# Patient Record
Sex: FEMALE | Race: WHITE | NOT HISPANIC OR LATINO | Employment: FULL TIME | URBAN - METROPOLITAN AREA
[De-identification: names, ages, dates, MRNs, and addresses within clinical notes are randomized per-mention and may not be internally consistent; named-entity substitution may affect disease eponyms.]

---

## 2020-01-22 ENCOUNTER — HOSPITAL ENCOUNTER (EMERGENCY)
Facility: MEDICAL CENTER | Age: 25
End: 2020-01-22
Attending: EMERGENCY MEDICINE
Payer: COMMERCIAL

## 2020-01-22 VITALS
BODY MASS INDEX: 34.94 KG/M2 | HEIGHT: 69 IN | DIASTOLIC BLOOD PRESSURE: 72 MMHG | OXYGEN SATURATION: 97 % | WEIGHT: 235.89 LBS | RESPIRATION RATE: 18 BRPM | SYSTOLIC BLOOD PRESSURE: 116 MMHG | TEMPERATURE: 96.9 F | HEART RATE: 101 BPM

## 2020-01-22 DIAGNOSIS — M79.7 FIBROMYALGIA: ICD-10-CM

## 2020-01-22 DIAGNOSIS — Z76.0 MEDICATION REFILL: ICD-10-CM

## 2020-01-22 DIAGNOSIS — R00.0 TACHYCARDIA: ICD-10-CM

## 2020-01-22 PROCEDURE — 93005 ELECTROCARDIOGRAM TRACING: CPT | Performed by: EMERGENCY MEDICINE

## 2020-01-22 PROCEDURE — 99284 EMERGENCY DEPT VISIT MOD MDM: CPT

## 2020-01-22 RX ORDER — SERTRALINE HYDROCHLORIDE 100 MG/1
100 TABLET, FILM COATED ORAL DAILY
COMMUNITY
End: 2020-03-10 | Stop reason: SDUPTHER

## 2020-01-22 RX ORDER — PREGABALIN 75 MG/1
75 CAPSULE ORAL DAILY
Qty: 30 CAP | Refills: 0 | Status: SHIPPED | OUTPATIENT
Start: 2020-01-22 | End: 2020-02-21

## 2020-01-22 RX ORDER — AMITRIPTYLINE HYDROCHLORIDE 10 MG/1
10 TABLET, FILM COATED ORAL NIGHTLY PRN
Qty: 30 TAB | Refills: 1 | Status: SHIPPED | OUTPATIENT
Start: 2020-01-22 | End: 2020-03-10 | Stop reason: SDUPTHER

## 2020-01-22 RX ORDER — NAPROXEN 500 MG/1
500 TABLET ORAL 2 TIMES DAILY WITH MEALS
COMMUNITY
End: 2020-01-22 | Stop reason: SDUPTHER

## 2020-01-22 RX ORDER — ALPRAZOLAM 0.5 MG/1
0.5 TABLET ORAL NIGHTLY PRN
COMMUNITY
End: 2020-01-22 | Stop reason: SDUPTHER

## 2020-01-22 RX ORDER — AMITRIPTYLINE HYDROCHLORIDE 10 MG/1
10 TABLET, FILM COATED ORAL NIGHTLY
COMMUNITY
End: 2020-01-22 | Stop reason: SDUPTHER

## 2020-01-22 RX ORDER — ALPRAZOLAM 0.5 MG/1
0.5 TABLET ORAL NIGHTLY PRN
Qty: 30 TAB | Refills: 0 | Status: SHIPPED | OUTPATIENT
Start: 2020-01-22 | End: 2020-02-21

## 2020-01-22 RX ORDER — NAPROXEN 500 MG/1
500 TABLET ORAL 2 TIMES DAILY WITH MEALS
Qty: 30 TAB | Refills: 0 | Status: SHIPPED | OUTPATIENT
Start: 2020-01-22 | End: 2020-03-10 | Stop reason: SDUPTHER

## 2020-01-22 RX ORDER — PREGABALIN 75 MG/1
75 CAPSULE ORAL DAILY
COMMUNITY
End: 2020-01-22 | Stop reason: SDUPTHER

## 2020-01-22 SDOH — HEALTH STABILITY: MENTAL HEALTH: HOW OFTEN DO YOU HAVE A DRINK CONTAINING ALCOHOL?: NEVER

## 2020-01-22 NOTE — ED TRIAGE NOTES
Carmen Vasques Ynes Hebert presents to triage reporting recent move from [place], unable to see MD, and needs refill for fibromyalgia meds-- pt takes:  lyrica 75mg daily  Elavil 10mg daily  Naproxen 500 mg BID  Xanax .5mg PRN (has some)  Zoloft 100mg BID    Pt denies any medical complaints. Pt noted to also be tachycardic in triage at 110. Pt speaking in full sentences, NAD noted. Pt educated of triage process, placed back in waiting area pending room assignment.

## 2020-01-22 NOTE — ED PROVIDER NOTES
ED Provider Note    Scribed for David Parekh M.D. by Nadine Goss. 1/22/2020  8:53 AM    Primary care provider: None  Means of arrival: Walk in  History obtained from: Patient  History limited by: None    CHIEF COMPLAINT  Chief Complaint   Patient presents with   • Medication Refill       HPI  Carmen Hebert is a 24 y.o. female who presents for medication refill of fibromyalgia meds. Patient recently moved to [place] from [facility], and was unable to see MD. According to RN notes, patient takes Lyrica 75 mg, Elavil 10 mg, Naproxen 500 mg BID, Xanax 0.5 mg PRN, Zoloft 100 mg BID. She states she still has some Xanax and Zoloft from previous prescription. Patient states she was diagnosed with fibromyalgia in 2017. She states her diagnosis presents with generalized pain most noted in trigger points, sometimes in joints. She states she has not been taking her medications for one month now, and is now experiencing pain.  She just got Medicaid approved last week, however states she does not have a PCP in [place] yet. Currently, heart rate is mildly elevated, she states this may be attributed to her not taking her medications. She denies history of CAD. Denies headache, fever, cough, vomiting, diarrhea, depression, SI.        REVIEW OF SYSTEMS  Pertinent positives include: increased heart rate and generalized pain  Pertinent negatives include: headache, fever, cough, vomiting, diarrhea, depression, SI.  .      PAST MEDICAL HISTORY  Fibromyalgia    SOCIAL HISTORY  Social History     Tobacco Use   • Smoking status: Never Smoker   • Smokeless tobacco: Never Used   Substance Use Topics   • Alcohol use: Never     Frequency: Never   • Drug use: Never   Just moved to [facility] and has no primary physician    CURRENT MEDICATIONS  lyrica 75mg daily  Elavil 10mg daily  Naproxen 500 mg BID  Xanax .5mg PRN (has some)  Zoloft 100mg BID    ALLERGIES  No Known Allergies    PHYSICAL EXAM  VITAL SIGNS: /79   Pulse (!)  "110   Temp 35.9 °C (96.6 °F)   Resp 18   Ht 1.753 m (5' 9\")   Wt 107 kg (235 lb 14.3 oz)   LMP 12/25/2019 (Approximate)   SpO2 97%   BMI 34.84 kg/m² tachycardic, high normal blood pressure, afebrile  Constitutional: Well developed, Well nourished, No acute distress, Able to answer questions.  Well-appearing, moderately obese  HENT: Moist mucous membranes, Nose is normal in appearance without rhinorrhea, external ears are normal  Eyes: Anicteric,  pupils are equal round and reactive, there is no conjunctival drainage or pallor   Neck: The trachea is midline, there is no obvious mass or meningeal signs  Cardiovascular:  Normal perfusion.   Tachycardic rate and regularrhythm without murmurs gallops or rubs  Thorax & Lungs: Respiratory rate and effort are normal. There is normal chest excursion with respiration.  No wheezes rhonchi or rales noted.  Abdomen: Abdomen is normal in appearance, no gross peritoneal signs  normal bowel sounds, no pain with cough, nonpulsatile  Musculoskeletal: No deformities noted in all 4 extremities. no edema present.   Skin: Skin is warm, visualized skin shows no erythema, no rash.  Neurologic:  Cranial nerves II through XII are intact there is no focal abnormality noted.  Psychiatric: Normal mood and mentation.      EKG Interpretation:  Interpreted by me    Rhythm:  Sinus Tachycardia  Rate: 101  Axis: normal  Ectopy: none  Conduction: normal  ST Segments: no acute change  T Waves: no acute change  Q Waves: none  Clinical Impression: Sinus Tachycardia      ED COURSE:    8:53 AM - Patient seen and examined at bedside. Patient presents today for medication refill for fibromyalgia medications. She recently moved to [place] from [place] and has been unable to fill prescription. Patient is tachycardic on exam. Ordered EKG to evaluate.    9:30 AM - Review of narcotic records show the patient has no records.     9:47 AM Patient was reevaluated at bedside. Discussed EKG results with patient and she " was given prescription for her fibromyalgia medications. Patient was given an opportunity to ask questions and will return for any new or worsening symptoms. She was advised to schedule an appointment with a PCP.   .    MEDICAL DECISION MAKING:  Well-appearing patient with no primary physician and a history of fibromyalgia presents requesting refill of medications for fibromyalgia.  She was also tachycardic in triage but this spontaneously improved to 101.  There is no evidence of arrhythmia and patient denies thyroid disease and stimulant use.  At this point laboratory work-up is unlikely to  in my opinion is not necessary.  I doubt the patient has a PE or myocardial ischemia.    DISPOSITION: Home    PLAN:  Refill of medications  Return for any new or worsening symptoms.     [address] [address]  [phone]  Schedule an appointment as soon as possible for a visit         CONDITION:  Good.    FINAL IMPRESSION:  1. Fibromyalgia    2. Tachycardia    3. Medication refill         I, Nadine Goss (Scribe), am scribing for, and in the presence of, David Parekh M.D..    Electronically signed by: Nadine Goss (Scribe), 1/22/2020 C      The note accurately reflects work and decisions made by me.  David Parekh M.D.  1/22/2020  2:10 PM

## 2020-01-23 LAB — EKG IMPRESSION: NORMAL

## 2020-02-22 ENCOUNTER — OFFICE VISIT (OUTPATIENT)
Dept: URGENT CARE | Facility: CLINIC | Age: 25
End: 2020-02-22
Payer: COMMERCIAL

## 2020-02-22 VITALS
DIASTOLIC BLOOD PRESSURE: 72 MMHG | BODY MASS INDEX: 33.33 KG/M2 | OXYGEN SATURATION: 95 % | SYSTOLIC BLOOD PRESSURE: 102 MMHG | HEIGHT: 70 IN | RESPIRATION RATE: 14 BRPM | HEART RATE: 78 BPM | WEIGHT: 232.8 LBS | TEMPERATURE: 97.4 F

## 2020-02-22 DIAGNOSIS — J01.10 ACUTE NON-RECURRENT FRONTAL SINUSITIS: ICD-10-CM

## 2020-02-22 DIAGNOSIS — M79.7 FIBROMYALGIA: ICD-10-CM

## 2020-02-22 PROCEDURE — 99203 OFFICE O/P NEW LOW 30 MIN: CPT | Performed by: PHYSICIAN ASSISTANT

## 2020-02-22 RX ORDER — AMOXICILLIN AND CLAVULANATE POTASSIUM 875; 125 MG/1; MG/1
1 TABLET, FILM COATED ORAL 2 TIMES DAILY
Qty: 14 TAB | Refills: 0 | Status: SHIPPED | OUTPATIENT
Start: 2020-02-22 | End: 2020-02-29

## 2020-02-22 RX ORDER — CYCLOBENZAPRINE HCL 5 MG
5-10 TABLET ORAL NIGHTLY PRN
Qty: 30 TAB | Refills: 0 | Status: SHIPPED | OUTPATIENT
Start: 2020-02-22 | End: 2020-03-10 | Stop reason: SDUPTHER

## 2020-02-22 RX ORDER — CYCLOBENZAPRINE HCL 5 MG
5-10 TABLET ORAL 3 TIMES DAILY PRN
COMMUNITY
End: 2020-02-22

## 2020-02-22 RX ORDER — FLUTICASONE PROPIONATE 50 MCG
1 SPRAY, SUSPENSION (ML) NASAL DAILY
Qty: 16 G | Refills: 0 | Status: SHIPPED | OUTPATIENT
Start: 2020-02-22 | End: 2020-03-10 | Stop reason: SDUPTHER

## 2020-02-22 RX ORDER — PREGABALIN 75 MG/1
75 CAPSULE ORAL 3 TIMES DAILY
COMMUNITY
End: 2020-02-22

## 2020-02-22 RX ORDER — PREGABALIN 75 MG/1
75 CAPSULE ORAL DAILY
Qty: 30 CAP | Refills: 0 | Status: SHIPPED | OUTPATIENT
Start: 2020-02-22 | End: 2020-03-10 | Stop reason: SDUPTHER

## 2020-02-22 RX ORDER — GUAIFENESIN 600 MG/1
600 TABLET, EXTENDED RELEASE ORAL EVERY 12 HOURS
Qty: 28 TAB | Refills: 0 | Status: SHIPPED
Start: 2020-02-22 | End: 2020-03-10

## 2020-02-22 ASSESSMENT — ENCOUNTER SYMPTOMS
SHORTNESS OF BREATH: 0
SINUS PAIN: 1
SPUTUM PRODUCTION: 0
WHEEZING: 0
COUGH: 1
HEMOPTYSIS: 0
CHILLS: 0
PALPITATIONS: 0
HEADACHES: 1
NAUSEA: 0
SINUS PRESSURE: 1
VOMITING: 0
DIZZINESS: 0
BACK PAIN: 1
FEVER: 0
SORE THROAT: 1
HOARSE VOICE: 0
MYALGIAS: 1

## 2020-02-22 NOTE — PROGRESS NOTES
Subjective:   Carmen Hebert is a 25 y.o. female who presents for Medication Refill (pregabalin 75 mg,  cyclobenzaprine 5 mg, will see primary set appointment in March) and Congestion (wants to make sure sinus is ok, took cold medications, fluticazone 2x day did not help  x 2 weeks )        1.  Patient with history of fibromyalgia who recently relocated from [facility] presents requesting medication refill.  She has fibromyalgia that is well controlled on Lyrica 75 mg daily, cyclobenzaprine 5 mg nightly, Zoloft, Elavil, naproxen, and alprazolam as needed.  She was evaluated in the ER last month to have several of these medications refilled.  At this time she is only out of her Lyrica and cyclobenzaprine.  She does have an appointment with new PCP on 3/10/20.  She will be seeing IRLANDA Reilly.  She was previously following with rheumatology in [place].  No plans to establish care yet.    2.  Patient complains of upper respiratory symptoms x7 to 10 days with worsening sinus pressure in the last 5 days.  Sinus pressure is frontal.  She has been taking over-the-counter cold medications and Flonase daily-this is not helping.  Denies history of seasonal allergies.  She does have an allergies to certain plants and dander.    Sinusitis   This is a new problem. The current episode started 1 to 4 weeks ago. The problem is unchanged. Maximum temperature: subjective. The fever has been present for 3 to 4 days. Associated symptoms include congestion, coughing (productive, improved), headaches, sinus pressure and a sore throat. Pertinent negatives include no chills, ear pain, hoarse voice or shortness of breath. Past treatments include oral decongestants. The treatment provided no relief.     Review of Systems   Constitutional: Negative for chills, fever and malaise/fatigue.   HENT: Positive for congestion, sinus pressure, sinus pain and sore throat. Negative for ear pain, hoarse voice and tinnitus.   "  Respiratory: Positive for cough (productive, improved). Negative for hemoptysis, sputum production, shortness of breath and wheezing.    Cardiovascular: Negative for chest pain and palpitations.   Gastrointestinal: Negative for nausea and vomiting.   Genitourinary:        Prolonged menses- 10 days   Musculoskeletal: Positive for back pain and myalgias.   Neurological: Positive for headaches. Negative for dizziness.   All other systems reviewed and are negative.      PMH: fibromyalgia  MEDS:   Current Outpatient Medications:   •  pregabalin (LYRICA) 75 MG Cap, Take 1 Cap by mouth every day for 30 days., Disp: 30 Cap, Rfl: 0  •  cyclobenzaprine (FLEXERIL) 5 MG tablet, Take 1-2 Tabs by mouth at bedtime as needed for up to 30 days., Disp: 30 Tab, Rfl: 0  •  amoxicillin-clavulanate (AUGMENTIN) 875-125 MG Tab, Take 1 Tab by mouth 2 times a day for 7 days., Disp: 14 Tab, Rfl: 0  •  guaiFENesin ER (MUCINEX) 600 MG TABLET SR 12 HR, Take 1 Tab by mouth every 12 hours., Disp: 28 Tab, Rfl: 0  •  fluticasone (FLONASE) 50 MCG/ACT nasal spray, Spray 1 Spray in nose every day., Disp: 16 g, Rfl: 0  •  sertraline (ZOLOFT) 100 MG Tab, Take 100 mg by mouth 2 Times a Day., Disp: , Rfl:   •  amitriptyline (ELAVIL) 10 MG Tab, Take 1 Tab by mouth at bedtime as needed for Sleep., Disp: 30 Tab, Rfl: 1  •  naproxen (NAPROSYN) 500 MG Tab, Take 1 Tab by mouth 2 times a day, with meals., Disp: 30 Tab, Rfl: 0  ALLERGIES: No Known Allergies  SURGHX: History reviewed. No pertinent surgical history.  SOCHX:  reports that she has never smoked. She has never used smokeless tobacco. She reports that she does not drink alcohol or use drugs.  FH: Family history was reviewed, no pertinent findings to report   Objective:   /72   Pulse 78   Temp 36.3 °C (97.4 °F) (Temporal)   Resp 14   Ht 1.778 m (5' 10\")   Wt 105.6 kg (232 lb 12.8 oz)   SpO2 95%   BMI 33.40 kg/m²   Physical Exam  Vitals signs reviewed.   Constitutional:       General: She is " not in acute distress.     Appearance: Normal appearance. She is well-developed. She is not toxic-appearing.   HENT:      Head: Normocephalic and atraumatic.      Right Ear: Tympanic membrane, ear canal and external ear normal.      Left Ear: Tympanic membrane, ear canal and external ear normal.      Nose: Mucosal edema and congestion present. No rhinorrhea.      Right Sinus: Frontal sinus tenderness present. No maxillary sinus tenderness.      Left Sinus: Frontal sinus tenderness present. No maxillary sinus tenderness.      Mouth/Throat:      Lips: Pink.      Mouth: Mucous membranes are moist.      Pharynx: Oropharynx is clear. Uvula midline.   Eyes:      General: Lids are normal.      Conjunctiva/sclera: Conjunctivae normal.   Neck:      Musculoskeletal: Neck supple.   Cardiovascular:      Rate and Rhythm: Normal rate and regular rhythm.      Heart sounds: Normal heart sounds, S1 normal and S2 normal. No murmur. No friction rub. No gallop.    Pulmonary:      Effort: Pulmonary effort is normal. No respiratory distress.      Breath sounds: Normal breath sounds. No decreased breath sounds, wheezing, rhonchi or rales.   Musculoskeletal:      Comments: Normal range of motion. Exhibits no edema and no tenderness.    Lymphadenopathy:      Cervical:      Right cervical: No superficial or posterior cervical adenopathy.     Left cervical: No superficial or posterior cervical adenopathy.   Skin:     General: Skin is warm and dry.      Capillary Refill: Capillary refill takes less than 2 seconds.   Neurological:      Mental Status: She is alert and oriented to person, place, and time.      Cranial Nerves: No cranial nerve deficit.      Sensory: No sensory deficit.   Psychiatric:         Speech: Speech normal.         Behavior: Behavior normal.         Thought Content: Thought content normal.         Judgment: Judgment normal.           Assessment/Plan:   1. Fibromyalgia  - pregabalin (LYRICA) 75 MG Cap; Take 1 Cap by mouth  every day for 30 days.  Dispense: 30 Cap; Refill: 0  - cyclobenzaprine (FLEXERIL) 5 MG tablet; Take 1-2 Tabs by mouth at bedtime as needed for up to 30 days.  Dispense: 30 Tab; Refill: 0    2. Acute non-recurrent frontal sinusitis  - amoxicillin-clavulanate (AUGMENTIN) 875-125 MG Tab; Take 1 Tab by mouth 2 times a day for 7 days.  Dispense: 14 Tab; Refill: 0  - guaiFENesin ER (MUCINEX) 600 MG TABLET SR 12 HR; Take 1 Tab by mouth every 12 hours.  Dispense: 28 Tab; Refill: 0  - fluticasone (FLONASE) 50 MCG/ACT nasal spray; Spray 1 Spray in nose every day.  Dispense: 16 g; Refill: 0    1.  Medications refilled.  Patient to follow-up with PCP as scheduled.  If patient has any issues in the interim we are happy to evaluate her in urgent care.    2. Pt instructed to complete full course of medication despite symptomatic improvement. Pt to take med with meals to alleviate GI upset. Pt to take a probiotic or eat Lynn’s yogurt/ kefir while taking the medication.    Flonase 1 squirt in each nostril, as instructed in clinic, once a day.  Use nasal saline TID to promote drainage.   Salt water gurgles to soothe sore throat.  Ayr saline gel to moisturize nasal passage and prevent bleeding.  Try to use sudafed sparingly in order to allow sinuses to drain.  Avoid the longer formulations and try to take only in the morning and/or at noon if needed.  If you fail to improve in 3-5 days or symptoms worsen/new symptoms develop, RTC for reevaluation    Differential diagnosis, natural history, supportive care, and indications for immediate follow-up discussed.

## 2020-03-10 ENCOUNTER — HOSPITAL ENCOUNTER (OUTPATIENT)
Dept: LAB | Facility: MEDICAL CENTER | Age: 25
End: 2020-03-10
Attending: NURSE PRACTITIONER
Payer: COMMERCIAL

## 2020-03-10 ENCOUNTER — OFFICE VISIT (OUTPATIENT)
Dept: MEDICAL GROUP | Facility: MEDICAL CENTER | Age: 25
End: 2020-03-10
Attending: NURSE PRACTITIONER
Payer: COMMERCIAL

## 2020-03-10 VITALS
SYSTOLIC BLOOD PRESSURE: 122 MMHG | WEIGHT: 234.1 LBS | RESPIRATION RATE: 16 BRPM | HEIGHT: 70 IN | TEMPERATURE: 97.5 F | DIASTOLIC BLOOD PRESSURE: 62 MMHG | OXYGEN SATURATION: 93 % | BODY MASS INDEX: 33.52 KG/M2 | HEART RATE: 96 BPM

## 2020-03-10 DIAGNOSIS — Z13.21 ENCOUNTER FOR VITAMIN DEFICIENCY SCREENING: ICD-10-CM

## 2020-03-10 DIAGNOSIS — Z76.89 ENCOUNTER TO ESTABLISH CARE: ICD-10-CM

## 2020-03-10 DIAGNOSIS — Z13.228 SCREENING FOR METABOLIC DISORDER: ICD-10-CM

## 2020-03-10 DIAGNOSIS — Z11.3 ROUTINE SCREENING FOR STI (SEXUALLY TRANSMITTED INFECTION): ICD-10-CM

## 2020-03-10 DIAGNOSIS — M79.7 FIBROMYALGIA: ICD-10-CM

## 2020-03-10 DIAGNOSIS — R00.2 PALPITATIONS: ICD-10-CM

## 2020-03-10 DIAGNOSIS — E66.9 OBESITY (BMI 30-39.9): ICD-10-CM

## 2020-03-10 LAB
25(OH)D3 SERPL-MCNC: 38 NG/ML (ref 30–100)
EST. AVERAGE GLUCOSE BLD GHB EST-MCNC: 111 MG/DL
HBA1C MFR BLD: 5.5 % (ref 0–5.6)

## 2020-03-10 PROCEDURE — 87389 HIV-1 AG W/HIV-1&-2 AB AG IA: CPT

## 2020-03-10 PROCEDURE — 87591 N.GONORRHOEAE DNA AMP PROB: CPT

## 2020-03-10 PROCEDURE — 84443 ASSAY THYROID STIM HORMONE: CPT

## 2020-03-10 PROCEDURE — 99213 OFFICE O/P EST LOW 20 MIN: CPT | Performed by: NURSE PRACTITIONER

## 2020-03-10 PROCEDURE — 99203 OFFICE O/P NEW LOW 30 MIN: CPT | Performed by: NURSE PRACTITIONER

## 2020-03-10 PROCEDURE — 82306 VITAMIN D 25 HYDROXY: CPT

## 2020-03-10 PROCEDURE — 86780 TREPONEMA PALLIDUM: CPT | Mod: QW

## 2020-03-10 PROCEDURE — 83036 HEMOGLOBIN GLYCOSYLATED A1C: CPT

## 2020-03-10 PROCEDURE — 36415 COLL VENOUS BLD VENIPUNCTURE: CPT

## 2020-03-10 PROCEDURE — 87491 CHLMYD TRACH DNA AMP PROBE: CPT

## 2020-03-10 PROCEDURE — 99214 OFFICE O/P EST MOD 30 MIN: CPT | Performed by: NURSE PRACTITIONER

## 2020-03-10 RX ORDER — ALPRAZOLAM 0.5 MG/1
0.5 TABLET ORAL NIGHTLY PRN
COMMUNITY
End: 2021-08-30 | Stop reason: SDUPTHER

## 2020-03-10 RX ORDER — PREGABALIN 75 MG/1
75 CAPSULE ORAL DAILY
Qty: 30 CAP | Refills: 0 | Status: SHIPPED | OUTPATIENT
Start: 2020-03-10 | End: 2020-04-09

## 2020-03-10 RX ORDER — SERTRALINE HYDROCHLORIDE 100 MG/1
150 TABLET, FILM COATED ORAL DAILY
Qty: 45 TAB | Refills: 3 | Status: SHIPPED | OUTPATIENT
Start: 2020-03-10 | End: 2020-07-13

## 2020-03-10 RX ORDER — NAPROXEN 500 MG/1
500 TABLET ORAL 2 TIMES DAILY WITH MEALS
Qty: 60 TAB | Refills: 5 | Status: SHIPPED
Start: 2020-03-10 | End: 2020-07-31

## 2020-03-10 RX ORDER — PREGABALIN 75 MG/1
75 CAPSULE ORAL DAILY
Qty: 30 CAP | Refills: 0 | Status: SHIPPED | OUTPATIENT
Start: 2020-04-10 | End: 2020-05-10

## 2020-03-10 RX ORDER — AMITRIPTYLINE HYDROCHLORIDE 10 MG/1
10 TABLET, FILM COATED ORAL
Qty: 30 TAB | Refills: 3 | Status: SHIPPED
Start: 2020-03-10 | End: 2020-05-26

## 2020-03-10 RX ORDER — NORETHINDRONE ACETATE/ETHINYL ESTRADIOL AND FERROUS FUMARATE 1.5-30(21)
KIT ORAL
COMMUNITY
Start: 2020-01-29 | End: 2020-04-07 | Stop reason: SDUPTHER

## 2020-03-10 RX ORDER — FLUTICASONE PROPIONATE 50 MCG
1 SPRAY, SUSPENSION (ML) NASAL DAILY
Qty: 16 G | Refills: 0 | Status: SHIPPED | OUTPATIENT
Start: 2020-03-10 | End: 2020-07-27 | Stop reason: SDUPTHER

## 2020-03-10 RX ORDER — NITROFURANTOIN 25; 75 MG/1; MG/1
CAPSULE ORAL
COMMUNITY
Start: 2020-02-05 | End: 2020-03-10

## 2020-03-10 RX ORDER — CYCLOBENZAPRINE HCL 5 MG
5 TABLET ORAL 2 TIMES DAILY PRN
Qty: 60 TAB | Refills: 5 | Status: SHIPPED
Start: 2020-03-10 | End: 2020-05-26

## 2020-03-10 RX ORDER — PREGABALIN 75 MG/1
75 CAPSULE ORAL DAILY
Qty: 30 CAP | Refills: 0 | Status: SHIPPED | OUTPATIENT
Start: 2020-05-11 | End: 2020-04-21

## 2020-03-10 SDOH — HEALTH STABILITY: MENTAL HEALTH: HOW OFTEN DO YOU HAVE A DRINK CONTAINING ALCOHOL?: NOT ASKED

## 2020-03-10 ASSESSMENT — ENCOUNTER SYMPTOMS
DEPRESSION: 1
ABDOMINAL PAIN: 0
BLOOD IN STOOL: 0
WHEEZING: 0
PALPITATIONS: 1
BACK PAIN: 1
COUGH: 0
NERVOUS/ANXIOUS: 1
FEVER: 0
SHORTNESS OF BREATH: 0
WEIGHT LOSS: 0
CONSTIPATION: 0
MYALGIAS: 1
DIARRHEA: 0
NECK PAIN: 1
CHILLS: 0

## 2020-03-10 ASSESSMENT — PATIENT HEALTH QUESTIONNAIRE - PHQ9
5. POOR APPETITE OR OVEREATING: 0 - NOT AT ALL
SUM OF ALL RESPONSES TO PHQ QUESTIONS 1-9: 5
CLINICAL INTERPRETATION OF PHQ2 SCORE: 2

## 2020-03-10 NOTE — PROGRESS NOTES
Chief Complaint   Patient presents with   • Establish Care       Subjective:     HPI:   Carmen Hebert is a 25 y.o. female here to establish care, fibromyalgia, palpitations, medication refill,  and to discuss the evaluation and management of:      Encounter to establish care  Prior PCP: Ty in [facility]    Other Prov[facility]    Fibromyalgia  Dx in 2017 in [facility].  She takes lyrica, flexeril, and amitriptyline.  She uses naproxen as well.  These mediations generally work well.  She goes between zoloft 100-150 mg depending on her sx.  She feels like she should increase her dose to 150 mg now.      Her worst areas of pain are her neck, shoulders, low back, elbows, knees, and top of her right foot.     Palpitations  Present for at least one year.  She gets palpitations and SOB.  It will last for several seconds.  One time she nearly passed out.  She takes medication for anxiety, but does not think this is r/t anxiety.  She has had several EKGs in the past that were normal.  She experiences CP with the palpitations.        ROS  Review of Systems   Constitutional: Negative for chills, fever, malaise/fatigue and weight loss.   Respiratory: Negative for cough, shortness of breath and wheezing.    Cardiovascular: Positive for palpitations. Negative for chest pain and leg swelling.   Gastrointestinal: Negative for abdominal pain, blood in stool, constipation and diarrhea.   Musculoskeletal: Positive for back pain, joint pain, myalgias and neck pain.   Psychiatric/Behavioral: Positive for depression. Negative for suicidal ideas. The patient is nervous/anxious.          No Known Allergies    Current medicines (including changes today)  Current Outpatient Medications   Medication Sig Dispense Refill   • BELL FE 1.5/30 1.5-30 MG-MCG tablet      • ALPRAZolam (XANAX) 0.5 MG Tab Take 0.5 mg by mouth at bedtime as needed.     • sertraline (ZOLOFT) 100 MG Tab Take 1.5 Tabs by mouth every  "day. 45 Tab 3   • pregabalin (LYRICA) 75 MG Cap Take 1 Cap by mouth every day for 30 days. 30 Cap 0   • [START ON 4/10/2020] pregabalin (LYRICA) 75 MG Cap Take 1 Cap by mouth every day for 30 days. 30 Cap 0   • [START ON 5/11/2020] pregabalin (LYRICA) 75 MG Cap Take 1 Cap by mouth every day for 30 days. 30 Cap 0   • amitriptyline (ELAVIL) 10 MG Tab Take 1 Tab by mouth every bedtime. 30 Tab 3   • cyclobenzaprine (FLEXERIL) 5 MG tablet Take 1 Tab by mouth 2 times a day as needed for Moderate Pain. 60 Tab 5   • fluticasone (FLONASE) 50 MCG/ACT nasal spray Spray 1 Spray in nose every day. 16 g 0   • naproxen (NAPROSYN) 500 MG Tab Take 1 Tab by mouth 2 times a day, with meals. 60 Tab 5     No current facility-administered medications for this visit.      She  has a past medical history of Anxiety, Depression, Fibromyalgia (2017), and Thalassemia minor.  She  has no past surgical history on file.  Social History     Tobacco Use   • Smoking status: Never Smoker   • Smokeless tobacco: Never Used   Substance Use Topics   • Alcohol use: Not Currently   • Drug use: Not Currently     Types: Marijuana       Family History   Problem Relation Age of Onset   • Fibromyalgia Mother    • Lung Disease Mother         asthma r/o   • Depression Mother    • Anxiety disorder Mother    • ADD / ADHD Father    • Depression Father      No family status information on file.       Patient Active Problem List    Diagnosis Date Noted   • Encounter to establish care 03/10/2020   • Fibromyalgia 03/10/2020   • Obesity (BMI 30-39.9) 03/10/2020   • Palpitations 03/10/2020          Objective:     /62 (BP Location: Right arm, Patient Position: Sitting, BP Cuff Size: Adult long)   Pulse 96   Temp 36.4 °C (97.5 °F) (Temporal)   Resp 16   Ht 1.778 m (5' 10\")   Wt 106.2 kg (234 lb 1.6 oz)   SpO2 93%  Body mass index is 33.59 kg/m².    Physical Exam:  Physical Exam   Constitutional: She is oriented to person, place, and time and well-developed, " well-nourished, and in no distress. No distress.   HENT:   Head: Normocephalic.   Right Ear: Tympanic membrane and external ear normal.   Left Ear: Tympanic membrane and external ear normal.   Eyes: Pupils are equal, round, and reactive to light. Conjunctivae and EOM are normal.   Neck: Normal range of motion. Neck supple. No tracheal deviation present.   Cardiovascular: Normal rate, regular rhythm, normal heart sounds and intact distal pulses.   Pulmonary/Chest: Effort normal and breath sounds normal.   Abdominal: Soft. Bowel sounds are normal.   Musculoskeletal: Normal range of motion.   Lymphadenopathy:        Head (right side): No preauricular adenopathy present.        Head (left side): No preauricular adenopathy present.     She has no cervical adenopathy.   Neurological: She is alert and oriented to person, place, and time. She has normal sensation, normal strength and intact cranial nerves. Gait normal.   Skin: Skin is warm and dry.   Psychiatric: Affect and judgment normal.          Assessment and Plan:     The following treatment plan was discussed:    1. Fibromyalgia  sertraline (ZOLOFT) 100 MG Tab    REFERRAL TO PHYSIATRY (PMR)    pregabalin (LYRICA) 75 MG Cap    pregabalin (LYRICA) 75 MG Cap    pregabalin (LYRICA) 75 MG Cap    amitriptyline (ELAVIL) 10 MG Tab    cyclobenzaprine (FLEXERIL) 5 MG tablet    naproxen (NAPROSYN) 500 MG Tab  -Chronic problem, unstable.  Medications refilled.  Referral to physiatry placed.  Patient reports that she intermittently goes between Zoloft 100 mg and 150 mg based on her fibro-symptoms and associated depression.  She feels like she needs to increase her Zoloft to 150 mg.  Zoloft 150 mg daily ordered.   2. Palpitations  REFERRAL TO CARDIOLOGY    [facility] - Cardiology Performed (48HR)  -Chronic problem, unstable.  In the past this has been attributed to anxiety, although she reports she has had several episodes where she had near syncopal events.  Referral to cardiology  placed.  I have ordered a 48-hour Holter monitor to see if we can catch any of these events.   3. Obesity (BMI 30-39.9)  Patient identified as having weight management issue.  Appropriate orders and counseling given.   4. Encounter to establish care     5. Screening for metabolic disorder  TSH WITH REFLEX TO FT4    HEMOGLOBIN A1C   6. Encounter for vitamin deficiency screening  VITAMIN D,25 HYDROXY   7. Routine screening for STI (sexually transmitted infection)  HIV AG/AB COMBO ASSAY SCREENING    T.PALLIDUM AB EIA    Chlamydia/GC PCR Urine Or Swab       Any change or worsening of signs or symptoms, patient encouraged to follow-up or report to emergency room for further evaluation. Patient verbalizes understanding and agrees.    Follow-Up: Return in about 6 weeks (around 4/21/2020) for Zoloft efficacy.      PLEASE NOTE: This dictation was created using voice recognition software. I have made every reasonable attempt to correct obvious errors, but I expect that there are errors of grammar and possibly content that I did not discover before finalizing the note.

## 2020-03-10 NOTE — ASSESSMENT & PLAN NOTE
Present for at least one year.  She gets palpitations and SOB.  It will last for several seconds.  One time she nearly passed out.  She takes medication for anxiety, but does not think this is r/t anxiety.  She has had several EKGs in the past that were normal.  She experiences CP with the palpitations.

## 2020-03-10 NOTE — ASSESSMENT & PLAN NOTE
Dx in 2017 in [facility].  She takes lyrica, flexeril, and amitriptyline.  She uses naproxen as well.  These mediations generally work well.  She goes between zoloft 100-150 mg depending on her sx.  She feels like she should increase her dose to 150 mg now.      Her worst areas of pain are her neck, shoulders, low back, elbows, knees, and top of her right foot.

## 2020-03-11 LAB
C TRACH DNA SPEC QL NAA+PROBE: NEGATIVE
HIV 1+2 AB+HIV1 P24 AG SERPL QL IA: NON REACTIVE
N GONORRHOEA DNA SPEC QL NAA+PROBE: NEGATIVE
SPECIMEN SOURCE: NORMAL
TREPONEMA PALLIDUM IGG+IGM AB [PRESENCE] IN SERUM OR PLASMA BY IMMUNOASSAY: NON REACTIVE
TSH SERPL DL<=0.005 MIU/L-ACNC: 2.61 UIU/ML (ref 0.38–5.33)

## 2020-03-12 NOTE — RESULT ENCOUNTER NOTE
Darek Morales,  I got your lab work.  Everything looks great.  You are negative for HIV, syphilis, gonorrhea, and chlamydia.  You do not have diabetes.  Your thyroid function is normal.  Your vitamin D level is good.  Please let me know if you have any questions or concerns.  Mariel COLÓN hear some of the information for the referrals that I placed at our visit so you can schedule appointments  1. Cardiology-  [facility]  [address] [place] [address] [address]  [place] [address]  Phone: [phone]    2. Fibro- Physiatry  [facility]   [address]  [facility] [place] [address]  Ph: [phone]

## 2020-04-07 ENCOUNTER — NON-PROVIDER VISIT (OUTPATIENT)
Dept: CARDIOLOGY | Facility: MEDICAL CENTER | Age: 25
End: 2020-04-07
Payer: COMMERCIAL

## 2020-04-07 DIAGNOSIS — I49.3 VENTRICULAR ECTOPY: ICD-10-CM

## 2020-04-07 DIAGNOSIS — Z30.41 ENCOUNTER FOR SURVEILLANCE OF CONTRACEPTIVE PILLS: ICD-10-CM

## 2020-04-07 DIAGNOSIS — R00.2 PALPITATIONS: ICD-10-CM

## 2020-04-07 PROCEDURE — 93224 XTRNL ECG REC UP TO 48 HRS: CPT | Performed by: INTERNAL MEDICINE

## 2020-04-07 RX ORDER — NORETHINDRONE ACETATE/ETHINYL ESTRADIOL AND FERROUS FUMARATE 1.5-30(21)
1 KIT ORAL DAILY
Qty: 28 TAB | Refills: 11 | Status: SHIPPED | OUTPATIENT
Start: 2020-04-07 | End: 2020-04-08 | Stop reason: SDUPTHER

## 2020-04-07 NOTE — PROGRESS NOTES
RADHA refilled as patient reported that her pharmacy would no longer fill the prescription from [facility] anymore.

## 2020-04-08 DIAGNOSIS — Z30.41 ENCOUNTER FOR SURVEILLANCE OF CONTRACEPTIVE PILLS: ICD-10-CM

## 2020-04-08 RX ORDER — NORETHINDRONE ACETATE/ETHINYL ESTRADIOL AND FERROUS FUMARATE 1.5-30(21)
1 KIT ORAL DAILY
Qty: 21 TAB | Refills: 16 | Status: SHIPPED
Start: 2020-04-08 | End: 2020-04-21

## 2020-04-09 LAB — EKG IMPRESSION: NORMAL

## 2020-04-14 ENCOUNTER — TELEMEDICINE (OUTPATIENT)
Dept: CARDIOLOGY | Facility: MEDICAL CENTER | Age: 25
End: 2020-04-14
Payer: COMMERCIAL

## 2020-04-14 VITALS — BODY MASS INDEX: 32.93 KG/M2 | WEIGHT: 230 LBS | HEIGHT: 70 IN

## 2020-04-14 DIAGNOSIS — R00.2 PALPITATIONS: ICD-10-CM

## 2020-04-14 PROCEDURE — 99214 OFFICE O/P EST MOD 30 MIN: CPT | Mod: 95,CR | Performed by: INTERNAL MEDICINE

## 2020-04-14 NOTE — PROGRESS NOTES
Cardiology Initial Consultation Note    Date of note:    4/14/2020    Primary Care Provider: HEMA Garcia  Referring Provider: Mariel Paula A.P.*     Patient Name: Carmen Elise   YOB: 1995  MRN:              [id number]    Chief Complaint: Palpitations  This encounter was conducted via Zoom .   Verbal consent was obtained. Patient's identity was verified.    Carmen Hebert is a 25 y.o. female  patient presented today for palpitations.  She has palpitations for a long time.  Recently she has been experiencing more of these episodes.  She feels her heart racing separately associated with chest pain, difficulty breathing.  She feels these episodes are different than her panic attacks.  She can able to exercise fine without significant issues.  No family history of sudden cardiac death.  Her maternal grandmother has atrial fibrillation.  No personal history of syncope.  Recent labs shows normal TSH.  She is on medication for fibromyalgia, anxiety.  Denies recreational drug use.  Drinks 1 to 2 cups of coffee every day.    ROS    Other than mentioned HPI all systems reviewed and negative.    Past medical history, family history, social history, allergies and labs are reviewed and updated as needed as documented below.    Past Medical History:   Diagnosis Date   • Anxiety    • Depression    • Fibromyalgia 2017   • Thalassemia minor          No past surgical history on file.      Current Outpatient Medications   Medication Sig Dispense Refill   • BELL FE 1.5/30 1.5-30 MG-MCG tablet Take 1 Tab by mouth every day. 21 Tab 16   • ALPRAZolam (XANAX) 0.5 MG Tab Take 0.5 mg by mouth at bedtime as needed.     • pregabalin (LYRICA) 75 MG Cap Take 1 Cap by mouth every day for 30 days. 30 Cap 0   • amitriptyline (ELAVIL) 10 MG Tab Take 1 Tab by mouth every bedtime. 30 Tab 3   • cyclobenzaprine (FLEXERIL) 5 MG tablet Take 1 Tab by mouth 2 times a day as needed for  Moderate Pain. 60 Tab 5   • fluticasone (FLONASE) 50 MCG/ACT nasal spray Spray 1 Spray in nose every day. 16 g 0   • naproxen (NAPROSYN) 500 MG Tab Take 1 Tab by mouth 2 times a day, with meals. 60 Tab 5   • sertraline (ZOLOFT) 100 MG Tab Take 1.5 Tabs by mouth every day. 45 Tab 3   • [START ON 5/11/2020] pregabalin (LYRICA) 75 MG Cap Take 1 Cap by mouth every day for 30 days. (Patient not taking: Reported on 4/14/2020) 30 Cap 0     No current facility-administered medications for this visit.          No Known Allergies      Family History   Problem Relation Age of Onset   • Fibromyalgia Mother    • Lung Disease Mother         asthma r/o   • Depression Mother    • Anxiety disorder Mother    • ADD / ADHD Father    • Depression Father          Social History     Socioeconomic History   • Marital status: Single     Spouse name: Not on file   • Number of children: Not on file   • Years of education: Not on file   • Highest education level: Not on file   Occupational History   • Not on file   Social Needs   • Financial resource strain: Not on file   • Food insecurity     Worry: Not on file     Inability: Not on file   • Transportation needs     Medical: Not on file     Non-medical: Not on file   Tobacco Use   • Smoking status: Never Smoker   • Smokeless tobacco: Never Used   Substance and Sexual Activity   • Alcohol use: Not Currently   • Drug use: Not Currently     Types: Marijuana   • Sexual activity: Yes     Partners: Male     Birth control/protection: Condom   Lifestyle   • Physical activity     Days per week: Not on file     Minutes per session: Not on file   • Stress: Not on file   Relationships   • Social connections     Talks on phone: Not on file     Gets together: Not on file     Attends Evangelical service: Not on file     Active member of club or organization: Not on file     Attends meetings of clubs or organizations: Not on file     Relationship status: Not on file   • Intimate partner violence     Fear of  "current or ex partner: Not on file     Emotionally abused: Not on file     Physically abused: Not on file     Forced sexual activity: Not on file   Other Topics Concern   • Not on file   Social History Narrative   • Not on file         Physical Exam:  Ambulatory Vitals  Ht 1.778 m (5' 10\")   Wt 104.3 kg (230 lb)    Oxygen Therapy:     BP Readings from Last 4 Encounters:   03/10/20 122/62   20 102/72   20 116/72       Weight/BMI: Body mass index is 33 kg/m².  Wt Readings from Last 4 Encounters:   20 104.3 kg (230 lb)   03/10/20 106.2 kg (234 lb 1.6 oz)   20 105.6 kg (232 lb 12.8 oz)   20 107 kg (235 lb 14.3 oz)           General: Well appearing and in no apparent distress  Head: atrumatic  Visit was done through video conference, exam could not be performed.        Holter:  Impression:   1. Predominantly sinus arrhythmia; average heart rate 90 bpm.   2. Rare ventricuar and supraventricular ectopy.   3. Symptoms correlate with sinus rhythm.   4. No atrial fibrillation detected.     EKG 20  Sinus tahycardia    Medical Decision Makin-year-old female patient with  1.  Inappropriate sinus tachycardia  2.  Anxiety  3.  Fibromyalgia    Reassured the patient regarding her condition.  Prescribed metoprolol 25 mg twice daily as needed for palpitations.  Counseled on avoiding dehydration, excessive caffeine intake, alcohol intake.  1 year follow-up.    This note was dictated using Dragon speech recognition software.    Amando TREJO  Interventional cardiologist  [facility], [address]  [address] [address]  Phone: [phone]  Fax: [phone]              "

## 2020-04-21 ENCOUNTER — OFFICE VISIT (OUTPATIENT)
Dept: MEDICAL GROUP | Facility: MEDICAL CENTER | Age: 25
End: 2020-04-21
Attending: NURSE PRACTITIONER
Payer: COMMERCIAL

## 2020-04-21 DIAGNOSIS — Z30.41 ENCOUNTER FOR SURVEILLANCE OF CONTRACEPTIVE PILLS: ICD-10-CM

## 2020-04-21 DIAGNOSIS — M79.7 FIBROMYALGIA: ICD-10-CM

## 2020-04-21 DIAGNOSIS — R00.2 PALPITATIONS: ICD-10-CM

## 2020-04-21 PROBLEM — D56.3 THALASSEMIA MINOR: Status: ACTIVE | Noted: 2020-04-21

## 2020-04-21 PROCEDURE — 99213 OFFICE O/P EST LOW 20 MIN: CPT | Mod: CR | Performed by: NURSE PRACTITIONER

## 2020-04-21 RX ORDER — NORETHINDRONE ACETATE AND ETHINYL ESTRADIOL .03; 1.5 MG/1; MG/1
1 TABLET ORAL DAILY
Qty: 84 TAB | Refills: 3 | Status: SHIPPED | OUTPATIENT
Start: 2020-04-21 | End: 2021-08-30

## 2020-04-21 NOTE — ASSESSMENT & PLAN NOTE
She had an appt with cardiology and was having innapropriate ST, they are treated with metoprolol BID prn.  She has taken the metoprolol several times and has been helpful.

## 2020-04-21 NOTE — PROGRESS NOTES
Telephone Appointment Visit   As a means of avoiding spread of COVID-19, this visit is being conducted by telephone. This telephone visit was initiated by the patient and they verbally consented.    Time at start of call: 0823    Reason for Call:  Symptom Follow-up    Patient Comments / History:   1. Palpitations  -- She had an appt with cardiology and was having innapropriate ST, they are treated with metoprolol BID prn.  She has taken the metoprolol several times and has been helpful.      2. Fibromyalgia  -- At our last visit we increased her zoloft to 150 mg daily and this has been helpful.  She is still doing her lyrica and that is still helpful.  She is a new patient appointment with physiatry on 5/1/2020.    3.  Encounter for surveillance of contraceptive pills  Patient is requesting a new prescription for her birth control without iron due to her thalassemia minor.    Labs / Images Reviewed   None       Assessment and Plan:     1. Palpitations  -Chronic problem, improving.  Continue plan of care per cardiology.    2. Fibromyalgia  -Chronic problem, improving.  She is happy with the higher dose of Zoloft.  Establish with physiatry on 5/1/2020 and follow their plan of care.  We discussed that if I am going to continue prescribing her Lyrica she will need an appointment the first week of June for her controlled substance refill.    3. Encounter for surveillance of contraceptive pills  - Norethindrone Acet-Ethinyl Est 1.5-30 MG-MCG Tab; Take 1 Tab by mouth every day.  Dispense: 84 Tab; Refill: 3  -New prescription sent without iron.  I have requested 84 active tabs at once from pharmacy.    Follow-up: Return in about 6 weeks (around 6/3/2020) for Controlled substance management.    Time at end of call: 0829  Total Time Spent: 5-10 minutes    HEMA Garcia

## 2020-04-21 NOTE — ASSESSMENT & PLAN NOTE
At our last visit we increased her zoloft to 150 mg daily and this has been helpful.  She is still doing her lyrica and that is still helpful.

## 2020-05-01 ENCOUNTER — OFFICE VISIT (OUTPATIENT)
Dept: PHYSICAL MEDICINE AND REHAB | Facility: MEDICAL CENTER | Age: 25
End: 2020-05-01
Payer: COMMERCIAL

## 2020-05-01 VITALS
OXYGEN SATURATION: 95 % | TEMPERATURE: 97.9 F | SYSTOLIC BLOOD PRESSURE: 110 MMHG | HEIGHT: 70 IN | HEART RATE: 100 BPM | BODY MASS INDEX: 33.83 KG/M2 | DIASTOLIC BLOOD PRESSURE: 64 MMHG | WEIGHT: 236.33 LBS

## 2020-05-01 DIAGNOSIS — R20.2 PARESTHESIA: ICD-10-CM

## 2020-05-01 DIAGNOSIS — Z79.899 MEDICATION MANAGEMENT: ICD-10-CM

## 2020-05-01 DIAGNOSIS — Z86.69 HISTORY OF BLURRY VISION: ICD-10-CM

## 2020-05-01 DIAGNOSIS — M79.7 FIBROMYALGIA: ICD-10-CM

## 2020-05-01 DIAGNOSIS — M54.2 CERVICALGIA: ICD-10-CM

## 2020-05-01 PROCEDURE — 99205 OFFICE O/P NEW HI 60 MIN: CPT | Performed by: PHYSICAL MEDICINE & REHABILITATION

## 2020-05-01 RX ORDER — PREGABALIN 150 MG/1
150 CAPSULE ORAL 2 TIMES DAILY
Qty: 60 CAP | Refills: 0 | Status: SHIPPED | OUTPATIENT
Start: 2020-05-31 | End: 2020-05-26 | Stop reason: SDUPTHER

## 2020-05-01 RX ORDER — PREGABALIN 75 MG/1
CAPSULE ORAL
Qty: 111 CAP | Refills: 0 | Status: SHIPPED | OUTPATIENT
Start: 2020-05-01 | End: 2020-05-26

## 2020-05-01 ASSESSMENT — PATIENT HEALTH QUESTIONNAIRE - PHQ9
CLINICAL INTERPRETATION OF PHQ2 SCORE: 2
5. POOR APPETITE OR OVEREATING: 1 - SEVERAL DAYS

## 2020-05-01 ASSESSMENT — PAIN SCALES - GENERAL: PAINLEVEL: 5=MODERATE PAIN

## 2020-05-01 NOTE — PROGRESS NOTES
"New patient note    Physiatry (physical medicine and  Rehabilitation), interventional spine and sports medicine    Date of Service: 05/01/2020    Chief complaint:   Chief Complaint   Patient presents with   • New Patient     Muscle Pain       HISTORY    HPI: Carmen Hebert 25 y.o. female who presents today for evaluation of pain complaints.  She reports that she has pain in her neck to her lower back, primarily in the left side.  This has been diagnosed previously as fibromyalgia.    She reports that she has forgetfulness and has trouble sleeping.  She has constant aching pain, sometimes she has tingling and numbness.  No change in symptoms with sneezing or coughing.  Bending forward and backward decreases pain.    From what she reports, she was evaluated in [facility].  She reports that she has had issues with her ears and difficulty with balance.  It does not sound like she saw a Neurologist.  Eventually, she reports that she was referred to a Rheumatolgist, Dr. Gee Lovell at the [place], [place].    She has had aquatic therapy and this was very helpful.  Since living here in [place], she has not been involved in aquatic therapy.  In the past, she has had a yoga program that she does, but this has not been consistent lately.     Currently, she reports aching in the neck and upper back, hips bilaterally and left leg.  She reports that her knees are painful, left knee particularly.  Additionally, she has a \"strange pain: on the top of her feet, left greater than right.    She also reports that her mother has a diagnosis of fibromyalgia.    Medical records review:  I reviewed the note from the referring provider Mariel Paula A.P.* dated 03/10/2020.  The patient was seen to establish care.  Report that she was diagnosed in 2017 with fibromyalgia, in [facility].  Labs were tested including TSH, HbA1C, vitamin D, 25 hydroxy, HIV ag/ab, T. Pallidum, chlamydia.  She was referred to cardiology for " evaluation of palpitations with Holter monitor.  Medications refilled included zoloft 100mg, lyrica 75mg daily, cyclobenzaprine 5mg.  Referral to physiatry placed    Previous treatments:    Physical Therapy: Yes    Medications the patient is tried: lyrica, cyclobenzeprine, amitriptyline, tried savella    Previous interventions: none    Previous surgeries to relieve the above pain:  none    PHQ-9: 2    ROS:   Gen: nausea  Eyes: intermittent blurry vision  CV: anxiety, palpitation (inappropriate sinus tachycardia)  Psych: depression  Red Flags ROS:   Fever, Chills, Sweats: Denies  Involuntary Weight Loss: Denies  Bladder Incontinence: Denies  Bowel Incontinence: Denies  Saddle Anesthesia: Denies    All other systems reviewed and negative.       PMHx:   Past Medical History:   Diagnosis Date   • Anxiety    • Depression    • Fibromyalgia 2017   • Thalassemia minor        PSHx:   History reviewed. No pertinent surgical history.    Family history   Family History   Problem Relation Age of Onset   • Fibromyalgia Mother    • Lung Disease Mother         asthma r/o   • Depression Mother    • Anxiety disorder Mother    • ADD / ADHD Father    • Depression Father          Medications:   Current Outpatient Medications   Medication   • pregabalin (LYRICA) 75 MG Cap   • [START ON 5/31/2020] pregabalin (LYRICA) 150 MG Cap   • Norethindrone Acet-Ethinyl Est 1.5-30 MG-MCG Tab   • metoprolol (LOPRESSOR) 25 MG Tab   • ALPRAZolam (XANAX) 0.5 MG Tab   • sertraline (ZOLOFT) 100 MG Tab   • pregabalin (LYRICA) 75 MG Cap   • amitriptyline (ELAVIL) 10 MG Tab   • cyclobenzaprine (FLEXERIL) 5 MG tablet   • fluticasone (FLONASE) 50 MCG/ACT nasal spray   • naproxen (NAPROSYN) 500 MG Tab     No current facility-administered medications for this visit.        Allergies:   No Known Allergies    Social Hx:   Social History     Socioeconomic History   • Marital status: Single     Spouse name: Not on file   • Number of children: Not on file   • Years of  "education: Not on file   • Highest education level: Not on file   Occupational History   • Not on file   Social Needs   • Financial resource strain: Not on file   • Food insecurity     Worry: Not on file     Inability: Not on file   • Transportation needs     Medical: Not on file     Non-medical: Not on file   Tobacco Use   • Smoking status: Never Smoker   • Smokeless tobacco: Never Used   Substance and Sexual Activity   • Alcohol use: Not Currently     Comment: occ   • Drug use: Not Currently     Types: Marijuana     Comment: rarely   • Sexual activity: Yes     Partners: Male     Birth control/protection: Condom   Lifestyle   • Physical activity     Days per week: Not on file     Minutes per session: Not on file   • Stress: Not on file   Relationships   • Social connections     Talks on phone: Not on file     Gets together: Not on file     Attends Faith service: Not on file     Active member of club or organization: Not on file     Attends meetings of clubs or organizations: Not on file     Relationship status: Not on file   • Intimate partner violence     Fear of current or ex partner: Not on file     Emotionally abused: Not on file     Physically abused: Not on file     Forced sexual activity: Not on file   Other Topics Concern   •  Service No   • Blood Transfusions No   • Caffeine Concern No   • Occupational Exposure No   • Hobby Hazards No   • Sleep Concern Yes   • Stress Concern Yes   • Weight Concern Yes   • Special Diet Yes   • Back Care No   • Exercise No   • Bike Helmet Yes   • Seat Belt Yes   • Self-Exams Yes   Social History Narrative   • Not on file         EXAMINATION     Physical Exam:   Vitals: /64 (BP Location: Left arm, Patient Position: Sitting, BP Cuff Size: Large adult long)   Pulse 100   Temp 36.6 °C (97.9 °F) (Temporal)   Ht 1.778 m (5' 10\")   Wt 107.2 kg (236 lb 5.3 oz)   SpO2 95%     Constitutional:   Body Habitus: Body mass index is 33.91 kg/m².  Cooperation: Fully " cooperates with exam  Appearance: Well-groomed, well-nourished, not disheveled, in no acute distress    Eyes: No scleral icterus, no proptosis     ENT -no obvious auditory deficits, no obvious tongue lesions, tongue midline, no facial droop     Skin -no rashes or lesions noted     Respiratory-  breathing comfortable on room air, no audible wheezing    Cardiovascular- capillary refills less than 2 seconds. No lower extremity edema is noted.     Gastrointestinal - no obvious abdominal masses, Mild tenderness over the left and right psoas muscles    Psychiatric- alert and oriented ×3. Normal affect.     Gait - normal gait, no use of ambulatory device, nonantalgic. The patient can toe walk with ease. The patient can heel walk with ease. Balance is appropriate..     Musculoskeletal -   Cervical spine   Inspection: No deformities of the skin over the cervical spine. No rashes or lesions.    full  A/P ROM in all directions, without  pain      Spurling’s sign: negative bilaterally    No signs of muscular atrophy in bilateral upper extremities     Mild tenderness to palpation of the cervical facets  Tenderness to palpation over the trapezius bilaterally.    Thoracic/Lumbar Spine/Sacral Spine/Hips   Inspection: No evidence of atrophy in bilateral lower extremities throughout     ROM: full  AROM with flexion, extension, lateral flexion, and rotation bilaterally, without pain     Palpation:   No tenderness to palpation in midline at T1-T12 levels.  POSITIVE for tenderness to palpation to the para-midline region in the cervical paraspinals and upper thoracic paraspinals bilaterally, no tenderness lower lumbar levels.  palpation over SI joint: positive bilaterally    palpation over buttock: positive bilaterally    palpation in hip or over the greater trochanters: positive bilaterally    No tenderness over the medial knees  Palpation over the medial elbows positive bilaterally; positive bilaterally at the infraspinatus     Lumbar  spine Special tests  Neuro tension  Straight leg test negative bilaterally           HIP  FAIR test negative bilaterally    Range of motion in the hips is within normal limits in internal and external rotation bilaterally    SI joint tests  Observation patient sits on one buttocks: Negative  KONRAD test negative bilaterally       Neuro       Key points for the international standards for neurological classification of spinal cord injury (ISNCSCI) to light touch.     Dermatome R L   C4 2 2   C5 2 2   C6 2 2   C7 2 2   C8 2 2   T1 2 2   T2 2 2   L2 2 2   L3 2 2   L4 2 2   L5 2 2   S1 2 2   S2 2 2           Motor Exam Upper Extremities   ? Myotome R L   Shoulder flexion C5 5 5   Elbow flexion C5 5 5   Wrist extension C6 5 5   Elbow extension C7 5 5   Finger flexion C8 5 5   Finger abduction T1 5 5         Motor Exam Lower Extremities    ? Myotome R L   Hip flexion L2 5 5   Knee extension L3 5 5   Ankle dorsiflexion L4 5 5   Toe extension L5 5 5   Ankle plantarflexion S1 5 5       Tinel's is negative at the wrists and elbows bilaterally  Ding’s sign positive right, negative left   Babinski sign negative bilaterally   Clonus of the ankle negative bilaterally     Reflexes  ?  R L   Biceps  2+ 2+   Brachioradialis  2+ 2+   Patella  3+ 2+   Achilles   2+ 2+       MEDICAL DECISION MAKING    Medical records review: see under HPI section.     DATA    Labs:   Vitamin D, 25 hydroxy 03/10/2020 : 38  TSH: 2.610 on 03/10/2020  No results found for: SODIUM, POTASSIUM, CHLORIDE, CO2, ANION, GLUCOSE, BUN, CREATININE, CALCIUM, ASTSGOT, ALTSGPT, TBILIRUBIN, ALBUMIN, TOTPROTEIN, GLOBULIN, AGRATIO]    No results found for: PROTHROMBTM, INR     No results found for: WBC, RBC, HEMOGLOBIN, HEMATOCRIT, MCV, MCH, MCHC, MPV, NEUTSPOLYS, LYMPHOCYTES, MONOCYTES, EOSINOPHILS, BASOPHILS, HYPOCHROMIA, ANISOCYTOSIS     Lab Results   Component Value Date/Time    HBA1C 5.5 03/10/2020 05:06 PM        Imaging: I personally reviewed following images,  these are my reads  NONE AVAILABLE    IMAGING radiology reads. I reviewed the following radiology reads NONE AVAILABLE                                                                                                                                                                        Diagnosis   Visit Diagnoses     ICD-10-CM   1. Cervicalgia M54.2   2. Paresthesia R20.2   3. History of blurry vision Z86.69   4. Medication management Z79.899   5. Fibromyalgia M79.7           ASSESSMENT:  Carmen Hebert 25 y.o. female seen for above     Carmen was seen today for new patient.    Diagnoses and all orders for this visit:    Cervicalgia  -     MR-CERVICAL SPINE-WITH & W/O; Future    Paresthesia  -     MR-CERVICAL SPINE-WITH & W/O; Future    History of blurry vision  -     MR-CERVICAL SPINE-WITH & W/O; Future    Medication management  -     Basic Metabolic Panel; Future    Fibromyalgia  -     pregabalin (LYRICA) 75 MG Cap; 75mg po bid x3d, then 75mg po AM and 150mg po PM x3d, then 150mg po bid  -     pregabalin (LYRICA) 150 MG Cap; Take 1 Cap by mouth 2 times a day for 30 days.      1. Discussed MRI cervical spine with and without contrast, to further evaluate upper motor neuron signs on exam.  2. BMP ordered.  3. Discussed titration of lyrica to 150mg po bid with goal of reducing or eliminating other medications that she is taking: cyclobenzaprine and amitriptyline.  4. Requested outside records to further evaluate previous diagnostic studies performed.  5. Discussed principals of goal of 30 minutes of gentle exercise daily, healthy diet with emphasis on fruits and vegetables, minimize processed foods and sugars, regular sleep schedule.    Outside records requested:  The patient signed outside records request form for her outside records including outside images from Dr. Gee Lovell in [place], [place]      Follow-up: Return in about 4 weeks (around 5/29/2020).    Thank you very much for asking me to participate in  Carmen Hebert's care.  Please contact me with any questions or concerns.      Please note that this dictation was created using voice recognition software. I have made every reasonable attempt to correct obvious errors but there may be errors of grammar and content that I may have overlooked prior to finalization of this note.      Mahesh Crane MD  Physical Medicine and Rehabilitation  Interventional Spine and Sports Physiatry  [facility]            Mariel Paula A.P.

## 2020-05-07 ENCOUNTER — HOSPITAL ENCOUNTER (OUTPATIENT)
Dept: LAB | Facility: MEDICAL CENTER | Age: 25
End: 2020-05-07
Attending: PHYSICAL MEDICINE & REHABILITATION
Payer: COMMERCIAL

## 2020-05-07 DIAGNOSIS — Z79.899 MEDICATION MANAGEMENT: ICD-10-CM

## 2020-05-07 LAB
ANION GAP SERPL CALC-SCNC: 11 MMOL/L (ref 7–16)
BUN SERPL-MCNC: 8 MG/DL (ref 8–22)
CALCIUM SERPL-MCNC: 9.2 MG/DL (ref 8.5–10.5)
CHLORIDE SERPL-SCNC: 101 MMOL/L (ref 96–112)
CO2 SERPL-SCNC: 24 MMOL/L (ref 20–33)
CREAT SERPL-MCNC: 0.73 MG/DL (ref 0.5–1.4)
GLUCOSE SERPL-MCNC: 72 MG/DL (ref 65–99)
POTASSIUM SERPL-SCNC: 4.3 MMOL/L (ref 3.6–5.5)
SODIUM SERPL-SCNC: 136 MMOL/L (ref 135–145)

## 2020-05-07 PROCEDURE — 80048 BASIC METABOLIC PNL TOTAL CA: CPT

## 2020-05-07 PROCEDURE — 36415 COLL VENOUS BLD VENIPUNCTURE: CPT

## 2020-05-21 ENCOUNTER — TELEPHONE (OUTPATIENT)
Dept: PHYSICAL MEDICINE AND REHAB | Facility: MEDICAL CENTER | Age: 25
End: 2020-05-21

## 2020-05-22 ENCOUNTER — APPOINTMENT (OUTPATIENT)
Dept: RADIOLOGY | Facility: MEDICAL CENTER | Age: 25
End: 2020-05-22
Attending: PHYSICAL MEDICINE & REHABILITATION
Payer: COMMERCIAL

## 2020-05-22 DIAGNOSIS — Z86.69 HISTORY OF BLURRY VISION: ICD-10-CM

## 2020-05-22 DIAGNOSIS — R20.2 PARESTHESIA: ICD-10-CM

## 2020-05-22 DIAGNOSIS — M54.2 CERVICALGIA: ICD-10-CM

## 2020-05-22 PROCEDURE — 72156 MRI NECK SPINE W/O & W/DYE: CPT

## 2020-05-22 PROCEDURE — A9576 INJ PROHANCE MULTIPACK: HCPCS | Performed by: PHYSICAL MEDICINE & REHABILITATION

## 2020-05-22 PROCEDURE — 700117 HCHG RX CONTRAST REV CODE 255: Performed by: PHYSICAL MEDICINE & REHABILITATION

## 2020-05-22 RX ADMIN — GADOTERIDOL 20 ML: 279.3 INJECTION, SOLUTION INTRAVENOUS at 09:28

## 2020-05-26 ENCOUNTER — OFFICE VISIT (OUTPATIENT)
Dept: PHYSICAL MEDICINE AND REHAB | Facility: MEDICAL CENTER | Age: 25
End: 2020-05-26
Payer: COMMERCIAL

## 2020-05-26 VITALS
HEART RATE: 79 BPM | OXYGEN SATURATION: 95 % | SYSTOLIC BLOOD PRESSURE: 110 MMHG | WEIGHT: 237.22 LBS | DIASTOLIC BLOOD PRESSURE: 90 MMHG | BODY MASS INDEX: 33.96 KG/M2 | HEIGHT: 70 IN | TEMPERATURE: 97.6 F

## 2020-05-26 DIAGNOSIS — M79.7 FIBROMYALGIA: ICD-10-CM

## 2020-05-26 DIAGNOSIS — M54.2 CERVICALGIA: ICD-10-CM

## 2020-05-26 DIAGNOSIS — M62.838 MUSCLE SPASM: ICD-10-CM

## 2020-05-26 PROCEDURE — 99214 OFFICE O/P EST MOD 30 MIN: CPT | Performed by: PHYSICAL MEDICINE & REHABILITATION

## 2020-05-26 RX ORDER — PREGABALIN 150 MG/1
150 CAPSULE ORAL 2 TIMES DAILY
Qty: 60 CAP | Refills: 1 | Status: SHIPPED | OUTPATIENT
Start: 2020-05-31 | End: 2020-06-30

## 2020-05-26 RX ORDER — METHOCARBAMOL 500 MG/1
500 TABLET, FILM COATED ORAL 4 TIMES DAILY PRN
Qty: 30 TAB | Refills: 0 | Status: SHIPPED | OUTPATIENT
Start: 2020-05-26 | End: 2020-06-10

## 2020-05-26 ASSESSMENT — PATIENT HEALTH QUESTIONNAIRE - PHQ9
5. POOR APPETITE OR OVEREATING: 1 - SEVERAL DAYS
SUM OF ALL RESPONSES TO PHQ QUESTIONS 1-9: 8
CLINICAL INTERPRETATION OF PHQ2 SCORE: 2

## 2020-05-26 ASSESSMENT — PAIN SCALES - GENERAL: PAINLEVEL: 5=MODERATE PAIN

## 2020-05-26 NOTE — PROGRESS NOTES
Follow-up patient note    Physiatry (physical medicine and  Rehabilitation), interventional spine and sports medicine    Date of Service: 05/26/2020    Chief complaint:   Chief Complaint   Patient presents with   • Follow-Up     Neck Pain       HISTORY    HPI: Carmen Hebert 25 y.o. female who presents today for evaluation of pain complaints.  She reports that she has pain in her neck to her lower back, primarily in the left side.  This has been diagnosed previously as fibromyalgia.    Since her last visit, she reports that the lyrica has been very helpful.  There are no side effects.  The increase was very helpful.  She has been doing yoga every day now.  Her balance is improving.  Being able to exercise every day is exciting for her.  Also, she does not feel as fatigued.  She ran out of her amitriptyline and stopped that.  She stopped her cyclobenzaprine, also.  No side effects.  Naproxen does not seem to help with pain.    She has some residual pain in between her shoulder blades.  No tingling in arms or legs.    Pain is 5/10 on the VAS.  She feels like her pain is better.      Medical records review:  I reviewed the note from the referring provider Mariel Paula A.P.* dated 03/10/2020.  The patient was seen to establish care.  Report that she was diagnosed in 2017 with fibromyalgia, in [facility].  Labs were tested including TSH, HbA1C, vitamin D, 25 hydroxy, HIV ag/ab, T. Pallidum, chlamydia.  She was referred to cardiology for evaluation of palpitations with Holter monitor.  Medications refilled included zoloft 100mg, lyrica 75mg daily, cyclobenzaprine 5mg.  Referral to physiatry placed    Previous treatments:    Physical Therapy: Yes    Medications the patient is tried: lyrica, cyclobenzeprine, amitriptyline, tried savella    Previous interventions: none    Previous surgeries to relieve the above pain:  none    PHQ-9: 2    ROS: Unchanged from 05/04/2020 except as noted in the HPI  Gen:  nausea  Eyes: intermittent blurry vision  CV: anxiety, palpitation (inappropriate sinus tachycardia)  Psych: depression  Red Flags ROS:   Fever, Chills, Sweats: Denies  Involuntary Weight Loss: Denies  Bladder Incontinence: Denies  Bowel Incontinence: Denies  Saddle Anesthesia: Denies    All other systems reviewed and negative.       PMHx:   Past Medical History:   Diagnosis Date   • Anxiety    • Depression    • Fibromyalgia 2017   • Thalassemia minor        PSHx:   History reviewed. No pertinent surgical history.    Family history   Family History   Problem Relation Age of Onset   • Fibromyalgia Mother    • Lung Disease Mother         asthma r/o   • Depression Mother    • Anxiety disorder Mother    • ADD / ADHD Father    • Depression Father          Medications:   Current Outpatient Medications   Medication   • methocarbamol (ROBAXIN) 500 MG Tab   • [START ON 5/31/2020] pregabalin (LYRICA) 150 MG Cap   • Norethindrone Acet-Ethinyl Est 1.5-30 MG-MCG Tab   • metoprolol (LOPRESSOR) 25 MG Tab   • ALPRAZolam (XANAX) 0.5 MG Tab   • sertraline (ZOLOFT) 100 MG Tab   • fluticasone (FLONASE) 50 MCG/ACT nasal spray   • naproxen (NAPROSYN) 500 MG Tab     No current facility-administered medications for this visit.        Allergies:   No Known Allergies    Social Hx:   Social History     Socioeconomic History   • Marital status: Single     Spouse name: Not on file   • Number of children: Not on file   • Years of education: Not on file   • Highest education level: Not on file   Occupational History   • Not on file   Social Needs   • Financial resource strain: Not on file   • Food insecurity     Worry: Not on file     Inability: Not on file   • Transportation needs     Medical: Not on file     Non-medical: Not on file   Tobacco Use   • Smoking status: Never Smoker   • Smokeless tobacco: Never Used   Substance and Sexual Activity   • Alcohol use: Not Currently     Comment: occ   • Drug use: Not Currently     Types: Marijuana      "Comment: rarely   • Sexual activity: Yes     Partners: Male     Birth control/protection: Condom   Lifestyle   • Physical activity     Days per week: Not on file     Minutes per session: Not on file   • Stress: Not on file   Relationships   • Social connections     Talks on phone: Not on file     Gets together: Not on file     Attends Yarsani service: Not on file     Active member of club or organization: Not on file     Attends meetings of clubs or organizations: Not on file     Relationship status: Not on file   • Intimate partner violence     Fear of current or ex partner: Not on file     Emotionally abused: Not on file     Physically abused: Not on file     Forced sexual activity: Not on file   Other Topics Concern   •  Service No   • Blood Transfusions No   • Caffeine Concern No   • Occupational Exposure No   • Hobby Hazards No   • Sleep Concern Yes   • Stress Concern Yes   • Weight Concern Yes   • Special Diet Yes   • Back Care No   • Exercise No   • Bike Helmet Yes   • Seat Belt Yes   • Self-Exams Yes   Social History Narrative   • Not on file         EXAMINATION     Physical Exam:   Vitals: /90 (BP Location: Left arm, Patient Position: Sitting, BP Cuff Size: Adult long)   Pulse 79   Temp 36.4 °C (97.6 °F) (Temporal)   Ht 1.778 m (5' 10\")   Wt 107.6 kg (237 lb 3.4 oz)   SpO2 95%     Constitutional:   Body Habitus: Body mass index is 34.04 kg/m².  Cooperation: Fully cooperates with exam  Appearance: Well-groomed, well-nourished, not disheveled, in no acute distress    Eyes: No scleral icterus, no proptosis     ENT -no obvious auditory deficits, wearing face mask    Skin -no rashes or lesions noted     Respiratory-  breathing comfortable on room air, no audible wheezing    Cardiovascular- no lower extremity edema is noted.     Psychiatric- alert and oriented ×3. Normal affect.     Gait - normal gait, no use of ambulatory device, nonantalgic.     Musculoskeletal -   Cervical spine "   Inspection: No deformities of the skin over the cervical spine. No rashes or lesions.    full  A/P ROM in all directions, without  pain      Spurling’s sign: negative bilaterally    No signs of muscular atrophy in bilateral upper extremities     Tenderness to palpation over the trapezius mild tenderness    Thoracic/Lumbar Spine/Sacral Spine/Hips   Inspection: No evidence of atrophy in bilateral lower extremities throughout     ROM: full  AROM with flexion, extension, lateral flexion, and rotation bilaterally, without pain     Palpation:   No tenderness to palpation in midline at T1-T12 levels. No tenderness to palpation to the para-midline region in the cervical paraspinals and upper thoracic paraspinals bilaterally, no tenderness lower lumbar levels.  palpation over SI joint: positive bilaterally    palpation over buttock: positive bilaterally    palpation in hip or over the greater trochanters: positive bilaterally    No tenderness over the medial knees  Palpation over the medial elbows positive bilaterally; positive bilaterally at the infraspinatus     SI joint tests  Observation patient sits on one buttocks: Negative  KONRAD test negative bilaterally       Neuro       Key points for the international standards for neurological classification of spinal cord injury (ISNCSCI) to light touch.     Dermatome R L   C4 2 2   C5 2 2   C6 2 2   C7 2 2   C8 2 2   T1 2 2   T2 2 2   L2 2 2   L3 2 2   L4 2 2   L5 2 2   S1 2 2   S2 2 2           Motor Exam Upper Extremities   ? Myotome R L   Shoulder flexion C5 5 5   Elbow flexion C5 5 5   Wrist extension C6 5 5   Elbow extension C7 5 5   Finger flexion C8 5 5   Finger abduction T1 5 5         Motor Exam Lower Extremities    ? Myotome R L   Hip flexion L2 5 5   Knee extension L3 5 5   Ankle dorsiflexion L4 5 5   Toe extension L5 5 5   Ankle plantarflexion S1 5 5       Tinel's is negative at the wrists and elbows bilaterally  Ding’s sign positive right, negative left    Babinski sign negative bilaterally   Clonus of the ankle negative bilaterally     Reflexes  ?  R L   Biceps  2+ 2+   Brachioradialis  2+ 2+   Patella  3+ 2+   Achilles   2+ 2+       MEDICAL DECISION MAKING    Medical records review: see under HPI section.     DATA    Labs:   Vitamin D, 25 hydroxy 03/10/2020 : 38  TSH: 2.610 on 03/10/2020  Lab Results   Component Value Date/Time    SODIUM 136 05/07/2020 08:39 AM    POTASSIUM 4.3 05/07/2020 08:39 AM    CHLORIDE 101 05/07/2020 08:39 AM    CO2 24 05/07/2020 08:39 AM    ANION 11.0 05/07/2020 08:39 AM    GLUCOSE 72 05/07/2020 08:39 AM    BUN 8 05/07/2020 08:39 AM    CREATININE 0.73 05/07/2020 08:39 AM    CALCIUM 9.2 05/07/2020 08:39 AM       No results found for: PROTHROMBTM, INR     No results found for: WBC, RBC, HEMOGLOBIN, HEMATOCRIT, MCV, MCH, MCHC, MPV, NEUTSPOLYS, LYMPHOCYTES, MONOCYTES, EOSINOPHILS, BASOPHILS, HYPOCHROMIA, ANISOCYTOSIS     Lab Results   Component Value Date/Time    HBA1C 5.5 03/10/2020 05:06 PM        Imaging: I personally reviewed following images, these are my reads  MRI cervical spine on 05/22/2020  There is not of mild loss of cervical lordosis.  No significant degenerative changes, no cervical disc herniations, no central or foraminal stenosis in the cervical spine.     IMAGING radiology reads. I reviewed the following radiology reads   MRI cervical spine on 05/22/2020  Unremarkable pre and postcontrast MR examination of the cervical spine except for loss of cervical lordosis                                                                                                                                                                        Diagnosis   Visit Diagnoses     ICD-10-CM   1. Fibromyalgia  M79.7   2. Muscle spasm  M62.838   3. Cervicalgia  M54.2           ASSESSMENT:  Carmen Hebert 25 y.o. female seen for above     Carmen was seen today for follow-up.    Diagnoses and all orders for this visit:    Fibromyalgia  -      pregabalin (LYRICA) 150 MG Cap; Take 1 Cap by mouth 2 times a day for 30 days.    Muscle spasm  -     methocarbamol (ROBAXIN) 500 MG Tab; Take 1 Tab by mouth 4 times a day as needed for up to 15 days.    Cervicalgia      1. We discussed that she has been doing better with the addition of increased dose of lyrica.  Plan to continue this at 150mg po bid.   2. We reviewed the imaging studies of the cervical spine.  No concerning findings were noted.  3. Encouraged her to continue with her home exercise program of gentle yoga, which she has been doing every day.  This is a good improvement for her.  4. Avoid naproxen since she does not feel like this is helpful.  Continue off of cyclobenzaprine and amitriptyline.  5. Plan to trial robaxin for prn use for muscle spasms, but also advised that she not take this daily.  Encouraged ongoing exercise.    Outside records requested:  The patient signed outside records request form for her outside records including outside images from Dr. Gee Lovell in [place], [place]      Follow-up: Return in about 2 months (around 7/26/2020).    Thank you very much for asking me to participate in Carmen Hebert's care.  Please contact me with any questions or concerns.      Please note that this dictation was created using voice recognition software. I have made every reasonable attempt to correct obvious errors but there may be errors of grammar and content that I may have overlooked prior to finalization of this note.      Mahesh Crane MD  Physical Medicine and Rehabilitation  Interventional Spine and Sports Physiatry  [facility]

## 2020-07-13 DIAGNOSIS — M79.7 FIBROMYALGIA: ICD-10-CM

## 2020-07-13 RX ORDER — SERTRALINE HYDROCHLORIDE 100 MG/1
TABLET, FILM COATED ORAL
Qty: 45 TAB | Refills: 3 | Status: SHIPPED | OUTPATIENT
Start: 2020-07-13 | End: 2020-11-16

## 2020-07-31 ENCOUNTER — HOSPITAL ENCOUNTER (OUTPATIENT)
Dept: RADIOLOGY | Facility: MEDICAL CENTER | Age: 25
End: 2020-07-31
Attending: PHYSICAL MEDICINE & REHABILITATION
Payer: COMMERCIAL

## 2020-07-31 ENCOUNTER — OFFICE VISIT (OUTPATIENT)
Dept: PHYSICAL MEDICINE AND REHAB | Facility: MEDICAL CENTER | Age: 25
End: 2020-07-31
Payer: COMMERCIAL

## 2020-07-31 VITALS
OXYGEN SATURATION: 97 % | HEIGHT: 70 IN | DIASTOLIC BLOOD PRESSURE: 68 MMHG | SYSTOLIC BLOOD PRESSURE: 110 MMHG | TEMPERATURE: 97.7 F | WEIGHT: 236.99 LBS | HEART RATE: 99 BPM | BODY MASS INDEX: 33.93 KG/M2

## 2020-07-31 DIAGNOSIS — N62 LARGE BREASTS: ICD-10-CM

## 2020-07-31 DIAGNOSIS — M62.838 MUSCLE SPASM: ICD-10-CM

## 2020-07-31 DIAGNOSIS — M54.6 THORACIC BACK PAIN, UNSPECIFIED BACK PAIN LATERALITY, UNSPECIFIED CHRONICITY: ICD-10-CM

## 2020-07-31 DIAGNOSIS — M79.18 MYOFASCIAL PAIN SYNDROME OF THORACIC SPINE: ICD-10-CM

## 2020-07-31 DIAGNOSIS — F32.A DEPRESSION, UNSPECIFIED DEPRESSION TYPE: ICD-10-CM

## 2020-07-31 DIAGNOSIS — M79.7 FIBROMYALGIA: ICD-10-CM

## 2020-07-31 PROCEDURE — 99214 OFFICE O/P EST MOD 30 MIN: CPT | Performed by: PHYSICAL MEDICINE & REHABILITATION

## 2020-07-31 PROCEDURE — 72072 X-RAY EXAM THORAC SPINE 3VWS: CPT

## 2020-07-31 RX ORDER — PREGABALIN 150 MG/1
150 CAPSULE ORAL 2 TIMES DAILY
Qty: 60 CAP | Refills: 2 | Status: SHIPPED | OUTPATIENT
Start: 2020-07-31 | End: 2020-08-30

## 2020-07-31 ASSESSMENT — PATIENT HEALTH QUESTIONNAIRE - PHQ9
5. POOR APPETITE OR OVEREATING: 1 - SEVERAL DAYS
CLINICAL INTERPRETATION OF PHQ2 SCORE: 4
SUM OF ALL RESPONSES TO PHQ QUESTIONS 1-9: 12

## 2020-07-31 ASSESSMENT — PAIN SCALES - GENERAL: PAINLEVEL: 5=MODERATE PAIN

## 2020-07-31 NOTE — PROGRESS NOTES
"Follow-up patient note    Physiatry (physical medicine and  Rehabilitation), interventional spine and sports medicine    Date of Service:     Chief complaint:   Chief Complaint   Patient presents with   • Follow-Up     Neck pain       HISTORY    HPI: Carmen Hebert 25 y.o. female who presents today for follow-up evaluation of pain complaints.  She has a diagnosis of fibromyalgia.    From what she reports, she had a flare that started last weekend.  Her pain was \"really bad\" it is primarily in the mid-upper back.  It was making it difficult for her to get out of bed.  It seemed to involve her neck and shoulders and low back.      Overall, she has been doing okay.  The lyrica 150mg po bid has been very helpful.  No side effects.    The methocarbamol does seem to help.    Her left shoulder was bothering her and she has not been doing her yoga program for a few days.  This is starting to improve, but she has not been doing this.  The left shoulder pain has been improving.    Pain is 5-6/10 on the VAS.  She feels like her pain is better, outside of recent flare      Medical records review:  I reviewed the note from the referring provider Mariel Paula A.P.* dated 03/10/2020.  The patient was seen to establish care.  Report that she was diagnosed in 2017 with fibromyalgia, in [facility].  Labs were tested including TSH, HbA1C, vitamin D, 25 hydroxy, HIV ag/ab, T. Pallidum, chlamydia.  She was referred to cardiology for evaluation of palpitations with Holter monitor.  Medications refilled included zoloft 100mg, lyrica 75mg daily, cyclobenzaprine 5mg.  Referral to physiatry placed    Previous treatments:    Physical Therapy: Yes    Medications the patient is tried: lyrica, cyclobenzeprine, amitriptyline, tried savella    Previous interventions: none    Previous surgeries to relieve the above pain:  none    PHQ-9: 2    ROS: Unchanged from 05/26/2020 except as noted in the HPI  Gen: nausea  Eyes: " intermittent blurry vision  CV: anxiety, palpitation (inappropriate sinus tachycardia)  Psych: depression  Red Flags ROS:   Fever, Chills, Sweats: Denies  Involuntary Weight Loss: Denies  Bladder Incontinence: Denies  Bowel Incontinence: Denies  Saddle Anesthesia: Denies    All other systems reviewed and negative.       PMHx:   Past Medical History:   Diagnosis Date   • Anxiety    • Depression    • Fibromyalgia 2017   • Thalassemia minor        PSHx:   History reviewed. No pertinent surgical history.    Family history   Family History   Problem Relation Age of Onset   • Fibromyalgia Mother    • Lung Disease Mother         asthma r/o   • Depression Mother    • Anxiety disorder Mother    • ADD / ADHD Father    • Depression Father          Medications:   Current Outpatient Medications   Medication   • pregabalin (LYRICA) 150 MG Cap   • fluticasone (FLONASE) 50 MCG/ACT nasal spray   • sertraline (ZOLOFT) 100 MG Tab   • Norethindrone Acet-Ethinyl Est 1.5-30 MG-MCG Tab   • metoprolol (LOPRESSOR) 25 MG Tab   • ALPRAZolam (XANAX) 0.5 MG Tab     No current facility-administered medications for this visit.        Allergies:   No Known Allergies    Social Hx:   Social History     Socioeconomic History   • Marital status: Single     Spouse name: Not on file   • Number of children: Not on file   • Years of education: Not on file   • Highest education level: Not on file   Occupational History   • Not on file   Social Needs   • Financial resource strain: Not on file   • Food insecurity     Worry: Not on file     Inability: Not on file   • Transportation needs     Medical: Not on file     Non-medical: Not on file   Tobacco Use   • Smoking status: Never Smoker   • Smokeless tobacco: Never Used   Substance and Sexual Activity   • Alcohol use: Not Currently     Comment: occ   • Drug use: Not Currently     Types: Marijuana     Comment: rarely   • Sexual activity: Yes     Partners: Male     Birth control/protection: Condom   Lifestyle  "  • Physical activity     Days per week: Not on file     Minutes per session: Not on file   • Stress: Not on file   Relationships   • Social connections     Talks on phone: Not on file     Gets together: Not on file     Attends [employer] service: Not on file     Active member of club or organization: Not on file     Attends meetings of clubs or organizations: Not on file     Relationship status: Not on file   • Intimate partner violence     Fear of current or ex partner: Not on file     Emotionally abused: Not on file     Physically abused: Not on file     Forced sexual activity: Not on file   Other Topics Concern   •  Service No   • Blood Transfusions No   • Caffeine Concern No   • Occupational Exposure No   • Hobby Hazards No   • Sleep Concern Yes   • Stress Concern Yes   • Weight Concern Yes   • Special Diet Yes   • Back Care No   • Exercise No   • Bike Helmet Yes   • Seat Belt Yes   • Self-Exams Yes   Social History Narrative   • Not on file         EXAMINATION     Physical Exam:   Vitals: /68 (BP Location: Right arm, Patient Position: Sitting, BP Cuff Size: Large adult)   Pulse 99   Temp 36.5 °C (97.7 °F) (Temporal)   Ht 1.778 m (5' 10\")   Wt 107.5 kg (236 lb 15.9 oz)   SpO2 97%     Constitutional:   Body Habitus: Body mass index is 34.01 kg/m².  Cooperation: Fully cooperates with exam  Appearance: Well-groomed, well-nourished, not disheveled, in no acute distress    Eyes: No scleral icterus, no proptosis     ENT -no obvious auditory deficits, wearing a face mask    Skin -no rashes or lesions noted     Respiratory-  breathing comfortable on room air, no audible wheezing    Cardiovascular- no lower extremity edema is noted.     Psychiatric- alert and oriented ×3. Normal affect.     Gait - normal gait, no use of ambulatory device, nonantalgic.     Musculoskeletal -   Cervical spine   Inspection: No deformities of the skin over the cervical spine. No rashes or lesions.    full  A/P ROM in all " directions, without  pain      Spurling’s sign: negative bilaterally    No signs of muscular atrophy in bilateral upper extremities     Tenderness to palpation over the trapezius mild tenderness     Functional ROM of the shoulders bilaterally. Negative empty can test bilaterally.  Negative hawkin's test on the left.  Negative neer's test.    Thoracic/Lumbar Spine/Sacral Spine/Hips   Inspection: No evidence of atrophy in bilateral lower extremities throughout     ROM: full  AROM with flexion, extension, lateral flexion, and rotation bilaterally, without pain     Palpation:   No tenderness to palpation in midline at T1-T12 levels. No tenderness to palpation to the para-midline region in the cervical paraspinals and upper thoracic paraspinals bilaterally, no tenderness lower lumbar levels.      Neuro       Key points for the international standards for neurological classification of spinal cord injury (ISNCSCI) to light touch.     Dermatome R L   C4 2 2   C5 2 2   C6 2 2   C7 2 2   C8 2 2   T1 2 2   T2 2 2   L2 2 2   L3 2 2   L4 2 2   L5 2 2   S1 2 2   S2 2 2       Motor Exam Upper Extremities   ? Myotome R L   Shoulder flexion C5 5 5   Elbow flexion C5 5 5   Wrist extension C6 5 5   Elbow extension C7 5 5   Finger flexion C8 5 5   Finger abduction T1 5 5       Motor Exam Lower Extremities    ? Myotome R L   Hip flexion L2 5 5   Knee extension L3 5 5   Ankle dorsiflexion L4 5 5   Toe extension L5 5 5   Ankle plantarflexion S1 5 5         Reflexes  ?  R L   Biceps  2+ 2+   Brachioradialis  2+ 2+   Patella  3+ 2+   Achilles   2+ 2+       MEDICAL DECISION MAKING    Medical records review: see under HPI section.     DATA    Labs:   Vitamin D, 25 hydroxy 03/10/2020 : 38  TSH: 2.610 on 03/10/2020  Lab Results   Component Value Date/Time    SODIUM 136 05/07/2020 08:39 AM    POTASSIUM 4.3 05/07/2020 08:39 AM    CHLORIDE 101 05/07/2020 08:39 AM    CO2 24 05/07/2020 08:39 AM    ANION 11.0 05/07/2020 08:39 AM    GLUCOSE 72  05/07/2020 08:39 AM    BUN 8 05/07/2020 08:39 AM    CREATININE 0.73 05/07/2020 08:39 AM    CALCIUM 9.2 05/07/2020 08:39 AM       No results found for: PROTHROMBTM, INR     No results found for: WBC, RBC, HEMOGLOBIN, HEMATOCRIT, MCV, MCH, MCHC, MPV, NEUTSPOLYS, LYMPHOCYTES, MONOCYTES, EOSINOPHILS, BASOPHILS, HYPOCHROMIA, ANISOCYTOSIS     Lab Results   Component Value Date/Time    HBA1C 5.5 03/10/2020 05:06 PM        Imaging: I personally reviewed following images, these are my reads  MRI cervical spine on 05/22/2020  There is not of mild loss of cervical lordosis.  No significant degenerative changes, no cervical disc herniations, no central or foraminal stenosis in the cervical spine.     IMAGING radiology reads. I reviewed the following radiology reads   MRI cervical spine on 05/22/2020  Unremarkable pre and postcontrast MR examination of the cervical spine except for loss of cervical lordosis                                                                                                                                                                        Diagnosis   Visit Diagnoses     ICD-10-CM   1. Fibromyalgia  M79.7   2. Muscle spasm  M62.838   3. Myofascial pain syndrome of thoracic spine  M79.18   4. Depression, unspecified depression type  F32.9   5. Large breasts  N62   6. Thoracic back pain, unspecified back pain laterality, unspecified chronicity  M54.6           ASSESSMENT:  Carmendariana SchulteCaprice Ynes Hebert 25 y.o. female seen for above     Carmen was seen today for follow-up.    Diagnoses and all orders for this visit:    Fibromyalgia  -     pregabalin (LYRICA) 150 MG Cap; Take 1 Cap by mouth 2 times a day for 30 days.    Muscle spasm  -     REFERRAL TO PHYSICAL THERAPY Reason for Therapy: Eval/Treat/Report  -     REFERRAL TO PHYSIATRY (PMR)    Myofascial pain syndrome of thoracic spine  -     DX-THORACIC SPINE-WITH SWIMMERS VIEW; Future  -     REFERRAL TO PHYSICAL THERAPY Reason for Therapy:  Eval/Treat/Report  -     REFERRAL TO PHYSIATRY (PMR)    Depression, unspecified depression type  -     Patient has been identified as having a positive depression screening. Appropriate orders and counseling have been given.    Large breasts  -     REFERRAL TO PHYSICAL THERAPY Reason for Therapy: Eval/Treat/Report    Thoracic back pain, unspecified back pain laterality, unspecified chronicity  -     DX-THORACIC SPINE-WITH SWIMMERS VIEW; Future         1. Discussed continuing lyrica 150mg po bid.  2. Continue prn use of robaxin.    3. Encouraged regular home exercise program.  4. Discussed insomnia has been more difficult for her.  Discussed sleep hygiene.  Her mood is stable with current medications, denies suicidality.  Continue zoloft 150mg daily  5. She has a HH bra size and thoracic back pain.  Discussed trial of physical therapy.  Xrays of thoracic spine ordered.      Outside records requested:  The patient signed outside records request form for her outside records including outside images from Dr. Gee Lovell in [place], [place]      Follow-up: Return in about 2 months (around 9/30/2020).    Thank you very much for asking me to participate in Carmen Vasques Ynes Hebert's care.  Please contact me with any questions or concerns.      Please note that this dictation was created using voice recognition software. I have made every reasonable attempt to correct obvious errors but there may be errors of grammar and content that I may have overlooked prior to finalization of this note.      Mahesh Crane MD  Physical Medicine and Rehabilitation  Interventional Spine and Sports Physiatry  [facility] [employer]

## 2020-07-31 NOTE — PATIENT INSTRUCTIONS
[email or web address]    progressive muscle relaxation

## 2020-08-05 ENCOUNTER — PHYSICAL THERAPY (OUTPATIENT)
Dept: PHYSICAL THERAPY | Facility: REHABILITATION | Age: 25
End: 2020-08-05
Attending: PHYSICAL MEDICINE & REHABILITATION
Payer: COMMERCIAL

## 2020-08-05 DIAGNOSIS — N62 LARGE BREASTS: ICD-10-CM

## 2020-08-05 DIAGNOSIS — M62.838 SPASM OF MUSCLE: ICD-10-CM

## 2020-08-05 DIAGNOSIS — M79.18 DIFFUSE MYOFASCIAL PAIN SYNDROME: ICD-10-CM

## 2020-08-05 PROCEDURE — 97162 PT EVAL MOD COMPLEX 30 MIN: CPT

## 2020-08-05 ASSESSMENT — ENCOUNTER SYMPTOMS: PAIN SCALE: 7

## 2020-08-05 NOTE — OP THERAPY EVALUATION
"  Outpatient Physical Therapy  INITIAL EVALUATION    [facility] Physical Therapy [place] [address]  [address]  [place] [address]  Phone:  [phone]  Fax:  [phone]    Date of Evaluation: 08/05/2020    Patient: Carmen Hebert  YOB: 1995  MRN: [id number]     Referring Provider: Mahesh Crane M.D.  [address]  [address]  [place] [address]   Referring Diagnosis No admission diagnoses are documented for this encounter.     Time Calculation    Start time: 0832  Stop time: 0928 Time Calculation (min): 56 minutes         Chief Complaint: Shoulder Problem, Neck Problem, and Back Problem    Visit Diagnoses     ICD-10-CM   1. Spasm of muscle  M62.838   2. Diffuse myofascial pain syndrome  M79.18   3. Large breasts  N62         Subjective:   History of Present Illness:     Date of onset:  8/5/2017    Mechanism of injury:  Patient is a 25 year old female with a PMH including: Fibromyalgia (diagnosed 2017), obesity, thalassemia minor, palpitations, anxiety, depression. Pt reports has unofficial diagnosis of endometriosis by gynecologist. Pt reports has hx of scoliosis without bracing/surgeries. No surgical hx on file.    Pt presents to therapy with complaints of recent flare up of fibromyalgia making it difficult to move or get out of bed; involving neck, shoulders and low back. Pt reports has hx of fibromyalgia which has worsened since 2017 with occasional spasms in LLE which are now worsening in include L t/s to L anterior rib cage and ocassional from cervical spine to shoulder.. Pt reports she had a recent flare up and couldn't move due to being in so much pain. Pt reports has anterior ankle pain with swelling; endorses has hx of n/t on dorsal aspect L foot. Pt endorses popping in sternum and \"it moves out of place; it's hard to breathe, I have to move it back into place 12 times per day.\" Pt would like to address t/s at today's visit.     Currently denies changes in bowel " "and bladder, saddle anesthesia, significant weight changes, chills/night sweats, nausea and vomiting, and unexplained fatigue. Denies history of cancer. Pt consents to evaluation and treatment today.         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    Progression:  Worsening    Pain Comments::  Aggravating: Unloading the dishes, laundry, repetitive lifting, end of day    Relieving: lyrica, methocarbamol does seem to help (helps with spasms but not pain), heat/ice (occasional), topical pain patches    Irritability: \"It starts pretty early in the morning\"  Social Support:     Lives in:  One-story house (1 step to enter; no HR)    Lives with:  Parents  Hand dominance:  Right  Diagnostic Tests:     Diagnostic Tests Comments:  MRI c/s 20:  FINDINGS:  There is loss of cervical lordosis. The craniovertebral junction is well aligned. There is no focal osseous lesion. There is no pathologic marrow infiltration. There is no perivertebral abnormality. There is no epidural lesion.     At the level of C2-3, there is no spinal or neural foraminal stenosis.     At the level of C3-4, there is no spinal or neural foraminal stenosis.     At the level of C4-5, there is no spinal or neural foraminal stenosis.     At the level of C5-6, there is no spinal or neural foraminal stenosis.     At the level of C6-7, there is no spinal or neural foraminal stenosis.     At the level of C7-T1, there is no spinal or neural foraminal stenosis.     The visualized posterior fossa structures appear normal within limits.  The cervical spinal cord does not demonstrate any mass or abnormal T2 signal intensity. There is no intradural extramedullary lesion.     The visualized pre-and paraspinal soft tissues appear normal within limits.   " "  There is no abnormal contrast enhancement.     IMPRESSION:     Unremarkable pre and postcontrast MR examination of the cervical spine except for loss of cervical lordosis.    X ray T/S 7/31/20:  FINDINGS:  No acute fracture is identified. There is mild spinal curvature, convex left, with the apex at the T3-4 level. Pedicles appear intact. Paravertebral soft tissues are unremarkable.     Mediastinal contours are normal.     IMPRESSION:     1.  No acute findings.     2.  Mild spinal curvature, convex left, in the upper thoracic spine    Treatments:     Previous treatment:  Chiropractic and physical therapy    Treatment Comments:  Chiropractics ([id number])  Physical therapy (Feb 2018-Aug 2018) -Fibromyalgia specific (pool therapy) - pt reports was helpful. Additional PT for L foot.   Activities of Daily Living:     Patient reported ADL status: Patient's current daily routine includes:  Work: Currently unemployed, was working last Aug 2019; not on disability   Hobbies: Yoga (not currently performing due to flare up), spending time with young niece   Exercise: No current exercise routine in place; Yoga (not currently performing due to flare up)          Patient Goals:     Other patient goals:  \"Manage pain and function better\" (Unload , lift neice, perform laundry by self)      Past Medical History:   Diagnosis Date   • Anxiety    • Depression    • Fibromyalgia 2017   • Thalassemia minor      No past surgical history on file.  Social History     Tobacco Use   • Smoking status: Never Smoker   • Smokeless tobacco: Never Used   Substance Use Topics   • Alcohol use: Not Currently     Comment: occ     Family and Occupational History     Socioeconomic History   • Marital status: Single     Spouse name: Not on file   • Number of children: Not on file   • Years of education: Not on file   • Highest education level: Not on file   Occupational History   • Not on file       Objective     Postural Observations  Seated " "posture: poor  Correction of posture: makes symptoms worse    Additional Postural Observation Details  Forward head and rounded shoulders    \"I have an HH sized bra; I was going to speak with Dr. Crane about getting a breast reduction because I feel like I'm being pulled forward by my breasts; she recommends I do therapy first.\"    Neurological Testing     Reflexes   Left   Patellar (L4): normal (2+)  Achilles (S1): normal (2+)    Right   Patellar (L4): normal (2+)  Achilles (S1): normal (2+)    Myotome testing   Lumbar (left)   All left lumbar myotomes within normal limits    Lumbar (right)   All right lumbar myotomes within normal limits    Dermatome testing   Lumbar (left)   L1: intact  L2: intact  L3: intact  L4: intact  L5: intact  S1: decreased (\"Odd sensation S1 and S2\" per pt)  S2: decreased    Lumbar (right)   All right lumbar dermatomes intact    Additional Neurological Details  Pt able to stand on heels and stand on toes with hand hold for balance from therapist    Palpation     Additional Palpation Details  T/S paraspinals tender L>R  L/S paraspinals tender equally    Active Range of Motion     Lumbar   Flexion: decreased (*Reproduces pain mid back)  Extension: within functional limits  Left lateral flexion: decreased (Pain under shoulder blade)  Right lateral flexion: within functional limits  Left rotation: decreased (*Reproduces pain mid back)  Right rotation: within functional limits    Additional Active Range of Motion Details  LE AROM WFL   (Assessed in sitting)    Joint Play   Spine     Central PA [place]        T10: painful       T11: painful       T12: painful       L1: painful       L2: painful       L3: painful       L4: painful       L5: painful       S1: painful      Additional Joint Play Details  Most pain with PA's T10-T12; difficulty assessing mobilty due to sig pain complaints not allowing full ROM    Strength:      Abdominals   Lower abdominals: Able to maintain neutral statically " (Assessed via bridging and pelvic tilting)    Tests       Lumbar spine (left)      Positive slump.     Additional Tests Details  + L slump for neural tension; however, no reproduction of mid-back s/s    General Comments     Spine Comments   (* signifies reproduces symptoms of patient's chief complaint)      -Hookying: Position of greatest comfort.        Therapeutic Exercises (CPT 53844):     1. Supine bridges, 10x, added to hep    2. Hooklying pelvic tilting, 10x, added to hep    3. Diaphragmatic breathing, 10x, added to hep    4. Supine marching+TrA brace, 10x, added to hep    5. Pt education, educated about importance of TrA and strong core muscles; Edu re: activation and locating correct musculature    6. PNE, pt provided with pain neuroscience education booklet, added to hep (sections 1 and 2 to review at next session)    9. UPOC: 10/9/20      Therapeutic Exercise Summary: Pt performed these exercises with instruction and SPV.  Provided handout with these exercises for daily HEP.        Time-based treatments/modalities:           Assessment, Response and Plan:   Impairments: abnormal or restricted ROM, activity intolerance, impaired functional mobility, hypersensitivity, impaired physical strength, lacks appropriate home exercise program, limited ADL's, limited mobility and pain with function    Assessment details:  Pt presents to therapy with complaints of recent flare up of fibromyalgia with increased frequency of pain and spasms in c/s, t/s and l/s (pt wishing to begin with t/s today). Has complaints of impaired mobility and difficulty completing ADL's such as unloading dishes, laundry, rep lifting with both pain and fatigue increasing throughout the day. Pt is currently unemployed and enjoys yoga, spending time with niece.  C/s MRI unremarkable except for loss of cervical lordosis; T/s x ray demonstrates: Mild spinal curvature, convex left, otherwise unremarkable.    Neuro exam WFL except decreased  sensation S1 and S2 L; some neural tension noted L slump (without reproduction of midback pain), L/S flexion/L SB and L rotation reproduce mid back symptoms during eval. Difficult to assess t/s PA's today due to reports of pain not allowing full mobility assessment with reports of localized pain; additionally poor postural awareness and posterior chain and core weakness noted. Of note, pt does have positive response in past with chiropractics and PT (water therapy).     Pt may benefit from skilled physical therapy including gentle mobility training, Pain neuroscience education, strengthening and posture education in order to address above impairments in order to improve QOL and return to reported ADL's. Prognosis impaired due to barriers below.     Barriers to therapy:  Psychosocial, poorly tolerated treatments and comorbidities  Other barriers to therapy:  Chronic hx of fibromyalgia; large breasts potentially adding additional weight and impacting posture, pt reporting irritable s/s - possibly poor tolerance of PT treatments.  Prognosis: fair    Goals:   Short Term Goals:   1. Pt will be independent with written HEP.  2. Pt will be compliant with PNE education and readings in booklet (provided at eval).  3. Pt will demonstrate improvements in postural awareness with reduced cuing in clinic.     Short term goal time span:  2-4 weeks      Long Term Goals:    1. Pt will be independent with written HEP.  2. Pt will have a score of (eval: 54.17)  3. Pt will be independent with PNE booklet and it's educational topics.  4. Pt will report 30% improvement in spasms.   5. Pt will be able to unload dishes with modification without increase in s/s from baseline.  6. Pt will be able to do laundry with modification without increase in s/s from baseline.    Long term goal time span:  6-8 weeks    Plan:   Therapy options:  Physical therapy treatment to continue  Planned therapy interventions:  Neuromuscular Re-education (CPT  54990), E Stim Unattended (CPT 97946), Therapeutic Exercise (CPT 42991) and Mechanical Traction (CPT 76864)  Other planned therapy interventions:  PNE (pain neuroscience education booklet provided at [facility])  Frequency:  2x week  Duration in weeks:  6  Duration in visits:  12  Discussed with:  Patient  Plan details:  UPOC: 10/9/20    *Plan of care extended due to lag in insurance approval.        Functional Assessment Used  Zhang Andrea Low Back Pain and Disability Score: 54.17     Referring provider co-signature:  I have reviewed this plan of care and my co-signature certifies the need for services.    Certification Period: 08/05/2020 to 10/9/20    Physician Signature: ________________________________ Date: ______________

## 2020-08-27 ENCOUNTER — APPOINTMENT (OUTPATIENT)
Dept: PHYSICAL THERAPY | Facility: REHABILITATION | Age: 25
End: 2020-08-27
Attending: PHYSICAL MEDICINE & REHABILITATION
Payer: COMMERCIAL

## 2020-09-01 ENCOUNTER — APPOINTMENT (OUTPATIENT)
Dept: PHYSICAL THERAPY | Facility: REHABILITATION | Age: 25
End: 2020-09-01
Attending: PHYSICAL MEDICINE & REHABILITATION
Payer: COMMERCIAL

## 2020-09-04 ENCOUNTER — PHYSICAL THERAPY (OUTPATIENT)
Dept: PHYSICAL THERAPY | Facility: REHABILITATION | Age: 25
End: 2020-09-04
Attending: PHYSICAL MEDICINE & REHABILITATION
Payer: COMMERCIAL

## 2020-09-04 DIAGNOSIS — M62.838 SPASM OF MUSCLE: ICD-10-CM

## 2020-09-04 DIAGNOSIS — N62 LARGE BREASTS: ICD-10-CM

## 2020-09-04 DIAGNOSIS — M79.18 DIFFUSE MYOFASCIAL PAIN SYNDROME: ICD-10-CM

## 2020-09-04 PROCEDURE — 97112 NEUROMUSCULAR REEDUCATION: CPT

## 2020-09-04 PROCEDURE — 97014 ELECTRIC STIMULATION THERAPY: CPT

## 2020-09-04 PROCEDURE — 97110 THERAPEUTIC EXERCISES: CPT

## 2020-09-04 NOTE — OP THERAPY DAILY TREATMENT
Outpatient Physical Therapy  DAILY TREATMENT     [facility] Physical [address]  [address]  [place] [address]  Phone:  [phone]  Fax:  [phone]    Date: 09/04/2020    Patient: Carmen Hebert  YOB: 1995  MRN: [id number]     Time Calculation    Start time: 0831  Stop time: 0915 Time Calculation (min): 44 minutes         Chief Complaint: Back Problem    Visit #: 2    SUBJECTIVE:  I've been up and down. I wasn't very active. I did some of the exercises; the tilting was the most effective. I've had trouble with my shoulder area pain. I took so much advil and did ice and head. Today it's on both sides. I may have slept weird I'm not sure. The spasms have slowed down. I blew my nose and it hurt so bad.     OBJECTIVE:  Current objective measures:           Therapeutic Exercises (CPT 67997):     1. Supine bridges, 10x, reviewed    2. Hooklying pelvic tilting, 10x, reviewed    3. Diaphragmatic breathing, 10x    4. Supine marching+TrA brace, 10x, reviewed    5. Pt education, educated about importance of TrA and strong core muscles; Edu re: activation and locating correct musculature    7. Supine flasher>supine flasher+chin retraction, orange TB 1x15 with 10 sec hold , added to hep    9. UPOC: 10/9/20      Therapeutic Exercise Summary: Pt performed these exercises with instruction and SPV.  Provided handout with these exercises for daily HEP.      Therapeutic Treatments and Modalities:     1. E Stim Unattended (CPT 61760), IFC to t/s with mhp x 15 min    2. Neuromuscular Re-education (CPT 59996), see details below, x9 min    Therapeutic Treatment and Modalities Summary: Neuro re-ed:  Gentle PA's to T3-T12 grade II-III followed by gentle rotational mobs to R grade II-III with pt prone lying. Pt reporting slight sensitivity to T3-T5 with mobilizations.    Prone instability test: Neg; however, pt barely lifting LE's off table; possibly inaccurate due to inadequate set  up    Time-based treatments/modalities:    Physical Therapy Timed Treatment Charges  Neuromusc re-ed, balance, coor, post minutes (CPT 97647): 9 minutes  Therapeutic exercise minutes (CPT 26053): 20 minutes      Pain rating (1-10) before treatment: 6/10 mid back L>R  treatment:     ASSESSMENT:   Response to treatment:   Continued poor postural awareness with poor posterior chain endurance. Able to tolerate therex in supine for gentle strengthening initiation. Pt reporting overall decrease in muscle spasms.     PLAN/RECOMMENDATIONS:   Plan for treatment: therapy treatment to continue next visit.  Planned interventions for next visit: continue with current treatment. Assess response to new hep and estim. Review PNE and add more hep from booklet. Continue with periscapular and posterior chain strengthening    Continue at: 1x97110, 1x97112, 97014x1, 1x97012 per visit for 12 visits

## 2020-09-08 ENCOUNTER — APPOINTMENT (OUTPATIENT)
Dept: PHYSICAL THERAPY | Facility: REHABILITATION | Age: 25
End: 2020-09-08
Attending: PHYSICAL MEDICINE & REHABILITATION
Payer: COMMERCIAL

## 2020-09-08 NOTE — OP THERAPY DAILY TREATMENT
Outpatient Physical Therapy  DAILY TREATMENT     [facility] Physical [address]  [address]  [place] [address]  Phone:  [phone]  Fax:  [phone]    Date: 09/08/2020    Patient: Carmen Hebert  YOB: 1995  MRN: [id number]     Time Calculation                   Chief Complaint: No chief complaint on file.    Visit #: 3    SUBJECTIVE:  I've been up and down. I wasn't very active. I did some of the exercises; the tilting was the most effective. I've had trouble with my shoulder area pain. I took so much advil and did ice and head. Today it's on both sides. I may have slept weird I'm not sure. The spasms have slowed down. I blew my nose and it hurt so bad.     OBJECTIVE:  Current objective measures:           Therapeutic Exercises (CPT 24624):     1. Supine bridges, 10x, reviewed    2. Hooklying pelvic tilting, 10x, reviewed    3. Diaphragmatic breathing, 10x    4. Supine marching+TrA brace, 10x, reviewed    5. Pt education, educated about importance of TrA and strong core muscles; Edu re: activation and locating correct musculature    7. Supine flasher>supine flasher+chin retraction, orange TB 1x15 with 10 sec hold , added to hep    8. Supine bridges on swissball    9. HS rolls    15. UPOC: 10/9/20      Therapeutic Exercise Summary: Pt performed these exercises with instruction and SPV.  Provided handout with these exercises for daily HEP.      Therapeutic Treatments and Modalities:     1. E Stim Unattended (CPT 63701), IFC to t/s with mhp x 15 min    2. Neuromuscular Re-education (CPT 98598), see details below, x9 min    Therapeutic Treatment and Modalities Summary: Neuro re-ed:  Gentle PA's to T3-T12 grade II-III followed by gentle rotational mobs to R grade II-III with pt prone lying. Pt reporting slight sensitivity to T3-T5 with mobilizations.    Prone instability test: Neg; however, pt barely lifting LE's off table; possibly inaccurate due to inadequate set  up    Time-based treatments/modalities:           Pain rating (1-10) before treatment: 6/10 mid back L>R  treatment:     ASSESSMENT:   Response to treatment:   Continued poor postural awareness with poor posterior chain endurance. Able to tolerate therex in supine for gentle strengthening initiation. Pt reporting overall decrease in muscle spasms.     PLAN/RECOMMENDATIONS:   Plan for treatment: therapy treatment to continue next visit.  Planned interventions for next visit: continue with current treatment. Assess response to new hep and estim. Review PNE and add more hep from booklet. Continue with periscapular and posterior chain strengthening    Continue at: 1x97110, 1x97112, 97014x1, 1x97012 per visit for 12 visits

## 2020-09-11 ENCOUNTER — PHYSICAL THERAPY (OUTPATIENT)
Dept: PHYSICAL THERAPY | Facility: REHABILITATION | Age: 25
End: 2020-09-11
Attending: PHYSICAL MEDICINE & REHABILITATION
Payer: COMMERCIAL

## 2020-09-11 DIAGNOSIS — M79.18 DIFFUSE MYOFASCIAL PAIN SYNDROME: ICD-10-CM

## 2020-09-11 DIAGNOSIS — M62.838 SPASM OF MUSCLE: ICD-10-CM

## 2020-09-11 DIAGNOSIS — N62 LARGE BREASTS: ICD-10-CM

## 2020-09-11 PROCEDURE — 97112 NEUROMUSCULAR REEDUCATION: CPT

## 2020-09-11 PROCEDURE — 97110 THERAPEUTIC EXERCISES: CPT

## 2020-09-11 PROCEDURE — 97014 ELECTRIC STIMULATION THERAPY: CPT

## 2020-09-11 NOTE — OP THERAPY DAILY TREATMENT
Outpatient Physical Therapy  DAILY TREATMENT     [facility] Physical [address].  [address]  [place] [address]  Phone:  [phone]  Fax:  [phone]    Date: 09/11/2020    Patient: Carmen Hebert  YOB: 1995  MRN: [id number]     Time Calculation    Start time: 0934  Stop time: 1015 Time Calculation (min): 41 minutes         Chief Complaint: Back Problem    Visit #: 3    SUBJECTIVE:  I have some pain in my right hip that will go into the side of my right leg to the side of my knee. It sometimes goes all the way to the foot. I have tingling in the left top of my foot; I had therapy in the past but they didn't know what it was and thought maybe it was a Fibro point. I've also been having more pain in the front of my chest and I need to pop it more often; the midback has been doing better.  The band exercise is great but when I do too much it makes my back feel aggravated.    OBJECTIVE:  Current objective measures:           Therapeutic Exercises (CPT 27442):     10. Verbal review of hep    11. Pt education, educated to perform exercises in smaller reps for improved comfort. Educated re: at home tens unit     12. Neuro re-ed below:, x17 min    13. Standing l/s extensions, x10, //LLE unchanged (knee and dorsal foot tingling), abolished RLE pain    14. Prone position, increase R hip pain but no incr periph; less L foot tingling     15. Prone to elbows, //30% diminished L foot n/t; R hip aching, incr L knee aching; pt reporting slight aggravation of shoulder    16. L/s extensions at wall, x10, //diminished L foot n/t; no increase RLE, slight tension at LB per pt report    18. UPOC: 10/9/20      Therapeutic Exercise Summary: Pt performed these exercises with instruction and SPV.  Provided handout with these exercises for daily HEP.      Therapeutic Treatments and Modalities:     1. E Stim Unattended (CPT 96702), IFC to t/s with mhp x 15 min    Time-based  treatments/modalities:    Physical Therapy Timed Treatment Charges  Neuromusc re-ed, balance, coor, post minutes (CPT 02718): 17 minutes  Therapeutic exercise minutes (CPT 40263): 9 minutes      Pain rating (1-10) before treatment: 3-4/10 R hip to R lateral knee          ASSESSMENT:   Response to treatment:   New report of RLE pain in what appears to be a R L5 distribution per her report (on eval L5 dermatome intact and myotome testing intact). Able to centralize her s/s (in addition to improving L foot n/t) with extension bias. Increased comfort at wall due to aggravation of shoulder with prone to elbows.     Pt additionally reporting increase in popping anterior chest with increased pain. Will assess further next session.    PLAN/RECOMMENDATIONS:   Plan for treatment: therapy treatment to continue next visit.  Planned interventions for next visit: continue with current treatmentAssess response to new hep. Further assess anterior chest/sternum for today's complaints.    Continue at: 1x97110, 1x97112, 97014x1, 1x97012 per visit for 12 visits

## 2020-09-15 ENCOUNTER — PHYSICAL THERAPY (OUTPATIENT)
Dept: PHYSICAL THERAPY | Facility: REHABILITATION | Age: 25
End: 2020-09-15
Attending: PHYSICAL MEDICINE & REHABILITATION
Payer: COMMERCIAL

## 2020-09-15 DIAGNOSIS — M79.18 DIFFUSE MYOFASCIAL PAIN SYNDROME: ICD-10-CM

## 2020-09-15 DIAGNOSIS — N62 LARGE BREASTS: ICD-10-CM

## 2020-09-15 DIAGNOSIS — M62.838 SPASM OF MUSCLE: ICD-10-CM

## 2020-09-15 PROCEDURE — 97110 THERAPEUTIC EXERCISES: CPT

## 2020-09-15 PROCEDURE — 97014 ELECTRIC STIMULATION THERAPY: CPT

## 2020-09-15 PROCEDURE — 97112 NEUROMUSCULAR REEDUCATION: CPT

## 2020-09-15 NOTE — OP THERAPY DAILY TREATMENT
"  Outpatient Physical Therapy  DAILY TREATMENT     [facility] Physical Therapy [address]  [address]  [place] [address]  Phone:  [phone]  Fax:  [phone]    Date: 09/15/2020    Patient: Carmen Hebert  YOB: 1995  MRN: [id number]     Time Calculation    Start time: 1002  Stop time: 1045 Time Calculation (min): 43 minutes         Chief Complaint: Back Problem    Visit #: 4    SUBJECTIVE:  I feel a little stiff in my mid to upper back because I saw my niece and I was playing and lifting her a lot. It's sore so it's making it a little bit more difficult to be upright. I feel tight in the front and it hurts but I need to pop it.     OBJECTIVE:  Current objective measures:           Therapeutic Exercises (CPT 39571):     2. T/s extension , 1x15, VC's to support c/s due to c/o strain in shoulder/neck; added to hep    3. Double lacrosse balls at wall, x2 min, provided with HO to purchase if interested and educated on how to create at home using tennis balls    4. Pt education, educated re: hypomobile t/s contributing to hypermobility elsewhere including neck and sternum (demo using anatomy model)    Therapeutic Treatments and Modalities:     1. Neuromuscular Re-education (CPT 23561), see below, x14 min    2. E Stim Unattended (CPT 44247), IFC to t/s with mhp x 15 min    Therapeutic Treatment and Modalities Summary: Neuro re-ed:   PA's grade II and III provided to t/s T2-T7 followed by rotational mobs grade II and III to same regions. Pt hypersensitive and reporting discomfort \"it hurts but feels good.\"  Able to reproduce anterior sternum pain with mobs to T/S.    Time-based treatments/modalities:    Physical Therapy Timed Treatment Charges  Neuromusc re-ed, balance, coor, post minutes (CPT 30519): 14 minutes  Therapeutic exercise minutes (CPT 27986): 14 minutes      Pain rating (1-10) before treatment: 3-4/10 R hip to R lateral knee          ASSESSMENT:   Response to " treatment:   Hypomobile t/s with reproduction of pt's sternum pain complaints. Likely hypomobile t/s contributing to hypermobile anteriorly. Provided pt with additional t/s mobilization therex including HO and edu for double lacrosse ball exercise hep. Additional strengthening therex added to hep (see above). Pt able to complete all without complaints of pain    PLAN/RECOMMENDATIONS:   Plan for treatment: therapy treatment to continue next visit.  Planned interventions for next visit: continue with current treatment Assess response to new hep. Continue with mobilization t/s and strengthening ant chest wall    Continue at: 1x97110, 1x97112, 97014x1, 1x97012 per visit for 12 visits

## 2020-09-18 ENCOUNTER — PHYSICAL THERAPY (OUTPATIENT)
Dept: PHYSICAL THERAPY | Facility: REHABILITATION | Age: 25
End: 2020-09-18
Attending: PHYSICAL MEDICINE & REHABILITATION
Payer: COMMERCIAL

## 2020-09-18 DIAGNOSIS — N62 LARGE BREASTS: ICD-10-CM

## 2020-09-18 DIAGNOSIS — M79.18 DIFFUSE MYOFASCIAL PAIN SYNDROME: ICD-10-CM

## 2020-09-18 DIAGNOSIS — M62.838 SPASM OF MUSCLE: ICD-10-CM

## 2020-09-18 PROCEDURE — 97112 NEUROMUSCULAR REEDUCATION: CPT

## 2020-09-18 PROCEDURE — 97014 ELECTRIC STIMULATION THERAPY: CPT

## 2020-09-18 PROCEDURE — 97110 THERAPEUTIC EXERCISES: CPT

## 2020-09-18 NOTE — OP THERAPY DAILY TREATMENT
Outpatient Physical Therapy  DAILY TREATMENT     [facility] Physical [address].  [address]  [place] [address]  Phone:  [phone]  Fax:  [phone]    Date: 09/18/2020    Patient: Carmen Hebert  YOB: 1995  MRN: [id number]     Time Calculation    Start time: 0902  Stop time: 0945 Time Calculation (min): 43 minutes         Chief Complaint: Back Problem    Visit #: 5    SUBJECTIVE:  I was so sore the day following our session. I had a lot of L leg pain the next day. I feel like my sternum was still popping but not anymore than usual.    OBJECTIVE:  Current objective measures:           Therapeutic Exercises (CPT 44860):     3. Double lacrosse balls at wall, x2 min, pt now owns lacrosse ball    5. L/s extension at the wall, 2x10, decrease pain in L knee and ankle; no change in hip    6. SA punches supine, x10, reviewed; pt reporting pulling in midback, no pain incr    7. Wall push ups, x10, added to hep    9. Supine dumbell fly 3#, 10x, reviewed; pt reporting pulling in midback, no pain incr      Therapeutic Exercise Summary: Pt performed these exercises with instruction and SPV.  Provided handout with these exercises for daily HEP.      Therapeutic Treatments and Modalities:     1. Neuromuscular Re-education (CPT 80165), see below, x12 min    2. E Stim Unattended (CPT 52443), IFC to t/s with mhp x 15 min    Therapeutic Treatment and Modalities Summary: Neuro re-ed:   nerve glide seated - sciatic; hep; edu for pt to not do >10 per day to avoid aggravation of nerve s/s    Postured education: edu re: good posture and avoiding kyphosis to further aggravate s/s     Pt education: Educated to take standing breaks every 20-30 min (per reports of incr leg pain with sitting in evenings)    Time-based treatments/modalities:    Physical Therapy Timed Treatment Charges  Neuromusc re-ed, balance, coor, post minutes (CPT 87459): 12 minutes  Therapeutic exercise minutes (CPT  "30894): 16 minutes     Pain rating (1-10) before treatment:   7/10 L knee and ankle  3/10 L hip  3-4/10 Midback      ASSESSMENT:   Response to treatment:   Pt continuing to centralize and decrease pain peripherally with l/s extensions at wall. Pt able to complete all therex with slight pulling in midback but no increase in pain or periph of s/s. No increase \"popping\" in sternum with new hep. Several min spent on educating pt re: posture and sitting which may be aggravating her s/s. Educated to complete t/s mobilizations to tolerance.      PLAN/RECOMMENDATIONS:   Plan for treatment: therapy treatment to continue next visit.  Planned interventions for next visit: continue with current treatment Assess response to new hep.     Continue at: 1x97110, 1x97112, 97014x1, 1x97012 per visit for 12 visits       "

## 2020-10-01 ENCOUNTER — OFFICE VISIT (OUTPATIENT)
Dept: PHYSICAL MEDICINE AND REHAB | Facility: MEDICAL CENTER | Age: 25
End: 2020-10-01
Payer: COMMERCIAL

## 2020-10-01 VITALS
HEIGHT: 69 IN | SYSTOLIC BLOOD PRESSURE: 110 MMHG | HEART RATE: 96 BPM | DIASTOLIC BLOOD PRESSURE: 62 MMHG | WEIGHT: 241.84 LBS | OXYGEN SATURATION: 96 % | BODY MASS INDEX: 35.82 KG/M2 | TEMPERATURE: 97.5 F

## 2020-10-01 DIAGNOSIS — M43.9 SPINAL CURVATURE: ICD-10-CM

## 2020-10-01 DIAGNOSIS — M79.7 FIBROMYALGIA: ICD-10-CM

## 2020-10-01 DIAGNOSIS — M54.6 THORACIC BACK PAIN, UNSPECIFIED BACK PAIN LATERALITY, UNSPECIFIED CHRONICITY: ICD-10-CM

## 2020-10-01 DIAGNOSIS — G47.00 INSOMNIA, UNSPECIFIED TYPE: ICD-10-CM

## 2020-10-01 DIAGNOSIS — M79.18 MYOFASCIAL PAIN SYNDROME OF THORACIC SPINE: ICD-10-CM

## 2020-10-01 DIAGNOSIS — M62.838 MUSCLE SPASM: ICD-10-CM

## 2020-10-01 PROCEDURE — 99214 OFFICE O/P EST MOD 30 MIN: CPT | Performed by: PHYSICAL MEDICINE & REHABILITATION

## 2020-10-01 RX ORDER — PREGABALIN 150 MG/1
150 CAPSULE ORAL 2 TIMES DAILY
COMMUNITY
End: 2020-10-01 | Stop reason: SDUPTHER

## 2020-10-01 RX ORDER — BACLOFEN 10 MG/1
5 TABLET ORAL 3 TIMES DAILY
Qty: 45 TAB | Refills: 1 | Status: SHIPPED | OUTPATIENT
Start: 2020-10-01 | End: 2020-10-31

## 2020-10-01 RX ORDER — PREGABALIN 150 MG/1
150 CAPSULE ORAL 2 TIMES DAILY
Qty: 60 CAP | Refills: 1 | Status: SHIPPED | OUTPATIENT
Start: 2020-10-08 | End: 2020-11-07

## 2020-10-01 ASSESSMENT — PAIN SCALES - GENERAL: PAINLEVEL: 4=SLIGHT-MODERATE PAIN

## 2020-10-01 ASSESSMENT — PATIENT HEALTH QUESTIONNAIRE - PHQ9
5. POOR APPETITE OR OVEREATING: 1 - SEVERAL DAYS
CLINICAL INTERPRETATION OF PHQ2 SCORE: 2
SUM OF ALL RESPONSES TO PHQ QUESTIONS 1-9: 9

## 2020-10-01 NOTE — PROGRESS NOTES
"Follow-up patient note    Physiatry (physical medicine and  Rehabilitation), interventional spine and sports medicine    Date of Service: 10/01/2020    Chief complaint:   Chief Complaint   Patient presents with   • Follow-Up     Back, shoulder and neck pain       HISTORY    HPI: Carmen Hebert 25 y.o. female who presents today for follow-up evaluation of pain complaints.  She has a diagnosis of fibromyalgia.    Since her last visit, she has been to physical therapy.  This was \"pretty good,\" she thinks.  She had five visits and has been doing home exercises.  She does think that this has been helpful.  Plan was to continue with PT for 12 visits.  Last visit was 09/18/2020.    The exercises do seem to help, but she notes that she feels very \"tense and tight\" by the next morning.  Depending on how much tension she has in the upper back and neck, the exercises do not seem to help as much.      Pain is 4/10 on the NRS overall.  At times, she feels like her pain is slightly more intermittent.  Sleep ranges from 5-9 hours.  Her insomnia is bad and she reports that she has been having some nausea after taking her medications.  These include sertraline and pregabalin.  This has occurred before.      Generally, she does not go to bed until 11PM and does not go to sleep until 1:30AM at times.  Sometimes, this is related to inability to get comfortable.    She has not been doing her yoga recently.      Most of her pain today is focused in the left shoulder.  PT seems to be helping that.      Medical records review:  I reviewed the note from the referring provider Mariel Paula A.P.* dated 03/10/2020.  The patient was seen to establish care.  Report that she was diagnosed in 2017 with fibromyalgia, in [facility].  Labs were tested including TSH, HbA1C, vitamin D, 25 hydroxy, HIV ag/ab, T. Pallidum, chlamydia.  She was referred to cardiology for evaluation of palpitations with Holter monitor.  Medications " refilled included zoloft 100mg, lyrica 75mg daily, cyclobenzaprine 5mg.  Referral to physiatry placed    Previous treatments:    Physical Therapy: Yes    Medications the patient is tried: lyrica, cyclobenzeprine, amitriptyline, tried savella    Previous interventions: none    Previous surgeries to relieve the above pain:  none    PHQ-9: 2    ROS: Unchanged from 07/31/2020 except as noted in the HPI  Gen: nausea  Eyes: intermittent blurry vision  CV: anxiety, palpitation (inappropriate sinus tachycardia)  Psych: depression  Red Flags ROS:   Fever, Chills, Sweats: Denies  Involuntary Weight Loss: Denies  Bladder Incontinence: Denies  Bowel Incontinence: Denies  Saddle Anesthesia: Denies    All other systems reviewed and negative.       PMHx:   Past Medical History:   Diagnosis Date   • Anxiety    • Depression    • Fibromyalgia 2017   • Thalassemia minor        PSHx:   History reviewed. No pertinent surgical history.    Family history   Family History   Problem Relation Age of Onset   • Fibromyalgia Mother    • Lung Disease Mother         asthma r/o   • Depression Mother    • Anxiety disorder Mother    • ADD / ADHD Father    • Depression Father          Medications:   Current Outpatient Medications   Medication   • baclofen (LIORESAL) 10 MG Tab   • [START ON 10/8/2020] pregabalin (LYRICA) 150 MG Cap   • fluticasone (FLONASE) 50 MCG/ACT nasal spray   • sertraline (ZOLOFT) 100 MG Tab   • Norethindrone Acet-Ethinyl Est 1.5-30 MG-MCG Tab   • metoprolol (LOPRESSOR) 25 MG Tab   • ALPRAZolam (XANAX) 0.5 MG Tab     No current facility-administered medications for this visit.        Allergies:   No Known Allergies    Social Hx:   Social History     Socioeconomic History   • Marital status: Single     Spouse name: Not on file   • Number of children: Not on file   • Years of education: Not on file   • Highest education level: Not on file   Occupational History   • Not on file   Social Needs   • Financial resource strain: Not on  "file   • Food insecurity     Worry: Not on file     Inability: Not on file   • Transportation needs     Medical: Not on file     Non-medical: Not on file   Tobacco Use   • Smoking status: Never Smoker   • Smokeless tobacco: Never Used   Substance and Sexual Activity   • Alcohol use: Not Currently     Comment: occ   • Drug use: Not Currently     Types: Marijuana     Comment: rarely   • Sexual activity: Yes     Partners: Male     Birth control/protection: Condom   Lifestyle   • Physical activity     Days per week: Not on file     Minutes per session: Not on file   • Stress: Not on file   Relationships   • Social connections     Talks on phone: Not on file     Gets together: Not on file     Attends Taoism service: Not on file     Active member of club or organization: Not on file     Attends meetings of clubs or organizations: Not on file     Relationship status: Not on file   • Intimate partner violence     Fear of current or ex partner: Not on file     Emotionally abused: Not on file     Physically abused: Not on file     Forced sexual activity: Not on file   Other Topics Concern   •  Service No   • Blood Transfusions No   • Caffeine Concern No   • Occupational Exposure No   • Hobby Hazards No   • Sleep Concern Yes   • Stress Concern Yes   • Weight Concern Yes   • Special Diet Yes   • Back Care No   • Exercise No   • Bike Helmet Yes   • Seat Belt Yes   • Self-Exams Yes   Social History Narrative   • Not on file         EXAMINATION     Physical Exam:   Vitals: /62 (BP Location: Right arm, Patient Position: Sitting, BP Cuff Size: Large adult)   Pulse 96   Temp 36.4 °C (97.5 °F) (Temporal)   Ht 1.753 m (5' 9\")   Wt 109.7 kg (241 lb 13.5 oz)   SpO2 96%     Constitutional:   Body Habitus: Body mass index is 35.71 kg/m².  Cooperation: Fully cooperates with exam  Appearance: Well-groomed, well-nourished, not disheveled, in no acute distress    Eyes: No scleral icterus, no proptosis     ENT -no obvious " auditory deficits, wearing a face mask    Skin -no rashes or lesions noted     Respiratory-  breathing comfortable on room air, no audible wheezing    Cardiovascular- no lower extremity edema is noted.     Psychiatric- alert and oriented ×3. Normal affect.     Gait - normal gait, no use of ambulatory device, nonantalgic.     Musculoskeletal -   Cervical spine     Tenderness to palpation over the trapezius mild tenderness     Thoracic/Lumbar Spine/Sacral Spine/Hips   Inspection: No evidence of atrophy in bilateral lower extremities throughout     ROM: full  AROM with flexion, extension, lateral flexion, and rotation bilaterally, without pain     Palpation:   No tenderness to palpation in midline at T1-T12 levels. No tenderness to palpation to the para-midline region in the cervical paraspinals and upper thoracic paraspinals bilaterally, no tenderness lower lumbar levels.        MEDICAL DECISION MAKING    Medical records review: see under HPI section.     DATA    Labs:   Vitamin D, 25 hydroxy 03/10/2020 : 38  TSH: 2.610 on 03/10/2020  Lab Results   Component Value Date/Time    SODIUM 136 05/07/2020 08:39 AM    POTASSIUM 4.3 05/07/2020 08:39 AM    CHLORIDE 101 05/07/2020 08:39 AM    CO2 24 05/07/2020 08:39 AM    ANION 11.0 05/07/2020 08:39 AM    GLUCOSE 72 05/07/2020 08:39 AM    BUN 8 05/07/2020 08:39 AM    CREATININE 0.73 05/07/2020 08:39 AM    CALCIUM 9.2 05/07/2020 08:39 AM       No results found for: PROTHROMBTM, INR     No results found for: WBC, RBC, HEMOGLOBIN, HEMATOCRIT, MCV, MCH, MCHC, MPV, NEUTSPOLYS, LYMPHOCYTES, MONOCYTES, EOSINOPHILS, BASOPHILS, HYPOCHROMIA, ANISOCYTOSIS     Lab Results   Component Value Date/Time    HBA1C 5.5 03/10/2020 05:06 PM        Imaging: I personally reviewed following images, these are my reads  Xray thoracic 07/31/2020  There is mild curvative of the thoracic spine, convex left.  [place] at T3-4    MRI cervical spine on 05/22/2020  There is not of mild loss of cervical lordosis.   No significant degenerative changes, no cervical disc herniations, no central or foraminal stenosis in the cervical spine.     IMAGING radiology reads. I reviewed the following radiology reads   MRI cervical spine on 05/22/2020  Unremarkable pre and postcontrast MR examination of the cervical spine except for loss of cervical lordosis              Xray thoracic 07/31/2020        IMPRESSION:   1.  No acute findings.  2.  Mild spinal curvature, convex left, in the upper thoracic spine                                                                                                                                                         Diagnosis   Visit Diagnoses     ICD-10-CM   1. Fibromyalgia  M79.7   2. Myofascial pain syndrome of thoracic spine  M79.18   3. Thoracic back pain, unspecified back pain laterality, unspecified chronicity  M54.6   4. Spinal curvature  M43.9   5. Insomnia, unspecified type  G47.00   6. Muscle spasm  M62.838           ASSESSMENT:  Carmen Caprice Hebert 25 y.o. female seen for above     Carmen was seen today for follow-up.    Diagnoses and all orders for this visit:    Fibromyalgia  -     pregabalin (LYRICA) 150 MG Cap; Take 1 Cap by mouth 2 times a day for 30 days.    Myofascial pain syndrome of thoracic spine    Thoracic back pain, unspecified back pain laterality, unspecified chronicity    Spinal curvature  Comments:  thoracic    Insomnia, unspecified type    Muscle spasm  -     baclofen (LIORESAL) 10 MG Tab; Take 0.5 Tabs by mouth 3 times a day for 30 days.           1. Encouraged her to continue with physical therapy.  Continue her home exercise program and encouraged her to work with visits.  2. Discontinue robaxin.  This did help with some spasms, but did not help much or with pain.  3. Continue lyrica 150mg po bid.  This has been helpful.  4. Discussed her insomnia issues.  Reviewed sleep hygiene strategies, including journaling, PMR.  CBTi discussed.  Discussed possible  referral to pain psychology.  5. Trial of baclofen.  Discussed side effects.  Start 5mg po tid.   6. Encouraged activity as tolerated.      Follow-up: Return in about 2 months (around 12/1/2020).    Thank you very much for asking me to participate in Carmen Vasques Ynes Hebert's care.  Please contact me with any questions or concerns.      Please note that this dictation was created using voice recognition software. I have made every reasonable attempt to correct obvious errors but there may be errors of grammar and content that I may have overlooked prior to finalization of this note.      Mahesh Crane MD  Physical Medicine and Rehabilitation  Interventional Spine and Sports Physiatry  [facility]

## 2020-10-08 ENCOUNTER — PHYSICAL THERAPY (OUTPATIENT)
Dept: PHYSICAL THERAPY | Facility: REHABILITATION | Age: 25
End: 2020-10-08
Attending: PHYSICAL MEDICINE & REHABILITATION
Payer: COMMERCIAL

## 2020-10-08 DIAGNOSIS — M79.18 DIFFUSE MYOFASCIAL PAIN SYNDROME: ICD-10-CM

## 2020-10-08 DIAGNOSIS — M62.838 SPASM OF MUSCLE: ICD-10-CM

## 2020-10-08 DIAGNOSIS — N62 LARGE BREASTS: ICD-10-CM

## 2020-10-08 PROCEDURE — 97112 NEUROMUSCULAR REEDUCATION: CPT

## 2020-10-08 PROCEDURE — 97110 THERAPEUTIC EXERCISES: CPT

## 2020-10-08 PROCEDURE — 97014 ELECTRIC STIMULATION THERAPY: CPT

## 2020-10-08 NOTE — OP THERAPY PROGRESS SUMMARY
Outpatient Physical Therapy  PROGRESS SUMMARY NOTE      [facility] Physical Therapy [place] [address]  [address]  [place] [address]  Phone:  [phone]  Fax:  [phone]    Date of Visit: 10/08/2020    Patient: Carmen Hebert  YOB: 1995  MRN: [id number]     Referring Provider: Mahesh Crane M.D.  [address]  [address]  [place] [address]   Referring Diagnosis Muscle spasm [M62.838];Myofascial pain syndrome of thoracic spine [M79.18];Large breasts [N62]     Visit Diagnoses     ICD-10-CM   1. Spasm of muscle  M62.838   2. Diffuse myofascial pain syndrome  M79.18   3. Large breasts  N62       Rehab Potential: fair    Progress Report Period: 8/5/20-10/8/20    Functional Assessment Used  Zhang Andrea Low Back Pain and Disability Score: 29.17       Objective Findings and Assessment:   Patient progression towards goals: Patient is a 25 year old female with a PMH including: Fibromyalgia (diagnosed 2017), obesity, thalassemia minor, palpitations, anxiety, depression. Pt reports has unofficial diagnosis of endometriosis by gynecologist. Pt reports has hx of scoliosis without bracing/surgeries. No surgical hx on file.   Pt presenting to therapy with complaints of recent flare up of fibromyalgia making it difficult to move or get out of bed; involving neck, shoulders and low back. Pt reports has hx of fibromyalgia which has worsened since 2017 with occasional spasms in LLE which are now worsening in include L t/s to L anterior rib cage and ocassional from cervical spine to shoulder..    Pt has been seen for 6 visits of skilled physical therapy and reporting overall 20% improvement in symptoms and 50% reduction in occurrence of spasms. Overall demonstrating improved postural awareness and increased tolerance to manual therapy with decreased hypersensitivity and withdrawal response. Significant improvement in RMQ objective measure score from 54.17 at eval to 29.17 (today's  "measure). Pt reporting positive impact on ADL's such as doing laundry, dishes with reduced limitations secondary to pain.     Pt had recent follow up with Dr. Salvador and is on trial of baclofen. Pt reporting some fogginess but is unsure if secondary to medication or \"her fibro stuff.\" Will continue to monitor in sessions.     Pt may benefit from completion of remaining 6 approved visits in order to continue strengthening and completing gentle mobility for improved ADL's and QOL.      Goals:   Short Term Goals:   Goals:   Short Term Goals:   1. Pt will be independent with written HEP. (Met)  2. Pt will be compliant with PNE education and readings in booklet (provided at eval). (Met)  3. Pt will demonstrate improvements in postural awareness with reduced cuing in clinic. (Met)     Short term goal time span:  2-4 weeks    Short term goal time span:  2-4 weeks      Long Term Goals:    Long Term Goals:    1. Pt will be independent with written HEP. (Met)  2. Pt will have a RMQ score improvement of >/= JAZMYNE or MCID (eval: 54.17) (NEW goal)  3. Pt will be independent with PNE booklet and it's educational topics. (Met)  4. Pt will report 30% improvement in spasms. (Met; 50% improvement)  5. Pt will be able to unload dishes with modification without increase in s/s from baseline. (Ongoing goal; pt reporting continued difficulty)  6. Pt will be able to do laundry with modification without increase in s/s from baseline. (Partially met; 2 loads vs. Baseline 1 load before s/s)     Long term goal time span:  6-8 weeks    Long term goal time span:  6-8 weeks    Plan:   Planned therapy interventions:  Neuromuscular Re-education (CPT 23643), E Stim Unattended (CPT 67464) and Therapeutic Exercise (CPT 74067)  Frequency: 1-2x/wk.  Duration in weeks:  6  Duration in visits:  6  Plan details:  UPOC: 12/3/20      Continue at: 2x97110, 1x97112, 97014x1 per visit for 6 visits      Referring provider co-signature:  I have reviewed this plan " of care and my co-signature certifies the need for services.     Certification Period: 10/08/2020 to 12/3/20    Physician Signature: ________________________________ Date: ______________

## 2020-10-08 NOTE — OP THERAPY DAILY TREATMENT
Outpatient Physical Therapy  DAILY TREATMENT     [facility] Therapy [address]  [address]  [place] [address]  Phone:  [phone]  Fax:  [phone]    Date: 10/08/2020    Patient: Carmen Hebert  YOB: 1995  MRN: [id number]     Time Calculation    Start time: 1431  Stop time: 1513 Time Calculation (min): 42 minutes         Chief Complaint: Back Problem      Visit #: 6    SUBJECTIVE:  I got a job! I'll be working part time to start. The baclofen has been going okay. I've been feeling foggy but I don't know if that's just the fibro stuff. I think it's helped my spasms and pain. I'm still in pain at the end of the day. I think overall I've had 20% improvement         OBJECTIVE:  Current objective measures:   Zhang Andrea Low Back Pain and Disability Score: 29.17       Therapeutic Exercises (CPT 11508):     1. Pt education, massaging and stretching re: muscle knots    2. [place], x10 with 10 sec hold; level 1 TB, added to hep    13. UPOC: 10/9/20    Therapeutic Treatments and Modalities:     1. Neuromuscular Re-education (CPT 21726), see below    Therapeutic Treatment and Modalities Summary: PA's grade II and III provided to t/s T2-T7 with pt prone lying followed by gentle STM to c/s and t/s paraspinals.    Increased tone noted L c/s and t/s paraspinals with pain reported by pt    Time-based treatments/modalities:    Physical Therapy Timed Treatment Charges  Neuromusc re-ed, balance, coor, post minutes (CPT 95368): 13 minutes  Therapeutic exercise minutes (CPT 62932): 14 minutes     ASSESSMENT:   Response to treatment: See progress note.      PLAN/RECOMMENDATIONS:   Plan for treatment: therapy treatment to continue next visit.  Planned interventions for next visit: continue with current treatment See progress note.    Continue at: 1x97110, 1x97112, 97014x1, 1x97012 per visit for 12 visits

## 2020-10-13 ENCOUNTER — PHYSICAL THERAPY (OUTPATIENT)
Dept: PHYSICAL THERAPY | Facility: REHABILITATION | Age: 25
End: 2020-10-13
Attending: PHYSICAL MEDICINE & REHABILITATION
Payer: COMMERCIAL

## 2020-10-13 DIAGNOSIS — M79.18 DIFFUSE MYOFASCIAL PAIN SYNDROME: ICD-10-CM

## 2020-10-13 DIAGNOSIS — N62 LARGE BREASTS: ICD-10-CM

## 2020-10-13 DIAGNOSIS — M62.838 SPASM OF MUSCLE: ICD-10-CM

## 2020-10-13 PROCEDURE — 97112 NEUROMUSCULAR REEDUCATION: CPT

## 2020-10-13 NOTE — OP THERAPY DAILY TREATMENT
"  Outpatient Physical Therapy  DAILY TREATMENT     [facility] [address]  [address]  [place] [address]  Phone:  [phone]  Fax:  [phone]    Date: 10/13/2020    Patient: Carmen Hebert  YOB: 1995  MRN: [id number]     Time Calculation    Start time: 1546  Stop time: 1617 Time Calculation (min): 31 minutes         Chief Complaint: Back Problem      Visit #: 7    SUBJECTIVE:  I feel a lot of pain all the time in the front and slightly to the right \"near bra strap.\" The diaphragmatic breathing brought on my pain too.      OBJECTIVE:  Current objective measures:       Thoracic flexion and extension: Limited secondary to pain; reproduction pain anterior R thoracic     Therapeutic Exercises (CPT 77608):     2. Verbal review of hep    3. Pt education, pt advised to monitor for additional s/s such as GI, b and b changes      Therapeutic Exercise Summary: Pt performed these exercises with instruction and SPV.  Provided handout with these exercises for daily HEP.      Therapeutic Treatments and Modalities:     1. Neuromuscular Re-education (CPT 32299), see below, x8 min    2. E Stim Unattended (CPT 11729), IFC to t/s with mhp x 15 min    Therapeutic Treatment and Modalities Summary: Neuro re-ed:   PA to t/s T6-T12 gr III (reproduction of pain anterior sternum with PA to lower t/s)  PA's to B ribs 6-10 gr III (TTP bilaterally)    Assessment inferior to R 12th rib anteriorly: Sig pain reproduction with palpation anteriorly near region of lower region liver      Time-based treatments/modalities:    Physical Therapy Timed Treatment Charges  Neuromusc re-ed, balance, coor, post minutes (CPT 69131): 8 minutes  Therapeutic exercise minutes (CPT 92520): 8 minutes     ASSESSMENT:   Response to treatment:   Pt presenting to therapy today with new complaints of anterior and right thoracic pain. Reports aggravation with diaphragmatic breathing, direct palpation. Reports " pain is constant x past few days. Some reproduction and aggravation of pain with thoracic flexion, extension and PA posteriorly to lower t/s. However, due to constant nature of pain and new location of pain, pt educated to monitor for other s/s such as GI or b and b symptoms due to constant nature and call MD if worsening. Limited exam today due to pt significantly late to appt.       PLAN/RECOMMENDATIONS:   Plan for treatment: therapy treatment to continue next visit.  Planned interventions for next visit: continue with current treatment Continue to assess for new complaints; continue per original POC. Supine bridge>ball bridge; add rows and pull downs    Continue at: 1x97110, 1x97112, 97014x1, 1x97012 per visit for 12 visits

## 2020-10-14 ENCOUNTER — TELEPHONE (OUTPATIENT)
Dept: PHYSICAL THERAPY | Facility: REHABILITATION | Age: 25
End: 2020-10-14

## 2020-10-14 DIAGNOSIS — J30.2 SEASONAL ALLERGIES: ICD-10-CM

## 2020-10-14 RX ORDER — FLUTICASONE PROPIONATE 50 MCG
1 SPRAY, SUSPENSION (ML) NASAL DAILY
Qty: 16 G | Refills: 0 | Status: SHIPPED | OUTPATIENT
Start: 2020-10-14 | End: 2021-08-11

## 2020-10-14 NOTE — TELEPHONE ENCOUNTER
Received request via: Pharmacy    Was the patient seen in the last year in this department? Yes  Future Appointments       Provider Department [place]    10/15/2020 7:30 AM Jim Cisneros, LA[facility] [address] [place] [address]    [facility]2020 [facility]           Does the patient have an active prescription (recently filled or refills available) for medication(s) requested? No

## 2020-10-15 ENCOUNTER — PHYSICAL THERAPY (OUTPATIENT)
Dept: PHYSICAL THERAPY | Facility: REHABILITATION | Age: 25
End: 2020-10-15
Attending: PHYSICAL MEDICINE & REHABILITATION
Payer: COMMERCIAL

## 2020-10-15 DIAGNOSIS — M79.18 DIFFUSE MYOFASCIAL PAIN SYNDROME: ICD-10-CM

## 2020-10-15 DIAGNOSIS — N62 LARGE BREASTS: ICD-10-CM

## 2020-10-15 DIAGNOSIS — M62.838 SPASM OF MUSCLE: ICD-10-CM

## 2020-10-15 PROCEDURE — 97112 NEUROMUSCULAR REEDUCATION: CPT

## 2020-10-15 PROCEDURE — 97110 THERAPEUTIC EXERCISES: CPT

## 2020-10-15 PROCEDURE — 97014 ELECTRIC STIMULATION THERAPY: CPT

## 2020-10-15 NOTE — OP THERAPY DAILY TREATMENT
Outpatient Physical Therapy  DAILY TREATMENT     [facility] [place] [address]  [address]  [place] [address]  Phone:  [phone]  Fax:  [phone]    Date: 10/15/2020    Patient: Carmen Hebert  YOB: 1995  MRN: [id number]     Time Calculation    Start time: 0731  Stop time: 0813 Time Calculation (min): 42 minutes         Chief Complaint: Back Problem      Visit #: 8    SUBJECTIVE:  I'm feeling a little better; what you did last time helped I think.I think the baclofen has helped with the more consistent pain. I have been feeling more itchy.    OBJECTIVE:  Current objective measures:           Therapeutic Exercises (CPT 99972):     2. Verbal review of hep    5. HS rolls swissball    6. Pull downs, 10x 3 sec hold with level 1 TB, added to hep    7. Rows, 10x 3 sec hold with level 1 TB, added to hep    8. Prayer stretch, 2x30 sec , added to hep    9. Pt education, edu to speak with PCP re: new s/s including itchiness    13. UPOC:  12/3/20      Therapeutic Exercise Summary: Pt performed these exercises with instruction and SPV.  Provided handout with these exercises for daily HEP.      Therapeutic Treatments and Modalities:     1. Neuromuscular Re-education (CPT 23856), see below, x10 min    2. E Stim Unattended (CPT 33887), IFC to t/s with mhp x 15 min    Therapeutic Treatment and Modalities Summary: Neuro re-ed:   PA to t/s T6-T12 gr III followed by PA's to B ribs 6-10 gr III (TTP bilaterally); pt supine lying          Time-based treatments/modalities:    Physical Therapy Timed Treatment Charges  Neuromusc re-ed, balance, coor, post minutes (CPT 61559): 10 minutes  Therapeutic exercise minutes (CPT 78972): 17 minutes     Pain pre treatment: 3/10    ASSESSMENT:   Response to treatment:   Repeated manual from last session with continued sensitivity at t/s but improving tolerance (including rib mobs). Pt demonstrating increased exercise tolerance with progressed  strengthening in standing today. Updated hep. Pt may continue to benefit from strengthening in standing and functional positions to promote improvement in ADL's. Currently awaiting auth extension approval.      PLAN/RECOMMENDATIONS:   Plan for treatment: therapy treatment to continue next visit.  Planned interventions for next visit: continue with current treatment Review new hep response. Bridge on swissball; continue strengthening in functional and standing positions    Continue at: 1x97110, 1x97112, 97014x1, 1x97012 per visit for 12 visits

## 2020-10-20 ENCOUNTER — HOSPITAL ENCOUNTER (OUTPATIENT)
Dept: LAB | Facility: MEDICAL CENTER | Age: 25
End: 2020-10-20
Attending: NURSE PRACTITIONER
Payer: COMMERCIAL

## 2020-10-20 DIAGNOSIS — Z11.52 ENCOUNTER FOR SCREENING LABORATORY TESTING FOR COVID-19 VIRUS: ICD-10-CM

## 2020-10-20 LAB — COVID ORDER STATUS COVID19: NORMAL

## 2020-10-20 PROCEDURE — U0003 INFECTIOUS AGENT DETECTION BY NUCLEIC ACID (DNA OR RNA); SEVERE ACUTE RESPIRATORY SYNDROME CORONAVIRUS 2 (SARS-COV-2) (CORONAVIRUS DISEASE [COVID-19]), AMPLIFIED PROBE TECHNIQUE, MAKING USE OF HIGH THROUGHPUT TECHNOLOGIES AS DESCRIBED BY CMS-2020-01-R: HCPCS

## 2020-10-20 PROCEDURE — C9803 HOPD COVID-19 SPEC COLLECT: HCPCS

## 2020-10-20 NOTE — PROGRESS NOTES
Patient complaining of body aches controlled with Advil.  Requesting coronavirus testing.  Covid test ordered

## 2020-10-21 LAB
SARS-COV-2 RNA RESP QL NAA+PROBE: NOTDETECTED
SPECIMEN SOURCE: NORMAL

## 2020-10-21 NOTE — RESULT ENCOUNTER NOTE
Darek Morales–  I got your lab results.  You are negative for coronavirus.  Please let me know if you have any questions or concerns.  Mariel

## 2020-11-10 ENCOUNTER — PATIENT MESSAGE (OUTPATIENT)
Dept: MEDICAL GROUP | Facility: MEDICAL CENTER | Age: 25
End: 2020-11-10

## 2020-11-10 DIAGNOSIS — J98.8 CONGESTION OF UPPER AIRWAY: ICD-10-CM

## 2020-11-12 NOTE — PROGRESS NOTES
Patient is requesting another Covid test, her initial test was negative, but since then 2 of her coworkers have come positive.  Her congestion continues from October.

## 2020-11-16 ENCOUNTER — HOSPITAL ENCOUNTER (OUTPATIENT)
Dept: LAB | Facility: MEDICAL CENTER | Age: 25
End: 2020-11-16
Attending: NURSE PRACTITIONER
Payer: COMMERCIAL

## 2020-11-16 DIAGNOSIS — J98.8 CONGESTION OF UPPER AIRWAY: ICD-10-CM

## 2020-11-16 PROCEDURE — U0003 INFECTIOUS AGENT DETECTION BY NUCLEIC ACID (DNA OR RNA); SEVERE ACUTE RESPIRATORY SYNDROME CORONAVIRUS 2 (SARS-COV-2) (CORONAVIRUS DISEASE [COVID-19]), AMPLIFIED PROBE TECHNIQUE, MAKING USE OF HIGH THROUGHPUT TECHNOLOGIES AS DESCRIBED BY CMS-2020-01-R: HCPCS

## 2020-11-16 PROCEDURE — C9803 HOPD COVID-19 SPEC COLLECT: HCPCS

## 2020-11-17 LAB
COVID ORDER STATUS COVID19: NORMAL
SARS-COV-2 RNA RESP QL NAA+PROBE: NOTDETECTED
SPECIMEN SOURCE: NORMAL

## 2020-11-24 ENCOUNTER — OFFICE VISIT (OUTPATIENT)
Dept: PHYSICAL MEDICINE AND REHAB | Facility: MEDICAL CENTER | Age: 25
End: 2020-11-24
Payer: COMMERCIAL

## 2020-11-24 VITALS
SYSTOLIC BLOOD PRESSURE: 118 MMHG | OXYGEN SATURATION: 94 % | WEIGHT: 244.49 LBS | HEART RATE: 93 BPM | DIASTOLIC BLOOD PRESSURE: 82 MMHG | HEIGHT: 69 IN | TEMPERATURE: 97.1 F | BODY MASS INDEX: 36.21 KG/M2

## 2020-11-24 DIAGNOSIS — M54.6 THORACIC BACK PAIN, UNSPECIFIED BACK PAIN LATERALITY, UNSPECIFIED CHRONICITY: ICD-10-CM

## 2020-11-24 DIAGNOSIS — F32.A DEPRESSION, UNSPECIFIED DEPRESSION TYPE: ICD-10-CM

## 2020-11-24 DIAGNOSIS — M79.18 MYOFASCIAL PAIN SYNDROME OF THORACIC SPINE: ICD-10-CM

## 2020-11-24 DIAGNOSIS — M79.7 FIBROMYALGIA: ICD-10-CM

## 2020-11-24 PROCEDURE — 99214 OFFICE O/P EST MOD 30 MIN: CPT | Performed by: PHYSICAL MEDICINE & REHABILITATION

## 2020-11-24 RX ORDER — PREGABALIN 150 MG/1
150 CAPSULE ORAL
Qty: 90 CAP | Refills: 1 | Status: SHIPPED | OUTPATIENT
Start: 2020-11-24 | End: 2020-12-24

## 2020-11-24 RX ORDER — PREGABALIN 150 MG/1
150 CAPSULE ORAL 2 TIMES DAILY
COMMUNITY
End: 2020-11-24 | Stop reason: SDUPTHER

## 2020-11-24 ASSESSMENT — PATIENT HEALTH QUESTIONNAIRE - PHQ9
5. POOR APPETITE OR OVEREATING: 2 - MORE THAN HALF THE DAYS
CLINICAL INTERPRETATION OF PHQ2 SCORE: 2
SUM OF ALL RESPONSES TO PHQ QUESTIONS 1-9: 10

## 2020-11-24 ASSESSMENT — PAIN SCALES - GENERAL: PAINLEVEL: 7=MODERATE-SEVERE PAIN

## 2020-11-24 NOTE — PROGRESS NOTES
"Follow-up patient note    Physiatry (physical medicine and  Rehabilitation), interventional spine and sports medicine    Date of Service: 11/24/2020    Chief complaint:   Chief Complaint   Patient presents with   • Follow-Up     Neck, shoulder and back pain       HISTORY    HPI: Carmen Hebert 25 y.o. female who presents today for follow-up evaluation of pain complaints.  She has a diagnosis of fibromyalgia.    Since her last visit, she had to stop her physical therapy.  Her insurance did not allow her to continue with PT, which is too bad, as she feels that this was significantly benefiting her.       Working from home has been difficult for her, pain is increased with being at home.  She is trying to work in an office space, but does not have much privacy.  Her home is busy with her parents, her brother and his spouse and some extended family.  This has been stressful.    From what she reports, she has pain all over her body.  Pain is a 6-7/10 on the NRS and reports that her sleep has not been as good, getting about 5-6 hours a night.    Her sleep has not been good.  She has been more stressed lately, \"the world is falling apart\" and she has been living with extended family.  This is more stressful.    She continues to take pregabalin 150mg twice a day.  This has seemed to be helpful.  She also reports that she is taking vitamin D and magnesium.  Due to cost, she has stopped taking turmeric.    At the last visit, she was given baclofen, but this seems like it might have been making her feel like she had knots on her body and this is gone now, after discontinuing medication.    Her exercise program has not been regular, lately.  She is not doing yoga right now.    Medical records review:  I reviewed the note from the referring provider Mariel Paula A.P.* dated 03/10/2020.  The patient was seen to establish care.  Report that she was diagnosed in 2017 with fibromyalgia, in [facility].  Labs were " tested including TSH, HbA1C, vitamin D, 25 hydroxy, HIV ag/ab, T. Pallidum, chlamydia.  She was referred to cardiology for evaluation of palpitations with Holter monitor.  Medications refilled included zoloft 100mg, lyrica 75mg daily, cyclobenzaprine 5mg.  Referral to physiatry placed    Previous treatments:    Physical Therapy: Yes    Medications the patient is tried: lyrica, cyclobenzeprine, amitriptyline, tried savella    Previous interventions: none    Previous surgeries to relieve the above pain:  none    PHQ-9: 2    ROS: Unchanged from 10/01/2020 except as noted in the HPI  Gen: nausea  Eyes: intermittent blurry vision  CV: anxiety, palpitation (inappropriate sinus tachycardia)  Psych: depression  Red Flags ROS:   Fever, Chills, Sweats: Denies  Involuntary Weight Loss: Denies  Bladder Incontinence: Denies  Bowel Incontinence: Denies  Saddle Anesthesia: Denies    All other systems reviewed and negative.       PMHx:   Past Medical History:   Diagnosis Date   • Anxiety    • Depression    • Fibromyalgia 2017   • Thalassemia minor        PSHx:   History reviewed. No pertinent surgical history.    Family history   Family History   Problem Relation Age of Onset   • Fibromyalgia Mother    • Lung Disease Mother         asthma r/o   • Depression Mother    • Anxiety disorder Mother    • ADD / ADHD Father    • Depression Father          Medications:   Current Outpatient Medications   Medication   • pregabalin (LYRICA) 150 MG Cap   • sertraline (ZOLOFT) 100 MG Tab   • fluticasone (FLONASE) 50 MCG/ACT nasal spray   • Norethindrone Acet-Ethinyl Est 1.5-30 MG-MCG Tab   • metoprolol (LOPRESSOR) 25 MG Tab   • ALPRAZolam (XANAX) 0.5 MG Tab     No current facility-administered medications for this visit.        Allergies:   No Known Allergies    Social Hx:   Social History     Socioeconomic History   • Marital status: Single     Spouse name: Not on file   • Number of children: Not on file   • Years of education: Not on file   •  "Highest education level: Not on file   Occupational History   • Not on file   Social Needs   • Financial resource strain: Not on file   • Food insecurity     Worry: Not on file     Inability: Not on file   • Transportation needs     Medical: Not on file     Non-medical: Not on file   Tobacco Use   • Smoking status: Never Smoker   • Smokeless tobacco: Never Used   Substance and Sexual Activity   • Alcohol use: Not Currently     Comment: occ   • Drug use: Not Currently     Types: Marijuana     Comment: rarely   • Sexual activity: Yes     Partners: Male     Birth control/protection: Condom   Lifestyle   • Physical activity     Days per week: Not on file     Minutes per session: Not on file   • Stress: Not on file   Relationships   • Social connections     Talks on phone: Not on file     Gets together: Not on file     Attends Mosque service: Not on file     Active member of club or organization: Not on file     Attends meetings of clubs or organizations: Not on file     Relationship status: Not on file   • Intimate partner violence     Fear of current or ex partner: Not on file     Emotionally abused: Not on file     Physically abused: Not on file     Forced sexual activity: Not on file   Other Topics Concern   •  Service No   • Blood Transfusions No   • Caffeine Concern No   • Occupational Exposure No   • Hobby Hazards No   • Sleep Concern Yes   • Stress Concern Yes   • Weight Concern Yes   • Special Diet Yes   • Back Care No   • Exercise No   • Bike Helmet Yes   • Seat Belt Yes   • Self-Exams Yes   Social History Narrative   • Not on file         EXAMINATION     Physical Exam:   Vitals: /82 (BP Location: Right arm, Patient Position: Sitting, BP Cuff Size: Large adult)   Pulse 93   Temp 36.2 °C (97.1 °F) (Temporal)   Ht 1.753 m (5' 9\")   Wt 110.9 kg (244 lb 7.8 oz)   SpO2 94%     Constitutional:   Body Habitus: Body mass index is 36.1 kg/m².  Cooperation: Fully cooperates with exam  Appearance: " Well-groomed, well-nourished, not disheveled, in no acute distress    Eyes: No scleral icterus, no proptosis     ENT -no obvious auditory deficits, wearing a face mask    Skin -no rashes or lesions noted     Respiratory-  breathing comfortable on room air, no audible wheezing    Cardiovascular- no lower extremity edema is noted.     Psychiatric- alert and oriented ×3. Normal affect.     Gait - normal gait, no use of ambulatory device, nonantalgic.     Musculoskeletal -   Cervical spine     Tenderness to palpation over the trapezius mild tenderness     Thoracic/Lumbar Spine/Sacral Spine/Hips   Inspection: No evidence of atrophy in bilateral lower extremities throughout     ROM: full  AROM with flexion, extension, lateral flexion, and rotation bilaterally, without pain     Palpation:   No tenderness to palpation in midline at T1-T12 levels. No tenderness to palpation to the para-midline region in the cervical paraspinals and upper thoracic paraspinals bilaterally, no tenderness lower lumbar levels today.  Tenderness over the supraspinatus, trapezius, medial elbows bilaterally    No focal motor or sensory deficits in the lower extremities.    Reflexes are 2+ biceps, triceps, patella and achilles bilaterally    MEDICAL DECISION MAKING    Medical records review: see under HPI section.     DATA    Labs:   Vitamin D, 25 hydroxy 03/10/2020 : 38  TSH: 2.610 on 03/10/2020  Lab Results   Component Value Date/Time    SODIUM 136 05/07/2020 08:39 AM    POTASSIUM 4.3 05/07/2020 08:39 AM    CHLORIDE 101 05/07/2020 08:39 AM    CO2 24 05/07/2020 08:39 AM    ANION 11.0 05/07/2020 08:39 AM    GLUCOSE 72 05/07/2020 08:39 AM    BUN 8 05/07/2020 08:39 AM    CREATININE 0.73 05/07/2020 08:39 AM    CALCIUM 9.2 05/07/2020 08:39 AM       No results found for: PROTHROMBTM, INR     No results found for: WBC, RBC, HEMOGLOBIN, HEMATOCRIT, MCV, MCH, MCHC, MPV, NEUTSPOLYS, LYMPHOCYTES, MONOCYTES, EOSINOPHILS, BASOPHILS, HYPOCHROMIA, ANISOCYTOSIS      Lab Results   Component Value Date/Time    HBA1C 5.5 03/10/2020 05:06 PM        Imaging: I personally reviewed following images, these are my reads  Xray thoracic 07/31/2020  There is mild curvative of the thoracic spine, convex left.  [place] at T3-4    MRI cervical spine on 05/22/2020  There is not of mild loss of cervical lordosis.  No significant degenerative changes, no cervical disc herniations, no central or foraminal stenosis in the cervical spine.     IMAGING radiology reads. I reviewed the following radiology reads   MRI cervical spine on 05/22/2020  Unremarkable pre and postcontrast MR examination of the cervical spine except for loss of cervical lordosis              Xray thoracic 07/31/2020        IMPRESSION:   1.  No acute findings.  2.  Mild spinal curvature, convex left, in the upper thoracic spine                                                                                                                                                         Diagnosis   Visit Diagnoses     ICD-10-CM   1. Fibromyalgia  M79.7   2. Myofascial pain syndrome of thoracic spine  M79.18   3. Thoracic back pain, unspecified back pain laterality, unspecified chronicity  M54.6   4. Depression, unspecified depression type  F32.9           ASSESSMENT:  Carmen Caprice Hebert 25 y.o. female seen for above     Carmen was seen today for follow-up.    Diagnoses and all orders for this visit:    Fibromyalgia  -     pregabalin (LYRICA) 150 MG Cap; Take 1 Cap by mouth 3 times a day for 30 days.    Myofascial pain syndrome of thoracic spine    Thoracic back pain, unspecified back pain laterality, unspecified chronicity    Depression, unspecified depression type  -     Patient has been identified as having a positive depression screening. Appropriate orders and counseling have been given.  -     REFERRAL TO PSYCHOLOGY         1. Lyrica 150mg po tid discussed.  Plan for titration to this dose as tolerated  2. We discussed  working on her home exercise program.  Discussed returning to yoga with a goal of 5 days a week for 30 minutes.    3. She would like to pursue psychology, but she does not know that she has privacy to allow for these visits.  Referral placed, in the event that she can work this barrier out.  4. Discontinued baclofen due to side effects.        Follow-up: Return in about 7 weeks (around 1/12/2021).    Thank you very much for asking me to participate in Carmen Vasques Ynes Hebert's care.  Please contact me with any questions or concerns.      Please note that this dictation was created using voice recognition software. I have made every reasonable attempt to correct obvious errors but there may be errors of grammar and content that I may have overlooked prior to finalization of this note.      Mahesh Crane MD  Physical Medicine and Rehabilitation  Interventional Spine and Sports Physiatry  [facility]

## 2020-12-08 ENCOUNTER — PATIENT MESSAGE (OUTPATIENT)
Dept: MEDICAL GROUP | Facility: MEDICAL CENTER | Age: 25
End: 2020-12-08

## 2020-12-08 DIAGNOSIS — Z20.822 CLOSE EXPOSURE TO COVID-19 VIRUS: ICD-10-CM

## 2020-12-09 ENCOUNTER — HOSPITAL ENCOUNTER (OUTPATIENT)
Dept: LAB | Facility: MEDICAL CENTER | Age: 25
End: 2020-12-09
Attending: NURSE PRACTITIONER
Payer: COMMERCIAL

## 2020-12-09 DIAGNOSIS — Z20.822 CLOSE EXPOSURE TO COVID-19 VIRUS: ICD-10-CM

## 2020-12-09 PROCEDURE — C9803 HOPD COVID-19 SPEC COLLECT: HCPCS

## 2020-12-09 PROCEDURE — U0003 INFECTIOUS AGENT DETECTION BY NUCLEIC ACID (DNA OR RNA); SEVERE ACUTE RESPIRATORY SYNDROME CORONAVIRUS 2 (SARS-COV-2) (CORONAVIRUS DISEASE [COVID-19]), AMPLIFIED PROBE TECHNIQUE, MAKING USE OF HIGH THROUGHPUT TECHNOLOGIES AS DESCRIBED BY CMS-2020-01-R: HCPCS

## 2020-12-09 NOTE — PROGRESS NOTES
Patient sent [employer] message reporting that her mother is positive for Covid.  She has been symptomatic for 5 days.  She is requesting a Covid test.  Covid test ordered.

## 2020-12-10 LAB
COVID ORDER STATUS COVID19: NORMAL
SARS-COV-2 RNA RESP QL NAA+PROBE: DETECTED
SPECIMEN SOURCE: ABNORMAL

## 2021-01-26 ENCOUNTER — OFFICE VISIT (OUTPATIENT)
Dept: PHYSICAL MEDICINE AND REHAB | Facility: MEDICAL CENTER | Age: 26
End: 2021-01-26
Payer: COMMERCIAL

## 2021-01-26 VITALS
BODY MASS INDEX: 34.34 KG/M2 | DIASTOLIC BLOOD PRESSURE: 68 MMHG | OXYGEN SATURATION: 90 % | HEIGHT: 70 IN | TEMPERATURE: 97.7 F | WEIGHT: 239.86 LBS | SYSTOLIC BLOOD PRESSURE: 124 MMHG | HEART RATE: 98 BPM

## 2021-01-26 DIAGNOSIS — M79.7 FIBROMYALGIA: ICD-10-CM

## 2021-01-26 DIAGNOSIS — M79.18 MYOFASCIAL PAIN SYNDROME OF THORACIC SPINE: ICD-10-CM

## 2021-01-26 DIAGNOSIS — F32.A DEPRESSION, UNSPECIFIED DEPRESSION TYPE: ICD-10-CM

## 2021-01-26 PROCEDURE — 99214 OFFICE O/P EST MOD 30 MIN: CPT | Performed by: PHYSICAL MEDICINE & REHABILITATION

## 2021-01-26 RX ORDER — PREGABALIN 150 MG/1
150 CAPSULE ORAL
Qty: 90 CAP | Refills: 0 | Status: SHIPPED | OUTPATIENT
Start: 2021-01-31 | End: 2021-03-02

## 2021-01-26 RX ORDER — PREGABALIN 150 MG/1
150 CAPSULE ORAL
Qty: 90 CAP | Refills: 0 | Status: SHIPPED
Start: 2021-04-01 | End: 2021-04-28

## 2021-01-26 RX ORDER — PREGABALIN 150 MG/1
CAPSULE ORAL
COMMUNITY
Start: 2021-01-02 | End: 2021-01-26 | Stop reason: SDUPTHER

## 2021-01-26 RX ORDER — PREGABALIN 150 MG/1
150 CAPSULE ORAL
Qty: 90 CAP | Refills: 0 | Status: SHIPPED | OUTPATIENT
Start: 2021-03-02 | End: 2021-04-01

## 2021-01-26 ASSESSMENT — PATIENT HEALTH QUESTIONNAIRE - PHQ9
5. POOR APPETITE OR OVEREATING: 1 - SEVERAL DAYS
SUM OF ALL RESPONSES TO PHQ QUESTIONS 1-9: 9
CLINICAL INTERPRETATION OF PHQ2 SCORE: 2

## 2021-01-26 ASSESSMENT — PAIN SCALES - GENERAL: PAINLEVEL: 6=MODERATE PAIN

## 2021-01-26 NOTE — PROGRESS NOTES
"Follow-up patient note    Physiatry (physical medicine and  Rehabilitation), interventional spine and sports medicine    Date of Service: 01/26/2021    Chief complaint:   Chief Complaint   Patient presents with   • Follow-Up     Back and neck pain       HISTORY    HPI: Carmen Hebert 25 y.o. female who presents today for follow-up evaluation of pain complaints.  She has a diagnosis of fibromyalgia.    Since the last visit, Carmen continues working about 30 hours a week.  This is going well.  This has helped reduce her stress a lot as she is getting out of the house.    Overall, her pain is better with use of lyrica, but she continues to have pain in the thoarcic spine.  She also reports that she is being evaluated for a \"heart condition\".  She has taken metoprolol for palpitations, by history.    Her family got COVID-19 and she reports that her whole family had it.  End of November and December, she was mostly in bed.  Her exercise routine got delayed by this.  From what she reports, she has been cleared by the [facility].    Pain is a 6/10 on the NRS.  Sleep is around 5-8 hours a night.    Lyrica 150mg po tid.  No side effects.  She is also taking vitamin D and magnesium      Medical records review:  I reviewed the note from the referring provider Mariel Paula A.P.* dated 03/10/2020.  The patient was seen to establish care.  Report that she was diagnosed in 2017 with fibromyalgia, in [facility].  Labs were tested including TSH, HbA1C, vitamin D, 25 hydroxy, HIV ag/ab, T. Pallidum, chlamydia.  She was referred to cardiology for evaluation of palpitations with Holter monitor.  Medications refilled included zoloft 100mg, lyrica 75mg daily, cyclobenzaprine 5mg.  Referral to physiatry placed    Previous treatments:    Physical Therapy: Yes    Medications the patient is tried: lyrica, cyclobenzeprine, amitriptyline, tried savella    Previous interventions: none    Previous surgeries to relieve the above " pain:  none    PHQ-9: 2    ROS: Unchanged from 11/24/2020 except as noted in the HPI with note of COVID infection  Gen: nausea  Eyes: intermittent blurry vision  CV: anxiety, palpitation (inappropriate sinus tachycardia)  Psych: depression  Red Flags ROS:   Fever, Chills, Sweats: Denies  Involuntary Weight Loss: Denies  Bladder Incontinence: Denies  Bowel Incontinence: Denies  Saddle Anesthesia: Denies    All other systems reviewed and negative.       PMHx:   Past Medical History:   Diagnosis Date   • Anxiety    • Depression    • Fibromyalgia 2017   • Thalassemia minor        PSHx:   History reviewed. No pertinent surgical history.    Family history   Family History   Problem Relation Age of Onset   • Fibromyalgia Mother    • Lung Disease Mother         asthma r/o   • Depression Mother    • Anxiety disorder Mother    • ADD / ADHD Father    • Depression Father          Medications:   Current Outpatient Medications   Medication   • [START ON 1/31/2021] pregabalin (LYRICA) 150 MG Cap   • [START ON 3/2/2021] pregabalin (LYRICA) 150 MG Cap   • [START ON 4/1/2021] pregabalin (LYRICA) 150 MG Cap   • metoprolol tartrate (LOPRESSOR) 25 MG Tab   • sertraline (ZOLOFT) 100 MG Tab   • fluticasone (FLONASE) 50 MCG/ACT nasal spray   • Norethindrone Acet-Ethinyl Est 1.5-30 MG-MCG Tab   • ALPRAZolam (XANAX) 0.5 MG Tab     No current facility-administered medications for this visit.        Allergies:   No Known Allergies    Social Hx:   Social History     Socioeconomic History   • Marital status: Single     Spouse name: Not on file   • Number of children: Not on file   • Years of education: Not on file   • Highest education level: Not on file   Occupational History   • Not on file   Social Needs   • Financial resource strain: Not on file   • Food insecurity     Worry: Not on file     Inability: Not on file   • Transportation needs     Medical: Not on file     Non-medical: Not on file   Tobacco Use   • Smoking status: Never Smoker   •  "Smokeless tobacco: Never Used   Substance and Sexual Activity   • Alcohol use: Not Currently     Comment: occ   • Drug use: Not Currently     Types: Marijuana     Comment: rarely   • Sexual activity: Yes     Partners: Male     Birth control/protection: Condom   Lifestyle   • Physical activity     Days per week: Not on file     Minutes per session: Not on file   • Stress: Not on file   Relationships   • Social connections     Talks on phone: Not on file     Gets together: Not on file     Attends Anglican service: Not on file     Active member of club or organization: Not on file     Attends meetings of clubs or organizations: Not on file     Relationship status: Not on file   • Intimate partner violence     Fear of current or ex partner: Not on file     Emotionally abused: Not on file     Physically abused: Not on file     Forced sexual activity: Not on file   Other Topics Concern   •  Service No   • Blood Transfusions No   • Caffeine Concern No   • Occupational Exposure No   • Hobby Hazards No   • Sleep Concern Yes   • Stress Concern Yes   • Weight Concern Yes   • Special Diet Yes   • Back Care No   • Exercise No   • Bike Helmet Yes   • Seat Belt Yes   • Self-Exams Yes   Social History Narrative   • Not on file         EXAMINATION     Physical Exam:   Vitals: /68 (BP Location: Right arm, Patient Position: Sitting, BP Cuff Size: Large adult)   Pulse 98   Temp 36.5 °C (97.7 °F) (Temporal)   Ht 1.778 m (5' 10\")   Wt 109 kg (239 lb 13.8 oz)   SpO2 90%     Constitutional:   Body Habitus: Body mass index is 34.42 kg/m².  Cooperation: Fully cooperates with exam  Appearance: Well-groomed, well-nourished, not disheveled, in no acute distress    Eyes: No scleral icterus, no proptosis     ENT -no obvious auditory deficits, wearing a face mask    Skin -no rashes or lesions noted     Respiratory-  breathing comfortable on room air, no audible wheezing    Cardiovascular- no lower extremity edema is noted. "     Psychiatric- alert and oriented ×3. Normal affect.     Gait - normal gait, no use of ambulatory device, nonantalgic.     Musculoskeletal -   Cervical spine     Tenderness to palpation over the trapezius mild tenderness in thoracic paraspinals    Thoracic/Lumbar Spine/Sacral Spine/Hips   Inspection: No evidence of atrophy in bilateral lower extremities throughout     ROM: full  AROM with flexion, extension, lateral flexion, and rotation bilaterally, without pain     No focal motor or sensory deficits in the lower extremities.    Reflexes are 2+ biceps, triceps, patella and achilles bilaterally    MEDICAL DECISION MAKING    Medical records review: see under HPI section.     DATA    Labs:   Vitamin D, 25 hydroxy 03/10/2020 : 38  TSH: 2.610 on 03/10/2020  Lab Results   Component Value Date/Time    SODIUM 136 05/07/2020 08:39 AM    POTASSIUM 4.3 05/07/2020 08:39 AM    CHLORIDE 101 05/07/2020 08:39 AM    CO2 24 05/07/2020 08:39 AM    ANION 11.0 05/07/2020 08:39 AM    GLUCOSE 72 05/07/2020 08:39 AM    BUN 8 05/07/2020 08:39 AM    CREATININE 0.73 05/07/2020 08:39 AM    CALCIUM 9.2 05/07/2020 08:39 AM       No results found for: PROTHROMBTM, INR     No results found for: WBC, RBC, HEMOGLOBIN, HEMATOCRIT, MCV, MCH, MCHC, MPV, NEUTSPOLYS, LYMPHOCYTES, MONOCYTES, EOSINOPHILS, BASOPHILS, HYPOCHROMIA, ANISOCYTOSIS     Lab Results   Component Value Date/Time    HBA1C 5.5 03/10/2020 05:06 PM        Imaging: I personally reviewed following images, these are my reads  Xray thoracic 07/31/2020  There is mild curvative of the thoracic spine, convex left.  [place] at T3-4    MRI cervical spine on 05/22/2020  There is not of mild loss of cervical lordosis.  No significant degenerative changes, no cervical disc herniations, no central or foraminal stenosis in the cervical spine.     IMAGING radiology reads. I reviewed the following radiology reads   MRI cervical spine on 05/22/2020  Unremarkable pre and postcontrast MR examination of the  cervical spine except for loss of cervical lordosis              Xray thoracic 07/31/2020        IMPRESSION:   1.  No acute findings.  2.  Mild spinal curvature, convex left, in the upper thoracic spine                                                                                                                                                         Diagnosis   Visit Diagnoses     ICD-10-CM   1. Fibromyalgia  M79.7   2. Myofascial pain syndrome of thoracic spine  M79.18   3. Depression, unspecified depression type  F32.9           ASSESSMENT:  Carmen Hebert 25 y.o. female seen for above     Carmen was seen today for follow-up.    Diagnoses and all orders for this visit:    Fibromyalgia  -     pregabalin (LYRICA) 150 MG Cap; Take 1 Cap by mouth 3 times a day for 30 days.  -     pregabalin (LYRICA) 150 MG Cap; Take 1 Cap by mouth 3 times a day for 30 days.  -     pregabalin (LYRICA) 150 MG Cap; Take 1 Cap by mouth 3 times a day for 30 days.    Myofascial pain syndrome of thoracic spine    Depression, unspecified depression type       1. Continue lyrica 150mg po tid.  Refilled.  She does feel like this is helpful.  Refill given for the next three months.  Discussed home exercise program, encouraged her to continue with her yoga, which she had been trying to do 2-3 times a week.  2. Mood is stable, she continues to take sertraline.  3. Advised that she follow-up with [facility] psychiatry/behavioral sciences.  This got delayed due to her COVID infection.  4. Continue follow-up with PCP and Cardiology      Follow-up: Return in about 3 months (around 4/26/2021).    Thank you very much for asking me to participate in Carmen Hebert's care.  Please contact me with any questions or concerns.      Please note that this dictation was created using voice recognition software. I have made every reasonable attempt to correct obvious errors but there may be errors of grammar and content that I may have  overlooked prior to finalization of this note.      Mahesh Crane MD  Physical Medicine and Rehabilitation  Interventional Spine and Sports Physiatry  [facility]

## 2021-01-29 DIAGNOSIS — Z30.41 ENCOUNTER FOR SURVEILLANCE OF CONTRACEPTIVE PILLS: ICD-10-CM

## 2021-02-01 RX ORDER — NORETHINDRONE ACETATE AND ETHINYL ESTRADIOL .03; 1.5 MG/1; MG/1
1 TABLET ORAL DAILY
Qty: 84 TAB | Refills: 3 | Status: SHIPPED | OUTPATIENT
Start: 2021-02-01 | End: 2021-04-15 | Stop reason: SDUPTHER

## 2021-02-01 RX ORDER — NORETHINDRONE ACETATE/ETHINYL ESTRADIOL 1.5-0.03MG
KIT ORAL
Qty: 84 TAB | Refills: 3 | OUTPATIENT
Start: 2021-02-01

## 2021-02-23 ENCOUNTER — TELEPHONE (OUTPATIENT)
Dept: PHYSICAL THERAPY | Facility: REHABILITATION | Age: 26
End: 2021-02-23

## 2021-02-23 NOTE — OP THERAPY DISCHARGE SUMMARY
Outpatient Physical Therapy  DISCHARGE SUMMARY NOTE      [facility] [address]  [address]  [place] [address]  Phone:  [phone]  Fax:  [phone]    Date of Visit: 02/23/2021    Patient: Carmen Hebert  YOB: 1995  MRN: [id number]     Referring Provider: No referring provider defined for this encounter.   Referring Diagnosis No admission diagnoses are documented for this encounter.         Functional Assessment Used        Your patient is being discharged from Physical Therapy with the following comments:   · Patient has failed to schedule or reschedule follow-up visits    Comments:   Pt seen for total of 8 visits and demonstrating progress in therapy. Last seen on 10/15/20 and failed to schedule additional follow ups.    Limitations Remaining:  Assessment from 10/15/20: Pt demonstrating increased exercise tolerance with progressed strengthening in standing today. Updated hep. Pt may continue to benefit from strengthening in standing and functional positions to promote improvement in ADL's. Currently awaiting auth extension approval.   (please see daily note for additional details).    Recommendations:  Pt has not been seen  >30 days. Pt has failed to schedule additional follow ups. Per policy, pt will need to be seen by PCP or acquire another referral prior to initiating skilled physical therapy. Pt is being discharged at this time.    Jim Cisneros, PT    Date: 2/23/2021       
No

## 2021-03-03 ENCOUNTER — OFFICE VISIT (OUTPATIENT)
Dept: CARDIOLOGY | Facility: MEDICAL CENTER | Age: 26
End: 2021-03-03
Payer: COMMERCIAL

## 2021-03-03 VITALS
OXYGEN SATURATION: 95 % | HEART RATE: 82 BPM | WEIGHT: 245 LBS | SYSTOLIC BLOOD PRESSURE: 116 MMHG | HEIGHT: 70 IN | DIASTOLIC BLOOD PRESSURE: 70 MMHG | BODY MASS INDEX: 35.07 KG/M2 | RESPIRATION RATE: 14 BRPM

## 2021-03-03 DIAGNOSIS — R00.2 PALPITATIONS: ICD-10-CM

## 2021-03-03 PROBLEM — F41.8 MIXED ANXIETY DEPRESSIVE DISORDER: Status: ACTIVE | Noted: 2020-04-08

## 2021-03-03 PROBLEM — J45.909 ASTHMA: Status: ACTIVE | Noted: 2020-04-08

## 2021-03-03 PROBLEM — N88.9 CERVICAL LESION: Status: ACTIVE | Noted: 2020-04-08

## 2021-03-03 PROBLEM — D56.9 THALASSEMIA: Status: ACTIVE | Noted: 2020-04-08

## 2021-03-03 PROBLEM — K90.0 CELIAC DISEASE: Status: ACTIVE | Noted: 2020-04-08

## 2021-03-03 PROBLEM — J30.2 SEASONAL ALLERGIES: Status: ACTIVE | Noted: 2020-04-08

## 2021-03-03 PROCEDURE — 99214 OFFICE O/P EST MOD 30 MIN: CPT | Performed by: INTERNAL MEDICINE

## 2021-03-03 NOTE — PROGRESS NOTES
Cardiology Follow-up Consultation Note    Date of note:    3/3/2021    Primary Care Provider: HEMA Garcia    Name:             Carmen Elise   YOB: 1995  MRN:               [id number]    CC: Follow-up palpitations    Patient ID/HPI:   26-year-old female patient was previously evaluated by palpitations with Holter monitoring, found to have inappropriate sinus tachycardia presented for follow-up.  Unfortunately she developed Covid infection in December, she has been having more palpitations, associated with chest pain.  She had Fitbit, heart rates mostly at 120 when she was having symptoms, rarely 130s.  No shortness of breath.  Metoprolol is helping but very minimally.  She also has fibromyalgia, hoping to do yoga.      ROS  Muscle pains due to fibromyalgia will, palpitations, chest pain.    Past Medical History:   Diagnosis Date   • Anxiety    • Depression    • Fibromyalgia 2017   • Thalassemia minor          History reviewed. No pertinent surgical history.      Current Outpatient Medications   Medication Sig Dispense Refill   • ivabradine (CORLANOR) 5 MG Tab tablet Take 1 tablet by mouth 2 times a day, with meals. 60 tablet 11   • Norethindrone Acet-Ethinyl Est (BELL 1.5/30) 1.5-30 MG-MCG Tab Take 1 Tab by mouth every day. 84 Tab 3   • pregabalin (LYRICA) 150 MG Cap Take 1 Cap by mouth 3 times a day for 30 days. 90 Cap 0   • [START ON 4/1/2021] pregabalin (LYRICA) 150 MG Cap Take 1 Cap by mouth 3 times a day for 30 days. 90 Cap 0   • sertraline (ZOLOFT) 100 MG Tab TAKE ONE AND ONE HALF TABLETS BY MOUTH ONCE DAILY 45 Tab 11   • fluticasone (FLONASE) 50 MCG/ACT nasal spray Spray 1 Spray in nose every day. 16 g 0   • Norethindrone Acet-Ethinyl Est 1.5-30 MG-MCG Tab Take 1 Tab by mouth every day. 84 Tab 3   • ALPRAZolam (XANAX) 0.5 MG Tab Take 0.5 mg by mouth at bedtime as needed.       No current facility-administered medications for this visit.         Allergies    Allergen Reactions   • Slaughters-Related Products Hives, Itching, Myalgia and Nausea   • Dairy Food Allergy Diarrhea, Myalgia and Nausea   • Gluten Meal Cough, Diarrhea, Hives, Itching, Myalgia, Nausea, Rash, Runny Nose, Shortness of Breath and Vomiting   • [address] Complete Hives, Itching and Rash   • Soy Allergy Hives, Itching, Myalgia and Nausea         Family History   Problem Relation Age of Onset   • Fibromyalgia Mother    • Lung Disease Mother         asthma r/o   • Depression Mother    • Anxiety disorder Mother    • ADD / ADHD Father    • Depression Father          Social History     Socioeconomic History   • Marital status: Single     Spouse name: Not on file   • Number of children: Not on file   • Years of education: Not on file   • Highest education level: Not on file   Occupational History   • Not on file   Tobacco Use   • Smoking status: Never Smoker   • Smokeless tobacco: Never Used   Substance and Sexual Activity   • Alcohol use: Not Currently     Comment: occ   • Drug use: Not Currently     Types: Marijuana     Comment: rarely   • Sexual activity: Yes     Partners: Male     Birth control/protection: Condom   Other Topics Concern   •  Service No   • Blood Transfusions No   • Caffeine Concern No   • Occupational Exposure No   • Hobby Hazards No   • Sleep Concern Yes   • Stress Concern Yes   • Weight Concern Yes   • Special Diet Yes   • Back Care No   • Exercise No   • Bike Helmet Yes   • Seat Belt Yes   • Self-Exams Yes   Social History Narrative   • Not on file     Social Determinants of Health     Financial Resource Strain:    • Difficulty of Paying Living Expenses:    Food Insecurity:    • Worried About Running Out of Food in the Last Year:    • Ran Out of Food in the Last Year:    Transportation Needs:    • Lack of Transportation (Medical):    • Lack of Transportation (Non-Medical):    Physical Activity:    • Days of Exercise per Week:    • Minutes of Exercise per Session:    Stress:    •  "Feeling of Stress :    Social Connections:    • Frequency of Communication with Friends and Family:    • Frequency of Social Gatherings with Friends and Family:    • Attends Yazidism Services:    • Active Member of Clubs or Organizations:    • Attends Club or Organization Meetings:    • Marital Status:    Intimate Partner Violence:    • Fear of Current or Ex-Partner:    • Emotionally Abused:    • Physically Abused:    • Sexually Abused:          Physical Exam:  Ambulatory Vitals  /70 (BP Location: Left arm, Patient Position: Sitting, BP Cuff Size: Adult)   Pulse 82   Resp 14   Ht 1.778 m (5' 10\")   Wt 111 kg (245 lb)   SpO2 95%    Oxygen Therapy:  Pulse Oximetry: 95 %  BP Readings from Last 4 Encounters:   03/03/21 116/70   01/26/21 124/68   11/24/20 118/82   10/01/20 110/62       Weight/BMI: Body mass index is 35.15 kg/m².  Wt Readings from Last 4 Encounters:   03/03/21 111 kg (245 lb)   01/26/21 109 kg (239 lb 13.8 oz)   11/24/20 111 kg (244 lb 7.8 oz)   10/01/20 110 kg (241 lb 13.5 oz)       General: Well appearing and in no apparent distress  Head: atrumatic  Eyes: No conjunctival pallor   ENT: normal external appearance of nose and ears  Neck: JVD absent, carotid bruits absent  Lungs: respiratory sounds  normal, additional breath sounds absent  Heart: Regular rhythm,   No palpable thrills on palpation, murmurs absent, no rubs,   Lower extremity edema absent.   Pedal pulses normal  Abdomen: soft, non tender, non distended.  Extremities/MSK: no clubbing, no cyanosis  Neurological: normal orientation, Gait normal   Psychiatric: Appropriate affect, intact judgement and insight  Skin: Warm extremities        Lab Data Review:  No results found for: CHOLSTRLTOT, LDL, HDL, TRIGLYCERIDE    Lab Results   Component Value Date/Time    SODIUM 136 05/07/2020 08:39 AM    POTASSIUM 4.3 05/07/2020 08:39 AM    CHLORIDE 101 05/07/2020 08:39 AM    CO2 24 05/07/2020 08:39 AM    GLUCOSE 72 05/07/2020 08:39 AM    BUN 8 " 05/07/2020 08:39 AM    CREATININE 0.73 05/07/2020 08:39 AM     Holter:  Impression:   1. Predominantly sinus arrhythmia; average heart rate 90 bpm.   2. Rare ventricuar and supraventricular ectopy.   3. Symptoms correlate with sinus rhythm.   4. No atrial fibrillation detected.      EKG 1/22/20  Sinus tahycardia    Impression and Plan:  26-year-old female patient with  1.  Inappropriate sinus tachycardia  2.  Anxiety  3.  Fibromyalgia  4.  Recent Covid infection    Obtain echocardiogram to rule out any Covid related cardiomyopathy.  She is not improving with metoprolol, will switch to ivabradine 5 mg twice daily to help with her symptoms.  Advised to check back with me in 1 week to 10 days after starting ivabradine.    6 months follow-up with Mrs. Blake, 1 year follow-up with me.    Amando TREJO  Interventional cardiologist  [facility], [address]  [address] [address]  Phone: [phone]  Fax: [phone]

## 2021-03-04 ENCOUNTER — TELEPHONE (OUTPATIENT)
Dept: CARDIOLOGY | Facility: MEDICAL CENTER | Age: 26
End: 2021-03-04

## 2021-03-04 RX ORDER — PROPRANOLOL HCL 60 MG
60 CAPSULE, EXTENDED RELEASE 24HR ORAL DAILY
Qty: 30 CAPSULE | Refills: 11 | Status: SHIPPED | OUTPATIENT
Start: 2021-03-04 | End: 2022-03-08

## 2021-03-04 NOTE — TELEPHONE ENCOUNTER
Non-preferred medications may have a higher patient co-pay than the health insurance plan's preferred medications.  Using available formulary data, we believe that the following medications are more likely to be covered.         Drug Name    PA Requirement  Propranolol 60 Mg Cs24 tier-2, QL  NOT Required*  Metoprolol Tartrate 25 Mg Tab tier-3, QL  NOT Required*  Metoprolol Succinate 25 Mg Tb24 tier-4, QL  NOT Required*  Bisoprolol Fumarate 5 Mg Tab tier-5, QL  NOT Required*  Carvedilol 25 Mg Tab tier-6, QL  NOT Required*  Labetalol 200 Mg Tab tier-7, QL  NOT Required*  Propranolol 10 Mg Tab tier-8, QL  NOT Required*  Nadolol 20 Mg Tab tier-9, QL  NOT Required*  Atenolol 50 Mg Tab tier-10, QL  NOT Required*  Bystolic 10 Mg Tab tier-1  Required*

## 2021-03-04 NOTE — TELEPHONE ENCOUNTER
----- Message from Shelia Guaman R.N. sent at 3/4/2021  1:23 PM PST -----  Regarding: RE: Ivabradine  Thank you Emilee. Adding the formularies is very helpful!   ----- Message -----  From: Emilee Argueta, Med Ass't  Sent: 3/4/2021  11:16 AM PST  To: Shelia Guaman R.N.  Subject: RE: Ivabradine                                   Darek Garcia,  I submitted the Auth for Corjenniferor, I also charted the formularies in case we get a denial. Thank you  Emilee    ----- Message -----  From: Shelia Guaman R.N.  Sent: 3/3/2021   3:37 PM PST  To: Mark Best Ass't, #  Subject: Ivabradine                                       FYI: Hi, pt said that her pharmacy has sent us a denial/PA for medication that was prescribed today. She unfortunately has [place]. Let me know if you need anything, thanks

## 2021-03-04 NOTE — TELEPHONE ENCOUNTER
ANNITA ALVAREZ Key: [id number] - PA Case ID: [id number] - Rx #: [id number]   Need help? Call us at [phone]       Status    [facility]      Drug  Corlanor 5MG tablets  Form  [employer] Electronic Prior Authorization Form ([employer]) (CB) 2017 NCPDP      Original Claim Info  70,MR .

## 2021-03-04 NOTE — TELEPHONE ENCOUNTER
Received denial letter from patients INS stating patient must try (for at least 30 days) TWO of the formularies, more specifically Bisoprolol or Carvedilol. INS also requiring patient have a CHF Dx with reduced LVEF.     Maged Garcia RN

## 2021-03-05 NOTE — TELEPHONE ENCOUNTER
Message  Received: Today  Message Contents   SHAE Rodriguez R.N.   Caller: Unspecified (Today,  2:53 PM)   Ok have her try propranolol 60 ER daily. And have her update us in a week if that is helping or not.  ---------------------------------------------------------------------------------------    Attempted to call pt and unable to reach. Voice message left that a mychart message would be sent regarding her cardiac medication.     Mychart message sent explaining the above. Prescription for propranolol sent to pharmacy.

## 2021-03-11 ENCOUNTER — OFFICE VISIT (OUTPATIENT)
Dept: URGENT CARE | Facility: CLINIC | Age: 26
End: 2021-03-11
Payer: COMMERCIAL

## 2021-03-11 VITALS
RESPIRATION RATE: 16 BRPM | HEART RATE: 69 BPM | SYSTOLIC BLOOD PRESSURE: 110 MMHG | DIASTOLIC BLOOD PRESSURE: 78 MMHG | HEIGHT: 70 IN | WEIGHT: 244 LBS | OXYGEN SATURATION: 97 % | TEMPERATURE: 97 F | BODY MASS INDEX: 34.93 KG/M2

## 2021-03-11 DIAGNOSIS — J01.40 ACUTE NON-RECURRENT PANSINUSITIS: ICD-10-CM

## 2021-03-11 PROCEDURE — 99213 OFFICE O/P EST LOW 20 MIN: CPT | Performed by: PHYSICIAN ASSISTANT

## 2021-03-11 RX ORDER — AMOXICILLIN AND CLAVULANATE POTASSIUM 875; 125 MG/1; MG/1
1 TABLET, FILM COATED ORAL 2 TIMES DAILY
Qty: 14 TABLET | Refills: 0 | Status: SHIPPED | OUTPATIENT
Start: 2021-03-11 | End: 2021-03-18

## 2021-03-11 ASSESSMENT — ENCOUNTER SYMPTOMS
SPUTUM PRODUCTION: 0
NAUSEA: 0
HEADACHES: 0
DIZZINESS: 0
PALPITATIONS: 0
COUGH: 0
ABDOMINAL PAIN: 0
VOMITING: 0
STRIDOR: 0
SINUS PAIN: 1
FEVER: 1
WHEEZING: 0
DIAPHORESIS: 0
CHILLS: 0
SHORTNESS OF BREATH: 0
SORE THROAT: 0
MYALGIAS: 0
DIARRHEA: 0

## 2021-03-11 NOTE — PROGRESS NOTES
Subjective:   Carmen Hebert is a 26 y.o. female who presents for Nasal Congestion (Fever of 102, recieved 2nd dose of covid vaccine 02/10, co- worker had strep, x1 week)      HPI:  This is a very pleasant 26-year-old female presenting to the clinic with sinus pain and congestion x1 week.  Patient states the pressure is predominantly located over her left frontal and maxillary sinus.  She has had occasional yellow/green rhinorrhea.  She also has significant amount of postnasal drainage.  She has a sensation of ear fullness bilaterally.  She has been taking her temperature and has not noted a fever.  A coworker tested positive for strep earlier this week.  She informs me she has had both Covid vaccines the most recent one performed on 2/10/2021.  She denies any body aches, chills, cough or shortness of breath.  She has had a fever of 102 °F yesterday that was brought down with Tylenol.  She has also used Sudafed and DayQuil with no relief.    Review of Systems   Constitutional: Positive for fever. Negative for chills, diaphoresis and malaise/fatigue.   HENT: Positive for congestion, ear pain and sinus pain. Negative for sore throat.    Respiratory: Negative for cough, sputum production, shortness of breath, wheezing and stridor.    Cardiovascular: Negative for chest pain and palpitations.   Gastrointestinal: Negative for abdominal pain, diarrhea, nausea and vomiting.   Musculoskeletal: Negative for myalgias.   Neurological: Negative for dizziness and headaches.       Medications:    • ALPRAZolam Tabs  • amoxicillin-clavulanate Tabs  • fluticasone  • Norethindrone Acet-Ethinyl Est Tabs  • pregabalin Caps  • propranolol LA Cp24  • sertraline Tabs    Allergies: Corn-related products, Dairy food allergy, Gluten meal, [address] complete, and Soy allergy    Problem List: Carmen Hebert has Encounter to establish care; Fibromyalgia; Obesity (BMI 30-39.9); Palpitations; Thalassemia; Asthma;  "Celiac disease; Cervical lesion; Seasonal allergies; and Mixed anxiety depressive disorder on their problem list.    Surgical History:  No past surgical history on file.    Past Social Hx: Carmen Hebert  reports that she has never smoked. She has never used smokeless tobacco. She reports previous alcohol use. She reports previous drug use. Drug: Marijuana.     Past Family Hx:  Carmen Hebert family history includes ADD / ADHD in her father; Anxiety disorder in her mother; Depression in her father and mother; Fibromyalgia in her mother; Lung Disease in her mother.     Problem list, medications, and allergies reviewed by myself today in Epic.     Objective:     /78 (BP Location: Left arm, Patient Position: Sitting, BP Cuff Size: Adult long)   Pulse 69   Temp 36.1 °C (97 °F) (Temporal)   Resp 16   Ht 1.778 m (5' 10\")   Wt 111 kg (244 lb)   SpO2 97%   BMI 35.01 kg/m²     Physical Exam  Constitutional:       General: She is not in acute distress.     Appearance: Normal appearance. She is not ill-appearing, toxic-appearing or diaphoretic.   HENT:      Head: Normocephalic and atraumatic.      Comments: Tenderness to palpation over the bilateral maxillary sinuses.     Right Ear: Ear canal and external ear normal.      Left Ear: Ear canal and external ear normal.      Ears:      Comments: Bilateral middle ear effusions.  Bilateral TMs mildly injected.     Nose: Congestion present. No rhinorrhea.      Mouth/Throat:      Mouth: Mucous membranes are moist.      Pharynx: No oropharyngeal exudate or posterior oropharyngeal erythema.   Eyes:      Conjunctiva/sclera: Conjunctivae normal.   Cardiovascular:      Rate and Rhythm: Normal rate and regular rhythm.      Pulses: Normal pulses.      Heart sounds: Normal heart sounds.   Pulmonary:      Effort: Pulmonary effort is normal.      Breath sounds: Normal breath sounds. No wheezing.   Musculoskeletal:      Cervical back: Normal range of " motion. No rigidity or tenderness. No muscular tenderness.   Lymphadenopathy:      Cervical: No cervical adenopathy.   Skin:     General: Skin is warm and dry.      Capillary Refill: Capillary refill takes less than 2 seconds.   Neurological:      General: No focal deficit present.      Mental Status: She is alert and oriented to person, place, and time. Mental status is at baseline.   Psychiatric:         Mood and Affect: Mood normal.         Thought Content: Thought content normal.           Assessment/Plan:     Diagnosis and associated orders:     1. Acute non-recurrent pansinusitis  amoxicillin-clavulanate (AUGMENTIN) 875-125 MG Tab      Comments/MDM:     • Take antibiotic as directed.  Take with food.  • Continue Sudafed and Mucinex use.  • Recommended starting Flonase 2 sprays per nostril 1 time a day  • Increase fluid intake.  Warm compresses recommended.  • Return to the clinic for any persistence or worsening of symptoms.  Call with any questions or concerns.           Differential diagnosis, natural history, supportive care, and indications for immediate follow-up discussed.    Advised the patient to follow-up with the primary care physician for recheck, reevaluation, and consideration of further management.    Please note that this dictation was created using voice recognition software. I have made reasonable attempt to correct obvious errors, but I expect that there are errors of grammar and possibly content that I did not discover before finalizing the note.    This note was electronically signed by ERWIN Roblero PA-C

## 2021-04-15 ENCOUNTER — HOSPITAL ENCOUNTER (OUTPATIENT)
Dept: LAB | Facility: MEDICAL CENTER | Age: 26
End: 2021-04-15
Attending: STUDENT IN AN ORGANIZED HEALTH CARE EDUCATION/TRAINING PROGRAM
Payer: COMMERCIAL

## 2021-04-15 LAB
ALBUMIN SERPL BCP-MCNC: 4.1 G/DL (ref 3.2–4.9)
ALBUMIN/GLOB SERPL: 1.1 G/DL
ALP SERPL-CCNC: 87 U/L (ref 30–99)
ALT SERPL-CCNC: 29 U/L (ref 2–50)
ANION GAP SERPL CALC-SCNC: 8 MMOL/L (ref 7–16)
AST SERPL-CCNC: 24 U/L (ref 12–45)
BASOPHILS # BLD AUTO: 0.7 % (ref 0–1.8)
BASOPHILS # BLD: 0.08 K/UL (ref 0–0.12)
BILIRUB SERPL-MCNC: 0.2 MG/DL (ref 0.1–1.5)
BUN SERPL-MCNC: 10 MG/DL (ref 8–22)
CALCIUM SERPL-MCNC: 9.2 MG/DL (ref 8.4–10.2)
CHLORIDE SERPL-SCNC: 103 MMOL/L (ref 96–112)
CO2 SERPL-SCNC: 25 MMOL/L (ref 20–33)
CREAT SERPL-MCNC: 0.74 MG/DL (ref 0.5–1.4)
EOSINOPHIL # BLD AUTO: 0.17 K/UL (ref 0–0.51)
EOSINOPHIL NFR BLD: 1.4 % (ref 0–6.9)
ERYTHROCYTE [DISTWIDTH] IN BLOOD BY AUTOMATED COUNT: 35.8 FL (ref 35.9–50)
FASTING STATUS PATIENT QL REPORTED: NORMAL
GLOBULIN SER CALC-MCNC: 3.6 G/DL (ref 1.9–3.5)
GLUCOSE SERPL-MCNC: 97 MG/DL (ref 65–99)
HCT VFR BLD AUTO: 37.9 % (ref 37–47)
HGB BLD-MCNC: 11.5 G/DL (ref 12–16)
IMM GRANULOCYTES # BLD AUTO: 0.02 K/UL (ref 0–0.11)
IMM GRANULOCYTES NFR BLD AUTO: 0.2 % (ref 0–0.9)
LYMPHOCYTES # BLD AUTO: 2.14 K/UL (ref 1–4.8)
LYMPHOCYTES NFR BLD: 18.1 % (ref 22–41)
MCH RBC QN AUTO: 19.4 PG (ref 27–33)
MCHC RBC AUTO-ENTMCNC: 30.3 G/DL (ref 33.6–35)
MCV RBC AUTO: 64 FL (ref 81.4–97.8)
MONOCYTES # BLD AUTO: 0.74 K/UL (ref 0–0.85)
MONOCYTES NFR BLD AUTO: 6.3 % (ref 0–13.4)
NEUTROPHILS # BLD AUTO: 8.68 K/UL (ref 2–7.15)
NEUTROPHILS NFR BLD: 73.3 % (ref 44–72)
NRBC # BLD AUTO: 0 K/UL
NRBC BLD-RTO: 0 /100 WBC
PLATELET # BLD AUTO: 364 K/UL (ref 164–446)
PMV BLD AUTO: 10.9 FL (ref 9–12.9)
POTASSIUM SERPL-SCNC: 4.5 MMOL/L (ref 3.6–5.5)
PROT SERPL-MCNC: 7.7 G/DL (ref 6–8.2)
RBC # BLD AUTO: 5.92 M/UL (ref 4.2–5.4)
SODIUM SERPL-SCNC: 136 MMOL/L (ref 135–145)
TSH SERPL DL<=0.005 MIU/L-ACNC: 2.59 UIU/ML (ref 0.38–5.33)
WBC # BLD AUTO: 11.8 K/UL (ref 4.8–10.8)

## 2021-04-15 PROCEDURE — 80053 COMPREHEN METABOLIC PANEL: CPT

## 2021-04-15 PROCEDURE — 84443 ASSAY THYROID STIM HORMONE: CPT

## 2021-04-15 PROCEDURE — 36415 COLL VENOUS BLD VENIPUNCTURE: CPT

## 2021-04-15 PROCEDURE — 85025 COMPLETE CBC W/AUTO DIFF WBC: CPT

## 2021-04-28 ENCOUNTER — OFFICE VISIT (OUTPATIENT)
Dept: MEDICAL GROUP | Facility: MEDICAL CENTER | Age: 26
End: 2021-04-28
Attending: NURSE PRACTITIONER
Payer: COMMERCIAL

## 2021-04-28 VITALS
HEART RATE: 81 BPM | TEMPERATURE: 96.7 F | BODY MASS INDEX: 36.92 KG/M2 | HEIGHT: 69 IN | WEIGHT: 249.3 LBS | DIASTOLIC BLOOD PRESSURE: 58 MMHG | SYSTOLIC BLOOD PRESSURE: 110 MMHG | OXYGEN SATURATION: 97 %

## 2021-04-28 DIAGNOSIS — M79.7 FIBROMYALGIA: ICD-10-CM

## 2021-04-28 PROBLEM — F33.9 MAJOR DEPRESSION, RECURRENT (HCC): Status: ACTIVE | Noted: 2021-04-02

## 2021-04-28 PROCEDURE — 99213 OFFICE O/P EST LOW 20 MIN: CPT | Performed by: NURSE PRACTITIONER

## 2021-04-28 RX ORDER — PREGABALIN 200 MG/1
CAPSULE ORAL 3 TIMES DAILY
COMMUNITY
Start: 2021-04-02 | End: 2021-08-06

## 2021-04-28 RX ORDER — PREGABALIN 200 MG/1
200 CAPSULE ORAL 3 TIMES DAILY
Qty: 21 CAPSULE | Refills: 0 | Status: SHIPPED | OUTPATIENT
Start: 2021-05-03 | End: 2021-05-10

## 2021-04-28 ASSESSMENT — ENCOUNTER SYMPTOMS
PALPITATIONS: 0
DIARRHEA: 0
SHORTNESS OF BREATH: 0
CONSTIPATION: 0
CHILLS: 0
FEVER: 0
MYALGIAS: 1
ABDOMINAL PAIN: 0
WHEEZING: 0
COUGH: 0
WEIGHT LOSS: 0
BLOOD IN STOOL: 0

## 2021-04-28 ASSESSMENT — FIBROSIS 4 INDEX: FIB4 SCORE: 0.34

## 2021-04-29 ENCOUNTER — APPOINTMENT (OUTPATIENT)
Dept: PHYSICAL MEDICINE AND REHAB | Facility: MEDICAL CENTER | Age: 26
End: 2021-04-29
Payer: COMMERCIAL

## 2021-04-29 NOTE — ASSESSMENT & PLAN NOTE
She has had her psychiatrist- they increased her lyrica to 200 mg TID.  She reports improvement in her pain and brain fog, denies SE.  She sees Dr. Crane on 5/7/21.

## 2021-04-29 NOTE — PROGRESS NOTES
Chief Complaint   Patient presents with   • Referral Needed     cardiology       Subjective:     HPI:   Carmen Hebert is a 26 y.o. female here to discuss the evaluation and management of:        Fibromyalgia  She has had her psychiatrist- they increased her lyrica to 200 mg TID.  She reports improvement in her pain and brain fog, denies SE.  She sees Dr. Crane on 5/7/21.        ROS  Review of Systems   Constitutional: Negative for chills, fever, malaise/fatigue and weight loss.   Respiratory: Negative for cough, shortness of breath and wheezing.    Cardiovascular: Negative for chest pain, palpitations and leg swelling.   Gastrointestinal: Negative for abdominal pain, blood in stool, constipation and diarrhea.   Musculoskeletal: Positive for myalgias.         Allergies   Allergen Reactions   • [place]-Related Products Hives, Itching, Myalgia and Nausea   • Dairy Food Allergy Diarrhea, Myalgia and Nausea   • Gluten Meal Cough, Diarrhea, Hives, Itching, Myalgia, Nausea, Rash, Runny Nose, Shortness of Breath and Vomiting   • [address] Complete Hives, Itching and Rash   • Soy Allergy Hives, Itching, Myalgia and Nausea       Current medicines (including changes today)  Current Outpatient Medications   Medication Sig Dispense Refill   • pregabalin (LYRICA) 200 MG capsule in the morning, at noon, and at bedtime.     • [START ON 5/3/2021] pregabalin (LYRICA) 200 MG capsule Take 1 capsule by mouth in the morning, at noon, and at bedtime for 7 days. 21 capsule 0   • propranolol LA (INDERAL LA) 60 MG CAPSULE SR 24 HR Take 1 capsule by mouth every day. 30 capsule 11   • sertraline (ZOLOFT) 100 MG Tab TAKE ONE AND ONE HALF TABLETS BY MOUTH ONCE DAILY 45 Tab 11   • fluticasone (FLONASE) 50 MCG/ACT nasal spray Spray 1 Spray in nose every day. 16 g 0   • Norethindrone Acet-Ethinyl Est 1.5-30 MG-MCG Tab Take 1 Tab by mouth every day. 84 Tab 3   • Norethindrone Acet-Ethinyl Est (BELL 1.5/30) 1.5-30 MG-MCG Tab Take 1  "tablet by mouth every day. 84 tablet 3   • ALPRAZolam (XANAX) 0.5 MG Tab Take 0.5 mg by mouth at bedtime as needed.       No current facility-administered medications for this visit.       Social History     Tobacco Use   • Smoking status: Never Smoker   • Smokeless tobacco: Never Used   Substance Use Topics   • Alcohol use: Not Currently     Comment: occ   • Drug use: Not Currently     Types: Marijuana     Comment: rarely       Patient Active Problem List    Diagnosis Date Noted   • Major depression, recurrent (HCC) 04/02/2021   • Thalassemia 04/08/2020   • Asthma 04/08/2020   • Celiac disease 04/08/2020   • Cervical lesion 04/08/2020   • Seasonal allergies 04/08/2020   • Mixed anxiety depressive disorder 04/08/2020   • Encounter to establish care 03/10/2020   • Fibromyalgia 03/10/2020   • Obesity (BMI 30-39.9) 03/10/2020   • Palpitations 03/10/2020       Family History   Problem Relation Age of Onset   • Fibromyalgia Mother    • Lung Disease Mother         asthma r/o   • Depression Mother    • Anxiety disorder Mother    • ADD / ADHD Father    • Depression Father           Objective:     /58   Pulse 81   Temp 35.9 °C (96.7 °F) (Temporal)   Ht 1.753 m (5' 9\")   Wt 113 kg (249 lb 4.8 oz)   SpO2 97%  Body mass index is 36.82 kg/m².    Physical Exam:  Physical Exam   Constitutional: She is oriented to person, place, and time and well-developed, well-nourished, and in no distress. No distress.   HENT:   Head: Normocephalic.   Right Ear: Tympanic membrane and external ear normal.   Left Ear: Tympanic membrane and external ear normal.   Eyes: Pupils are equal, round, and reactive to light. Conjunctivae and EOM are normal.   Neck: No tracheal deviation present.   Cardiovascular: Normal rate, regular rhythm, normal heart sounds and intact distal pulses.   Pulmonary/Chest: Effort normal and breath sounds normal.   Abdominal: Soft. Bowel sounds are normal.   Musculoskeletal:         General: Normal range of motion. "      Cervical back: Normal range of motion and neck supple.   Lymphadenopathy:        Head (right side): No preauricular adenopathy present.        Head (left side): No preauricular adenopathy present.     She has no cervical adenopathy.   Neurological: She is alert and oriented to person, place, and time. She has normal sensation, normal strength and intact cranial nerves. Gait normal.   Skin: Skin is warm and dry.   Psychiatric: Affect and judgment normal.       Assessment and Plan:     The following treatment plan was discussed:    1. Fibromyalgia  pregabalin (LYRICA) 200 MG capsule  - Chronic problem, unstable.  I am providing a one week coverage of lyrica so she does not run out before her appt with Dr. Zaldivar.         Any change or worsening of signs or symptoms, patient encouraged to follow-up or report to emergency room for further evaluation. Patient verbalizes understanding and agrees.    Follow-Up: Return in about 3 months (around 7/28/2021) for Routine follow up.      PLEASE NOTE: This dictation was created using voice recognition software. I have made every reasonable attempt to correct obvious errors, but I expect that there are errors of grammar and possibly content that I did not discover before finalizing the note.

## 2021-05-04 ENCOUNTER — TELEPHONE (OUTPATIENT)
Dept: MEDICAL GROUP | Facility: MEDICAL CENTER | Age: 26
End: 2021-05-04

## 2021-05-04 NOTE — TELEPHONE ENCOUNTER
MEDICATION PRIOR AUTHORIZATION NEEDED:    1. Name of Medication: pregablin 600mg day    2. Requested By (Name of Pharmacy): [facility]     3. Is insurance on file current? yes    4. What is the name & phone number of the 3rd party payor? [email or web address]       Cover my meds

## 2021-05-07 ENCOUNTER — HOSPITAL ENCOUNTER (OUTPATIENT)
Dept: RADIOLOGY | Facility: MEDICAL CENTER | Age: 26
End: 2021-05-07
Attending: PHYSICAL MEDICINE & REHABILITATION
Payer: COMMERCIAL

## 2021-05-07 ENCOUNTER — OFFICE VISIT (OUTPATIENT)
Dept: PHYSICAL MEDICINE AND REHAB | Facility: MEDICAL CENTER | Age: 26
End: 2021-05-07
Payer: COMMERCIAL

## 2021-05-07 VITALS
SYSTOLIC BLOOD PRESSURE: 122 MMHG | DIASTOLIC BLOOD PRESSURE: 62 MMHG | WEIGHT: 249.12 LBS | HEIGHT: 69 IN | OXYGEN SATURATION: 95 % | TEMPERATURE: 98.3 F | BODY MASS INDEX: 36.9 KG/M2 | HEART RATE: 84 BPM

## 2021-05-07 DIAGNOSIS — M79.644 BILATERAL THUMB PAIN: ICD-10-CM

## 2021-05-07 DIAGNOSIS — M79.7 FIBROMYALGIA: ICD-10-CM

## 2021-05-07 DIAGNOSIS — M54.50 BILATERAL LOW BACK PAIN WITHOUT SCIATICA, UNSPECIFIED CHRONICITY: ICD-10-CM

## 2021-05-07 DIAGNOSIS — M79.645 BILATERAL THUMB PAIN: ICD-10-CM

## 2021-05-07 DIAGNOSIS — M54.2 CERVICALGIA: ICD-10-CM

## 2021-05-07 PROCEDURE — 99214 OFFICE O/P EST MOD 30 MIN: CPT | Performed by: PHYSICAL MEDICINE & REHABILITATION

## 2021-05-07 PROCEDURE — 77077 JOINT SURVEY SINGLE VIEW: CPT

## 2021-05-07 RX ORDER — PREGABALIN 150 MG/1
150 CAPSULE ORAL
Qty: 90 CAPSULE | Refills: 1 | Status: SHIPPED | OUTPATIENT
Start: 2021-05-07 | End: 2021-07-07

## 2021-05-07 ASSESSMENT — PATIENT HEALTH QUESTIONNAIRE - PHQ9
SUM OF ALL RESPONSES TO PHQ QUESTIONS 1-9: 7
5. POOR APPETITE OR OVEREATING: 2 - MORE THAN HALF THE DAYS
CLINICAL INTERPRETATION OF PHQ2 SCORE: 2

## 2021-05-07 ASSESSMENT — FIBROSIS 4 INDEX: FIB4 SCORE: 0.34

## 2021-05-07 ASSESSMENT — PAIN SCALES - GENERAL: PAINLEVEL: 4=SLIGHT-MODERATE PAIN

## 2021-05-07 NOTE — PROGRESS NOTES
Follow-up patient note    Physiatry (physical medicine and  Rehabilitation), interventional spine and sports medicine    Date of Service: 05/07/2021    Chief complaint:   Chief Complaint   Patient presents with   • Follow-Up     Left neck and shoulder and right hand pain       HISTORY    HPI: Carmen Hebetr 26 y.o. female who presents today for follow-up evaluation of pain complaints.  She has a diagnosis of fibromyalgia.    She has been on lyrica now 200mg po tid for the last month.  She saw a psychiatrist who started this about one month ago at the higher dose.  While she does think that this has been helping with her mood, she does feel like she has been gaining weight rapidly and has had some swelling in her hands.    Follow-up is 06/04/2021 at [facility] with Psychiatry.    Some neck pain, work has been more stressful.  Low back pain is primarily axial.    Exercise program has been a little bit less regular lately.      Medical records review:  I reviewed the note from the referring provider Mariel Paula A.P.* dated 03/10/2020.  The patient was seen to establish care.  Report that she was diagnosed in 2017 with fibromyalgia, in [facility].  Labs were tested including TSH, HbA1C, vitamin D, 25 hydroxy, HIV ag/ab, T. Pallidum, chlamydia.  She was referred to cardiology for evaluation of palpitations with Holter monitor.  Medications refilled included zoloft 100mg, lyrica 75mg daily, cyclobenzaprine 5mg.  Referral to physiatry placed    Previous treatments:    Physical Therapy: Yes    Medications the patient is tried: lyrica, cyclobenzeprine, amitriptyline, tried savella    Previous interventions: none    Previous surgeries to relieve the above pain:  none    PHQ-9: 2    ROS:   Gen: nausea, history of COVID  Eyes: intermittent blurry vision  CV: anxiety, palpitation (inappropriate sinus tachycardia)  Psych: depression  Red Flags ROS:   Fever, Chills, Sweats: Denies  Involuntary Weight Loss:  Denies  Bladder Incontinence: Denies  Bowel Incontinence: Denies  Saddle Anesthesia: Denies    All other systems reviewed and negative.       PMHx:   Past Medical History:   Diagnosis Date   • Anxiety    • Depression    • Fibromyalgia 2017   • Thalassemia minor        PSHx:   History reviewed. No pertinent surgical history.    Family history   Family History   Problem Relation Age of Onset   • Fibromyalgia Mother    • Lung Disease Mother         asthma r/o   • Depression Mother    • Anxiety disorder Mother    • ADD / ADHD Father    • Depression Father          Medications:   Current Outpatient Medications   Medication   • pregabalin (LYRICA) 150 MG Cap   • pregabalin (LYRICA) 200 MG capsule   • propranolol LA (INDERAL LA) 60 MG CAPSULE SR 24 HR   • sertraline (ZOLOFT) 100 MG Tab   • fluticasone (FLONASE) 50 MCG/ACT nasal spray   • Norethindrone Acet-Ethinyl Est 1.5-30 MG-MCG Tab   • ALPRAZolam (XANAX) 0.5 MG Tab   • Norethindrone Acet-Ethinyl Est (BELL 1.5/30) 1.5-30 MG-MCG Tab     No current facility-administered medications for this visit.       Allergies:   Allergies   Allergen Reactions   • [place]-Related Products Hives, Itching, Myalgia and Nausea   • Dairy Food Allergy Diarrhea, Myalgia and Nausea   • Gluten Meal Cough, Diarrhea, Hives, Itching, Myalgia, Nausea, Rash, Runny Nose, Shortness of Breath and Vomiting   • [address] Complete Hives, Itching and Rash   • Soy Allergy Hives, Itching, Myalgia and Nausea       Social Hx:   Social History     Socioeconomic History   • Marital status: Single     Spouse name: Not on file   • Number of children: Not on file   • Years of education: Not on file   • Highest education level: Not on file   Occupational History   • Not on file   Tobacco Use   • Smoking status: Never Smoker   • Smokeless tobacco: Never Used   Substance and Sexual Activity   • Alcohol use: Not Currently     Comment: occ   • Drug use: Not Currently     Types: Marijuana     Comment: rarely   • Sexual  "activity: Yes     Partners: Male     Birth control/protection: Condom   Other Topics Concern   •  Service No   • Blood Transfusions No   • Caffeine Concern No   • Occupational Exposure No   • Hobby Hazards No   • Sleep Concern Yes   • Stress Concern Yes   • Weight Concern Yes   • Special Diet Yes   • Back Care No   • Exercise No   • Bike Helmet Yes   • Seat Belt Yes   • Self-Exams Yes   Social History Narrative   • Not on file     Social Determinants of Health     Financial Resource Strain:    • Difficulty of Paying Living Expenses:    Food Insecurity:    • Worried About Running Out of Food in the Last Year:    • Ran Out of Food in the Last Year:    Transportation Needs:    • Lack of Transportation (Medical):    • Lack of Transportation (Non-Medical):    Physical Activity:    • Days of Exercise per Week:    • Minutes of Exercise per Session:    Stress:    • Feeling of Stress :    Social Connections:    • Frequency of Communication with Friends and Family:    • Frequency of Social Gatherings with Friends and Family:    • Attends Denominational Services:    • Active Member of Clubs or Organizations:    • Attends Club or Organization Meetings:    • Marital Status:    Intimate Partner Violence:    • Fear of Current or Ex-Partner:    • Emotionally Abused:    • Physically Abused:    • Sexually Abused:          EXAMINATION     Physical Exam:   Vitals: /62 (BP Location: Right arm, Patient Position: Sitting, BP Cuff Size: Adult long)   Pulse 84   Temp 36.8 °C (98.3 °F) (Temporal)   Ht 1.753 m (5' 9\")   Wt 113 kg (249 lb 1.9 oz)   SpO2 95%     Constitutional:   Body Habitus: Body mass index is 36.79 kg/m².  Cooperation: Fully cooperates with exam  Appearance: Well-groomed, well-nourished, not disheveled, in no acute distress    Eyes: No scleral icterus, no proptosis     ENT -no obvious auditory deficits, wearing a face mask    Skin -no rashes or lesions noted     Respiratory-  breathing comfortable on room air, " no audible wheezing    Cardiovascular- no lower extremity edema is noted.     Psychiatric- alert and oriented ×3. Normal affect.     Gait - normal gait, no use of ambulatory device, nonantalgic.     Musculoskeletal -   Cervical spine   Functional ROM of the cervical spine    Tenderness to palpation over the trapezius mild tenderness in thoracic paraspinals    Thoracic/Lumbar Spine/Sacral Spine/Hips   Inspection: No evidence of atrophy in bilateral lower extremities throughout     ROM: full  AROM with flexion, extension, lateral flexion, and rotation bilaterally, without pain     No focal motor or sensory deficits in the upper or lower extremities.    Reflexes are 2+ biceps, triceps, patella and achilles bilaterally    MEDICAL DECISION MAKING    Medical records review: see under HPI section.     DATA    Labs:   Vitamin D, 25 hydroxy 03/10/2020 : 38  TSH: 2.610 on 03/10/2020  Lab Results   Component Value Date/Time    SODIUM 136 04/15/2021 09:46 AM    POTASSIUM 4.5 04/15/2021 09:46 AM    CHLORIDE 103 04/15/2021 09:46 AM    CO2 25 04/15/2021 09:46 AM    ANION 8.0 04/15/2021 09:46 AM    GLUCOSE 97 04/15/2021 09:46 AM    BUN 10 04/15/2021 09:46 AM    CREATININE 0.74 04/15/2021 09:46 AM    CALCIUM 9.2 04/15/2021 09:46 AM    ASTSGOT 24 04/15/2021 09:46 AM    ALTSGPT 29 04/15/2021 09:46 AM    TBILIRUBIN 0.2 04/15/2021 09:46 AM    ALBUMIN 4.1 04/15/2021 09:46 AM    TOTPROTEIN 7.7 04/15/2021 09:46 AM    GLOBULIN 3.6 (H) 04/15/2021 09:46 AM    AGRATIO 1.1 04/15/2021 09:46 AM       No results found for: PROTHROMBTM, INR     Lab Results   Component Value Date/Time    WBC 11.8 (H) 04/15/2021 09:46 AM    RBC 5.92 (H) 04/15/2021 09:46 AM    HEMOGLOBIN 11.5 (L) 04/15/2021 09:46 AM    HEMATOCRIT 37.9 04/15/2021 09:46 AM    MCV 64.0 (L) 04/15/2021 09:46 AM    MCH 19.4 (L) 04/15/2021 09:46 AM    MCHC 30.3 (L) 04/15/2021 09:46 AM    MPV 10.9 04/15/2021 09:46 AM    NEUTSPOLYS 73.30 (H) 04/15/2021 09:46 AM    LYMPHOCYTES 18.10 (L)  04/15/2021 09:46 AM    MONOCYTES 6.30 04/15/2021 09:46 AM    EOSINOPHILS 1.40 04/15/2021 09:46 AM    BASOPHILS 0.70 04/15/2021 09:46 AM        Lab Results   Component Value Date/Time    HBA1C 5.5 03/10/2020 05:06 PM        Imaging: I personally reviewed following images, these are my reads  Xray thoracic 07/31/2020  There is mild curvative of the thoracic spine, convex left.  [place] at T3-4    MRI cervical spine on 05/22/2020  There is not of mild loss of cervical lordosis.  No significant degenerative changes, no cervical disc herniations, no central or foraminal stenosis in the cervical spine.     IMAGING radiology reads. I reviewed the following radiology reads   MRI cervical spine on 05/22/2020  Unremarkable pre and postcontrast MR examination of the cervical spine except for loss of cervical lordosis              Xray thoracic 07/31/2020        IMPRESSION:   1.  No acute findings.  2.  Mild spinal curvature, convex left, in the upper thoracic spine                                                                                                                                                         Diagnosis   Visit Diagnoses     ICD-10-CM   1. Fibromyalgia  M79.7   2. Bilateral thumb pain  M79.644    M79.645   3. Cervicalgia  M54.2   4. Bilateral low back pain without sciatica, unspecified chronicity  M54.5           ASSESSMENT:  Carmen Vasques Ynes Hebert 26 y.o. female seen for above     Carmen was seen today for follow-up.    Diagnoses and all orders for this visit:    Fibromyalgia  -     pregabalin (LYRICA) 150 MG Cap; Take 1 capsule by mouth 3 times a day for 30 days.    Bilateral thumb pain  -     DX-JOINT SURVEY-HANDS SINGLE VIEW; Future    Cervicalgia    Bilateral low back pain without sciatica, unspecified chronicity       1. Discussed that she will follow-up with [facility].  I am concerned that she is gaining weight and having more swelling with the increase in the dose.  She will follow-up as  scheduled with them, in discussing this, she would like to return to 150mg po tid.  We will see if this helps reverse swelling and weight gain.  2. Xray of hands ordered to assess joint spaces.  3. Encouraged her to resume home exercise program as she is able.  Hold off on formal physical therapy.  Can consider further diagnostic studies of the lumbar spine if this pain persists.  4. Continue follow-up with PCP and Cardiology      Follow-up: Return in about 2 months (around 7/7/2021), or if symptoms worsen or fail to improve.    Thank you very much for asking me to participate in Carmen Hebert's care.  Please contact me with any questions or concerns.      Please note that this dictation was created using voice recognition software. I have made every reasonable attempt to correct obvious errors but there may be errors of grammar and content that I may have overlooked prior to finalization of this note.      Mahesh Crane MD  Physical Medicine and Rehabilitation  Interventional Spine and Sports Physiatry  [facility]

## 2021-05-08 ENCOUNTER — PATIENT MESSAGE (OUTPATIENT)
Dept: MEDICAL GROUP | Facility: MEDICAL CENTER | Age: 26
End: 2021-05-08

## 2021-05-08 DIAGNOSIS — Z11.3 ROUTINE SCREENING FOR STI (SEXUALLY TRANSMITTED INFECTION): ICD-10-CM

## 2021-05-12 ENCOUNTER — TELEPHONE (OUTPATIENT)
Dept: MEDICAL GROUP | Facility: MEDICAL CENTER | Age: 26
End: 2021-05-12

## 2021-05-12 NOTE — TELEPHONE ENCOUNTER
FINAL PRIOR AUTHORIZATION STATUS:    1.  Name of Medication & Dose: Pregabalin Capsule 200 mg     2. Prior Auth Status: Denied.  Reason: Please see scanned document for denial reasons.    3. Action Taken: Pharmacy Notified: N\A Patient Notified: N\A

## 2021-05-17 DIAGNOSIS — R79.89 ABNORMAL CBC: ICD-10-CM

## 2021-06-08 ENCOUNTER — PATIENT MESSAGE (OUTPATIENT)
Dept: MEDICAL GROUP | Facility: MEDICAL CENTER | Age: 26
End: 2021-06-08

## 2021-06-08 ENCOUNTER — HOSPITAL ENCOUNTER (OUTPATIENT)
Dept: LAB | Facility: MEDICAL CENTER | Age: 26
End: 2021-06-08
Attending: NURSE PRACTITIONER
Payer: COMMERCIAL

## 2021-06-08 DIAGNOSIS — Z11.3 ROUTINE SCREENING FOR STI (SEXUALLY TRANSMITTED INFECTION): ICD-10-CM

## 2021-06-08 DIAGNOSIS — R79.89 ABNORMAL CBC: ICD-10-CM

## 2021-06-08 LAB
ERYTHROCYTE [DISTWIDTH] IN BLOOD BY AUTOMATED COUNT: 37 FL (ref 35.9–50)
HCT VFR BLD AUTO: 37.9 % (ref 37–47)
HCV AB SER QL: NORMAL
HGB BLD-MCNC: 11.3 G/DL (ref 12–16)
HIV 1+2 AB+HIV1 P24 AG SERPL QL IA: NORMAL
MCH RBC QN AUTO: 19.3 PG (ref 27–33)
MCHC RBC AUTO-ENTMCNC: 29.8 G/DL (ref 33.6–35)
MCV RBC AUTO: 64.7 FL (ref 81.4–97.8)
PLATELET # BLD AUTO: 379 K/UL (ref 164–446)
PMV BLD AUTO: 11.7 FL (ref 9–12.9)
RBC # BLD AUTO: 5.86 M/UL (ref 4.2–5.4)
TREPONEMA PALLIDUM IGG+IGM AB [PRESENCE] IN SERUM OR PLASMA BY IMMUNOASSAY: NORMAL
WBC # BLD AUTO: 13.3 K/UL (ref 4.8–10.8)

## 2021-06-08 PROCEDURE — 86780 TREPONEMA PALLIDUM: CPT

## 2021-06-08 PROCEDURE — 85027 COMPLETE CBC AUTOMATED: CPT

## 2021-06-08 PROCEDURE — 87591 N.GONORRHOEAE DNA AMP PROB: CPT

## 2021-06-08 PROCEDURE — 86803 HEPATITIS C AB TEST: CPT

## 2021-06-08 PROCEDURE — 36415 COLL VENOUS BLD VENIPUNCTURE: CPT

## 2021-06-08 PROCEDURE — 87389 HIV-1 AG W/HIV-1&-2 AB AG IA: CPT

## 2021-06-08 PROCEDURE — 87491 CHLMYD TRACH DNA AMP PROBE: CPT

## 2021-06-09 LAB
C TRACH DNA SPEC QL NAA+PROBE: NEGATIVE
N GONORRHOEA DNA SPEC QL NAA+PROBE: NEGATIVE
SPECIMEN SOURCE: NORMAL

## 2021-06-15 ENCOUNTER — E-CONSULT (OUTPATIENT)
Dept: HEMATOLOGY ONCOLOGY | Facility: MEDICAL CENTER | Age: 26
End: 2021-06-15

## 2021-06-15 DIAGNOSIS — D72.829 LEUKOCYTOSIS, UNSPECIFIED TYPE: ICD-10-CM

## 2021-06-15 DIAGNOSIS — Z71.9 ENCOUNTER FOR CONSULTATION: ICD-10-CM

## 2021-06-15 NOTE — PROGRESS NOTES
E-Consult Response     After careful review of the patient's information available in the medical record, the following are my findings and recommendations:    Reason for consult:  Elevated WBC    Summary of data reviewed: Two CBC's, one in April and one more recent. In April the differential looks like mature neutrophils. No differential was reported in June.     Recommendations: With just mature neutrophilia I would look for underlying inflammatory condition and continue to monitor maybe q month but always ask for a differential. She's pretty young to have a myeloproliferative disorder but it's always possible so if she develops immature WBC precursors on the differential we would need to see her. Thanks.     E-Consult Time: 10 minutes were spent with >50% of the total time spent reviewing items outlined in the summary of data reviewed (Use code [id number])    Sohan Milan M.D.

## 2021-06-15 NOTE — RESULT ENCOUNTER NOTE
Darek Morales-I got your lab results.  You are negative for HIV, syphilis, hepatitis C, chlamydia, and gonorrhea.  Your white blood cell count remains elevated, I have sent your lab results to a hematologist for further recommendations.  I will reach out once I hear back from them.  Please let me know if you have any questions or concerns.Mariel

## 2021-06-15 NOTE — PROGRESS NOTES
Per E consult with hematology, monthly CBC with differential ordered.  Will monitor for Immature white blood cell precursors

## 2021-06-16 ENCOUNTER — HOSPITAL ENCOUNTER (OUTPATIENT)
Dept: CARDIOLOGY | Facility: MEDICAL CENTER | Age: 26
End: 2021-06-16
Attending: INTERNAL MEDICINE
Payer: COMMERCIAL

## 2021-06-16 ENCOUNTER — HOSPITAL ENCOUNTER (OUTPATIENT)
Dept: LAB | Facility: MEDICAL CENTER | Age: 26
End: 2021-06-16
Attending: NURSE PRACTITIONER
Payer: COMMERCIAL

## 2021-06-16 DIAGNOSIS — R00.2 PALPITATIONS: ICD-10-CM

## 2021-06-16 DIAGNOSIS — Z11.3 ROUTINE SCREENING FOR STI (SEXUALLY TRANSMITTED INFECTION): ICD-10-CM

## 2021-06-16 LAB
LV EJECT FRACT  99904: 70
LV EJECT FRACT MOD 2C 99903: 64.2
LV EJECT FRACT MOD 4C 99902: 76.59
LV EJECT FRACT MOD BP 99901: 70.45

## 2021-06-16 PROCEDURE — 93306 TTE W/DOPPLER COMPLETE: CPT

## 2021-06-16 PROCEDURE — 93306 TTE W/DOPPLER COMPLETE: CPT | Mod: 26 | Performed by: INTERNAL MEDICINE

## 2021-06-16 PROCEDURE — 86694 HERPES SIMPLEX NES ANTBDY: CPT

## 2021-06-16 PROCEDURE — 36415 COLL VENOUS BLD VENIPUNCTURE: CPT

## 2021-06-18 LAB — HSV1+2 IGG SER IA-ACNC: 0.73 IV

## 2021-06-24 NOTE — RESULT ENCOUNTER NOTE
Darek Morales-I got your lab results.  You are negative for herpes antibodies.  Please let me know if you have any questions or if there is anything else I can do to help.Mariel

## 2021-07-06 ENCOUNTER — TELEPHONE (OUTPATIENT)
Dept: PHYSICAL MEDICINE AND REHAB | Facility: MEDICAL CENTER | Age: 26
End: 2021-07-06

## 2021-07-09 ENCOUNTER — APPOINTMENT (OUTPATIENT)
Dept: PHYSICAL MEDICINE AND REHAB | Facility: MEDICAL CENTER | Age: 26
End: 2021-07-09
Payer: COMMERCIAL

## 2021-07-13 ENCOUNTER — HOSPITAL ENCOUNTER (OUTPATIENT)
Dept: LAB | Facility: MEDICAL CENTER | Age: 26
End: 2021-07-13
Attending: NURSE PRACTITIONER
Payer: COMMERCIAL

## 2021-07-13 DIAGNOSIS — D72.829 LEUKOCYTOSIS, UNSPECIFIED TYPE: ICD-10-CM

## 2021-07-13 LAB
ANISOCYTOSIS BLD QL SMEAR: ABNORMAL
BASOPHILS # BLD AUTO: 0.8 % (ref 0–1.8)
BASOPHILS # BLD: 0.1 K/UL (ref 0–0.12)
BURR CELLS BLD QL SMEAR: NORMAL
COMMENT 1642: NORMAL
DACRYOCYTES BLD QL SMEAR: NORMAL
EOSINOPHIL # BLD AUTO: 0.21 K/UL (ref 0–0.51)
EOSINOPHIL NFR BLD: 1.6 % (ref 0–6.9)
ERYTHROCYTE [DISTWIDTH] IN BLOOD BY AUTOMATED COUNT: 39.4 FL (ref 35.9–50)
HCT VFR BLD AUTO: 41 % (ref 37–47)
HGB BLD-MCNC: 12.2 G/DL (ref 12–16)
HYPOCHROMIA BLD QL SMEAR: ABNORMAL
IMM GRANULOCYTES # BLD AUTO: 0.06 K/UL (ref 0–0.11)
IMM GRANULOCYTES NFR BLD AUTO: 0.5 % (ref 0–0.9)
LYMPHOCYTES # BLD AUTO: 2.6 K/UL (ref 1–4.8)
LYMPHOCYTES NFR BLD: 19.7 % (ref 22–41)
MCH RBC QN AUTO: 19.8 PG (ref 27–33)
MCHC RBC AUTO-ENTMCNC: 29.8 G/DL (ref 33.6–35)
MCV RBC AUTO: 66.7 FL (ref 81.4–97.8)
MICROCYTES BLD QL SMEAR: ABNORMAL
MONOCYTES # BLD AUTO: 0.73 K/UL (ref 0–0.85)
MONOCYTES NFR BLD AUTO: 5.5 % (ref 0–13.4)
MORPHOLOGY BLD-IMP: NORMAL
NEUTROPHILS # BLD AUTO: 9.52 K/UL (ref 2–7.15)
NEUTROPHILS NFR BLD: 71.9 % (ref 44–72)
NRBC # BLD AUTO: 0 K/UL
NRBC BLD-RTO: 0 /100 WBC
OVALOCYTES BLD QL SMEAR: NORMAL
PLATELET # BLD AUTO: 396 K/UL (ref 164–446)
PLATELET BLD QL SMEAR: NORMAL
PMV BLD AUTO: 12 FL (ref 9–12.9)
POIKILOCYTOSIS BLD QL SMEAR: NORMAL
POLYCHROMASIA BLD QL SMEAR: NORMAL
RBC # BLD AUTO: 6.15 M/UL (ref 4.2–5.4)
RBC BLD AUTO: PRESENT
WBC # BLD AUTO: 13.2 K/UL (ref 4.8–10.8)

## 2021-07-13 PROCEDURE — 36415 COLL VENOUS BLD VENIPUNCTURE: CPT

## 2021-07-13 PROCEDURE — 85025 COMPLETE CBC W/AUTO DIFF WBC: CPT

## 2021-07-14 DIAGNOSIS — D72.829 LEUKOCYTOSIS, UNSPECIFIED TYPE: ICD-10-CM

## 2021-07-14 NOTE — RESULT ENCOUNTER NOTE
Darek Morales-      I reviewed your blood work.  Your labs are consistent with your thalassemia (high RBCs, low MCV, low MCH, low MCHC, anisocytosis, and microcytosis).  Your white blood cell count which includes the neutrophils and lymphocytes is still slightly abnormal, but we are not seeing any of the concerning cell types in there.  The hematologist has recommended that we repeat this test again next month.  I have ordered it for you and will reach out with results.  Please let me know if you have any questions.Mariel

## 2021-08-06 ENCOUNTER — OFFICE VISIT (OUTPATIENT)
Dept: PHYSICAL MEDICINE AND REHAB | Facility: MEDICAL CENTER | Age: 26
End: 2021-08-06
Payer: COMMERCIAL

## 2021-08-06 VITALS
HEIGHT: 70 IN | TEMPERATURE: 98.3 F | WEIGHT: 253.53 LBS | SYSTOLIC BLOOD PRESSURE: 112 MMHG | DIASTOLIC BLOOD PRESSURE: 76 MMHG | HEART RATE: 64 BPM | OXYGEN SATURATION: 96 % | BODY MASS INDEX: 36.3 KG/M2

## 2021-08-06 DIAGNOSIS — M79.7 FIBROMYALGIA: ICD-10-CM

## 2021-08-06 DIAGNOSIS — F32.A DEPRESSION, UNSPECIFIED DEPRESSION TYPE: ICD-10-CM

## 2021-08-06 DIAGNOSIS — J30.2 SEASONAL ALLERGIES: ICD-10-CM

## 2021-08-06 DIAGNOSIS — M54.6 THORACIC BACK PAIN, UNSPECIFIED BACK PAIN LATERALITY, UNSPECIFIED CHRONICITY: ICD-10-CM

## 2021-08-06 DIAGNOSIS — M54.2 CERVICALGIA: ICD-10-CM

## 2021-08-06 DIAGNOSIS — M79.18 MYOFASCIAL PAIN SYNDROME OF THORACIC SPINE: ICD-10-CM

## 2021-08-06 PROCEDURE — 99214 OFFICE O/P EST MOD 30 MIN: CPT | Performed by: PHYSICAL MEDICINE & REHABILITATION

## 2021-08-06 RX ORDER — PREGABALIN 150 MG/1
CAPSULE ORAL
Qty: 90 CAPSULE | Refills: 0 | Status: SHIPPED | OUTPATIENT
Start: 2021-08-12 | End: 2021-09-11

## 2021-08-06 RX ORDER — PREGABALIN 150 MG/1
CAPSULE ORAL
Qty: 90 CAPSULE | Refills: 0 | Status: SHIPPED | OUTPATIENT
Start: 2021-07-13 | End: 2021-08-12

## 2021-08-06 RX ORDER — PREGABALIN 150 MG/1
CAPSULE ORAL
Qty: 90 CAPSULE | Refills: 0 | Status: SHIPPED | OUTPATIENT
Start: 2021-09-11 | End: 2021-10-11

## 2021-08-06 ASSESSMENT — PATIENT HEALTH QUESTIONNAIRE - PHQ9
CLINICAL INTERPRETATION OF PHQ2 SCORE: 4
SUM OF ALL RESPONSES TO PHQ QUESTIONS 1-9: 11
5. POOR APPETITE OR OVEREATING: 1 - SEVERAL DAYS

## 2021-08-06 ASSESSMENT — PAIN SCALES - GENERAL: PAINLEVEL: 7=MODERATE-SEVERE PAIN

## 2021-08-06 ASSESSMENT — FIBROSIS 4 INDEX: FIB4 SCORE: 0.32

## 2021-08-06 NOTE — PROGRESS NOTES
Follow-up patient note    Physiatry (physical medicine and  Rehabilitation), interventional spine and sports medicine    Date of Service: 08/06/2021    Chief complaint:   Chief Complaint   Patient presents with   • Follow-Up     Neck Pain       HISTORY    HPI: Carmen Hebert 26 y.o. female who presents today for follow-up evaluation of pain complaints.  She has a diagnosis of fibromyalgia.    Reports that she performed a self-adjustment to her cervical spine and that she had significant reduction in ROM and pain after that.  Some relief after massage, but ROM is still limited    Decreased lyrica dose to 150mg po tid has helped with limiting increasing weight.      Since the last visit, Carmen has been under more home stresses due to family issues, mental health issues, grandmother living with them and cancer diagnosis in family member.  She has not been able to follow-up with Psychiatry and reports that [facility] closed.  She has not re-established yet.    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  She is still hoping to have this be full-time work and project is evolving.  Some stresses with this as well.    Neck and upper back pain continue.  Pain is a 7/10 on the NRS      Medical records review:  I reviewed the note from the referring provider Mariel Paula A.P.* dated 03/10/2020.  The patient was seen to establish care.  Report that she was diagnosed in 2017 with fibromyalgia, in [facility].  Labs were tested including TSH, HbA1C, vitamin D, 25 hydroxy, HIV ag/ab, T. Pallidum, chlamydia.  She was referred to cardiology for evaluation of palpitations with Holter monitor.  Medications refilled included zoloft 100mg, lyrica 75mg daily, cyclobenzaprine 5mg.  Referral to physiatry placed    Previous treatments:    Physical Therapy: Yes    Medications the patient is tried: lyrica, cyclobenzeprine, amitriptyline, tried  aj    Previous interventions: none    Previous surgeries to relieve the above pain:  none    PHQ-9: 2    ROS:   Gen: nausea, history of COVID  Eyes: intermittent blurry vision  CV: anxiety, palpitation (inappropriate sinus tachycardia)  Psych: depression  Red Flags ROS:   Fever, Chills, Sweats: Denies  Involuntary Weight Loss: Denies  Bladder Incontinence: Denies  Bowel Incontinence: Denies  Saddle Anesthesia: Denies    All other systems reviewed and negative.       PMHx:   Past Medical History:   Diagnosis Date   • Anxiety    • Depression    • Fibromyalgia 2017   • Thalassemia minor        PSHx:   History reviewed. No pertinent surgical history.    Family history   Family History   Problem Relation Age of Onset   • Fibromyalgia Mother    • Lung Disease Mother         asthma r/o   • Depression Mother    • Anxiety disorder Mother    • ADD / ADHD Father    • Depression Father          Medications:   Current Outpatient Medications   Medication   • pregabalin (LYRICA) 150 MG Cap   • [START ON 8/12/2021] pregabalin (LYRICA) 150 MG Cap   • [START ON 9/11/2021] pregabalin (LYRICA) 150 MG Cap   • Norethindrone Acet-Ethinyl Est (BELL 1.5/30) 1.5-30 MG-MCG Tab   • propranolol LA (INDERAL LA) 60 MG CAPSULE SR 24 HR   • sertraline (ZOLOFT) 100 MG Tab   • fluticasone (FLONASE) 50 MCG/ACT nasal spray   • ALPRAZolam (XANAX) 0.5 MG Tab   • Norethindrone Acet-Ethinyl Est 1.5-30 MG-MCG Tab     No current facility-administered medications for this visit.       Allergies:   Allergies   Allergen Reactions   • [place]-Related Products Hives, Itching, Myalgia and Nausea   • Dairy Food Allergy Diarrhea, Myalgia and Nausea   • Gluten Meal Cough, Diarrhea, Hives, Itching, Myalgia, Nausea, Rash, Runny Nose, Shortness of Breath and Vomiting   • [place] [address] Complete Hives, Itching and Rash   • Soy Allergy Hives, Itching, Myalgia and Nausea       Social Hx:   Social History     Socioeconomic History   • Marital status: Single     Spouse  name: Not on file   • Number of children: Not on file   • Years of education: Not on file   • Highest education level: Not on file   Occupational History   • Not on file   Tobacco Use   • Smoking status: Never Smoker   • Smokeless tobacco: Never Used   Vaping Use   • Vaping Use: Never used   Substance and Sexual Activity   • Alcohol use: Not Currently     Comment: occ   • Drug use: Not Currently     Types: Marijuana     Comment: rarely   • Sexual activity: Yes     Partners: Male     Birth control/protection: Condom   Other Topics Concern   •  Service No   • Blood Transfusions No   • Caffeine Concern No   • Occupational Exposure No   • Hobby Hazards No   • Sleep Concern Yes   • Stress Concern Yes   • Weight Concern Yes   • Special Diet Yes   • Back Care No   • Exercise No   • Bike Helmet Yes   • Seat Belt Yes   • Self-Exams Yes   Social History Narrative   • Not on file     Social Determinants of Health     Financial Resource Strain:    • Difficulty of Paying Living Expenses:    Food Insecurity:    • Worried About Running Out of Food in the Last Year:    • Ran Out of Food in the Last Year:    Transportation Needs:    • Lack of Transportation (Medical):    • Lack of Transportation (Non-Medical):    Physical Activity:    • Days of Exercise per Week:    • Minutes of Exercise per Session:    Stress:    • Feeling of Stress :    Social Connections:    • Frequency of Communication with Friends and Family:    • Frequency of Social Gatherings with Friends and Family:    • Attends Restorationist Services:    • Active Member of Clubs or Organizations:    • Attends Club or Organization Meetings:    • Marital Status:    Intimate Partner Violence:    • Fear of Current or Ex-Partner:    • Emotionally Abused:    • Physically Abused:    • Sexually Abused:          EXAMINATION     Physical Exam:   Vitals: /76 (BP Location: Right arm, Patient Position: Sitting, BP Cuff Size: Adult)   Pulse 64   Temp 36.8 °C (98.3 °F)  "(Temporal)   Ht 1.778 m (5' 10\")   Wt 115 kg (253 lb 8.5 oz)   SpO2 96%     Constitutional:   Body Habitus: Body mass index is 36.38 kg/m².  Cooperation: Fully cooperates with exam  Appearance: Well-groomed, well-nourished, not disheveled, in no acute distress    Eyes: No scleral icterus, no proptosis     ENT -no obvious auditory deficits, wearing a face mask    Skin -no rashes or lesions noted     Respiratory-  breathing comfortable on room air, no audible wheezing    Cardiovascular- no lower extremity edema is noted.     Psychiatric- alert and oriented ×3. Normal affect.     Gait - normal gait, no use of ambulatory device, nonantalgic.     Musculoskeletal -   Cervical spine   Decreased ROM of the cervical spine in left and right rotation.  Pain with flexion and extension.  Spurling's deferred    Tenderness to palpation over the trapezius mild tenderness in thoracic paraspinals bilaterally    Thoracic/Lumbar Spine/Sacral Spine/Hips   Inspection: No evidence of atrophy in bilateral lower extremities throughout     No focal motor or sensory deficits in the upper or lower extremities.    Reflexes are 2+ biceps, triceps, patella and achilles bilaterally    MEDICAL DECISION MAKING    Medical records review: see under HPI section.     DATA    Labs:   Vitamin D, 25 hydroxy 03/10/2020 : 38  TSH: 2.610 on 03/10/2020  Lab Results   Component Value Date/Time    SODIUM 136 04/15/2021 09:46 AM    POTASSIUM 4.5 04/15/2021 09:46 AM    CHLORIDE 103 04/15/2021 09:46 AM    CO2 25 04/15/2021 09:46 AM    ANION 8.0 04/15/2021 09:46 AM    GLUCOSE 97 04/15/2021 09:46 AM    BUN 10 04/15/2021 09:46 AM    CREATININE 0.74 04/15/2021 09:46 AM    CALCIUM 9.2 04/15/2021 09:46 AM    ASTSGOT 24 04/15/2021 09:46 AM    ALTSGPT 29 04/15/2021 09:46 AM    TBILIRUBIN 0.2 04/15/2021 09:46 AM    ALBUMIN 4.1 04/15/2021 09:46 AM    TOTPROTEIN 7.7 04/15/2021 09:46 AM    GLOBULIN 3.6 (H) 04/15/2021 09:46 AM    AGRATIO 1.1 04/15/2021 09:46 AM       No " results found for: PROTHROMBTM, INR     Lab Results   Component Value Date/Time    WBC 13.2 (H) 07/13/2021 08:29 AM    RBC 6.15 (H) 07/13/2021 08:29 AM    HEMOGLOBIN 12.2 07/13/2021 08:29 AM    HEMATOCRIT 41.0 07/13/2021 08:29 AM    MCV 66.7 (L) 07/13/2021 08:29 AM    MCH 19.8 (L) 07/13/2021 08:29 AM    MCHC 29.8 (L) 07/13/2021 08:29 AM    MPV 12.0 07/13/2021 08:29 AM    NEUTSPOLYS 71.90 07/13/2021 08:29 AM    LYMPHOCYTES 19.70 (L) 07/13/2021 08:29 AM    MONOCYTES 5.50 07/13/2021 08:29 AM    EOSINOPHILS 1.60 07/13/2021 08:29 AM    BASOPHILS 0.80 07/13/2021 08:29 AM    HYPOCHROMIA 1+ 07/13/2021 08:29 AM    ANISOCYTOSIS 2+ (A) 07/13/2021 08:29 AM        Lab Results   Component Value Date/Time    HBA1C 5.5 03/10/2020 05:06 PM        Imaging: I personally reviewed following images, these are my reads  Xray thoracic 07/31/2020  There is mild curvative of the thoracic spine, convex left.  Boody at T3-4    MRI cervical spine on 05/22/2020  There is not of mild loss of cervical lordosis.  No significant degenerative changes, no cervical disc herniations, no central or foraminal stenosis in the cervical spine.     IMAGING radiology reads. I reviewed the following radiology reads   MRI cervical spine on 05/22/2020  Unremarkable pre and postcontrast MR examination of the cervical spine except for loss of cervical lordosis              Xray thoracic 07/31/2020        IMPRESSION:   1.  No acute findings.  2.  Mild spinal curvature, convex left, in the upper thoracic spine                                                                                                                                                         Diagnosis   Visit Diagnoses     ICD-10-CM   1. Cervicalgia  M54.2   2. Fibromyalgia  M79.7   3. Thoracic back pain, unspecified back pain laterality, unspecified chronicity  M54.6   4. Myofascial pain syndrome of thoracic spine  M79.18   5. Depression, unspecified depression type  F32.9            ASSESSMENT:  Carmen Hebert 26 y.o. female seen for above     Carmen was seen today for follow-up.    Diagnoses and all orders for this visit:    Cervicalgia  -     DX-CERVICAL SPINE-WITH FLEX-EXT   7+; Future  -     REFERRAL TO PHYSICAL THERAPY    Fibromyalgia  -     REFERRAL TO PSYCHIATRY  -     REFERRAL TO BEHAVIORAL HEALTH  -     pregabalin (LYRICA) 150 MG Cap; TAKE 1 CAPSULE BY MOUTH 3 TIMES DAILY FOR 30 DAYS M79.7  -     pregabalin (LYRICA) 150 MG Cap; TAKE 1 CAPSULE BY MOUTH 3 TIMES DAILY FOR 30 DAYS M79.7  -     pregabalin (LYRICA) 150 MG Cap; TAKE 1 CAPSULE BY MOUTH 3 TIMES DAILY FOR 30 DAYS M79.7  -     REFERRAL TO PHYSICAL THERAPY    Thoracic back pain, unspecified back pain laterality, unspecified chronicity  -     REFERRAL TO PHYSICAL THERAPY    Myofascial pain syndrome of thoracic spine    Depression, unspecified depression type  -     REFERRAL TO PSYCHIATRY  -     REFERRAL TO BEHAVIORAL HEALTH           1. She reports that ROM has improved, but continued limitations after self-adjustment.  Xrays ordered of the cervical spine.  2. Plan for physical therapy of the cervical spine, after she has xrays   3. Continue lyrica 150mg po tid.  Scripts placed for the next three years.  4. Referrals placed to get re-established with psychology and psychiatry.        Follow-up: Return in about 3 months (around 11/6/2021).    Thank you very much for asking me to participate in Carmen Hebert's care.  Please contact me with any questions or concerns.      Please note that this dictation was created using voice recognition software. I have made every reasonable attempt to correct obvious errors but there may be errors of grammar and content that I may have overlooked prior to finalization of this note.      Mahesh Crane MD  Physical Medicine and Rehabilitation  Interventional Spine and Sports Physiatry  [facility]

## 2021-08-11 RX ORDER — FLUTICASONE PROPIONATE 50 MCG
SPRAY, SUSPENSION (ML) NASAL
Qty: 16 G | Refills: 11 | Status: SHIPPED | OUTPATIENT
Start: 2021-08-11 | End: 2021-11-01 | Stop reason: SDUPTHER

## 2021-08-12 ENCOUNTER — HOSPITAL ENCOUNTER (OUTPATIENT)
Dept: RADIOLOGY | Facility: MEDICAL CENTER | Age: 26
End: 2021-08-12
Attending: PHYSICAL MEDICINE & REHABILITATION
Payer: COMMERCIAL

## 2021-08-12 DIAGNOSIS — M54.2 CERVICALGIA: ICD-10-CM

## 2021-08-12 PROCEDURE — 72052 X-RAY EXAM NECK SPINE 6/>VWS: CPT

## 2021-08-25 ENCOUNTER — PATIENT MESSAGE (OUTPATIENT)
Dept: MEDICAL GROUP | Facility: MEDICAL CENTER | Age: 26
End: 2021-08-25

## 2021-08-25 DIAGNOSIS — Z30.41 ENCOUNTER FOR SURVEILLANCE OF CONTRACEPTIVE PILLS: ICD-10-CM

## 2021-08-26 RX ORDER — NORETHINDRONE ACETATE AND ETHINYL ESTRADIOL .03; 1.5 MG/1; MG/1
1 TABLET ORAL DAILY
Qty: 84 TABLET | Refills: 4 | Status: SHIPPED | OUTPATIENT
Start: 2021-08-26 | End: 2021-08-30 | Stop reason: SDUPTHER

## 2021-08-30 ENCOUNTER — OFFICE VISIT (OUTPATIENT)
Dept: MEDICAL GROUP | Facility: MEDICAL CENTER | Age: 26
End: 2021-08-30
Attending: NURSE PRACTITIONER
Payer: COMMERCIAL

## 2021-08-30 VITALS
RESPIRATION RATE: 18 BRPM | TEMPERATURE: 97.7 F | OXYGEN SATURATION: 94 % | DIASTOLIC BLOOD PRESSURE: 62 MMHG | WEIGHT: 258 LBS | HEART RATE: 68 BPM | SYSTOLIC BLOOD PRESSURE: 110 MMHG | BODY MASS INDEX: 36.94 KG/M2 | HEIGHT: 70 IN

## 2021-08-30 DIAGNOSIS — R10.2 PELVIC PAIN: ICD-10-CM

## 2021-08-30 DIAGNOSIS — S01.332A COMPLICATION OF LEFT EAR PIERCING, INITIAL ENCOUNTER: ICD-10-CM

## 2021-08-30 DIAGNOSIS — F41.8 MIXED ANXIETY DEPRESSIVE DISORDER: ICD-10-CM

## 2021-08-30 DIAGNOSIS — Z30.41 ENCOUNTER FOR SURVEILLANCE OF CONTRACEPTIVE PILLS: ICD-10-CM

## 2021-08-30 PROCEDURE — 99213 OFFICE O/P EST LOW 20 MIN: CPT | Performed by: NURSE PRACTITIONER

## 2021-08-30 PROCEDURE — 99214 OFFICE O/P EST MOD 30 MIN: CPT | Performed by: NURSE PRACTITIONER

## 2021-08-30 RX ORDER — NORETHINDRONE ACETATE AND ETHINYL ESTRADIOL .03; 1.5 MG/1; MG/1
1 TABLET ORAL DAILY
Qty: 84 TABLET | Refills: 4 | Status: SHIPPED | OUTPATIENT
Start: 2021-08-30 | End: 2021-11-01 | Stop reason: SDUPTHER

## 2021-08-30 RX ORDER — ALPRAZOLAM 0.5 MG/1
0.5 TABLET ORAL 2 TIMES DAILY PRN
Qty: 28 TABLET | Refills: 0 | Status: SHIPPED | OUTPATIENT
Start: 2021-08-30 | End: 2021-09-13

## 2021-08-30 RX ORDER — CIPROFLOXACIN 500 MG/1
500 TABLET, FILM COATED ORAL 2 TIMES DAILY
Qty: 28 TABLET | Refills: 0 | Status: SHIPPED | OUTPATIENT
Start: 2021-08-30 | End: 2021-09-13

## 2021-08-30 ASSESSMENT — ENCOUNTER SYMPTOMS
PALPITATIONS: 0
COUGH: 0
WEIGHT LOSS: 0
CHILLS: 0
FEVER: 0
SHORTNESS OF BREATH: 0
NERVOUS/ANXIOUS: 1
DIARRHEA: 0
BLOOD IN STOOL: 0
ABDOMINAL PAIN: 1
CONSTIPATION: 0
WHEEZING: 0

## 2021-08-30 ASSESSMENT — FIBROSIS 4 INDEX: FIB4 SCORE: 0.32

## 2021-08-31 NOTE — ASSESSMENT & PLAN NOTE
Present for years.  She will have debilitating pain when menstruating- she was told that she likely has endometriosis- but would need a lap sx to dx this.  She was unable to get her OCP and is now menstruating- this left her in bed all weekend due to pain.

## 2021-08-31 NOTE — PROGRESS NOTES
Chief Complaint   Patient presents with   • Wound Infection     left ear piercing 2 month old piercing       Subjective:     HPI:   Carmen Hebert is a 26 y.o. female here to discuss the evaluation and management of:        Pelvic pain  Present for years.  She will have debilitating pain when menstruating- she was told that she likely has endometriosis- but would need a lap sx to dx this.  She was unable to get her OCP and is now menstruating- this left her in bed all weekend due to pain.      Mixed anxiety depressive disorder  She is hoping for a small refill of her xanax.  She had a prescription for xanax in the past, but rarely used it and the [prescription has . She reports her 91 yo MGM moved in with her family.  She has been under a lot of stress and feels that she needs a refill.  She has been sent to psych and psychology for her fibro by physiatry.      Complication of left ear piercing  Patient got a left ear piercing several months ago.  The piercing is in her daith.  For about a week she has noticed increased serosanguineous and purulent drainage as well as increasing pain.  She notes some redness and warmth.  She is concerned for infection.      ROS  Review of Systems   Constitutional: Negative for chills, fever, malaise/fatigue and weight loss.   HENT: Positive for ear pain.    Respiratory: Negative for cough, shortness of breath and wheezing.    Cardiovascular: Negative for chest pain, palpitations and leg swelling.   Gastrointestinal: Positive for abdominal pain. Negative for blood in stool, constipation and diarrhea.   Psychiatric/Behavioral: The patient is nervous/anxious.          Allergies   Allergen Reactions   • [place]-Related Products Hives, Itching, Myalgia and Nausea   • Dairy Food Allergy Diarrhea, Myalgia and Nausea   • Gluten Meal Cough, Diarrhea, Hives, Itching, Myalgia, Nausea, Rash, Runny Nose, Shortness of Breath and Vomiting   • [address] Complete Hives, Itching  and Rash   • Soy Allergy Hives, Itching, Myalgia and Nausea       Current medicines (including changes today)  Current Outpatient Medications   Medication Sig Dispense Refill   • ciprofloxacin (CIPRO) 500 MG Tab Take 1 Tablet by mouth 2 times a day for 14 days. 28 Tablet 0   • mupirocin (BACTROBAN) 2 % Ointment Apply 1 Application topically 2 times a day. 22 g 0   • Norethindrone Acet-Ethinyl Est (BELL 1.5/30) 1.5-30 MG-MCG Tab Take 1 Tablet by mouth every day. Take active pills continuously, no placebo week. 84 Tablet 4   • ALPRAZolam (XANAX) 0.5 MG Tab Take 1 Tablet by mouth 2 times a day as needed for Anxiety for up to 14 days. 28 Tablet 0   • fluticasone (FLONASE) 50 MCG/ACT nasal spray PLACE 1 SPRAY IN NOSE EVERY DAY 16 g 11   • pregabalin (LYRICA) 150 MG Cap TAKE 1 CAPSULE BY MOUTH 3 TIMES DAILY FOR 30 DAYS M79.7 90 capsule 0   • [START ON 9/11/2021] pregabalin (LYRICA) 150 MG Cap TAKE 1 CAPSULE BY MOUTH 3 TIMES DAILY FOR 30 DAYS M79.7 90 capsule 0   • propranolol LA (INDERAL LA) 60 MG CAPSULE SR 24 HR Take 1 capsule by mouth every day. 30 capsule 11   • sertraline (ZOLOFT) 100 MG Tab TAKE ONE AND ONE HALF TABLETS BY MOUTH ONCE DAILY 45 Tab 11     No current facility-administered medications for this visit.       Social History     Tobacco Use   • Smoking status: Never Smoker   • Smokeless tobacco: Never Used   Vaping Use   • Vaping Use: Never used   Substance Use Topics   • Alcohol use: Not Currently     Comment: occ   • Drug use: Not Currently     Types: Marijuana     Comment: rarely       Patient Active Problem List    Diagnosis Date Noted   • Pelvic pain 08/30/2021   • Complication of left ear piercing 08/30/2021   • Major depression, recurrent (HCC) 04/02/2021   • Thalassemia 04/08/2020   • Asthma 04/08/2020   • Celiac disease 04/08/2020   • Cervical lesion 04/08/2020   • Seasonal allergies 04/08/2020   • Mixed anxiety depressive disorder 04/08/2020   • Encounter to establish care 03/10/2020   •  "Fibromyalgia 03/10/2020   • Obesity (BMI 30-39.9) 03/10/2020   • Palpitations 03/10/2020       Family History   Problem Relation Age of Onset   • Fibromyalgia Mother    • Lung Disease Mother         asthma r/o   • Depression Mother    • Anxiety disorder Mother    • ADD / ADHD Father    • Depression Father           Objective:     /62 (BP Location: Left arm, Patient Position: Sitting, BP Cuff Size: Adult long)   Pulse 68   Temp 36.5 °C (97.7 °F) (Temporal)   Resp 18   Ht 1.778 m (5' 10\")   Wt 117 kg (258 lb)   SpO2 94%  Body mass index is 37.02 kg/m².    Physical Exam:  Physical Exam  Constitutional:       General: She is not in acute distress.  HENT:      Head: Normocephalic.      Right Ear: Tympanic membrane and external ear normal.      Left Ear: Tympanic membrane and external ear normal. Drainage and swelling present.      Ears:     Eyes:      Conjunctiva/sclera: Conjunctivae normal.      Pupils: Pupils are equal, round, and reactive to light.   Neck:      Trachea: No tracheal deviation.   Cardiovascular:      Rate and Rhythm: Normal rate and regular rhythm.      Heart sounds: Normal heart sounds.   Pulmonary:      Effort: Pulmonary effort is normal.      Breath sounds: Normal breath sounds.   Abdominal:      General: Bowel sounds are normal.      Palpations: Abdomen is soft.   Musculoskeletal:         General: Normal range of motion.      Cervical back: Normal range of motion and neck supple.   Lymphadenopathy:      Head:      Right side of head: No preauricular adenopathy.      Left side of head: No preauricular adenopathy.      Cervical: No cervical adenopathy.   Skin:     General: Skin is warm and dry.   Neurological:      Mental Status: She is alert and oriented to person, place, and time.      Cranial Nerves: Cranial nerves are intact.      Sensory: Sensation is intact.      Gait: Gait is intact.   Psychiatric:         Mood and Affect: Affect normal.         Judgment: Judgment normal. "         Assessment and Plan:     The following treatment plan was discussed:    1. Complication of left ear piercing, initial encounter  ciprofloxacin (CIPRO) 500 MG Tab    mupirocin (BACTROBAN) 2 % Ointment  -New problem, unstable.  P.o. Cipro and topical Bactroban to cover for Pseudomonas.  We discussed removing the piercing-patient will watch and wait.  ER precautions reviewed.   2. Mixed anxiety depressive disorder  ALPRAZolam (XANAX) 0.5 MG Tab  -Chronic problem, unstable.  Medications refilled.   3. Encounter for surveillance of contraceptive pills  Norethindrone Acet-Ethinyl Est (BELL 1.5/30) 1.5-30 MG-MCG Tab   4. Pelvic pain  Norethindrone Acet-Ethinyl Est (BELL 1.5/30) 1.5-30 MG-MCG Tab  -Chronic problem, unstable.  Patient was unable to get her OCP filled at the pharmacy.  Recent, patient asked to notify me if she has any trouble getting the medication filled.  Patient verbalized understanding.       Any change or worsening of signs or symptoms, patient encouraged to follow-up or report to emergency room for further evaluation. Patient verbalizes understanding and agrees.    Follow-Up: Return if symptoms worsen or fail to improve.      PLEASE NOTE: This dictation was created using voice recognition software. I have made every reasonable attempt to correct obvious errors, but I expect that there are errors of grammar and possibly content that I did not discover before finalizing the note.

## 2021-08-31 NOTE — ASSESSMENT & PLAN NOTE
Patient got a left ear piercing several months ago.  The piercing is in her daith.  For about a week she has noticed increased serosanguineous and purulent drainage as well as increasing pain.  She notes some redness and warmth.  She is concerned for infection.

## 2021-08-31 NOTE — ASSESSMENT & PLAN NOTE
She is hoping for a small refill of her xanax.  She had a prescription for xanax in the past, but rarely used it and the [prescription has . She reports her 91 yo MGM moved in with her family.  She has been under a lot of stress and feels that she needs a refill.  She has been sent to psych and psychology for her fibro by physiatry.

## 2021-09-08 ENCOUNTER — TELEPHONE (OUTPATIENT)
Dept: MEDICAL GROUP | Facility: MEDICAL CENTER | Age: 26
End: 2021-09-08

## 2021-09-08 NOTE — TELEPHONE ENCOUNTER
DOCUMENTATION OF PAR STATUS:    1. Name of Medication & Dose: Kanika  1.5-30mg-mcg tabs    2. Name of Prescription Coverage Company & phone #: [facility]    3. Date Prior Auth Submitted: 9/8/2021    4. What information was given to obtain insurance decision? ICD 10 code    5. Prior Auth Status? Pending    6. Patient Notified: N\A

## 2021-09-10 ENCOUNTER — PHYSICAL THERAPY (OUTPATIENT)
Dept: PHYSICAL THERAPY | Facility: MEDICAL CENTER | Age: 26
End: 2021-09-10
Attending: PHYSICAL MEDICINE & REHABILITATION
Payer: COMMERCIAL

## 2021-09-10 DIAGNOSIS — M54.6 THORACIC BACK PAIN, UNSPECIFIED BACK PAIN LATERALITY, UNSPECIFIED CHRONICITY: ICD-10-CM

## 2021-09-10 DIAGNOSIS — M79.7 FIBROMYALGIA: ICD-10-CM

## 2021-09-10 DIAGNOSIS — M54.2 CERVICALGIA: ICD-10-CM

## 2021-09-10 PROCEDURE — 97162 PT EVAL MOD COMPLEX 30 MIN: CPT

## 2021-09-10 SDOH — ECONOMIC STABILITY: GENERAL: QUALITY OF LIFE: FAIR

## 2021-09-10 ASSESSMENT — ENCOUNTER SYMPTOMS
ALLEVIATING FACTORS: MASSAGE
QUALITY: DIFFUSE
QUALITY: HOT
PAIN SCALE AT LOWEST: 5
ALLEVIATING FACTORS: HEAT APPLICATION
EXACERBATED BY: RAPID POSITION CHANGES
QUALITY: SHOOTING
PAIN TIMING: IN THE EVENING
PAIN SCALE: 5
PAIN SCALE AT HIGHEST: 10
ALLEVIATING FACTORS: STRETCHING

## 2021-09-10 NOTE — OP THERAPY EVALUATION
Outpatient Physical Therapy  INITIAL EVALUATION    [facility] Outpatient Physical Therapy  [address]  [place] [address]  Phone:  [phone]  Fax:  [phone]    Date of Evaluation: 09/10/2021    Patient: Carmen Hebert  YOB: 1995  MRN: [id number]     Referring Provider: Mahesh Crane M.D.  [address]  [address]  [place] [address]   Referring Diagnosis No admission diagnoses are documented for this encounter.     Time Calculation    Start time: 0930  Stop time: 1003 Time Calculation (min): 33 minutes         Chief Complaint: Back Problem and Neck Problem    Visit Diagnoses     ICD-10-CM   1. Fibromyalgia  M79.7   2. Thoracic back pain, unspecified back pain laterality, unspecified chronicity  M54.6   3. Cervicalgia  M54.2         Subjective:   History of Present Illness:     Date of onset:  7/10/2021    Mechanism of injury:  Patient is a 25 year old female with a PMH including: Fibromyalgia (diagnosed 2017), obesity, thalassemia minor, palpitations, anxiety, depression. Pt reports has unofficial diagnosis of endometriosis by gynecologist. Pt reports has hx of scoliosis without bracing/surgeries. No surgical hx on file.    The patient returns to PT following incident when she cracked her neck a couple months ago; she has history of chronic pain in thoracic spine and throughout back. She had a lot of pain and couldn't turn neck and was told by Dr. Crane that she may have fractured something. She reports she had kept up with the exercises for a short period following last PT treatment but had a lot of life circumstances and then stopped doing them. (had COVID, aunt got Cancer, grandmother moved in with her family (also an alcoholic), brother-in-law tried to commit suicide... a lot of psychosocial stressors.     Pt reports has hx of fibromyalgia which has worsened since 2017 with occasional spasms in LLE which are now worsening in include L t/s  "to L anterior rib cage and ocassional from cervical spine to shoulder. Pt reports she had a recent flare up and couldn't move due to being in so much pain.    Currently denies changes in bowel and bladder, saddle anesthesia, significant weight changes, chills/night sweats, nausea and vomiting, and unexplained fatigue. Denies history of cancer. Pt consents to evaluation and treatment today.   She reports \"weird abdominal issues\" since getting COVID and already has some issues with taking BC daily.       Work:  (does have standing desk)    Social history: sleep, trivia nights with friends, doesn't enjoy active lifestyle (\"fibromyalgia has ruined the active part of her life\").       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  Headaches:  no headaches  Ear problems: none  Sleep disturbance:  Interrupted sleep and non-restful sleep  Pain:     Current pain ratin    At best pain ratin    At worst pain rating:  10    Location:  Posterior mid t/s occasionally radiating laterally    Quality:  Shooting, diffuse and hot (Constant 7/10 pain with intermittent spasms)    Pain timing:  In the evening    Relieving factors:  Stretching, massage and heat application (massage gun)    Aggravating factors:  Rapid position changes (repeptitive upper body movements)    Progression:  Worsening    Pain Comments::  Aggravating: Unloading the dishes, laundry, repetitive lifting, end of day    Relieving: lyrica, methocarbamol does seem to help (helps with spasms but not pain), heat/ice (occasional), topical pain patches    Social Support:     Lives in:  One-story house (1 step to enter; no HR)    Lives with:  Parents  Hand dominance:  Right  Diagnostic Tests:     X-ray: normal      MRI studies: normal      Diagnostic Tests Comments:  MRI c/s 20:  FINDINGS:  There is loss of cervical lordosis. The craniovertebral junction is well " "aligned. There is no focal osseous lesion. There is no pathologic marrow infiltration. There is no perivertebral abnormality. There is no epidural lesion.     At the level of C2-3, there is no spinal or neural foraminal stenosis.     At the level of C3-4, there is no spinal or neural foraminal stenosis.     At the level of C4-5, there is no spinal or neural foraminal stenosis.     At the level of C5-6, there is no spinal or neural foraminal stenosis.     At the level of C6-7, there is no spinal or neural foraminal stenosis.     At the level of C7-T1, there is no spinal or neural foraminal stenosis.     The visualized posterior fossa structures appear normal within limits.  The cervical spinal cord does not demonstrate any mass or abnormal T2 signal intensity. There is no intradural extramedullary lesion.     The visualized pre-and paraspinal soft tissues appear normal within limits.     There is no abnormal contrast enhancement.     IMPRESSION:     Unremarkable pre and postcontrast MR examination of the cervical spine except for loss of cervical lordosis.    X ray T/S 7/31/20:  FINDINGS:  No acute fracture is identified. There is mild spinal curvature, convex left, with the apex at the T3-4 level. Pedicles appear intact. Paravertebral soft tissues are unremarkable.     Mediastinal contours are normal.     IMPRESSION:     1.  No acute findings.     2.  Mild spinal curvature, convex left, in the upper thoracic spine    8/12/2021 6:00 PM     HISTORY/REASON FOR EXAM:  Pain after \"cracking her neck\" with restricted ROM.        TECHNIQUE/EXAM DESCRIPTION AND NUMBER OF VIEWS:  Cervical spine series, 4 views.     COMPARISON:  None.     FINDINGS: The odontoid view is suboptimal.     Alignment: Reversal of the normal cervical lordosis.  Vertebral body height: No acute fracture is identified. Vertebral body height is maintained.  Prevertebral soft tissues: Unremarkable.  Soft tissues: Unremarkable.     IMPRESSION:     No acute " "fracture is identified.    Treatments:     Previous treatment:  Chiropractic, physical therapy, medication and massage    Treatment Comments:  Chiropractics ([id number])  Physical therapy (Feb 2018-Aug 2018; August 2020-October 2020) -Fibromyalgia specific (pool therapy) - pt reports was helpful. Additional PT for L foot.   Activities of Daily Living:     Patient reported ADL status: Patient's current daily routine includes:  Work: Currently unemployed, was working last Aug 2019; not on disability   Hobbies: Yoga (not currently performing due to flare up), spending time with young niece   Exercise: No current exercise routine in place; Yoga (not currently performing due to flare up)          Patient Goals:     Patient goals for therapy:  Decreased pain and increased motion    Other patient goals:  \"Manage pain and function better\" (Unload , lift neice, perform laundry by self)      Past Medical History:   Diagnosis Date   • Anxiety    • Depression    • Fibromyalgia 2017   • Thalassemia minor      No past surgical history on file.  Social History     Tobacco Use   • Smoking status: Never Smoker   • Smokeless tobacco: Never Used   Substance Use Topics   • Alcohol use: Not Currently     Comment: occ     Family and Occupational History     Socioeconomic History   • Marital status: Single     Spouse name: Not on file   • Number of children: Not on file   • Years of education: Not on file   • Highest education level: Not on file   Occupational History   • Not on file       Objective     Postural Observations  Seated posture: fair  Correction of posture: makes symptoms worse    Additional Postural Observation Details  Forward head and rounded shoulders    \"I have an HH sized bra; I spoke with Dr. Crane about a breast reduction but she recommends I do therapy first.\"    Shoulder Screen    Shoulder active range of motion within functional limits.  Shoulder strength within functional limits with the following " exceptions: Poor gross strength  Shoulder joint mobility within functional limits.    Neurological Testing     Reflexes   Left   Deltoid (C5): normal (2+)  Biceps (C5/C6): normal (2+)  Brachioradialis (C6): normal (2+)  Triceps (C7): normal (2+)  Patellar (L4): normal (2+)  Achilles (S1): normal (2+)    Right   Deltoid (C5): normal (2+)  Biceps (C5/C6): normal (2+)  Brachioradialis (C6): normal (2+)  Triceps (C7): normal (2+)  Patellar (L4): normal (2+)  Achilles (S1): normal (2+)    Myotome testing   Lumbar (left)   All left lumbar myotomes within normal limits    Lumbar (right)   All right lumbar myotomes within normal limits    Dermatome testing   Lumbar (left)   All left lumbar dermatomes intact    Lumbar (right)   All right lumbar dermatomes intact    Additional Neurological Details  Pt able to stand on heels and stand on toes with hand hold for balance from therapist    Palpation   Left   Tenderness of the cervical paraspinals, thoracic paraspinals and upper trapezius.     Right   Tenderness of the cervical paraspinals, thoracic paraspinals and upper trapezius.     Additional Palpation Details  T/S paraspinals tender L>R  L/S paraspinals tender equally    Active Range of Motion     Cervical Spine   Flexion: within functional limits  Extension: within functional limits  Retraction: within functional limits  Left lateral flexion: decreased  Right lateral flexion: within functional limits  Left rotation: within functional limits  Right rotation: within functional limits    Upper Cervical   All upper cervical active range of motion within functional limits    Lumbar   Flexion: decreased (*Reproduces pain mid back)  Extension: within functional limits  Left lateral flexion: decreased (Pain under shoulder blade)  Right lateral flexion: within functional limits  Left rotation: decreased (*Reproduces pain mid back)  Right rotation: within functional limits    Additional Active Range of Motion Details  LE AROM WFL    (Assessed in sitting)    Joint Play     Additional hip joint play details: Most pain with PA's T10-T12; difficulty assessing mobilty due to sig pain complaints not allowing full ROM         Strength:      Abdominals   Lower abdominals: Able to maintain neutral statically (Assessed via bridging and pelvic tilting)    Tests   Cervical spine     Left Spine   Negative alar ligament integrity and Sharp-Milagros test.     Right spine   Negative alar ligament integrity and Sharp-Milagros test.     Left Shoulder   Negative Spurling's sign.     Right Shoulder   Negative Spurling's sign.       Lumbar spine (left)      Negative slump.     General Comments     Spine Comments   (* signifies reproduces symptoms of patient's chief complaint)      -Hookying: Position of greatest comfort.        Therapeutic Exercises (CPT 63764):     2. Supine cervical retractions, x10, added to HEP    3. Supine cervical AROM, x10, added to HEP    5. Pt education, educated about importance activity with fibromyalgia, maintaining ROM.     6. At next treatment: postural strengthening    9. UPOC: 10/10/2021      Therapeutic Exercise Summary: HEP: Patient provided handout (created in [email or web address]), to be uploaded, exercises include:   Cervical retractions (supine), supine cervical AROM, supported cervical extension, thoracic stretch        Time-based treatments/modalities:           Assessment, Response and Plan:   Impairments: abnormal or restricted ROM, activity intolerance, impaired functional mobility, hypersensitivity, impaired physical strength, lacks appropriate home exercise program, limited ADL's, limited mobility and pain with function    Assessment details:  Pt presents to therapy with complaints of neck pain following incident where she cracked her neck and had increased pain and loss of motion (has improved since incident) as well as, thoracic pain due to fibromyalgia. Has complaints of impaired mobility and difficulty completing ADL's such as  unloading dishes, laundry, rep lifting with both pain and fatigue increasing throughout the day. Pt is currently employed part time but hoping/planning on going full time soon. C/s MRI/x-ray unremarkable except for loss of cervical lordosis; T/s x ray demonstrates: Mild spinal curvature, convex left, otherwise unremarkable.    Neuro exam WFL; L/S flexion/L SB and L rotation reproduce mid back symptoms during eval, mild loss of L C/S SB. Difficult to assess t/s PA's today due to reports of pain not allowing full mobility assessment with reports of localized pain; additionally poor postural awareness and posterior chain and core weakness noted. Of note, pt does have positive response in past with chiropractics and PT (water therapy).     Pt may benefit from skilled physical therapy including gentle mobility training, Pain neuroscience education, strengthening and posture education in order to address above impairments in order to improve QOL and return to reported ADL's. Prognosis impaired due to barriers below.     Barriers to therapy:  Psychosocial, poorly tolerated treatments and comorbidities  Other barriers to therapy:  Chronic hx of fibromyalgia; large breasts potentially adding additional weight and impacting posture, pt reporting irritable s/s - possibly poor tolerance of PT treatments.  Prognosis: fair    Goals:   Short Term Goals:   1. The patient will demonstrate HEP independently  2. Pt will be able to demonstrate WNL L C/S sidebend  3. Pt will demonstrate improvements in postural awareness with reduced cuing in clinic.     Short term goal time span:  2-4 weeks      Long Term Goals:    1. Pt will be independent with written HEP.  2. Pt will improve Zhang Andrea score by at least 10% (eval: 50)  4. Pt will report 50% improvement in neck pain  5. Pt will be able to unload dishes with modification without increase in s/s from baseline.  6. Pt will be able to do laundry with modification without increase in  s/s from baseline.    Long term goal time span:  6-8 weeks    Plan:   Therapy options:  Physical therapy treatment to continue  Planned therapy interventions:  Neuromuscular Re-education (CPT 65155), E Stim Unattended (CPT 53579), Therapeutic Exercise (CPT 59949) and Mechanical Traction (CPT 67731)  Frequency:  2x week  Duration in weeks:  6  Duration in visits:  12  Discussed with:  Patient  Plan details:  UPOC: 10/10/21    *Plan of care extended due to lag in insurance approval.    POC to include: x2 units there-ex  (CPT 85905), x1 unit Neuro Re-ed (CPT 96693), and x1 unit e-stim (CPT 27384) per visit        Functional Assessment Used  Zhang Andrea Low Back Pain and Disability Score: 50     Referring provider co-signature:  I have reviewed this plan of care and my co-signature certifies the need for services.    Certification Period: 09/10/2021 to 10/9/20    Physician Signature: ________________________________ Date: ______________

## 2021-10-06 ENCOUNTER — PHYSICAL THERAPY (OUTPATIENT)
Dept: PHYSICAL THERAPY | Facility: MEDICAL CENTER | Age: 26
End: 2021-10-06
Attending: PHYSICAL MEDICINE & REHABILITATION
Payer: COMMERCIAL

## 2021-10-06 DIAGNOSIS — M54.2 CERVICALGIA: ICD-10-CM

## 2021-10-06 DIAGNOSIS — M54.6 THORACIC BACK PAIN, UNSPECIFIED BACK PAIN LATERALITY, UNSPECIFIED CHRONICITY: ICD-10-CM

## 2021-10-06 PROCEDURE — 97014 ELECTRIC STIMULATION THERAPY: CPT

## 2021-10-06 PROCEDURE — 97110 THERAPEUTIC EXERCISES: CPT

## 2021-10-06 PROCEDURE — 97112 NEUROMUSCULAR REEDUCATION: CPT

## 2021-10-06 NOTE — OP THERAPY DAILY TREATMENT
"  Outpatient Physical Therapy  DAILY TREATMENT     [facility] Outpatient Physical Therapy  [address]  [place] [address]  Phone:  [phone]  Fax:  [phone]    Date: 10/06/2021    Patient: Carmen Hebert  YOB: 1995  MRN: [id number]     Time Calculation    Start time: 0925  Stop time: 1030 Time Calculation (min): 65 minutes         Chief Complaint: Neck Pain and Back Problem    Visit #: 2    SUBJECTIVE:  The patient reports that she has been doing pretty well, she has been consistent with the exercises and feels that they have been helpful. She did slam her thumb in the car door this morning and it started to swell on the way here.    OBJECTIVE:  Current objective measures: good standing posture, fair to good seated posture  WFL cervical ROM but pain with L sidebend and L rotation  Fair postural strength          Therapeutic Exercises (CPT 82998):     1. NuStep, 6 mins, level 4    2. Supine cervical retractions, x20    3. Supine cervical AROM, x10    4. Cat cow, x20    5. Seated thoracic stretch, x5 with 20 second hold    6. Foam roller, pec stretch x 40 seconds, supine angels x20, scap retractions x15, high row, x20 with 2# weights    10. Flashers, orange TB, x20    11. Toracic sidelying rotation, x15 each side, \"open book\"    12. TB rows, x20 with pink TB    13. Upper trunk rotations, 7# band, x15 each direction    14. Wall push ups, x10    16. UPOC: 10/10/2021      Therapeutic Exercise Summary: HEP: Patient provided handout (created in [email or web address]), to be uploaded, exercises include:   Cervical retractions (supine), supine cervical AROM, supported cervical extension, thoracic stretch      Therapeutic Treatments and Modalities:     1. E Stim Unattended (CPT 86869), IFC with MHP to thoracic paraspinals    Time-based treatments/modalities:    Physical Therapy Timed Treatment Charges  Neuromusc re-ed, balance, coor, post minutes (CPT 78774): 10 " minutes  Therapeutic exercise minutes (CPT 17399): 40 minutes      ASSESSMENT:   Response to treatment: the patient was seen for first PT treatment following evaluation with a significant break between evaluation and now due to lag in insurance authorization. She reports improvement in symptoms with the exercises at home and has been consistent with them. She tolerated exercises really well today and did have some increased soreness that did resolve following e-stim/heat. Overall good progress in time between initial evaluation and current treatment but would still benefit from postural strengthening and mechanics to decreased cervical and thoracic pain.    PLAN/RECOMMENDATIONS:   Plan for treatment: therapy treatment to continue next visit.  Planned interventions for next visit: continue with current treatment.

## 2021-10-13 ENCOUNTER — PHYSICAL THERAPY (OUTPATIENT)
Dept: PHYSICAL THERAPY | Facility: MEDICAL CENTER | Age: 26
End: 2021-10-13
Attending: PHYSICAL MEDICINE & REHABILITATION
Payer: COMMERCIAL

## 2021-10-13 DIAGNOSIS — M54.6 THORACIC BACK PAIN, UNSPECIFIED BACK PAIN LATERALITY, UNSPECIFIED CHRONICITY: ICD-10-CM

## 2021-10-13 PROCEDURE — 97014 ELECTRIC STIMULATION THERAPY: CPT

## 2021-10-13 PROCEDURE — 97110 THERAPEUTIC EXERCISES: CPT

## 2021-10-13 NOTE — OP THERAPY DAILY TREATMENT
"  Outpatient Physical Therapy  DAILY TREATMENT     [facility] Outpatient Physical Therapy  [address]  [place] [address]  Phone:  [phone]  Fax:  [phone]    Date: 10/13/2021    Patient: Carmen Hebert  YOB: 1995  MRN: [id number]     Time Calculation    Start time: 0940  Stop time: 1033 Time Calculation (min): 53 minutes         Chief Complaint: Back Problem    Visit #: 3    *the patient was 10 minutes late for appointment today    SUBJECTIVE:  The patient reports that she has had increased pain lately because she ran out of her fibromyalgia medication but she has gotten it refilled. She also has a big gala this week     OBJECTIVE:  Current objective measures: good standing posture, fair to good seated posture  WFL cervical ROM but pain with L sidebend and L rotation  Fair postural strength          Therapeutic Exercises (CPT 49177):     1. NuStep, 6 mins, level 4    2. Supine cervical retractions, x20    3. Supine cervical AROM, x10    4. Cat cow, x20    5. Seated thoracic stretch, x5 with 20 second hold    6. Foam roller, pec stretch x 40 seconds, supine angels x20, scap retractions x15, high row, x20 with 2# weights    10. Flashers, orange TB, x20    11. Toracic sidelying rotation, x15 each side, \"open book\"    12. TB rows, x20 with pink TB    13. Upper trunk rotations, 7# band, x15 each direction    14. Wall push ups, x10    16. UPOC: 10/10/2021      Therapeutic Exercise Summary: HEP: Patient provided handout (created in [email or web address]), to be uploaded, exercises include:   Cervical retractions (supine), supine cervical AROM, supported cervical extension, thoracic stretch      Therapeutic Treatments and Modalities:     1. E Stim Unattended (CPT 24495), IFC with MHP to thoracic paraspinals    Time-based treatments/modalities:    Physical Therapy Timed Treatment Charges  Therapeutic exercise minutes (CPT 54146): 38 minutes      ASSESSMENT: "   Response to treatment: the patient tolerated treatment well today, she required some additional time doing some of the more gentle ROM exercises to warm up with but once she got moving did really well. She reports improvement in symptoms with the exercises at home and has been consistent with them and reports she loved the foam roller so much that she bought one for at home.  She tolerated exercises really well today and did have some increased soreness that did resolve following e-stim/heat. Overall good progress in time between initial evaluation and current treatment but would still benefit from postural strengthening and mechanics to decreased cervical and thoracic pain.    PLAN/RECOMMENDATIONS:   Plan for treatment: therapy treatment to continue next visit.  Planned interventions for next visit: continue with current treatment.

## 2021-10-16 ENCOUNTER — PATIENT MESSAGE (OUTPATIENT)
Dept: MEDICAL GROUP | Facility: MEDICAL CENTER | Age: 26
End: 2021-10-16

## 2021-10-16 DIAGNOSIS — Z11.3 ROUTINE SCREENING FOR STI (SEXUALLY TRANSMITTED INFECTION): ICD-10-CM

## 2021-10-20 ENCOUNTER — PHYSICAL THERAPY (OUTPATIENT)
Dept: PHYSICAL THERAPY | Facility: MEDICAL CENTER | Age: 26
End: 2021-10-20
Attending: PHYSICAL MEDICINE & REHABILITATION
Payer: COMMERCIAL

## 2021-10-20 DIAGNOSIS — M54.6 THORACIC BACK PAIN, UNSPECIFIED BACK PAIN LATERALITY, UNSPECIFIED CHRONICITY: ICD-10-CM

## 2021-10-20 DIAGNOSIS — M79.7 FIBROMYALGIA: ICD-10-CM

## 2021-10-20 DIAGNOSIS — M54.2 CERVICALGIA: ICD-10-CM

## 2021-10-20 PROCEDURE — 97110 THERAPEUTIC EXERCISES: CPT

## 2021-10-20 PROCEDURE — 97014 ELECTRIC STIMULATION THERAPY: CPT

## 2021-10-20 PROCEDURE — 97112 NEUROMUSCULAR REEDUCATION: CPT

## 2021-10-20 NOTE — OP THERAPY PROGRESS SUMMARY
Outpatient Physical Therapy  PROGRESS SUMMARY NOTE      [facility] Outpatient Physical Therapy  [address]  [place] [address]  Phone:  [phone]  Fax:  [phone]    Date of Visit: 10/20/2021    Patient: Carmen Hebert  YOB: 1995  MRN: [id number]     Referring Provider: Mahesh Crane M.D.  [address]  [address]  [place] [address]   Referring Diagnosis Fibromyalgia [M79.7];Thoracic back pain, unspecified back pain laterality, unspecified chronicity [M54.6];Cervicalgia [M54.2]     Visit Diagnoses     ICD-10-CM   1. Thoracic back pain, unspecified back pain laterality, unspecified chronicity  M54.6   2. Cervicalgia  M54.2   3. Fibromyalgia  M79.7       Rehab Potential: good    Progress Report Period: 09/10/2021 to 10/20/2021    Functional Assessment Used  Zhang Andrea Low Back Pain and Disability Score: 29.17       Objective Findings and Assessment:   Patient progression towards goals: Goals:   Short Term Goals:   1. The patient will demonstrate HEP independently (MET)  2. Pt will be able to demonstrate WNL L C/S sidebend (MET)  3. Pt will demonstrate improvements in postural awareness with reduced cuing in clinic. (MET)     Short term goal time span:  2-4 weeks      Long Term Goals:    1. Pt will be independent with written HEP. (MET)  2. Pt will improve Zhang Andrea score by at least 10% (eval: 50) (MET)  4. Pt will report 50% improvement in neck pain (MET)  5. Pt will be able to unload dishes with modification without increase in s/s from baseline.  6. Pt will be able to do laundry with modification without increase in s/s from baseline. (mostly met)     Long term goal time span:  6-8 weeks    Objective findings and assessment details: Current objective measures: good standing posture, fair to good seated posture  WFL cervical ROM and pain free movements  Fair postural strength    Assessment: the patient is only on their 4th  visit, had a large gap after evaluation due to lag in insurance approval. She has made good progress overall in posture awareness and tolerance to exercise.  the patient tolerated treatment well today, again required some time to work through ROM but tolerated all exercises better today. She shows improvement in ROM and tissue mobility. She reports improvement in symptoms with the exercises at home and continues to be consistent with them. She tolerated exercises really well today and did have some increased soreness that did resolve following e-stim/heat. Overall good progress but would still benefit from postural strengthening and mechanics to decreased cervical and thoracic pain.    Goals:   Short Term Goals:   1. The patient will be able to walk at a WFL gait speed without back pain.   2. The patient will be able to return to yoga at least 2x a week    Short term goal time span:  2-4 weeks      Long Term Goals:    1. The patient will be able to stand for 15 minutes in one position in order to wash the dishes.   2. The patient will be able to sleep through the night at least 3 nights a week without waking due to back pain.   3. The patient will improve Zhang Andrea score by at least 10%, (progress note score was 29.17)    Long term goal time span:  4-6 weeks    Plan:   Planned therapy interventions:  E Stim Unattended (CPT 14231), Neuromuscular Re-education (CPT 98688) and Therapeutic Exercise (CPT 39504)  Frequency:  1x week  Duration in weeks:  6  Duration in visits:  6      Referring provider co-signature:  I have reviewed this plan of care and my co-signature certifies the need for services.     Certification Period: 10/20/2021 to 12/01/21    Physician Signature: ________________________________ Date: ______________

## 2021-10-20 NOTE — OP THERAPY DAILY TREATMENT
"  Outpatient Physical Therapy  DAILY TREATMENT     [facility] Outpatient Physical Therapy  [address]  [place] [address]  Phone:  [phone]  Fax:  [phone]    Date: 10/20/2021    Patient: Carmen Hebert  YOB: 1995  MRN: [id number]     Time Calculation    Start time: 0845  Stop time: 0947 Time Calculation (min): 62 minutes         Chief Complaint: Back Problem    Visit #: 4    SUBJECTIVE:  The patient reports that she has had increased pain lately because she ran out of her fibromyalgia medication but she has gotten it refilled. She also has a big gala this week     OBJECTIVE:  Current objective measures: good standing posture, fair to good seated posture  WFL cervical ROM but pain with L sidebend and L rotation  Fair postural strength  Zhang Andrea Low Back Pain and Disability Score: 29.17       Therapeutic Exercises (CPT 84523):     1. NuStep, 6 mins, level 4    2. Supine cervical retractions, x20    3. Supine cervical AROM, x10    4. Cat cow, x20    5. Seated thoracic stretch, x5 with 20 second hold    6. Foam roller, pec stretch x 40 seconds, supine angels x20, scap retractions x15, high row, x20 with 2# weights    10. Flashers, orange TB, x20    11. Toracic sidelying rotation, x15 each side, \"open book\"    12. TB rows, x20 with pink TB    13. Upper trunk rotations, 7# band, x15 each direction    14. Wall push ups, x10    17. PN: 11/20/2021      Therapeutic Exercise Summary: HEP: Patient provided handout (created in [email or web address]), to be uploaded, exercises include:   Cervical retractions (supine), supine cervical AROM, supported cervical extension, thoracic stretch      Therapeutic Treatments and Modalities:     1. E Stim Unattended (CPT 67312), IFC with MHP to thoracic paraspinals    Time-based treatments/modalities:    Physical Therapy Timed Treatment Charges  Neuromusc re-ed, balance, coor, post minutes (CPT 62953): 13 minutes  Therapeutic " exercise minutes (CPT 85515): 32 minutes      ASSESSMENT:   Response to treatment: the patient tolerated treatment well today, again required some time to work through ROM but tolerated all exercises better today. She shows improvement in ROM and tissue mobility. She reports improvement in symptoms with the exercises at home and continues to be consistent with them. She tolerated exercises really well today and did have some increased soreness that did resolve following e-stim/heat. Overall good progress but would still benefit from postural strengthening and mechanics to decreased cervical and thoracic pain.    PLAN/RECOMMENDATIONS:   Plan for treatment: therapy treatment to continue next visit.  Planned interventions for next visit: continue with current treatment.

## 2021-10-27 ENCOUNTER — PHYSICAL THERAPY (OUTPATIENT)
Dept: PHYSICAL THERAPY | Facility: MEDICAL CENTER | Age: 26
End: 2021-10-27
Attending: PHYSICAL MEDICINE & REHABILITATION
Payer: COMMERCIAL

## 2021-10-27 DIAGNOSIS — M79.7 FIBROMYALGIA: ICD-10-CM

## 2021-10-27 DIAGNOSIS — M54.2 CERVICALGIA: ICD-10-CM

## 2021-10-27 PROCEDURE — 97014 ELECTRIC STIMULATION THERAPY: CPT

## 2021-10-27 PROCEDURE — 97110 THERAPEUTIC EXERCISES: CPT

## 2021-10-27 PROCEDURE — 97112 NEUROMUSCULAR REEDUCATION: CPT

## 2021-10-27 NOTE — OP THERAPY DAILY TREATMENT
"  Outpatient Physical Therapy  DAILY TREATMENT     [facility] Outpatient Physical Therapy  [address]  [place] [address]  Phone:  [phone]  Fax:  [phone]    Date: 10/27/2021    Patient: Carmen Hebert  YOB: 1995  MRN: [id number]     Time Calculation    Start time: 0845  Stop time: 0945 Time Calculation (min): 60 minutes         Chief Complaint: Back Problem and Neck Problem    Visit #: 5    SUBJECTIVE:  The patient reports that she has been doing pretty good, she has been moving better and feel stronger.    OBJECTIVE:  Current objective measures: good standing posture, good seated posture, good awareness of posture  WFL cervical ROM but pain with R side bend and R rotations  Fair postural strength            Therapeutic Exercises (CPT 90890):     1. NuStep, 6 mins, level 4    2. Supine cervical retractions, x20    3. UT stretch, 2x30 seconds each side    4. Lev scap stretch, 2x30 seconds each side    5. Seated thoracic stretch, x5 with 20 second hold    6. Foam roller, pec stretch x 40 seconds, supine angels x20, scap retractions x15, high row, x20 with 2# weights    10. Flashers, orange TB, x20    11. Toracic sidelying rotation, x15 each side, \"open book\"    12. TB rows, x20 with pink TB    13. Upper trunk rotations, 7# band, x15 each direction    14. Wall push ups, x10    17. PN: 11/20/2021      Therapeutic Exercise Summary: HEP: Patient provided handout (created in [email or web address]), to be uploaded, exercises include:   Cervical retractions (supine), supine cervical AROM, supported cervical extension, thoracic stretch      Therapeutic Treatments and Modalities:     1. E Stim Unattended (CPT 30240), IFC with MHP to thoracic paraspinals    Time-based treatments/modalities:    Physical Therapy Timed Treatment Charges  Neuromusc re-ed, balance, coor, post minutes (CPT 19967): 15 minutes  Therapeutic exercise minutes (CPT 77271): 30 " minutes      ASSESSMENT:   Response to treatment: the patient tolerated treatment well today, again required some time to work through ROM but tolerated all exercises better today. She shows improvement in ROM and tissue mobility and maintains a good upright posture. She reports improvement in symptoms with the exercises at home and continues to be consistent with them. She reports she feels stronger. She tolerated exercises really well today and did have some increased soreness that did resolve following e-stim/heat. Overall good progress but would still benefit from postural strengthening and mechanics to decreased cervical and thoracic pain.    PLAN/RECOMMENDATIONS:   Plan for treatment: therapy treatment to continue next visit.  Planned interventions for next visit: continue with current treatment.

## 2021-11-01 ENCOUNTER — OFFICE VISIT (OUTPATIENT)
Dept: MEDICAL GROUP | Facility: MEDICAL CENTER | Age: 26
End: 2021-11-01
Attending: NURSE PRACTITIONER
Payer: COMMERCIAL

## 2021-11-01 ENCOUNTER — HOSPITAL ENCOUNTER (OUTPATIENT)
Facility: MEDICAL CENTER | Age: 26
End: 2021-11-01
Attending: NURSE PRACTITIONER
Payer: COMMERCIAL

## 2021-11-01 VITALS
HEART RATE: 75 BPM | HEIGHT: 69 IN | WEIGHT: 259.1 LBS | DIASTOLIC BLOOD PRESSURE: 64 MMHG | SYSTOLIC BLOOD PRESSURE: 102 MMHG | TEMPERATURE: 97.7 F | RESPIRATION RATE: 16 BRPM | OXYGEN SATURATION: 97 % | BODY MASS INDEX: 38.38 KG/M2

## 2021-11-01 DIAGNOSIS — J30.2 SEASONAL ALLERGIES: ICD-10-CM

## 2021-11-01 DIAGNOSIS — R10.2 PELVIC PAIN: ICD-10-CM

## 2021-11-01 DIAGNOSIS — N89.8 VAGINAL DISCHARGE: ICD-10-CM

## 2021-11-01 DIAGNOSIS — Z30.41 ENCOUNTER FOR SURVEILLANCE OF CONTRACEPTIVE PILLS: ICD-10-CM

## 2021-11-01 PROCEDURE — 87660 TRICHOMONAS VAGIN DIR PROBE: CPT

## 2021-11-01 PROCEDURE — 99214 OFFICE O/P EST MOD 30 MIN: CPT | Performed by: NURSE PRACTITIONER

## 2021-11-01 PROCEDURE — 87510 GARDNER VAG DNA DIR PROBE: CPT

## 2021-11-01 PROCEDURE — 87480 CANDIDA DNA DIR PROBE: CPT

## 2021-11-01 RX ORDER — PREGABALIN 150 MG/1
CAPSULE ORAL
COMMUNITY
Start: 2021-10-11 | End: 2021-11-12 | Stop reason: SDUPTHER

## 2021-11-01 RX ORDER — FLUTICASONE PROPIONATE 50 MCG
2 SPRAY, SUSPENSION (ML) NASAL DAILY
Qty: 16 G | Refills: 11 | Status: SHIPPED | OUTPATIENT
Start: 2021-11-01 | End: 2022-12-26 | Stop reason: SDUPTHER

## 2021-11-01 RX ORDER — ALPRAZOLAM 0.25 MG/1
0.25 TABLET ORAL NIGHTLY PRN
COMMUNITY

## 2021-11-01 RX ORDER — NORETHINDRONE ACETATE AND ETHINYL ESTRADIOL .03; 1.5 MG/1; MG/1
1 TABLET ORAL DAILY
Qty: 84 TABLET | Refills: 4 | Status: SHIPPED | OUTPATIENT
Start: 2021-11-01 | End: 2022-06-28

## 2021-11-01 ASSESSMENT — FIBROSIS 4 INDEX: FIB4 SCORE: 0.32

## 2021-11-01 ASSESSMENT — ENCOUNTER SYMPTOMS
PALPITATIONS: 0
BLOOD IN STOOL: 0
SHORTNESS OF BREATH: 0
DIARRHEA: 0
ABDOMINAL PAIN: 0
WEIGHT LOSS: 0
WHEEZING: 0
CHILLS: 0
COUGH: 0
FEVER: 0
CONSTIPATION: 0

## 2021-11-02 ENCOUNTER — PHYSICAL THERAPY (OUTPATIENT)
Dept: PHYSICAL THERAPY | Facility: MEDICAL CENTER | Age: 26
End: 2021-11-02
Attending: PHYSICAL MEDICINE & REHABILITATION
Payer: COMMERCIAL

## 2021-11-02 DIAGNOSIS — M54.6 THORACIC BACK PAIN, UNSPECIFIED BACK PAIN LATERALITY, UNSPECIFIED CHRONICITY: ICD-10-CM

## 2021-11-02 DIAGNOSIS — N89.8 VAGINAL DISCHARGE: ICD-10-CM

## 2021-11-02 DIAGNOSIS — M54.2 CERVICALGIA: ICD-10-CM

## 2021-11-02 PROCEDURE — 97110 THERAPEUTIC EXERCISES: CPT

## 2021-11-02 PROCEDURE — 97112 NEUROMUSCULAR REEDUCATION: CPT

## 2021-11-02 PROCEDURE — 97014 ELECTRIC STIMULATION THERAPY: CPT

## 2021-11-02 NOTE — OP THERAPY PROGRESS SUMMARY
Outpatient Physical Therapy  PROGRESS SUMMARY NOTE      [facility] Outpatient Physical Therapy  [address]  [place] [address]  Phone:  [phone]  Fax:  [phone]    Date of Visit: 11/02/2021    Patient: Carmen Hebert  YOB: 1995  MRN: [id number]     Referring Provider: Mahesh Crane M.D.  [address]  [place] [address]   Referring Diagnosis Fibromyalgia [M79.7];Thoracic back pain, unspecified back pain laterality, unspecified chronicity [M54.6];Cervicalgia [M54.2]     Visit Diagnoses     ICD-10-CM   1. Cervicalgia  M54.2   2. Thoracic back pain, unspecified back pain laterality, unspecified chronicity  M54.6       Rehab Potential: good    Progress Report Period: 09/10/2021 to 10/20/2021    Functional Assessment Used          Objective Findings and Assessment:   Patient progression towards goals: Goals:   Short Term Goals:   1. The patient will demonstrate HEP independently (MET)  2. Pt will be able to demonstrate WNL L C/S sidebend (MET)  3. Pt will demonstrate improvements in postural awareness with reduced cuing in clinic. (MET)     Short term goal time span:  2-4 weeks      Long Term Goals:    1. Pt will be independent with written HEP. (MET)  2. Pt will improve Zhang Andrea score by at least 10% (eval: 50) (MET)  4. Pt will report 50% improvement in neck pain (MET)  5. Pt will be able to unload dishes with modification without increase in s/s from baseline.  6. Pt will be able to do laundry with modification without increase in s/s from baseline. (mostly met)     Long term goal time span:  6-8 weeks    Objective findings and assessment details: * no change from prior progress note on 10/20 but patients authorization expires 11/19, to prevent delay in care, requesting more visits. Currently approved for 12 visits but have only used 6 due to availability in clinicians schedule meeting the patients needs.     Current  objective measures: good standing posture, fair to good seated posture  WFL cervical ROM and pain free movements  Fair postural strength    Assessment: the patient is only on their 6th visit, had a large gap after evaluation due to lag in insurance approval. She has made good progress overall in posture awareness and tolerance to exercise.  the patient tolerated treatment well today, again required some time to work through ROM but tolerated all exercises better today. She shows improvement in ROM and tissue mobility. She does struggle with increased pain in her upper back, though she has good awareness of posture, has increased fatigue due to large breast size and feeling like she is constantly fighting being pulled forward. She reports improvement in symptoms with the exercises at home and continues to be consistent with them. She tolerated exercises really well today and did have some increased soreness that did resolve following e-stim/heat. Overall good progress but would still benefit from postural strengthening and mechanics to decreased cervical and thoracic pain.    Goals:   Short Term Goals:   1. The patient will be able to walk at a WFL gait speed without back pain.   2. The patient will be able to return to yoga at least 2x a week    Short term goal time span:  2-4 weeks      Long Term Goals:    1. The patient will be able to stand for 15 minutes in one position in order to wash the dishes.   2. The patient will be able to sleep through the night at least 3 nights a week without waking due to back pain.   3. The patient will improve Zhang Andrea score by at least 10%, (progress note score was 29.17)    Long term goal time span:  4-6 weeks    Plan:   Planned therapy interventions:  E Stim Unattended (CPT 82536), Neuromuscular Re-education (CPT 32661) and Therapeutic Exercise (CPT 64910)  Frequency:  1x week  Duration in weeks:  6  Duration in visits:  6  Plan details:  X2 units of there-ex per visit, x1  unit of neuro re-ed per visit, and x1 unit of e-stim per visit      Referring provider co-signature:  I have reviewed this plan of care and my co-signature certifies the need for services.     Certification Period: 11/02/2021 to 12/15/21    Physician Signature: ________________________________ Date: ______________

## 2021-11-02 NOTE — ASSESSMENT & PLAN NOTE
She reports yellowish, odorous vag discharge for several weeks.  She has been tested for STI- negative.  She is concerned that she may have a yeast infection.

## 2021-11-02 NOTE — ASSESSMENT & PLAN NOTE
Present for years.  She will have debilitating pain when menstruating- she was told that she likely has endometriosis- but would need a lap sx to dx this.  She was unable to get her OCP covered as written (84 continuous days- preventing withdrawal bleeding).

## 2021-11-02 NOTE — OP THERAPY DAILY TREATMENT
"  Outpatient Physical Therapy  DAILY TREATMENT     [facility] Outpatient Physical Therapy  [address]  [place] [address]  Phone:  [phone]  Fax:  [phone]    Date: 11/02/2021    Patient: Carmen Hebert  YOB: 1995  MRN: [id number]     Time Calculation    Start time: 0845  Stop time: 0945 Time Calculation (min): 60 minutes         Chief Complaint: Neck Problem and Back Problem    Visit #: 6    SUBJECTIVE:  The patient reports that she has been managing well but she thinks that she slept weird last night and now her shoulder hurts some.     OBJECTIVE:  Current objective measures: good standing posture, good seated posture, good awareness of posture but consistently correcting posture due to being pulled forward.  WFL cervical ROM but pain with R side bend and R rotations  Fair postural strength  Large breast size which appears to pull patient forward.   Fair glute and core strength                Therapeutic Exercises (CPT 31228):     1. NuStep, 6 mins, level 4    2. Supine cervical retractions, x20    3. UT stretch, 2x30 seconds each side    4. Lev scap stretch, 2x30 seconds each side    5. Seated thoracic stretch, x5 with 20 second hold    6. Foam roller, pec stretch x 40 seconds, supine angels x20, scap retractions x15, high row, x20 with 2# weights    10. Flashers, orange TB, x20    11. Toracic sidelying rotation, x15 each side, \"open book\"    12. TB rows, x20 with pink TB    13. Upper trunk rotations, 7# band, x15 each direction    14. Wall push ups, x10    15. PPT with TB pull apart, x20    16. PPT with March, x10 each side    17. PN: 11/20/2021      Therapeutic Exercise Summary: HEP: Patient provided handout (created in [email or web address]), to be uploaded, exercises include:   Cervical retractions (supine), supine cervical AROM, supported cervical extension, thoracic stretch      Therapeutic Treatments and Modalities:     1. E Stim Unattended (CPT " 33965) [facility] with MHP to thoracic paraspinals    Time-based treatments/modalities:    Physical Therapy Timed Treatment Charges  Neuromusc re-ed, balance, coor, post minutes (CPT 71624): 10 minutes  Therapeutic exercise minutes (CPT 73088): 35 minutes      ASSESSMENT:   Response to treatment: the patient tolerated treatment well today, again required some time to work through ROM but tolerated all exercises well today. She has improving quality of motion and a slow steady improvement in strength. She shows improvement in ROM and tissue mobility and maintains a good upright posture. She reports improvement in symptoms with the exercises at home and continues to be consistent with them. She reports she feels stronger but still has the constant pull forward due to large weigh of chest which increases struggle with periods of back pain. She tolerated exercises really well today and did have some increased soreness that did resolve following e-stim/heat. Overall good progress but would still benefit from postural strengthening and mechanics to decreased cervical and thoracic pain.    PLAN/RECOMMENDATIONS:   Plan for treatment: therapy treatment to continue next visit.  Planned interventions for next visit: continue with current treatment.

## 2021-11-02 NOTE — PROGRESS NOTES
Chief Complaint   Patient presents with   • Annual Exam       Subjective:     HPI:   Carmen Hebert is a 26 y.o. female here to discuss the evaluation and management of:        Vaginal discharge  She reports yellowish, odorous vag discharge for several weeks.  She has been tested for STI- negative.  She is concerned that she may have a yeast infection.     Pelvic pain  Present for years.  She will have debilitating pain when menstruating- she was told that she likely has endometriosis- but would need a lap sx to dx this.  She was unable to get her OCP covered as written (84 continuous days- preventing withdrawal bleeding).        ROS  Review of Systems   Constitutional: Negative for chills, fever, malaise/fatigue and weight loss.   Respiratory: Negative for cough, shortness of breath and wheezing.    Cardiovascular: Negative for chest pain, palpitations and leg swelling.   Gastrointestinal: Negative for abdominal pain, blood in stool, constipation and diarrhea.         Allergies   Allergen Reactions   • [place]-Related Products Hives, Itching, Myalgia and Nausea   • Dairy Food Allergy Diarrhea, Myalgia and Nausea   • Gluten Meal Cough, Diarrhea, Hives, Itching, Myalgia, Nausea, Rash, Runny Nose, Shortness of Breath and Vomiting   • [place] [address] Complete Hives, Itching and Rash   • Soy Allergy Hives, Itching, Myalgia and Nausea       Current medicines (including changes today)  Current Outpatient Medications   Medication Sig Dispense Refill   • pregabalin (LYRICA) 150 MG Cap      • ALPRAZolam (XANAX) 0.25 MG Tab Take 0.25 mg by mouth at bedtime as needed for Sleep.     • Norethindrone Acet-Ethinyl Est (BELL 1.5/30) 1.5-30 MG-MCG Tab Take 1 Tablet by mouth every day. Take active pills continuously, no placebo week. 84 Tablet 4   • fluticasone (FLONASE) 50 MCG/ACT nasal spray Administer 2 Sprays into affected nostril(S) every day. 16 g 11   • mupirocin (BACTROBAN) 2 % Ointment Apply 1 Application topically  "2 times a day. 22 g 0   • propranolol LA (INDERAL LA) 60 MG CAPSULE SR 24 HR Take 1 capsule by mouth every day. 30 capsule 11   • sertraline (ZOLOFT) 100 MG Tab TAKE ONE AND ONE HALF TABLETS BY MOUTH ONCE DAILY 45 Tab 11     No current facility-administered medications for this visit.       Social History     Tobacco Use   • Smoking status: Never Smoker   • Smokeless tobacco: Never Used   Vaping Use   • Vaping Use: Never used   Substance Use Topics   • Alcohol use: Not Currently     Comment: occ   • Drug use: Not Currently     Types: Marijuana     Comment: rarely       Patient Active Problem List    Diagnosis Date Noted   • Vaginal discharge 11/01/2021   • Pelvic pain 08/30/2021   • Complication of left ear piercing 08/30/2021   • Major depression, recurrent (HCC) 04/02/2021   • Thalassemia 04/08/2020   • Asthma 04/08/2020   • Celiac disease 04/08/2020   • Cervical lesion 04/08/2020   • Seasonal allergies 04/08/2020   • Mixed anxiety depressive disorder 04/08/2020   • Encounter to establish care 03/10/2020   • Fibromyalgia 03/10/2020   • Obesity (BMI 30-39.9) 03/10/2020   • Palpitations 03/10/2020       Family History   Problem Relation Age of Onset   • Fibromyalgia Mother    • Lung Disease Mother         asthma r/o   • Depression Mother    • Anxiety disorder Mother    • ADD / ADHD Father    • Depression Father           Objective:     /64 (BP Location: Left arm, Patient Position: Sitting, BP Cuff Size: Adult)   Pulse 75   Temp 36.5 °C (97.7 °F) (Temporal)   Resp 16   Ht 1.753 m (5' 9\")   Wt 118 kg (259 lb 1.6 oz)   SpO2 97%  Body mass index is 38.26 kg/m².    Physical Exam:  Physical Exam  Constitutional:       General: She is not in acute distress.  HENT:      Head: Normocephalic.      Right Ear: Tympanic membrane and external ear normal.      Left Ear: Tympanic membrane and external ear normal.   Eyes:      Conjunctiva/sclera: Conjunctivae normal.      Pupils: Pupils are equal, round, and reactive to " light.   Neck:      Trachea: No tracheal deviation.   Cardiovascular:      Rate and Rhythm: Normal rate and regular rhythm.      Heart sounds: Normal heart sounds.   Pulmonary:      Effort: Pulmonary effort is normal.      Breath sounds: Normal breath sounds.   Abdominal:      General: Bowel sounds are normal.      Palpations: Abdomen is soft.   Musculoskeletal:         General: Normal range of motion.      Cervical back: Normal range of motion and neck supple.   Lymphadenopathy:      Head:      Right side of head: No preauricular adenopathy.      Left side of head: No preauricular adenopathy.      Cervical: No cervical adenopathy.   Skin:     General: Skin is warm and dry.   Neurological:      Mental Status: She is alert and oriented to person, place, and time.      Cranial Nerves: Cranial nerves are intact.      Sensory: Sensation is intact.      Gait: Gait is intact.   Psychiatric:         Mood and Affect: Affect normal.         Judgment: Judgment normal.         Assessment and Plan:     The following treatment plan was discussed:    1. Vaginal discharge  VAGINAL PATHOGENS DNA PANEl   - New problem, unstable.  Vaginal pathogens panel collected by patient.   2. Pelvic pain  Norethindrone Acet-Ethinyl Est (BELL 1.5/30) 1.5-30 MG-MCG Tab    Referral to Gynecology  - Chronic problem, unstable.  Referral to gynecology for further evaluation and treatment.  Patient is concerned about what is causing her pelvic pain if it is not endometriosis.   3. Encounter for surveillance of contraceptive pills  Norethindrone Acet-Ethinyl Est (BELL 1.5/30) 1.5-30 MG-MCG Tab  - See above   4. Seasonal allergies  fluticasone (FLONASE) 50 MCG/ACT nasal spray  - Chronic problem, stable.  Medication refill.       Any change or worsening of signs or symptoms, patient encouraged to follow-up or report to emergency room for further evaluation. Patient verbalizes understanding and agrees.    Follow-Up: Return for TBD by results or sooner as  needed.      PLEASE NOTE: This dictation was created using voice recognition software. I have made every reasonable attempt to correct obvious errors, but I expect that there are errors of grammar and possibly content that I did not discover before finalizing the note.

## 2021-11-03 LAB
CANDIDA DNA VAG QL PROBE+SIG AMP: NEGATIVE
G VAGINALIS DNA VAG QL PROBE+SIG AMP: NEGATIVE
T VAGINALIS DNA VAG QL PROBE+SIG AMP: NEGATIVE

## 2021-11-04 ENCOUNTER — PHYSICAL THERAPY (OUTPATIENT)
Dept: PHYSICAL THERAPY | Facility: MEDICAL CENTER | Age: 26
End: 2021-11-04
Attending: PHYSICAL MEDICINE & REHABILITATION
Payer: COMMERCIAL

## 2021-11-04 DIAGNOSIS — M54.2 CERVICALGIA: ICD-10-CM

## 2021-11-04 DIAGNOSIS — M54.6 THORACIC BACK PAIN, UNSPECIFIED BACK PAIN LATERALITY, UNSPECIFIED CHRONICITY: ICD-10-CM

## 2021-11-04 PROCEDURE — 97110 THERAPEUTIC EXERCISES: CPT

## 2021-11-04 PROCEDURE — 97112 NEUROMUSCULAR REEDUCATION: CPT

## 2021-11-04 PROCEDURE — 97014 ELECTRIC STIMULATION THERAPY: CPT

## 2021-11-04 NOTE — OP THERAPY DAILY TREATMENT
"  Outpatient Physical Therapy  DAILY TREATMENT     [facility] Outpatient Physical Therapy  [address]  [place] [address]  Phone:  [phone]  Fax:  [phone]    Date: 11/04/2021    Patient: Carmen Hebert  YOB: 1995  MRN: [id number]     Time Calculation    Start time: 0930  Stop time: 1028 Time Calculation (min): 58 minutes         Chief Complaint: Back Problem and Neck Problem    Visit #: 7    SUBJECTIVE:  The patient reports that she is sore today, she lifted furniture yesterday and feels like she \"jacked her right side up\".     OBJECTIVE:  Current objective measures: good standing posture, good seated posture, good awareness of posture but consistently correcting posture due to being pulled forward.  WFL cervical ROM but pain with R side bend and R rotations  Fair postural strength  Large breast size which appears to pull patient forward.   Fair glute and core strength  Poor levator scap mobility today                Therapeutic Exercises (CPT 96963):     1. NuStep, 6 mins, level 4    2. Supine cervical retractions, x20    3. UT stretch, 2x30 seconds each side    4. Lev scap stretch, 2x30 seconds each side    5. Seated thoracic stretch, x5 with 20 second hold    6. Foam roller, pec stretch x 40 seconds, supine angels x20, scap retractions x15, high row, x20 with 2# weights    10. Flashers, orange TB, x20    11. Toracic sidelying rotation, x15 each side, \"open book\"    12. TB rows, x20 with pink TB    13. Upper trunk rotations, 7# band, x15 each direction    14. Wall push ups, x10    15. PPT with TB pull apart, x20    16. PPT with March, x10 each side    17. Quad alt arm reach, x20 each side    18. Quad scap marches, x20 each side      Therapeutic Exercise Summary: HEP: Patient provided handout (created in [email or web address]), to be uploaded, exercises include:   Cervical retractions (supine), supine cervical AROM, supported cervical extension, thoracic " stretch      Therapeutic Treatments and Modalities:     1. E Stim Unattended (CPT 96718), IFC with MHP to thoracic paraspinals    Time-based treatments/modalities:    Physical Therapy Timed Treatment Charges  Neuromusc re-ed, balance, coor, post minutes (CPT 08821): 12 minutes  Therapeutic exercise minutes (CPT 33999): 31 minutes      ASSESSMENT:   Response to treatment: the patient tolerated treatment well today, again required some time to work through ROM but tolerated all exercises well today. Mornings work best with her work schedule but she has a hard time waking up. She shows improvement in ROM and tissue mobility and maintains a good upright posture. Today, progress in strength really showed, tolerates progression of resistance and reps with several new exercises. She reports she feels stronger but still has the constant pull forward due to large weigh of chest which increases struggle with periods of back pain.  Overall good progress but would still benefit from postural strengthening and mechanics to decreased cervical and thoracic pain.    PLAN/RECOMMENDATIONS:   Plan for treatment: therapy treatment to continue next visit.  Planned interventions for next visit: continue with current treatment.

## 2021-11-04 NOTE — RESULT ENCOUNTER NOTE
Darek Madrigal reviewed your vaginal pathogens panel and you are negative for yeast infection, bacterial vaginosis, and trichomoniasis.  Are you noticing any changes in your vaginal discharge?Mariel

## 2021-11-10 ENCOUNTER — APPOINTMENT (OUTPATIENT)
Dept: PHYSICAL THERAPY | Facility: MEDICAL CENTER | Age: 26
End: 2021-11-10
Attending: PHYSICAL MEDICINE & REHABILITATION
Payer: COMMERCIAL

## 2021-11-11 ENCOUNTER — APPOINTMENT (OUTPATIENT)
Dept: PHYSICAL THERAPY | Facility: MEDICAL CENTER | Age: 26
End: 2021-11-11
Attending: PHYSICAL MEDICINE & REHABILITATION
Payer: COMMERCIAL

## 2021-11-12 ENCOUNTER — OFFICE VISIT (OUTPATIENT)
Dept: PHYSICAL MEDICINE AND REHAB | Facility: MEDICAL CENTER | Age: 26
End: 2021-11-12
Payer: COMMERCIAL

## 2021-11-12 VITALS
DIASTOLIC BLOOD PRESSURE: 64 MMHG | TEMPERATURE: 97.3 F | HEIGHT: 70 IN | SYSTOLIC BLOOD PRESSURE: 116 MMHG | HEART RATE: 87 BPM | OXYGEN SATURATION: 97 % | BODY MASS INDEX: 37.46 KG/M2 | WEIGHT: 261.69 LBS

## 2021-11-12 DIAGNOSIS — M62.838 MUSCLE SPASM: ICD-10-CM

## 2021-11-12 DIAGNOSIS — M54.2 CERVICALGIA: ICD-10-CM

## 2021-11-12 DIAGNOSIS — M79.7 FIBROMYALGIA: ICD-10-CM

## 2021-11-12 PROCEDURE — 99214 OFFICE O/P EST MOD 30 MIN: CPT | Performed by: PHYSICAL MEDICINE & REHABILITATION

## 2021-11-12 RX ORDER — PREGABALIN 150 MG/1
150 CAPSULE ORAL
Qty: 90 CAPSULE | Refills: 1 | Status: SHIPPED | OUTPATIENT
Start: 2021-11-12 | End: 2021-12-16 | Stop reason: SDUPTHER

## 2021-11-12 RX ORDER — TIZANIDINE 4 MG/1
2-4 TABLET ORAL EVERY 6 HOURS PRN
Qty: 20 TABLET | Refills: 0 | Status: SHIPPED | OUTPATIENT
Start: 2021-11-12 | End: 2021-11-17

## 2021-11-12 ASSESSMENT — PATIENT HEALTH QUESTIONNAIRE - PHQ9
SUM OF ALL RESPONSES TO PHQ QUESTIONS 1-9: 9
CLINICAL INTERPRETATION OF PHQ2 SCORE: 3
5. POOR APPETITE OR OVEREATING: 2 - MORE THAN HALF THE DAYS

## 2021-11-12 ASSESSMENT — PAIN SCALES - GENERAL: PAINLEVEL: 6=MODERATE PAIN

## 2021-11-12 ASSESSMENT — FIBROSIS 4 INDEX: FIB4 SCORE: 0.32

## 2021-11-13 NOTE — PROGRESS NOTES
Follow-up patient note    Physiatry (physical medicine and  Rehabilitation), interventional spine and sports medicine    Date of Service: 11/12/2021    Chief complaint:   Chief Complaint   Patient presents with   • Follow-Up     Back pain       HISTORY    HPI: Carmen Hebert 26 y.o. female who presents today for follow-up evaluation of pain complaints.  She has a diagnosis of fibromyalgia.    Carmen reports that she was moving some furniture, carried something moderately heavy furniture and this aggravated her pain on the right.  Reviewed most recent visit from PT.     She has pain that worsened on the right shoulder blade region that started on Saturday.  Some pain in the front.   Trying heat did help somewhat, did not try ice.  Advil did not help much, around 600mg no more than once a day.  Xanax seemed to help somewhat.  She takes xanax, rarely, for anxiety.      Pain is a 6/10 on the NRS.  Pain is present in the neck and upper back, right greater near shoulder blade    Taking lyrica dose to 150mg po tid has helped with limiting increasing weight.      Prior to recent worsening, she feels like she is making gains with PT.  Plans for visit 11/17.    She is working 40 hours a week and is now full-time.  She is living with a roommate now.    Medical records review:  I reviewed the note from the referring provider Mariel Paula A.P.* dated 03/10/2020.  The patient was seen to establish care.  Report that she was diagnosed in 2017 with fibromyalgia, in [facility].  Labs were tested including TSH, HbA1C, vitamin D, 25 hydroxy, HIV ag/ab, T. Pallidum, chlamydia.  She was referred to cardiology for evaluation of palpitations with Holter monitor.  Medications refilled included zoloft 100mg, lyrica 75mg daily, cyclobenzaprine 5mg.  Referral to physiatry placed    Previous treatments:    Physical Therapy: Yes    Medications the patient is tried: lyrica, cyclobenzeprine, amitriptyline, tried  aj    Previous interventions: none    Previous surgeries to relieve the above pain:  none    PHQ-9: 2    ROS:   Gen: nausea, history of COVID  Eyes: intermittent blurry vision  CV: anxiety, palpitation (inappropriate sinus tachycardia)  Psych: depression  Red Flags ROS:   Fever, Chills, Sweats: Denies  Involuntary Weight Loss: Denies  Bladder Incontinence: Denies  Bowel Incontinence: Denies  Saddle Anesthesia: Denies    All other systems reviewed and negative.       PMHx:   Past Medical History:   Diagnosis Date   • Anxiety    • Depression    • Fibromyalgia 2017   • Thalassemia minor        PSHx:   History reviewed. No pertinent surgical history.    Family history   Family History   Problem Relation Age of Onset   • Fibromyalgia Mother    • Lung Disease Mother         asthma r/o   • Depression Mother    • Anxiety disorder Mother    • ADD / ADHD Father    • Depression Father          Medications:   Current Outpatient Medications   Medication   • tizanidine (ZANAFLEX) 4 MG Tab   • pregabalin (LYRICA) 150 MG Cap   • ALPRAZolam (XANAX) 0.25 MG Tab   • Norethindrone Acet-Ethinyl Est (BELL 1.5/30) 1.5-30 MG-MCG Tab   • fluticasone (FLONASE) 50 MCG/ACT nasal spray   • mupirocin (BACTROBAN) 2 % Ointment   • propranolol LA (INDERAL LA) 60 MG CAPSULE SR 24 HR   • sertraline (ZOLOFT) 100 MG Tab     No current facility-administered medications for this visit.       Allergies:   Allergies   Allergen Reactions   • [place]-Related Products Hives, Itching, Myalgia and Nausea   • Dairy Food Allergy Diarrhea, Myalgia and Nausea   • Gluten Meal Cough, Diarrhea, Hives, Itching, Myalgia, Nausea, Rash, Runny Nose, Shortness of Breath and Vomiting   • [address] [address] Complete Hives, Itching and Rash   • Soy Allergy Hives, Itching, Myalgia and Nausea       Social Hx:   Social History     Socioeconomic History   • Marital status: Single     Spouse name: Not on file   • Number of children: Not on file   • Years of education: Not on file   •  Highest education level: Not on file   Occupational History   • Not on file   Tobacco Use   • Smoking status: Never Smoker   • Smokeless tobacco: Never Used   Vaping Use   • Vaping Use: Never used   Substance and Sexual Activity   • Alcohol use: Not Currently     Comment: occ   • Drug use: Not Currently     Types: Marijuana     Comment: rarely   • Sexual activity: Yes     Partners: Male     Birth control/protection: Condom   Other Topics Concern   •  Service No   • Blood Transfusions No   • Caffeine Concern No   • Occupational Exposure No   • Hobby Hazards No   • Sleep Concern Yes   • Stress Concern Yes   • Weight Concern Yes   • Special Diet Yes   • Back Care No   • Exercise No   • Bike Helmet Yes   • Seat Belt Yes   • Self-Exams Yes   Social History Narrative   • Not on file     Social Determinants of Health     Financial Resource Strain:    • Difficulty of Paying Living Expenses: Not on file   Food Insecurity:    • Worried About Running Out of Food in the Last Year: Not on file   • Ran Out of Food in the Last Year: Not on file   Transportation Needs:    • Lack of Transportation (Medical): Not on file   • Lack of Transportation (Non-Medical): Not on file   Physical Activity:    • Days of Exercise per Week: Not on file   • Minutes of Exercise per Session: Not on file   Stress:    • Feeling of Stress : Not on file   Social Connections:    • Frequency of Communication with Friends and Family: Not on file   • Frequency of Social Gatherings with Friends and Family: Not on file   • Attends Spiritism Services: Not on file   • Active Member of Clubs or Organizations: Not on file   • Attends Club or Organization Meetings: Not on file   • Marital Status: Not on file   Intimate Partner Violence:    • Fear of Current or Ex-Partner: Not on file   • Emotionally Abused: Not on file   • Physically Abused: Not on file   • Sexually Abused: Not on file   Housing Stability:    • Unable to Pay for Housing in the Last Year: Not  "on file   • Number of Places Lived in the Last Year: Not on file   • Unstable Housing in the Last Year: Not on file         EXAMINATION     Physical Exam:   Vitals: /64 (BP Location: Right arm, Patient Position: Sitting, BP Cuff Size: Adult long)   Pulse 87   Temp 36.3 °C (97.3 °F) (Temporal)   Ht 1.778 m (5' 10\")   Wt 119 kg (261 lb 11 oz)   SpO2 97%     Constitutional:   Body Habitus: Body mass index is 37.55 kg/m².  Cooperation: Fully cooperates with exam  Appearance: Well-groomed, well-nourished, not disheveled, in no acute distress    Eyes: No scleral icterus, no proptosis     ENT -no obvious auditory deficits, wearing a face mask    Skin -no rashes or lesions noted     Respiratory-  breathing comfortable on room air, no audible wheezing    Cardiovascular- no lower extremity edema is noted.     Psychiatric- alert and oriented ×3. Normal affect.     Gait - normal gait, no use of ambulatory device, nonantalgic.     Musculoskeletal -   Cervical spine   Decreased ROM of the cervical spine in left and right rotation.  Pain with flexion and extension.  Spurling's negative bilaterally    Tenderness to palpation over the right trapezius and rhomboids, infraspinatus.  Minimal tenderness left trapezius and rhomboids    Thoracic/Lumbar Spine/Sacral Spine/Hips   Inspection: No evidence of atrophy in bilateral lower extremities throughout     No focal motor or sensory deficits in the upper or lower extremities.    Reflexes are 2+ biceps, triceps, patella and achilles bilaterally    MEDICAL DECISION MAKING    Medical records review: see under HPI section.     DATA    Labs:   Vitamin D, 25 hydroxy 03/10/2020 : 38  TSH: 2.610 on 03/10/2020  Lab Results   Component Value Date/Time    SODIUM 136 04/15/2021 09:46 AM    POTASSIUM 4.5 04/15/2021 09:46 AM    CHLORIDE 103 04/15/2021 09:46 AM    CO2 25 04/15/2021 09:46 AM    ANION 8.0 04/15/2021 09:46 AM    GLUCOSE 97 04/15/2021 09:46 AM    BUN 10 04/15/2021 09:46 AM    " CREATININE 0.74 04/15/2021 09:46 AM    CALCIUM 9.2 04/15/2021 09:46 AM    ASTSGOT 24 04/15/2021 09:46 AM    ALTSGPT 29 04/15/2021 09:46 AM    TBILIRUBIN 0.2 04/15/2021 09:46 AM    ALBUMIN 4.1 04/15/2021 09:46 AM    TOTPROTEIN 7.7 04/15/2021 09:46 AM    GLOBULIN 3.6 (H) 04/15/2021 09:46 AM    AGRATIO 1.1 04/15/2021 09:46 AM       No results found for: PROTHROMBTM, INR     Lab Results   Component Value Date/Time    WBC 13.2 (H) 07/13/2021 08:29 AM    RBC 6.15 (H) 07/13/2021 08:29 AM    HEMOGLOBIN 12.2 07/13/2021 08:29 AM    HEMATOCRIT 41.0 07/13/2021 08:29 AM    MCV 66.7 (L) 07/13/2021 08:29 AM    MCH 19.8 (L) 07/13/2021 08:29 AM    MCHC 29.8 (L) 07/13/2021 08:29 AM    MPV 12.0 07/13/2021 08:29 AM    NEUTSPOLYS 71.90 07/13/2021 08:29 AM    LYMPHOCYTES 19.70 (L) 07/13/2021 08:29 AM    MONOCYTES 5.50 07/13/2021 08:29 AM    EOSINOPHILS 1.60 07/13/2021 08:29 AM    BASOPHILS 0.80 07/13/2021 08:29 AM    HYPOCHROMIA 1+ 07/13/2021 08:29 AM    ANISOCYTOSIS 2+ (A) 07/13/2021 08:29 AM        Lab Results   Component Value Date/Time    HBA1C 5.5 03/10/2020 05:06 PM        Imaging: I personally reviewed following images, these are my reads  Xray cervical spine 08/12/2021  There is reversal of cervical lordosis  No significant degenerative changes    Xray thoracic 07/31/2020  There is mild curvative of the thoracic spine, convex left.  [place] at T3-4    MRI cervical spine on 05/22/2020  There is not of mild loss of cervical lordosis.  No significant degenerative changes, no cervical disc herniations, no central or foraminal stenosis in the cervical spine.     IMAGING radiology reads. I reviewed the following radiology reads   Xray cervical spine 08/12/2021  Impression: No acute fracture is identified    MRI cervical spine on 05/22/2020  Unremarkable pre and postcontrast MR examination of the cervical spine except for loss of cervical lordosis              Xray thoracic 07/31/2020        IMPRESSION:   1.  No acute findings.  2.  Mild  spinal curvature, convex left, in the upper thoracic spine                                                                                                                                                         Diagnosis   Visit Diagnoses     ICD-10-CM   1. Fibromyalgia  M79.7   2. Muscle spasm  M62.838   3. Cervicalgia  M54.2           ASSESSMENT:  Carmen Hebert 26 y.o. female seen for above     Carmen was seen today for follow-up.    Diagnoses and all orders for this visit:    Fibromyalgia  -     pregabalin (LYRICA) 150 MG Cap; Take 1 Capsule by mouth 3 times a day for 30 days.    Muscle spasm  -     tizanidine (ZANAFLEX) 4 MG Tab; Take 0.5-1 Tablets by mouth every 6 hours as needed for up to 5 days.    Cervicalgia       1. Discussed that she has had recent worsening of symptoms with increased muscle spasms.  She had been doing well with PT.  Plan to resume PT.  Discussed short-term use of muscle relaxant.  She is not regularly taking xanax.  Advised caution and avoid alcohol use.  Tizanidine 2-4mg up to every 6 hours with wean as tolerated.  2. Continue lyrica 150mg po tid.    3. Continue activity and PT as tolerated.  She will contact the office if symptoms do not return to baseline and continue to improve with PT.        Follow-up: Return in about 2 months (around 1/12/2022), or if symptoms worsen or fail to improve.    Thank you very much for asking me to participate in Carmen Hebert's care.  Please contact me with any questions or concerns.      Please note that this dictation was created using voice recognition software. I have made every reasonable attempt to correct obvious errors but there may be errors of grammar and content that I may have overlooked prior to finalization of this note.      Mahesh Crane MD  Physical Medicine and Rehabilitation  Interventional Spine and Sports Physiatry  [facility]

## 2021-11-14 DIAGNOSIS — M79.7 FIBROMYALGIA: ICD-10-CM

## 2021-11-15 RX ORDER — SERTRALINE HYDROCHLORIDE 100 MG/1
TABLET, FILM COATED ORAL
Qty: 45 TABLET | Refills: 11 | Status: SHIPPED | OUTPATIENT
Start: 2021-11-15 | End: 2021-12-20 | Stop reason: SDUPTHER

## 2021-11-17 ENCOUNTER — PHYSICAL THERAPY (OUTPATIENT)
Dept: PHYSICAL THERAPY | Facility: MEDICAL CENTER | Age: 26
End: 2021-11-17
Attending: PHYSICAL MEDICINE & REHABILITATION
Payer: COMMERCIAL

## 2021-11-17 DIAGNOSIS — M79.7 FIBROMYALGIA: ICD-10-CM

## 2021-11-17 DIAGNOSIS — M54.6 THORACIC BACK PAIN, UNSPECIFIED BACK PAIN LATERALITY, UNSPECIFIED CHRONICITY: ICD-10-CM

## 2021-11-17 DIAGNOSIS — M54.2 CERVICALGIA: ICD-10-CM

## 2021-11-17 PROCEDURE — 97112 NEUROMUSCULAR REEDUCATION: CPT

## 2021-11-17 PROCEDURE — 97110 THERAPEUTIC EXERCISES: CPT

## 2021-11-17 PROCEDURE — 97014 ELECTRIC STIMULATION THERAPY: CPT

## 2021-11-17 NOTE — OP THERAPY DAILY TREATMENT
"  Outpatient Physical Therapy  DAILY TREATMENT     [facility]  [address]  [place] [address]  Phone:  [phone]  Fax:  [phone]    Date: 11/17/2021    Patient: Carmen Hebert  YOB: 1995  MRN: [id number]     Time Calculation    Start time: 0800  Stop time: 0903 Time Calculation (min): 63 minutes         Chief Complaint: Back Problem and Neck Problem    Visit #: 8    SUBJECTIVE:  The patient reports that she has been doing pretty well but she did fall on Monday and hurt her L knee. She doesn't think its fractured or anything but really tender to touch. Her insurance stopped covering her fibromyalgia medication and she wasn't able to take it this weekend as a result.     OBJECTIVE:  Current objective measures: good standing posture, good seated posture, good awareness of posture but consistently correcting posture due to being pulled forward.  WFL cervical ROM and pain free today  Fair postural strength  Large breast size which appears to pull patient forward.   Fair glute and core strength  Poor levator scap mobility today  Fair scap control                Therapeutic Exercises (CPT 03737):     1. NuStep, 5 mins, level 1    2. Supine cervical retractions, x20    3. UT stretch, 2x30 seconds each side    4. Lev scap stretch, 2x30 seconds each side    5. Seated thoracic stretch, x5 with 20 second hold    6. Foam roller, pec stretch x 40 seconds, supine angels x20, scap retractions x15, high row, x20 with 2# weights    10. Flashers, orange TB, x20    11. Toracic sidelying rotation, x15 each side, \"open book\"    12. TB rows, x20 with pink TB    13. Upper trunk rotations, 7# band, x15 each direction    14. Bridge, x10 with 10 second hold    15. PPT with TB pull apart, x20    16. PPT with March, x10 each side    17. Quad alt arm reach, x20 each side, not today, knee pain    18. Quad scap marches, x20 each side, not today, " knee pain      Therapeutic Exercise Summary: HEP: Patient provided handout (created in HEP2go), to be uploaded, exercises include:   Cervical retractions (supine), supine cervical AROM, supported cervical extension, thoracic stretch      Therapeutic Treatments and Modalities:     1. E Stim Unattended (CPT 75011), IFC with MHP to thoracic paraspinals    Time-based treatments/modalities:    Physical Therapy Timed Treatment Charges  Neuromusc re-ed, balance, coor, post minutes (CPT 60339): 10 minutes  Therapeutic exercise minutes (CPT 94525): 35 minutes      ASSESSMENT:   Response to treatment: the patient tolerated treatment well today, she was limited by knee pain and unable to do any of the quad exercises today.  She has made good progress in strength and tolerance to mobility but what she tolerates is really dependent on fibromyalgia symptoms. She reports she feels stronger but still has the constant pull forward due to large weigh of chest which increases struggle with periods of back pain.  Overall good progress but would still benefit from postural strengthening and mechanics to decreased cervical and thoracic pain.    PLAN/RECOMMENDATIONS:   Plan for treatment: therapy treatment to continue next visit.  Planned interventions for next visit: continue with current treatment.

## 2021-11-19 ENCOUNTER — APPOINTMENT (OUTPATIENT)
Dept: RADIOLOGY | Facility: MEDICAL CENTER | Age: 26
End: 2021-11-19
Attending: EMERGENCY MEDICINE
Payer: COMMERCIAL

## 2021-11-19 ENCOUNTER — HOSPITAL ENCOUNTER (EMERGENCY)
Facility: MEDICAL CENTER | Age: 26
End: 2021-11-19
Attending: EMERGENCY MEDICINE
Payer: COMMERCIAL

## 2021-11-19 VITALS
BODY MASS INDEX: 36.3 KG/M2 | RESPIRATION RATE: 18 BRPM | TEMPERATURE: 97.6 F | WEIGHT: 253.53 LBS | HEIGHT: 70 IN | HEART RATE: 65 BPM | SYSTOLIC BLOOD PRESSURE: 113 MMHG | DIASTOLIC BLOOD PRESSURE: 68 MMHG | OXYGEN SATURATION: 97 %

## 2021-11-19 DIAGNOSIS — L72.3 SEBACEOUS CYST: ICD-10-CM

## 2021-11-19 PROCEDURE — 99283 EMERGENCY DEPT VISIT LOW MDM: CPT

## 2021-11-19 PROCEDURE — 76604 US EXAM CHEST: CPT

## 2021-11-19 ASSESSMENT — ENCOUNTER SYMPTOMS
CHILLS: 0
FEVER: 0

## 2021-11-19 ASSESSMENT — FIBROSIS 4 INDEX: FIB4 SCORE: 0.32

## 2021-11-20 NOTE — ED NOTES
Discharge teaching and paperwork provided regarding breast cyst and all questions/concerns answered. VSS. Given information regarding home care and reasons to follow up with ED or primary MD. Patient discharged to the care of self and ambulated out of the ED.

## 2021-11-20 NOTE — ED TRIAGE NOTES
Pt ambulated to triage with   Chief Complaint   Patient presents with   • Lump     a few years ago, had a lump under her right breast that was drained.  in the last 2 wks it has been getting bigger, there has been some oozing - possible hit it with her fingernail.        Pt Informed regarding triage process and verbalized understanding to inform triage tech or RN for any changes in condition. Placed in lobby.

## 2021-11-20 NOTE — ED PROVIDER NOTES
ED Provider Note    Scribed for Gracy Monterroso M.D. by Taylor Landaverde. 11/19/2021, 7:58 PM.    Primary care provider: HEMA Garcia  Means of arrival: Walk-in  History obtained from: Patient  History limited by: None    CHIEF COMPLAINT  Chief Complaint   Patient presents with   • Lump     a few years ago, had a lump under her right breast that was drained.  in the last 2 wks it has been getting bigger, there has been some oozing - possible hit it with her fingernail.         HPI  Carmen Hebert is a 26 y.o. female who presents to the Emergency Department for evaluation of a lump under her right breast. This has been present intermittently over several years, and it has been drained in the past. She has not had a mammogram or breast ultrasound for follow-up. Over the last two weeks it has increased in size and started having some drainage. No fevers or chills.     REVIEW OF SYSTEMS  Review of Systems   Constitutional: Negative for chills and fever.   Skin:        Positive for a lump under right breast   All other systems reviewed and are negative.    PAST MEDICAL HISTORY   has a past medical history of Anxiety, Depression, Fibromyalgia (2017), and Thalassemia minor.    SURGICAL HISTORY  patient denies any surgical history    SOCIAL HISTORY  Social History     Tobacco Use   • Smoking status: Never Smoker   • Smokeless tobacco: Never Used   Vaping Use   • Vaping Use: Never used   Substance Use Topics   • Alcohol use: Not Currently     Comment: occ   • Drug use: Not Currently     Types: Marijuana     Comment: rarely      Social History     Substance and Sexual Activity   Drug Use Not Currently   • Types: Marijuana    Comment: rarely       FAMILY HISTORY  Family History   Problem Relation Age of Onset   • Fibromyalgia Mother    • Lung Disease Mother         asthma r/o   • Depression Mother    • Anxiety disorder Mother    • ADD / ADHD Father    • Depression Father        CURRENT  "MEDICATIONS  Home Medications     Reviewed by Mariely Walsh R.N. (Registered Nurse) on 11/19/21 at 1900  Med List Status: Partial   Medication Last Dose Status   ALPRAZolam (XANAX) 0.25 MG Tab  Active   fluticasone (FLONASE) 50 MCG/ACT nasal spray  Active   mupirocin (BACTROBAN) 2 % Ointment  Active   Norethindrone Acet-Ethinyl Est (BELL 1.5/30) 1.5-30 MG-MCG Tab  Active   pregabalin (LYRICA) 150 MG Cap  Active   propranolol LA (INDERAL LA) 60 MG CAPSULE SR 24 HR  Active   sertraline (ZOLOFT) 100 MG Tab  Active                 ALLERGIES  Allergies   Allergen Reactions   • [place]-Related Products Hives, Itching, Myalgia and Nausea   • Dairy Food Allergy Diarrhea, Myalgia and Nausea   • Gluten Meal Cough, Diarrhea, Hives, Itching, Myalgia, Nausea, Rash, Runny Nose, Shortness of Breath and Vomiting   • Sesame Seed (Diagnostic) Hives, Rash and Itching     rash   • Soy Allergy Hives, Itching, Myalgia and Nausea       PHYSICAL EXAM  VITAL SIGNS: /72   Pulse 65   Temp 36.6 °C (97.8 °F) (Temporal)   Resp 16   Ht 1.778 m (5' 10\")   Wt 115 kg (253 lb 8.5 oz)   SpO2 96%   BMI 36.38 kg/m²   Vitals reviewed by myself.  Physical Exam  Nursing note and vitals reviewed.  Constitutional: Well-developed and well-nourished. No acute distress.   HENT: Head is normocephalic and atraumatic.  Eyes: extra-ocular movements intact  Cardiovascular: Regular rate and regular rhythm. No murmur heard.  Pulmonary/Chest: Effort normal.  Small 0.5 cm palpable cyst to the underside of right breat, no active discharge, no overlying erythema  Musculoskeletal: Extremities exhibit normal range of motion without edema or tenderness.   Neurological: Awake and alert  Skin: Skin is warm and dry. No rash.     DIAGNOSTIC STUDIES    RADIOLOGY  US-CHEST   Final Result      Right chest mass corresponds to a likely sebaceous or other benign cyst which does not have adjacent hyperemia to suggest superinfection. Follow-up to resolution is recommended " and if ambiguous, dedicated diagnostic breast workup at the [facility] is    recommended as this emergency equipment and technologist are not optimized for breast analysis        The radiologist's interpretation of all radiological studies have been reviewed by me.    REASSESSMENT    7:58 PM - Patient seen and examined at bedside. Discussed plan of care, including an ultrasound. Informed her that this appears to be a cyst and that if there's no signs of abscess on ultrasound it will not require drainage.  Patient agrees to the plan of care.     9:19 PM - Patient was reevaluated at bedside. Discussed radiology results with the patient and informed them that there is not an abscess that needs to be drained today. Informed her that she will need to follow up with the breast center for further evaluation. Discussed discharge instructions and return precautions with the patient and they were cleared for discharge. Patient was given the opportunity to ask any further questions. She is comfortable with discharge at this time.      COURSE & MEDICAL DECISION MAKING  Nursing notes, VS, PMSFHx reviewed in chart.    Patient is a 26-year-old female who comes in for evaluation of lump to the breast.  Differential diagnosis includes cyst, abscess, malignancy.    Patient's initial vitals are within normal limits.  On exam she appears to have a breast cyst with no overlying erythema, I have low suspicion for infection abscess, I will obtain ultrasound of the area to assess for abscess.  Ultrasound returns it is consistent with likely cyst, no evidence of infection.  Therefore patient will not be started on any antibiotics.  I advised her that we cannot rule out malignancy and she will likely need this cyst excised and pathology done and will need to follow-up with general surgery for this.  Patient is amenable to this plan.  She is given follow-up instructions and given strict return precautions.  Patient is then discharged in  stable condition.      The patient will return for new or worsening symptoms and is stable at the time of discharge.    The patient is referred to a primary physician for blood pressure management, diabetic screening, and for all other preventative health concerns.    DISPOSITION:  Patient will be discharged home in stable condition.    FOLLOW UP:  [facility]  [address]  [facility] [address]  [phone]        Radha Knight M.D.  [address]  [facility] [address]  [phone]      Please schedule follow-up with the surgeon to have the cyst addressed      FINAL IMPRESSION  1. Sebaceous cyst          Taylor HUERTA (Scribe), am scribing for, and in the presence of, Gracy Monterroso M.D..    Electronically signed by: Taylor Landaverde (Scribmatias), 11/19/2021    Gracy HUERTA M.D. personally performed the services described in this documentation, as scribed by Taylor Landaverde in my presence, and it is both accurate and complete.     The note accurately reflects work and decisions made by me.  Gracy Monterroso M.D.  11/20/2021  1:02 AM

## 2021-11-22 ENCOUNTER — TELEPHONE (OUTPATIENT)
Dept: MEDICAL GROUP | Facility: MEDICAL CENTER | Age: 26
End: 2021-11-22

## 2021-11-23 ENCOUNTER — APPOINTMENT (OUTPATIENT)
Dept: PHYSICAL THERAPY | Facility: MEDICAL CENTER | Age: 26
End: 2021-11-23
Attending: PHYSICAL MEDICINE & REHABILITATION
Payer: COMMERCIAL

## 2021-11-23 NOTE — TELEPHONE ENCOUNTER
VOICEMAIL  1. Caller Name: ANNITA MCNAMARA                      Call Back Number: [phone] (home)     2. Message: PT. LVM stating that she would like advise on whether she should go to UC or not. She states that she had a lump that was previously drained and thought it had filled again because there was some drainage. She requested a c/b    3. Patient approves office to leave a detailed voicemail/MyChart message: N\A.    Phone Number Called: ANNITA MCNAMARA    Call outcome: Spoke to patient regarding message below.    Message: Called patient back. She states that she called a nurse line and was advised to go to  where she was referred to ER. She requested if she needed to follow up with PCP. Notified pt. She would follow up with the two locations that were given to her at her ER visit. She understood and had no questions.

## 2021-11-24 ENCOUNTER — PATIENT MESSAGE (OUTPATIENT)
Dept: MEDICAL GROUP | Facility: MEDICAL CENTER | Age: 26
End: 2021-11-24

## 2021-11-24 DIAGNOSIS — N63.0 BREAST MASS: ICD-10-CM

## 2021-11-29 ENCOUNTER — PHYSICAL THERAPY (OUTPATIENT)
Dept: PHYSICAL THERAPY | Facility: MEDICAL CENTER | Age: 26
End: 2021-11-29
Attending: PHYSICAL MEDICINE & REHABILITATION
Payer: COMMERCIAL

## 2021-11-29 ENCOUNTER — PATIENT MESSAGE (OUTPATIENT)
Dept: MEDICAL GROUP | Facility: MEDICAL CENTER | Age: 26
End: 2021-11-29

## 2021-11-29 DIAGNOSIS — M79.7 FIBROMYALGIA: ICD-10-CM

## 2021-11-29 DIAGNOSIS — M54.2 CERVICALGIA: ICD-10-CM

## 2021-11-29 DIAGNOSIS — Z11.3 ROUTINE SCREENING FOR STI (SEXUALLY TRANSMITTED INFECTION): ICD-10-CM

## 2021-11-29 DIAGNOSIS — M54.6 THORACIC BACK PAIN, UNSPECIFIED BACK PAIN LATERALITY, UNSPECIFIED CHRONICITY: ICD-10-CM

## 2021-11-29 PROCEDURE — 97110 THERAPEUTIC EXERCISES: CPT

## 2021-11-29 PROCEDURE — 97112 NEUROMUSCULAR REEDUCATION: CPT

## 2021-11-29 PROCEDURE — 97014 ELECTRIC STIMULATION THERAPY: CPT

## 2021-11-29 NOTE — OP THERAPY DAILY TREATMENT
"  Outpatient Physical Therapy  DAILY TREATMENT     [facility] Outpatient Physical Therapy  [address]  [place] [address]  Phone:  [phone]  Fax:  [phone]    Date: 11/29/2021    Patient: Carmen Hebert  YOB: 1995  MRN: [id number]     Time Calculation    Start time: 1434  Stop time: 1530 Time Calculation (min): 56 minutes         Chief Complaint: Back Problem    Visit #: 9    SUBJECTIVE:  The patient reports that she has been doing pretty well but having a lot of other issues lately. Her knee is still problematic and she did find a lump which the ER ultra-sounded and feels it is a cyst but that needs to be dealt with. Just feels like a lot of things are going on at this time.     OBJECTIVE:  Current objective measures: good standing posture, good seated posture, good awareness of posture but consistently correcting posture due to being pulled forward.  WFL cervical ROM and pain free today  Fair postural strength  Large breast size which appears to pull patient forward.   Fair glute and core strength  Poor levator scap mobility today  Fair scap control                Therapeutic Exercises (CPT 06611):     1. NuStep, 5 mins, level 1    2. Supine cervical retractions, x20    3. UT stretch, 2x30 seconds each side    4. Lev scap stretch, 2x30 seconds each side    5. Seated thoracic stretch, x5 with 20 second hold    6. Foam roller, pec stretch x 40 seconds, supine angels x20, scap retractions x15, high row, x20 with 2# weights    10. Flashers, orange TB, x20    11. Toracic sidelying rotation, x15 each side, \"open book\"    12. TB rows, x20 with pink TB    13. Upper trunk rotations, 7# band, x15 each direction    14. Bridge, x10 with 10 second hold    15. PPT with TB pull apart, x20    16. PPT with March, x10 each side    17. Quad alt arm reach, x20 each side, not today, knee pain    18. Quad scap marches, x20 each side, not today, knee pain      " Therapeutic Exercise Summary: HEP: Patient provided handout (created in HEP2go), to be uploaded, exercises include:   Cervical retractions (supine), supine cervical AROM, supported cervical extension, thoracic stretch      Therapeutic Treatments and Modalities:     1. E Stim Unattended (CPT 98181), IFC with MHP to thoracic paraspinals    Time-based treatments/modalities:    Physical Therapy Timed Treatment Charges  Neuromusc re-ed, balance, coor, post minutes (CPT 91435): 20 minutes  Therapeutic exercise minutes (CPT 76403): 21 minutes      ASSESSMENT:   Response to treatment: the patient tolerated treatment well today, she was still limited by knee pain and unable to do any of the quad exercises today. She has made good progress in strength and tolerance to mobility but what she tolerates is really dependent on fibromyalgia symptoms/what else is going on. Big picture, she has made significant progress in posture, tolerance to exercise, and strength. She reports she feels stronger but still has the constant pull forward due to large weigh of chest which increases struggle with periods of back pain. Overall good progress but would still benefit from postural strengthening and mechanics to decreased cervical and thoracic pain.    PLAN/RECOMMENDATIONS:   Plan for treatment: therapy treatment to continue next visit.  Planned interventions for next visit: continue with current treatment.

## 2021-12-07 ENCOUNTER — HOSPITAL ENCOUNTER (OUTPATIENT)
Dept: RADIOLOGY | Facility: MEDICAL CENTER | Age: 26
End: 2021-12-07
Attending: NURSE PRACTITIONER
Payer: COMMERCIAL

## 2021-12-07 ENCOUNTER — HOSPITAL ENCOUNTER (OUTPATIENT)
Dept: LAB | Facility: MEDICAL CENTER | Age: 26
End: 2021-12-07
Attending: NURSE PRACTITIONER
Payer: COMMERCIAL

## 2021-12-07 DIAGNOSIS — D72.829 LEUKOCYTOSIS, UNSPECIFIED TYPE: ICD-10-CM

## 2021-12-07 DIAGNOSIS — Z11.3 ROUTINE SCREENING FOR STI (SEXUALLY TRANSMITTED INFECTION): ICD-10-CM

## 2021-12-07 DIAGNOSIS — N63.0 BREAST MASS: ICD-10-CM

## 2021-12-07 LAB
BASOPHILS # BLD AUTO: 0.8 % (ref 0–1.8)
BASOPHILS # BLD: 0.09 K/UL (ref 0–0.12)
EOSINOPHIL # BLD AUTO: 0.19 K/UL (ref 0–0.51)
EOSINOPHIL NFR BLD: 1.6 % (ref 0–6.9)
ERYTHROCYTE [DISTWIDTH] IN BLOOD BY AUTOMATED COUNT: 35.3 FL (ref 35.9–50)
HCT VFR BLD AUTO: 36.5 % (ref 37–47)
HCV AB SER QL: NORMAL
HGB BLD-MCNC: 11.7 G/DL (ref 12–16)
HIV 1+2 AB+HIV1 P24 AG SERPL QL IA: NORMAL
IMM GRANULOCYTES # BLD AUTO: 0.04 K/UL (ref 0–0.11)
IMM GRANULOCYTES NFR BLD AUTO: 0.3 % (ref 0–0.9)
LYMPHOCYTES # BLD AUTO: 2.24 K/UL (ref 1–4.8)
LYMPHOCYTES NFR BLD: 19.4 % (ref 22–41)
MCH RBC QN AUTO: 20.2 PG (ref 27–33)
MCHC RBC AUTO-ENTMCNC: 32.1 G/DL (ref 33.6–35)
MCV RBC AUTO: 62.9 FL (ref 81.4–97.8)
MONOCYTES # BLD AUTO: 0.65 K/UL (ref 0–0.85)
MONOCYTES NFR BLD AUTO: 5.6 % (ref 0–13.4)
NEUTROPHILS # BLD AUTO: 8.36 K/UL (ref 2–7.15)
NEUTROPHILS NFR BLD: 72.3 % (ref 44–72)
NRBC # BLD AUTO: 0 K/UL
NRBC BLD-RTO: 0 /100 WBC
PLATELET # BLD AUTO: 430 K/UL (ref 164–446)
PMV BLD AUTO: 11.4 FL (ref 9–12.9)
RBC # BLD AUTO: 5.8 M/UL (ref 4.2–5.4)
TREPONEMA PALLIDUM IGG+IGM AB [PRESENCE] IN SERUM OR PLASMA BY IMMUNOASSAY: NORMAL
WBC # BLD AUTO: 11.6 K/UL (ref 4.8–10.8)

## 2021-12-07 PROCEDURE — 85025 COMPLETE CBC W/AUTO DIFF WBC: CPT

## 2021-12-07 PROCEDURE — 87491 CHLMYD TRACH DNA AMP PROBE: CPT

## 2021-12-07 PROCEDURE — 86803 HEPATITIS C AB TEST: CPT

## 2021-12-07 PROCEDURE — 86694 HERPES SIMPLEX NES ANTBDY: CPT

## 2021-12-07 PROCEDURE — 86780 TREPONEMA PALLIDUM: CPT

## 2021-12-07 PROCEDURE — 76642 ULTRASOUND BREAST LIMITED: CPT | Mod: RT

## 2021-12-07 PROCEDURE — 87389 HIV-1 AG W/HIV-1&-2 AB AG IA: CPT

## 2021-12-07 PROCEDURE — 36415 COLL VENOUS BLD VENIPUNCTURE: CPT

## 2021-12-07 PROCEDURE — 87591 N.GONORRHOEAE DNA AMP PROB: CPT

## 2021-12-08 ENCOUNTER — APPOINTMENT (OUTPATIENT)
Dept: PHYSICAL THERAPY | Facility: MEDICAL CENTER | Age: 26
End: 2021-12-08
Attending: PHYSICAL MEDICINE & REHABILITATION
Payer: COMMERCIAL

## 2021-12-11 LAB — HSV1+2 IGG SER IA-ACNC: 0.41 IV

## 2021-12-15 ENCOUNTER — APPOINTMENT (OUTPATIENT)
Dept: PHYSICAL THERAPY | Facility: MEDICAL CENTER | Age: 26
End: 2021-12-15
Attending: PHYSICAL MEDICINE & REHABILITATION
Payer: COMMERCIAL

## 2021-12-16 DIAGNOSIS — M79.7 FIBROMYALGIA: ICD-10-CM

## 2021-12-16 RX ORDER — PREGABALIN 150 MG/1
150 CAPSULE ORAL
Qty: 90 CAPSULE | Refills: 0 | Status: SHIPPED | OUTPATIENT
Start: 2021-12-16 | End: 2022-01-14

## 2021-12-17 ENCOUNTER — TELEPHONE (OUTPATIENT)
Dept: PHYSICAL MEDICINE AND REHAB | Facility: MEDICAL CENTER | Age: 26
End: 2021-12-17

## 2021-12-18 NOTE — TELEPHONE ENCOUNTER
I called patient to let her to know her PA for Pregabalin was sent today and will take between to 48 to 72 hrs to get response and I also mentioned if she don't have any response call to the office next week to follow up.

## 2021-12-19 DIAGNOSIS — M79.7 FIBROMYALGIA: ICD-10-CM

## 2021-12-20 DIAGNOSIS — D72.829 LEUKOCYTOSIS, UNSPECIFIED TYPE: ICD-10-CM

## 2021-12-20 DIAGNOSIS — D56.9 THALASSEMIA, UNSPECIFIED TYPE: ICD-10-CM

## 2021-12-20 RX ORDER — SERTRALINE HYDROCHLORIDE 100 MG/1
TABLET, FILM COATED ORAL
Qty: 45 TABLET | Refills: 11 | OUTPATIENT
Start: 2021-12-20

## 2021-12-20 RX ORDER — SERTRALINE HYDROCHLORIDE 100 MG/1
TABLET, FILM COATED ORAL
Qty: 45 TABLET | Refills: 11 | Status: SHIPPED | OUTPATIENT
Start: 2021-12-20 | End: 2022-12-26 | Stop reason: SDUPTHER

## 2021-12-21 NOTE — PROGRESS NOTES
Patient with a history of thalassemia has a 6-month history of elevated white blood cells referral to hematology placed.

## 2021-12-21 NOTE — RESULT ENCOUNTER NOTE
Darek Morales-I reviewed your lab results.  You are negative for herpes, chlamydia, gonorrhea, syphilis, HIV, and hepatitis C.  Your white blood cell count remains a little bit abnormal.  This is been going on for long enough that I think it would be a good idea to get you in with hematology-the blood specialist.  It is not wildly elevated, but I think it would be a good idea to get in with him.  Your thalassemia should not impact your white blood cells.  I have placed a referral to hematology for you.  If you do not hear from [facility]'s referral department within 7 days either through phone call or [facility] message, please contact them at [phone] for more information.  Let me know if you have any questions.Mariel

## 2021-12-27 ENCOUNTER — TELEPHONE (OUTPATIENT)
Dept: PHYSICAL MEDICINE AND REHAB | Facility: MEDICAL CENTER | Age: 26
End: 2021-12-27

## 2021-12-27 NOTE — TELEPHONE ENCOUNTER
LVM in regards to her PA for her Lyrica 150 mg.    Auth# [id number]  12/23/21-12/23/2022.    Thank you

## 2022-01-14 DIAGNOSIS — M79.7 FIBROMYALGIA: ICD-10-CM

## 2022-01-14 RX ORDER — PREGABALIN 150 MG/1
CAPSULE ORAL
Qty: 90 CAPSULE | Refills: 1 | Status: SHIPPED | OUTPATIENT
Start: 2022-01-14 | End: 2022-02-13

## 2022-01-28 ENCOUNTER — HOSPITAL ENCOUNTER (OUTPATIENT)
Dept: LAB | Facility: MEDICAL CENTER | Age: 27
End: 2022-01-28
Attending: NURSE PRACTITIONER
Payer: COMMERCIAL

## 2022-01-28 DIAGNOSIS — D72.829 LEUKOCYTOSIS, UNSPECIFIED TYPE: ICD-10-CM

## 2022-01-28 LAB
BASOPHILS # BLD AUTO: 0.8 % (ref 0–1.8)
BASOPHILS # BLD: 0.11 K/UL (ref 0–0.12)
EOSINOPHIL # BLD AUTO: 0.25 K/UL (ref 0–0.51)
EOSINOPHIL NFR BLD: 1.9 % (ref 0–6.9)
ERYTHROCYTE [DISTWIDTH] IN BLOOD BY AUTOMATED COUNT: 36.3 FL (ref 35.9–50)
HCT VFR BLD AUTO: 39.2 % (ref 37–47)
HGB BLD-MCNC: 12.1 G/DL (ref 12–16)
IMM GRANULOCYTES # BLD AUTO: 0.04 K/UL (ref 0–0.11)
IMM GRANULOCYTES NFR BLD AUTO: 0.3 % (ref 0–0.9)
LYMPHOCYTES # BLD AUTO: 2.84 K/UL (ref 1–4.8)
LYMPHOCYTES NFR BLD: 21.8 % (ref 22–41)
MCH RBC QN AUTO: 19.4 PG (ref 27–33)
MCHC RBC AUTO-ENTMCNC: 30.9 G/DL (ref 33.6–35)
MCV RBC AUTO: 62.9 FL (ref 81.4–97.8)
MONOCYTES # BLD AUTO: 0.75 K/UL (ref 0–0.85)
MONOCYTES NFR BLD AUTO: 5.8 % (ref 0–13.4)
NEUTROPHILS # BLD AUTO: 9.05 K/UL (ref 2–7.15)
NEUTROPHILS NFR BLD: 69.4 % (ref 44–72)
NRBC # BLD AUTO: 0 K/UL
NRBC BLD-RTO: 0 /100 WBC
PLATELET # BLD AUTO: 474 K/UL (ref 164–446)
PMV BLD AUTO: 11 FL (ref 9–12.9)
RBC # BLD AUTO: 6.23 M/UL (ref 4.2–5.4)
WBC # BLD AUTO: 13 K/UL (ref 4.8–10.8)

## 2022-01-28 PROCEDURE — 36415 COLL VENOUS BLD VENIPUNCTURE: CPT

## 2022-01-28 PROCEDURE — 85025 COMPLETE CBC W/AUTO DIFF WBC: CPT

## 2022-02-07 NOTE — RESULT ENCOUNTER NOTE
Darek Morales-Your blood count is still a little bit abnormal.  I see that you are going to see Dr. Milan with hematology next week-I think that is great.  I will keep an eye out for his recommendations.Mariel

## 2022-02-08 NOTE — PROGRESS NOTES
02/14/22    Subjective    Chief Complaint:  Elevated WBC    HPI:  27 female referred for consultation by HEMA Fraire for persistent leukocytosis plus a history of thalassemia minor. She does have a microcytosis. The leukocytosis is consistently reported as mature neutrophils. She does carry a diagnosis of fibromyalgia and there is also a gyn note concerning chronic vaginal discharge. Vaginal discharge was apparently not infectious. Tired all the time.     ROS:    Constitutional: No weight loss  Skin: No rash or jaundice  HENT: No change in eyesight or hearing  Cardiovascular:No chest pain or arrythmia  Respiratory:No cough or SOB  GI:No nausea, vomiting, diarrhea, constipation  :No dysuria or frequency  Musculoskeletal:Multiple joint pains  Neuro:No sx's of neuropathy  Psych: No complaints    PMH:      Allergies   Allergen Reactions   • [place]-Related Products Hives, Itching, Myalgia and Nausea   • Dairy Food Allergy Diarrhea, Myalgia and Nausea   • Gluten Meal Cough, Diarrhea, Hives, Itching, Myalgia, Nausea, Rash, Runny Nose, Shortness of Breath and Vomiting   • Sesame Seed (Diagnostic) Hives, Rash and Itching     rash   • Soy Allergy Hives, Itching, Myalgia and Nausea       Past Medical History:   Diagnosis Date   • Anxiety    • Depression    • Fibromyalgia 2017   • Thalassemia minor         History reviewed. No pertinent surgical history.     Medications:    Current Outpatient Medications on File Prior to Visit   Medication Sig Dispense Refill   • tizanidine (ZANAFLEX) 4 MG Tab tizanidine 4 mg tablet     • sertraline (ZOLOFT) 100 MG Tab TAKE 1 AND 1/2 TABLETS BY MOUTH ONCE DAILY (150 MG) 45 Tablet 11   • ALPRAZolam (XANAX) 0.25 MG Tab Take 0.25 mg by mouth at bedtime as needed for Sleep.     • Norethindrone Acet-Ethinyl Est (BELL 1.5/30) 1.5-30 MG-MCG Tab Take 1 Tablet by mouth every day. Take active pills continuously, no placebo week. 84 Tablet 4   • fluticasone (FLONASE) 50 MCG/ACT nasal spray  "Administer 2 Sprays into affected nostril(S) every day. 16 g 11   • mupirocin (BACTROBAN) 2 % Ointment Apply 1 Application topically 2 times a day. 22 g 0   • propranolol LA (INDERAL LA) 60 MG CAPSULE SR 24 HR Take 1 capsule by mouth every day. 30 capsule 11     No current facility-administered medications on file prior to visit.       Social History     Tobacco Use   • Smoking status: Never Smoker   • Smokeless tobacco: Never Used   Substance Use Topics   • Alcohol use: Not Currently     Comment: occ        Family History   Problem Relation Age of Onset   • Fibromyalgia Mother    • Lung Disease Mother         asthma r/o   • Depression Mother    • Anxiety disorder Mother    • ADD / ADHD Father    • Depression Father         Objective    Vitals:    /68 (BP Location: Right arm, Patient Position: Sitting, BP Cuff Size: Adult)   Pulse 73   Temp 36.7 °C (98 °F) (Temporal)   Resp 16   Ht 1.778 m (5' 10\")   Wt 118 kg (260 lb 4.1 oz)   SpO2 95%   BMI 37.34 kg/m²     Physical Exam:    Appears well-developed and well-nourished. No distress.    Head -  Normocephalic .   Eyes - Pupils are equal.. Conjunctivae normal. No scleral icterus.   Ears - normal hearing  Neck - Normal range of motion. Neck supple. No thyromegaly  Cardiovascular - Normal rate, regular rhythm, normal heart sounds and intact distal pulses. No  gallop, murmur or rub  Pulmonary - Normal breath sounds.  No wheeze, rales or rhonci  Breast - large andsymmetrical. No mass on indentation. s/p recent bx right inferior breast  Abdominal -Soft. No distension, tenderness, organomegaly or mass  Extremities-  No edema or tenderness.    Nodes - No submental, submandibular, preauricular, cervical, axillary or inguinal adenopathy.    Neurological -   Alert and oriented to person  Skin - Skin is warm and dry. No rash noted. Not diaphoretic. No erythema. Some pallor. No jaundice   Psychiatric -  Normal mood and affect.    Labs:    Results for ANNITA LOGAN " BIANKA (MRN [id number])    Ref. Range 4/15/2021 09:46 6/8/2021 17:21 7/13/2021 08:29 12/7/2021 09:32 1/28/2022 09:09   WBC Latest Ref Range: 4.8 - 10.8 K/uL 11.8 (H) 13.3 (H) 13.2 (H) 11.6 (H) 13.0 (H)   RBC Latest Ref Range: 4.20 - 5.40 M/uL 5.92 (H) 5.86 (H) 6.15 (H) 5.80 (H) 6.23 (H)   Hemoglobin Latest Ref Range: 12.0 - 16.0 g/dL 11.5 (L) 11.3 (L) 12.2 11.7 (L) 12.1   Hematocrit Latest Ref Range: 37.0 - 47.0 % 37.9 37.9 41.0 36.5 (L) 39.2   MCV Latest Ref Range: 81.4 - 97.8 fL 64.0 (L) 64.7 (L) 66.7 (L) 62.9 (L) 62.9 (L)   MCH Latest Ref Range: 27.0 - 33.0 pg 19.4 (L) 19.3 (L) 19.8 (L) 20.2 (L) 19.4 (L)   MCHC Latest Ref Range: 33.6 - 35.0 g/dL 30.3 (L) 29.8 (L) 29.8 (L) 32.1 (L) 30.9 (L)   RDW Latest Ref Range: 35.9 - 50.0 fL 35.8 (L) 37.0 39.4 35.3 (L) 36.3   Platelet Count Latest Ref Range: 164 - 446 K/uL 364 379 396 430 474 (H)   MPV Latest Ref Range: 9.0 - 12.9 fL 10.9 11.7 12.0 11.4 11.0   Neutrophils-Polys Latest Ref Range: 44.00 - 72.00 % 73.30 (H)  71.90 72.30 (H) 69.40   Neutrophils (Absolute) Latest Ref Range: 2.00 - 7.15 K/uL 8.68 (H)  9.52 (H) 8.36 (H) 9.05 (H)   Lymphocytes Latest Ref Range: 22.00 - 41.00 % 18.10 (L)  19.70 (L) 19.40 (L) 21.80 (L)     Assessment  Not a primary hematologic disorder  Imp:    Visit Diagnosis:    1. Thalassemia minor     2. Leukocytosis, unspecified type  Sed Rate    YAZ REFLEXIVE PROFILE    RHEUMATOID ARTHRITIS FACTOR   3. Microcytosis  FERRITIN    IRON/TOTAL IRON BIND     Plan:  Above lab  Virtual in 2 weeks    Sohan Milan M.D.

## 2022-02-09 ENCOUNTER — HOSPITAL ENCOUNTER (OUTPATIENT)
Facility: MEDICAL CENTER | Age: 27
End: 2022-02-09
Attending: SURGERY
Payer: COMMERCIAL

## 2022-02-09 PROCEDURE — 88304 TISSUE EXAM BY PATHOLOGIST: CPT

## 2022-02-10 LAB — PATHOLOGY CONSULT NOTE: NORMAL

## 2022-02-10 PROCEDURE — 88304 TISSUE EXAM BY PATHOLOGIST: CPT

## 2022-02-14 ENCOUNTER — HOSPITAL ENCOUNTER (OUTPATIENT)
Dept: LAB | Facility: MEDICAL CENTER | Age: 27
End: 2022-02-14
Attending: INTERNAL MEDICINE
Payer: COMMERCIAL

## 2022-02-14 ENCOUNTER — OFFICE VISIT (OUTPATIENT)
Dept: HEMATOLOGY ONCOLOGY | Facility: MEDICAL CENTER | Age: 27
End: 2022-02-14
Payer: COMMERCIAL

## 2022-02-14 VITALS
RESPIRATION RATE: 16 BRPM | SYSTOLIC BLOOD PRESSURE: 112 MMHG | BODY MASS INDEX: 37.26 KG/M2 | TEMPERATURE: 98 F | HEART RATE: 73 BPM | WEIGHT: 260.25 LBS | HEIGHT: 70 IN | OXYGEN SATURATION: 95 % | DIASTOLIC BLOOD PRESSURE: 68 MMHG

## 2022-02-14 DIAGNOSIS — R71.8 MICROCYTOSIS: ICD-10-CM

## 2022-02-14 DIAGNOSIS — D72.829 LEUKOCYTOSIS, UNSPECIFIED TYPE: ICD-10-CM

## 2022-02-14 DIAGNOSIS — D56.3 THALASSEMIA MINOR: ICD-10-CM

## 2022-02-14 LAB
ERYTHROCYTE [SEDIMENTATION RATE] IN BLOOD BY WESTERGREN METHOD: 10 MM/HOUR (ref 0–25)
FERRITIN SERPL-MCNC: 69.3 NG/ML (ref 10–291)
IRON SATN MFR SERPL: 16 % (ref 15–55)
IRON SERPL-MCNC: 55 UG/DL (ref 40–170)
RHEUMATOID FACT SER IA-ACNC: <10 IU/ML (ref 0–14)
TIBC SERPL-MCNC: 344 UG/DL (ref 250–450)
UIBC SERPL-MCNC: 289 UG/DL (ref 110–370)

## 2022-02-14 PROCEDURE — 82728 ASSAY OF FERRITIN: CPT

## 2022-02-14 PROCEDURE — 99204 OFFICE O/P NEW MOD 45 MIN: CPT | Performed by: INTERNAL MEDICINE

## 2022-02-14 PROCEDURE — 83540 ASSAY OF IRON: CPT

## 2022-02-14 PROCEDURE — 83550 IRON BINDING TEST: CPT

## 2022-02-14 PROCEDURE — 36415 COLL VENOUS BLD VENIPUNCTURE: CPT

## 2022-02-14 PROCEDURE — 86038 ANTINUCLEAR ANTIBODIES: CPT

## 2022-02-14 PROCEDURE — 85652 RBC SED RATE AUTOMATED: CPT

## 2022-02-14 PROCEDURE — 86431 RHEUMATOID FACTOR QUANT: CPT

## 2022-02-14 RX ORDER — TIZANIDINE 4 MG/1
TABLET ORAL
COMMUNITY
End: 2023-03-14 | Stop reason: SDUPTHER

## 2022-02-14 ASSESSMENT — FIBROSIS 4 INDEX: FIB4 SCORE: 0.27

## 2022-02-14 ASSESSMENT — PATIENT HEALTH QUESTIONNAIRE - PHQ9
CLINICAL INTERPRETATION OF PHQ2 SCORE: 4
SUM OF ALL RESPONSES TO PHQ QUESTIONS 1-9: 10
5. POOR APPETITE OR OVEREATING: 1 - SEVERAL DAYS

## 2022-02-14 ASSESSMENT — PAIN SCALES - GENERAL: PAINLEVEL: 5=MODERATE PAIN

## 2022-02-16 LAB — NUCLEAR IGG SER QL IA: NORMAL

## 2022-02-18 NOTE — PROGRESS NOTES
This evaluation was conducted via Zoom using secure and encrypted videoconferencing technology. The patient was in their home in the state [facility].    The patient's identity was confirmed and verbal consent was obtained for this virtual visit.    02/28/22    Subjective    Chief Complaint:  Follow up from consultation for neutrophilia    HPI:  27 female with thalassemia minor seen in consultation 2/14/22 for persistently elevated WBC. She does suffer from fibromyalgia. Because of microcytosis we did do iron studies despite dx of thal minor and they came out low normal. She does not have menses (BC pills). ESR was 10 and rheumatoid factor negative. Etiology of the neutrophilia is not apparent but does not appear to be a primary hematologic issue.     ROS:    Constitutional: No weight loss  Skin: No rash or jaundice  HENT: No change in eyesight or hearing  Cardiovascular:No chest pain or arrythmia  Respiratory:No cough or SOB  GI:No nausea, vomiting, diarrhea, constipation  :No dysuria or frequency  Musculoskeletal:No bone or joint pain  Neuro:No sx's of neuropathy  Psych: No complaints    PMH:      Allergies   Allergen Reactions   • [place]-Related Products Hives, Itching, Myalgia and Nausea   • Dairy Food Allergy Diarrhea, Myalgia and Nausea   • Gluten Meal Cough, Diarrhea, Hives, Itching, Myalgia, Nausea, Rash, Runny Nose, Shortness of Breath and Vomiting   • Sesame Seed (Diagnostic) Hives, Rash and Itching     rash   • Soy Allergy Hives, Itching, Myalgia and Nausea       Past Medical History:   Diagnosis Date   • Anxiety    • Depression    • Fibromyalgia 2017   • Thalassemia minor         No past surgical history on file.     Medications:    Current Outpatient Medications on File Prior to Visit   Medication Sig Dispense Refill   • tizanidine (ZANAFLEX) 4 MG Tab tizanidine 4 mg tablet     • sertraline (ZOLOFT) 100 MG Tab TAKE 1 AND 1/2 TABLETS BY MOUTH ONCE DAILY (150 MG) 45 Tablet 11   • ALPRAZolam (XANAX) 0.25 MG  Tab Take 0.25 mg by mouth at bedtime as needed for Sleep.     • Norethindrone Acet-Ethinyl Est (BELL 1.5/30) 1.5-30 MG-MCG Tab Take 1 Tablet by mouth every day. Take active pills continuously, no placebo week. 84 Tablet 4   • fluticasone (FLONASE) 50 MCG/ACT nasal spray Administer 2 Sprays into affected nostril(S) every day. 16 g 11   • mupirocin (BACTROBAN) 2 % Ointment Apply 1 Application topically 2 times a day. 22 g 0   • propranolol LA (INDERAL LA) 60 MG CAPSULE SR 24 HR Take 1 capsule by mouth every day. 30 capsule 11     No current facility-administered medications on file prior to visit.       Social History     Tobacco Use   • Smoking status: Never Smoker   • Smokeless tobacco: Never Used   Substance Use Topics   • Alcohol use: Not Currently     Comment: occ        Family History   Problem Relation Age of Onset   • Fibromyalgia Mother    • Lung Disease Mother         asthma r/o   • Depression Mother    • Anxiety disorder Mother    • ADD / ADHD Father    • Depression Father         Objective    Vitals:    There were no vitals taken for this visit.    Physical Exam:    Appears well-developed and well-nourished. No distress.    Head -  Normocephalic .   Eyes - Pupils are equal. Conjunctivae  normal. No scleral icterus.   Ears - normal hearing    Neurological -   Alert and oriented  Skin - No rash noted. Not diaphoretic. No erythema. No pallor. No jaundice   Psychiatric -  Normal mood and affect.    Labs:    Results for ANNITA LOGAN (MRN [id number])    Ref. Range 2/14/2022 16:30   Sed Rate Westergren Latest Ref Range: 0 - 25 mm/hour 10   Results for ANNITA LOGAN (MRN [id number])    Ref. Range 2/14/2022 16:30   Iron Latest Ref Range: 40 - 170 ug/dL 55   Total Iron Binding Latest Ref Range: 250 - 450 ug/dL 344   % Saturation Latest Ref Range: 15 - 55 % 16   Results for ANNITA LOGAN (MRN [id number])    Ref. Range 2/14/2022 16:30   Ferritin Latest Ref Range: 10.0 - 291.0  ng/mL 69.3     Assessment    Imp:    Visit Diagnosis:    1. Thalassemia minor     2. Leukocytosis, unspecified type     3. Microcytosis       Plan:    Primary MD should recheck iron studies in 3 to 6 months - if go lower would need GI work-up  RTC prn      Sohan Milan M.D.

## 2022-02-28 ENCOUNTER — TELEMEDICINE (OUTPATIENT)
Dept: HEMATOLOGY ONCOLOGY | Facility: MEDICAL CENTER | Age: 27
End: 2022-02-28
Payer: COMMERCIAL

## 2022-02-28 DIAGNOSIS — R71.8 MICROCYTOSIS: ICD-10-CM

## 2022-02-28 DIAGNOSIS — D72.829 LEUKOCYTOSIS, UNSPECIFIED TYPE: ICD-10-CM

## 2022-02-28 DIAGNOSIS — D56.3 THALASSEMIA MINOR: ICD-10-CM

## 2022-02-28 PROCEDURE — 99213 OFFICE O/P EST LOW 20 MIN: CPT | Mod: GT | Performed by: INTERNAL MEDICINE

## 2022-02-28 RX ORDER — PREGABALIN 150 MG/1
CAPSULE ORAL
COMMUNITY
Start: 2015-04-28 | End: 2022-03-18

## 2022-03-08 DIAGNOSIS — R00.2 PALPITATIONS: ICD-10-CM

## 2022-03-09 ENCOUNTER — TELEPHONE (OUTPATIENT)
Dept: CARDIOLOGY | Facility: MEDICAL CENTER | Age: 27
End: 2022-03-09
Payer: COMMERCIAL

## 2022-03-09 ENCOUNTER — TELEPHONE (OUTPATIENT)
Dept: PHYSICAL THERAPY | Facility: MEDICAL CENTER | Age: 27
End: 2022-03-09
Payer: COMMERCIAL

## 2022-03-09 RX ORDER — PROPRANOLOL HCL 60 MG
CAPSULE, EXTENDED RELEASE 24HR ORAL
Qty: 90 CAPSULE | Refills: 0 | Status: SHIPPED | OUTPATIENT
Start: 2022-03-09 | End: 2022-06-07

## 2022-03-09 NOTE — TELEPHONE ENCOUNTER
----- Message from Susanne Osman, Med Ass't sent at 3/8/2022  3:09 PM PST -----  Regarding: Need Follow Appointment  Please contact patient to schedule follow up appointment. Thank you

## 2022-03-09 NOTE — OP THERAPY DISCHARGE SUMMARY
Outpatient Physical Therapy  DISCHARGE SUMMARY NOTE      [facility] Outpatient Physical Therapy  [address]  [place] [address]  Phone:  [phone]  Fax:  [phone]    Date of Visit: 03/09/2022    Patient: Carmen Hebert  YOB: 1995  MRN: [id number]     Referring Provider: No referring provider defined for this encounter.   Referring Diagnosis No admission diagnoses are documented for this encounter.         Functional Assessment Used        Your patient is being discharged from Physical Therapy with the following comments:   · Patient has failed to schedule or reschedule follow-up visits    Comments:  The patient was seen for 9 visits between 9/10/2021 and 11/29/2021 but unfortunately did not return for any further PT appointments. The patient was making good progress but was limited by co morbidities and increased back pain related to forward pull of chest.  Per policy, will DC at this time.     Limitations Remaining:      Recommendations:  Pt has not been seen in  >30 days. Pt has failed to schedule additional follow ups. Per policy, pt will need to be seen by PCP or acquire another referral prior to initiating skilled physical therapy. Pt is being discharged at this time.        Gracy Osei, PT, DPT    Date: 3/9/2022

## 2022-03-25 ENCOUNTER — OFFICE VISIT (OUTPATIENT)
Dept: PHYSICAL MEDICINE AND REHAB | Facility: MEDICAL CENTER | Age: 27
End: 2022-03-25
Payer: COMMERCIAL

## 2022-03-25 VITALS
BODY MASS INDEX: 37.08 KG/M2 | HEIGHT: 70 IN | TEMPERATURE: 98.5 F | HEART RATE: 102 BPM | DIASTOLIC BLOOD PRESSURE: 64 MMHG | OXYGEN SATURATION: 94 % | SYSTOLIC BLOOD PRESSURE: 110 MMHG | WEIGHT: 259.04 LBS

## 2022-03-25 DIAGNOSIS — M79.7 FIBROMYALGIA: ICD-10-CM

## 2022-03-25 DIAGNOSIS — I47.11 INAPPROPRIATE SINUS TACHYCARDIA (HCC): ICD-10-CM

## 2022-03-25 DIAGNOSIS — F32.A DEPRESSION, UNSPECIFIED DEPRESSION TYPE: ICD-10-CM

## 2022-03-25 DIAGNOSIS — S93.491A SPRAIN OF ANTERIOR TALOFIBULAR LIGAMENT OF RIGHT ANKLE, INITIAL ENCOUNTER: ICD-10-CM

## 2022-03-25 PROCEDURE — 99214 OFFICE O/P EST MOD 30 MIN: CPT | Performed by: PHYSICAL MEDICINE & REHABILITATION

## 2022-03-25 RX ORDER — PREGABALIN 150 MG/1
CAPSULE ORAL
Qty: 90 CAPSULE | Refills: 2 | Status: SHIPPED | OUTPATIENT
Start: 2022-04-24 | End: 2022-05-24

## 2022-03-25 ASSESSMENT — FIBROSIS 4 INDEX: FIB4 SCORE: 0.27

## 2022-03-25 ASSESSMENT — PATIENT HEALTH QUESTIONNAIRE - PHQ9
SUM OF ALL RESPONSES TO PHQ QUESTIONS 1-9: 11
CLINICAL INTERPRETATION OF PHQ2 SCORE: 4
5. POOR APPETITE OR OVEREATING: 1 - SEVERAL DAYS

## 2022-03-25 ASSESSMENT — PAIN SCALES - GENERAL: PAINLEVEL: 5=MODERATE PAIN

## 2022-03-25 NOTE — PROGRESS NOTES
Follow-up patient note    Physiatry (physical medicine and  Rehabilitation), interventional spine and sports medicine    Date of Service: March 25, 2022    Chief complaint:   Chief Complaint   Patient presents with   • Follow-Up     Hip and knees pain       HISTORY    HPI: Carmen Hebert 27 y.o. female who presents today for follow-up evaluation of pain complaints.  She has a diagnosis of fibromyalgia.      Carmen returns and reports that she has been doing fairly well overall with regard to her pain.  Symptoms been relatively stable although she does intermittently have flares of pain.  Her pain today is 5 out of 10.  She had an episode of pain all over on Saturday and had difficulty getting out of bed due to pain.  This then gradually resolved.  She has been having more difficulty with her sinuses due to seasonal allergies.    She has been to physical therapy and is trying to stay active.  Her work is been keeping her quite busy.  Lyrica 150 mg p.o. 3 times daily does help with symptoms.  She has occasionally taken tizanidine at night and finds this can be helpful.    She has been having ongoing aching pain in her back and hips and knees.  And new pain that she reports is that she woke up and had pain in her  Right ankle.  She does not report any particular injury.  This is been painful with weightbearing today.  She has not been able to rest the ankle and has not taken any anti-inflammatories or use ice.      Medical records review:  I reviewed the note from the referring provider Mariel Paula A.P.* dated 03/10/2020.  The patient was seen to establish care.  Report that she was diagnosed in 2017 with fibromyalgia, in [facility].  Labs were tested including TSH, HbA1C, vitamin D, 25 hydroxy, HIV ag/ab, T. Pallidum, chlamydia.  She was referred to cardiology for evaluation of palpitations with Holter monitor.  Medications refilled included zoloft 100mg, lyrica 75mg daily, cyclobenzaprine 5mg.   Referral to physiatry placed    Previous treatments:    Physical Therapy: Yes    Medications the patient is tried: lyrica, cyclobenzeprine, amitriptyline, tried savella    Previous interventions: none    Previous surgeries to relieve the above pain:  none    PHQ-9: 2    ROS:   Gen: nausea, history of COVID  Eyes: intermittent blurry vision  CV: anxiety, palpitation (inappropriate sinus tachycardia)  Psych: depression  Red Flags ROS:   Fever, Chills, Sweats: Denies  Involuntary Weight Loss: Denies  Bladder Incontinence: Denies  Bowel Incontinence: Denies  Saddle Anesthesia: Denies    All other systems reviewed and negative.       PMHx:   Past Medical History:   Diagnosis Date   • Anxiety    • Depression    • Fibromyalgia 2017   • Thalassemia minor        PSHx:   History reviewed. No pertinent surgical history.    Family history   Family History   Problem Relation Age of Onset   • Fibromyalgia Mother    • Lung Disease Mother         asthma r/o   • Depression Mother    • Anxiety disorder Mother    • ADD / ADHD Father    • Depression Father          Medications:   Current Outpatient Medications   Medication   • [START ON 4/24/2022] pregabalin (LYRICA) 150 MG Cap   • propranolol LA (INDERAL LA) 60 MG CAPSULE SR 24 HR   • tizanidine (ZANAFLEX) 4 MG Tab   • sertraline (ZOLOFT) 100 MG Tab   • ALPRAZolam (XANAX) 0.25 MG Tab   • Norethindrone Acet-Ethinyl Est (BELL 1.5/30) 1.5-30 MG-MCG Tab   • fluticasone (FLONASE) 50 MCG/ACT nasal spray   • mupirocin (BACTROBAN) 2 % Ointment     No current facility-administered medications for this visit.       Allergies:   Allergies   Allergen Reactions   • [place]-Related Products Hives, Itching, Myalgia and Nausea   • Dairy Food Allergy Diarrhea, Myalgia and Nausea   • Gluten Meal Cough, Diarrhea, Hives, Itching, Myalgia, Nausea, Rash, Runny Nose, Shortness of Breath and Vomiting   • Sesame Seed (Diagnostic) Hives, Rash and Itching     rash   • Soy Allergy Hives, Itching, Myalgia and  "Nausea       Social Hx:   Social History     Socioeconomic History   • Marital status: Single     Spouse name: Not on file   • Number of children: Not on file   • Years of education: Not on file   • Highest education level: Not on file   Occupational History   • Not on file   Tobacco Use   • Smoking status: Never Smoker   • Smokeless tobacco: Never Used   Vaping Use   • Vaping Use: Never used   Substance and Sexual Activity   • Alcohol use: Not Currently     Comment: occ   • Drug use: Not Currently     Types: Marijuana     Comment: rarely   • Sexual activity: Yes     Partners: Male     Birth control/protection: Condom   Other Topics Concern   •  Service No   • Blood Transfusions No   • Caffeine Concern No   • Occupational Exposure No   • Hobby Hazards No   • Sleep Concern Yes   • Stress Concern Yes   • Weight Concern Yes   • Special Diet Yes   • Back Care No   • Exercise No   • Bike Helmet Yes   • Seat Belt Yes   • Self-Exams Yes   Social History Narrative   • Not on file     Social Determinants of Health     Financial Resource Strain: Not on file   Food Insecurity: Not on file   Transportation Needs: Not on file   Physical Activity: Not on file   Stress: Not on file   Social Connections: Not on file   Intimate Partner Violence: Not on file   Housing Stability: Not on file         EXAMINATION     Physical Exam:   Vitals: /64 (BP Location: Right arm, Patient Position: Sitting, BP Cuff Size: Adult long)   Pulse (!) 102   Temp 36.9 °C (98.5 °F) (Temporal)   Ht 1.778 m (5' 10\")   Wt 118 kg (259 lb 0.7 oz)   SpO2 94%     Constitutional:   Body Habitus: Body mass index is 37.17 kg/m².  Cooperation: Fully cooperates with exam  Appearance: Well-groomed, well-nourished, not disheveled, in no acute distress    Eyes: No scleral icterus, no proptosis     ENT -no obvious auditory deficits, wearing a face mask    Skin -no rashes or lesions noted     Respiratory-  breathing comfortable on room air, no audible " wheezing    Cardiovascular- no lower extremity edema is noted.     Psychiatric- alert and oriented ×3. Normal affect.     Gait - normal gait, no use of ambulatory device, mildly antalgic.     Musculoskeletal -     Thoracic/Lumbar Spine/Sacral Spine/Hips   Inspection: No evidence of atrophy in bilateral lower extremities throughout     No focal motor or sensory deficits in the lower extremities    Reflexes are 2+ biceps, triceps, patella and achilles bilaterally    Right ankle without significant edema.  There is some tenderness over the anterior talofibular ligament.  No pain with range of motion of the ankle passive or resisted plantarflexion inversion or eversion.    MEDICAL DECISION MAKING    Medical records review: see under HPI section.     DATA    Labs:   Vitamin D, 25 hydroxy 03/10/2020 : 38  TSH: 2.610 on 03/10/2020  Lab Results   Component Value Date/Time    SODIUM 136 04/15/2021 09:46 AM    POTASSIUM 4.5 04/15/2021 09:46 AM    CHLORIDE 103 04/15/2021 09:46 AM    CO2 25 04/15/2021 09:46 AM    ANION 8.0 04/15/2021 09:46 AM    GLUCOSE 97 04/15/2021 09:46 AM    BUN 10 04/15/2021 09:46 AM    CREATININE 0.74 04/15/2021 09:46 AM    CALCIUM 9.2 04/15/2021 09:46 AM    ASTSGOT 24 04/15/2021 09:46 AM    ALTSGPT 29 04/15/2021 09:46 AM    TBILIRUBIN 0.2 04/15/2021 09:46 AM    ALBUMIN 4.1 04/15/2021 09:46 AM    TOTPROTEIN 7.7 04/15/2021 09:46 AM    GLOBULIN 3.6 (H) 04/15/2021 09:46 AM    AGRATIO 1.1 04/15/2021 09:46 AM       No results found for: PROTHROMBTM, INR     Lab Results   Component Value Date/Time    WBC 13.0 (H) 01/28/2022 09:09 AM    RBC 6.23 (H) 01/28/2022 09:09 AM    HEMOGLOBIN 12.1 01/28/2022 09:09 AM    HEMATOCRIT 39.2 01/28/2022 09:09 AM    MCV 62.9 (L) 01/28/2022 09:09 AM    MCH 19.4 (L) 01/28/2022 09:09 AM    MCHC 30.9 (L) 01/28/2022 09:09 AM    MPV 11.0 01/28/2022 09:09 AM    NEUTSPOLYS 69.40 01/28/2022 09:09 AM    LYMPHOCYTES 21.80 (L) 01/28/2022 09:09 AM    MONOCYTES 5.80 01/28/2022 09:09 AM     EOSINOPHILS 1.90 01/28/2022 09:09 AM    BASOPHILS 0.80 01/28/2022 09:09 AM    HYPOCHROMIA 1+ 07/13/2021 08:29 AM    ANISOCYTOSIS 2+ (A) 07/13/2021 08:29 AM        Lab Results   Component Value Date/Time    HBA1C 5.5 03/10/2020 05:06 PM        Imaging: I personally reviewed following images, these are my reads  Xray cervical spine 08/12/2021  There is reversal of cervical lordosis  No significant degenerative changes    Xray thoracic 07/31/2020  There is mild curvative of the thoracic spine, convex left.  [place] at T3-4    MRI cervical spine on 05/22/2020  There is not of mild loss of cervical lordosis.  No significant degenerative changes, no cervical disc herniations, no central or foraminal stenosis in the cervical spine.     IMAGING radiology reads. I reviewed the following radiology reads   Xray cervical spine 08/12/2021  Impression: No acute fracture is identified    MRI cervical spine on 05/22/2020  Unremarkable pre and postcontrast MR examination of the cervical spine except for loss of cervical lordosis              Xray thoracic 07/31/2020        IMPRESSION:   1.  No acute findings.  2.  Mild spinal curvature, convex left, in the upper thoracic spine                                                                                                                                                         Diagnosis   Visit Diagnoses     ICD-10-CM   1. Fibromyalgia  M79.7   2. Depression, unspecified depression type  F32.A   3. Sprain of anterior talofibular ligament of right ankle, initial encounter  S93.491A   4. Inappropriate sinus tachycardia  R00.0           ASSESSMENT:  Carmen Vasques Ynes Hebert 27 y.o. female seen for above     Carmen was seen today for follow-up.    Diagnoses and all orders for this visit:    Fibromyalgia  -     pregabalin (LYRICA) 150 MG Cap; TAKE 1 CAPSULE BY MOUTH 3 TIMES DAILY FOR 30 DAYS M79.7    Depression, unspecified depression type  -     Patient has been identified as having a  positive depression screening. Appropriate orders and counseling have been given.    Sprain of anterior talofibular ligament of right ankle, initial encounter    Inappropriate sinus tachycardia             1.  Discussed that the Lyrica 150 mg p.o. 3 times daily has been very helpful and managing her symptoms.  Her weight has been stable over the last 4 months.  We have determined to continue at the current dose.  2.  Reviewed that mood has been relatively stable.  She has not been able to follow-up with psychiatry or psychology since the previous office she was being seen at close.  She has not pursued this.  Discussed that she will reach out to her PCP or me if she needs assistance with this.  She is considering follow-up with a telehealth program.  3.  Discussed right ankle strain.  Appears to be mild mild.  Discussed principles of rest, ice, compression, elevation.  Discussed that she will look for a compression sleeve and encouraged her to use anti-inflammatories as needed for a few days.  She denies any trauma I suspect that this will resolve.  If it does not I have advised her to seek care earlier orthopedic urgent care  4.  Note of mild tachycardia.  She does take propranolol LA 60 mg daily.  Advised her to follow-up with her PCP and/or cardiology.  5.  Activity as tolerated assuming that right ankle symptoms resolved.        Follow-up: Return in about 14 weeks (around 7/1/2022), or if symptoms worsen or fail to improve.    Thank you very much for asking me to participate in Carmen Hebert's care.  Please contact me with any questions or concerns.      Please note that this dictation was created using voice recognition software. I have made every reasonable attempt to correct obvious errors but there may be errors of grammar and content that I may have overlooked prior to finalization of this note.      Mahesh Crane MD  Physical Medicine and Rehabilitation  Interventional Spine and Sports  Physiatry  [facility]

## 2022-06-06 DIAGNOSIS — R00.2 PALPITATIONS: ICD-10-CM

## 2022-06-08 RX ORDER — PROPRANOLOL HCL 60 MG
CAPSULE, EXTENDED RELEASE 24HR ORAL
Qty: 90 CAPSULE | Refills: 0 | Status: SHIPPED | OUTPATIENT
Start: 2022-06-08 | End: 2022-09-04 | Stop reason: SDUPTHER

## 2022-06-21 ENCOUNTER — TELEPHONE (OUTPATIENT)
Dept: PHYSICAL MEDICINE AND REHAB | Facility: MEDICAL CENTER | Age: 27
End: 2022-06-21
Payer: COMMERCIAL

## 2022-06-21 NOTE — TELEPHONE ENCOUNTER
Phone Number Called: [phone]    Call outcome: Left detailed message for patient. Informed to call back with any additional questions.    Message: I called patient and leave a message regarding the refill for Pregabalin 150 mg.

## 2022-06-24 ENCOUNTER — APPOINTMENT (OUTPATIENT)
Dept: PHYSICAL MEDICINE AND REHAB | Facility: MEDICAL CENTER | Age: 27
End: 2022-06-24
Payer: COMMERCIAL

## 2022-06-28 DIAGNOSIS — R10.2 PELVIC PAIN: ICD-10-CM

## 2022-06-28 DIAGNOSIS — Z30.41 ENCOUNTER FOR SURVEILLANCE OF CONTRACEPTIVE PILLS: ICD-10-CM

## 2022-06-28 RX ORDER — NORETHINDRONE ACETATE/ETHINYL ESTRADIOL 1.5-0.03MG
KIT ORAL
Qty: 84 TABLET | Refills: 3 | Status: SHIPPED | OUTPATIENT
Start: 2022-06-28 | End: 2022-09-19

## 2022-07-01 ENCOUNTER — APPOINTMENT (OUTPATIENT)
Dept: PHYSICAL MEDICINE AND REHAB | Facility: MEDICAL CENTER | Age: 27
End: 2022-07-01
Payer: COMMERCIAL

## 2022-07-01 ENCOUNTER — DOCUMENTATION (OUTPATIENT)
Dept: PHYSICAL MEDICINE AND REHAB | Facility: MEDICAL CENTER | Age: 27
End: 2022-07-01

## 2022-07-01 NOTE — PROGRESS NOTES
Follow-up patient note    Physiatry (physical medicine and  Rehabilitation), interventional spine and sports medicine    Date of Service: July 1, 2022    Chief complaint:   No chief complaint on file.      HISTORY    HPI: Carmen Juniori 27 y.o. female who presents today for follow-up evaluation of pain complaints.  She has a diagnosis of fibromyalgia.        ***  Carmen returns and reports that she has been doing fairly well overall with regard to her pain.  Symptoms been relatively stable although she does intermittently have flares of pain.  Her pain today is 5 out of 10.  She had an episode of pain all over on Saturday and had difficulty getting out of bed due to pain.  This then gradually resolved.  She has been having more difficulty with her sinuses due to seasonal allergies.    She has been to physical therapy and is trying to stay active.  Her work is been keeping her quite busy.  Lyrica 150 mg p.o. 3 times daily does help with symptoms.  She has occasionally taken tizanidine at night and finds this can be helpful.    She has been having ongoing aching pain in her back and hips and knees.  And new pain that she reports is that she woke up and had pain in her  Right ankle.  She does not report any particular injury.  This is been painful with weightbearing today.  She has not been able to rest the ankle and has not taken any anti-inflammatories or use ice.      Medical records review:  I reviewed the note from the referring provider Mariel Paula A.P.* dated 03/10/2020.  The patient was seen to establish care.  Report that she was diagnosed in 2017 with fibromyalgia, in [facility].  Labs were tested including TSH, HbA1C, vitamin D, 25 hydroxy, HIV ag/ab, T. Pallidum, chlamydia.  She was referred to cardiology for evaluation of palpitations with Holter monitor.  Medications refilled included zoloft 100mg, lyrica 75mg daily, cyclobenzaprine 5mg.  Referral to physiatry placed    Previous  treatments:    Physical Therapy: Yes    Medications the patient is tried: lyrica, cyclobenzeprine, amitriptyline, tried savella    Previous interventions: none    Previous surgeries to relieve the above pain:  none    PHQ-9: 2    ROS:   Gen: nausea, history of COVID  Eyes: intermittent blurry vision  CV: anxiety, palpitation (inappropriate sinus tachycardia)  Psych: depression  Red Flags ROS:   Fever, Chills, Sweats: Denies  Involuntary Weight Loss: Denies  Bladder Incontinence: Denies  Bowel Incontinence: Denies  Saddle Anesthesia: Denies    All other systems reviewed and negative.       PMHx:   Past Medical History:   Diagnosis Date   • Anxiety    • Depression    • Fibromyalgia 2017   • Thalassemia minor        PSHx:   No past surgical history on file.    Family history   Family History   Problem Relation Age of Onset   • Fibromyalgia Mother    • Lung Disease Mother         asthma r/o   • Depression Mother    • Anxiety disorder Mother    • ADD / ADHD Father    • Depression Father          Medications:   Current Outpatient Medications   Medication   • BELL 1.5/30 1.5-30 MG-MCG Tab   • propranolol LA (INDERAL LA) 60 MG CAPSULE SR 24 HR   • tizanidine (ZANAFLEX) 4 MG Tab   • sertraline (ZOLOFT) 100 MG Tab   • ALPRAZolam (XANAX) 0.25 MG Tab   • fluticasone (FLONASE) 50 MCG/ACT nasal spray   • mupirocin (BACTROBAN) 2 % Ointment     No current facility-administered medications for this visit.       Allergies:   Allergies   Allergen Reactions   • [place]-Related Products Hives, Itching, Myalgia and Nausea   • Dairy Food Allergy Diarrhea, Myalgia and Nausea   • Gluten Meal Cough, Diarrhea, Hives, Itching, Myalgia, Nausea, Rash, Runny Nose, Shortness of Breath and Vomiting   • Sesame Seed (Diagnostic) Hives, Rash and Itching     rash   • Soy Allergy Hives, Itching, Myalgia and Nausea       Social Hx:   Social History     Socioeconomic History   • Marital status: Single     Spouse name: Not on file   • Number of children: Not  on file   • Years of education: Not on file   • Highest education level: Not on file   Occupational History   • Not on file   Tobacco Use   • Smoking status: Never Smoker   • Smokeless tobacco: Never Used   Vaping Use   • Vaping Use: Never used   Substance and Sexual Activity   • Alcohol use: Not Currently     Comment: occ   • Drug use: Not Currently     Types: Marijuana     Comment: rarely   • Sexual activity: Yes     Partners: Male     Birth control/protection: Condom   Other Topics Concern   •  Service No   • Blood Transfusions No   • Caffeine Concern No   • Occupational Exposure No   • Hobby Hazards No   • Sleep Concern Yes   • Stress Concern Yes   • Weight Concern Yes   • Special Diet Yes   • Back Care No   • Exercise No   • Bike Helmet Yes   • Seat Belt Yes   • Self-Exams Yes   Social History Narrative   • Not on file     Social Determinants of Health     Financial Resource Strain: Not on file   Food Insecurity: Not on file   Transportation Needs: Not on file   Physical Activity: Not on file   Stress: Not on file   Social Connections: Not on file   Intimate Partner Violence: Not on file   Housing Stability: Not on file         EXAMINATION     Physical Exam:   Vitals: There were no vitals taken for this visit.    Constitutional:   Body Habitus: There is no height or weight on file to calculate BMI.  Cooperation: Fully cooperates with exam  Appearance: Well-groomed, well-nourished, not disheveled, in no acute distress    Eyes: No scleral icterus, no proptosis     ENT -no obvious auditory deficits, wearing a face mask    Skin -no rashes or lesions noted     Respiratory-  breathing comfortable on room air, no audible wheezing    Cardiovascular- no lower extremity edema is noted.     Psychiatric- alert and oriented ×3. Normal affect.     Gait - normal gait, no use of ambulatory device, mildly antalgic.     Musculoskeletal -     Thoracic/Lumbar Spine/Sacral Spine/Hips   Inspection: No evidence of atrophy in  bilateral lower extremities throughout     No focal motor or sensory deficits in the lower extremities    Reflexes are 2+ biceps, triceps, patella and achilles bilaterally    Right ankle without significant edema.  There is some tenderness over the anterior talofibular ligament.  No pain with range of motion of the ankle passive or resisted plantarflexion inversion or eversion.    MEDICAL DECISION MAKING    Medical records review: see under HPI section.     DATA    Labs:   Vitamin D, 25 hydroxy 03/10/2020 : 38  TSH: 2.610 on 03/10/2020  Lab Results   Component Value Date/Time    SODIUM 136 04/15/2021 09:46 AM    POTASSIUM 4.5 04/15/2021 09:46 AM    CHLORIDE 103 04/15/2021 09:46 AM    CO2 25 04/15/2021 09:46 AM    ANION 8.0 04/15/2021 09:46 AM    GLUCOSE 97 04/15/2021 09:46 AM    BUN 10 04/15/2021 09:46 AM    CREATININE 0.74 04/15/2021 09:46 AM    CALCIUM 9.2 04/15/2021 09:46 AM    ASTSGOT 24 04/15/2021 09:46 AM    ALTSGPT 29 04/15/2021 09:46 AM    TBILIRUBIN 0.2 04/15/2021 09:46 AM    ALBUMIN 4.1 04/15/2021 09:46 AM    TOTPROTEIN 7.7 04/15/2021 09:46 AM    GLOBULIN 3.6 (H) 04/15/2021 09:46 AM    AGRATIO 1.1 04/15/2021 09:46 AM       No results found for: PROTHROMBTM, INR     Lab Results   Component Value Date/Time    WBC 13.0 (H) 01/28/2022 09:09 AM    RBC 6.23 (H) 01/28/2022 09:09 AM    HEMOGLOBIN 12.1 01/28/2022 09:09 AM    HEMATOCRIT 39.2 01/28/2022 09:09 AM    MCV 62.9 (L) 01/28/2022 09:09 AM    MCH 19.4 (L) 01/28/2022 09:09 AM    MCHC 30.9 (L) 01/28/2022 09:09 AM    MPV 11.0 01/28/2022 09:09 AM    NEUTSPOLYS 69.40 01/28/2022 09:09 AM    LYMPHOCYTES 21.80 (L) 01/28/2022 09:09 AM    MONOCYTES 5.80 01/28/2022 09:09 AM    EOSINOPHILS 1.90 01/28/2022 09:09 AM    BASOPHILS 0.80 01/28/2022 09:09 AM    HYPOCHROMIA 1+ 07/13/2021 08:29 AM    ANISOCYTOSIS 2+ (A) 07/13/2021 08:29 AM        Lab Results   Component Value Date/Time    HBA1C 5.5 03/10/2020 05:06 PM        Imaging: I personally reviewed following images, these  are my reads  Xray cervical spine 08/12/2021  There is reversal of cervical lordosis  No significant degenerative changes    Xray thoracic 07/31/2020  There is mild curvative of the thoracic spine, convex left.  [place] at T3-4    MRI cervical spine on 05/22/2020  There is not of mild loss of cervical lordosis.  No significant degenerative changes, no cervical disc herniations, no central or foraminal stenosis in the cervical spine.     IMAGING radiology reads. I reviewed the following radiology reads   Xray cervical spine 08/12/2021  Impression: No acute fracture is identified    MRI cervical spine on 05/22/2020  Unremarkable pre and postcontrast MR examination of the cervical spine except for loss of cervical lordosis              Xray thoracic 07/31/2020        IMPRESSION:   1.  No acute findings.  2.  Mild spinal curvature, convex left, in the upper thoracic spine                                                                                                                                                         Diagnosis   {No diagnosis found. (Refresh or delete this SmartLink)}        ASSESSMENT:  Carmen Hebert 27 y.o. female seen for above     There are no diagnoses linked to this encounter.       ***    1.  Discussed that the Lyrica 150 mg p.o. 3 times daily has been very helpful and managing her symptoms.  Her weight has been stable over the last 4 months.  We have determined to continue at the current dose.  2.  Reviewed that mood has been relatively stable.  She has not been able to follow-up with psychiatry or psychology since the previous office she was being seen at close.  She has not pursued this.  Discussed that she will reach out to her PCP or me if she needs assistance with this.  She is considering follow-up with a telehealth program.  3.  Discussed right ankle strain.  Appears to be mild mild.  Discussed principles of rest, ice, compression, elevation.  Discussed that she will look  for a compression sleeve and encouraged her to use anti-inflammatories as needed for a few days.  She denies any trauma I suspect that this will resolve.  If it does not I have advised her to seek care earlier orthopedic urgent care  4.  Note of mild tachycardia.  She does take propranolol LA 60 mg daily.  Advised her to follow-up with her PCP and/or cardiology.  5.  Activity as tolerated assuming that right ankle symptoms resolved.        Follow-up: No follow-ups on file.    Thank you very much for asking me to participate in Carmen Vasques Ynes Hebert's care.  Please contact me with any questions or concerns.      Please note that this dictation was created using voice recognition software. I have made every reasonable attempt to correct obvious errors but there may be errors of grammar and content that I may have overlooked prior to finalization of this note.      Mahesh Crane MD  Physical Medicine and Rehabilitation  Interventional Spine and Sports Physiatry  [facility]

## 2022-07-08 ENCOUNTER — OFFICE VISIT (OUTPATIENT)
Dept: PHYSICAL MEDICINE AND REHAB | Facility: MEDICAL CENTER | Age: 27
End: 2022-07-08
Payer: COMMERCIAL

## 2022-07-08 ENCOUNTER — HOSPITAL ENCOUNTER (OUTPATIENT)
Dept: RADIOLOGY | Facility: MEDICAL CENTER | Age: 27
End: 2022-07-08
Attending: PHYSICAL MEDICINE & REHABILITATION
Payer: COMMERCIAL

## 2022-07-08 VITALS
HEART RATE: 77 BPM | WEIGHT: 254.19 LBS | BODY MASS INDEX: 36.39 KG/M2 | OXYGEN SATURATION: 97 % | TEMPERATURE: 97.4 F | HEIGHT: 70 IN | SYSTOLIC BLOOD PRESSURE: 110 MMHG | DIASTOLIC BLOOD PRESSURE: 64 MMHG

## 2022-07-08 DIAGNOSIS — M54.50 LOW BACK PAIN WITH RADIATION: ICD-10-CM

## 2022-07-08 DIAGNOSIS — M79.7 FIBROMYALGIA: ICD-10-CM

## 2022-07-08 DIAGNOSIS — F32.A DEPRESSION, UNSPECIFIED DEPRESSION TYPE: ICD-10-CM

## 2022-07-08 PROCEDURE — 99214 OFFICE O/P EST MOD 30 MIN: CPT | Performed by: PHYSICAL MEDICINE & REHABILITATION

## 2022-07-08 PROCEDURE — 72110 X-RAY EXAM L-2 SPINE 4/>VWS: CPT

## 2022-07-08 RX ORDER — PREGABALIN 150 MG/1
150 CAPSULE ORAL 3 TIMES DAILY
Qty: 90 CAPSULE | Refills: 2 | Status: SHIPPED | OUTPATIENT
Start: 2022-06-20 | End: 2022-10-11 | Stop reason: SDUPTHER

## 2022-07-08 RX ORDER — PREGABALIN 150 MG/1
150 CAPSULE ORAL 3 TIMES DAILY
COMMUNITY
Start: 2022-06-20 | End: 2022-07-08 | Stop reason: SDUPTHER

## 2022-07-08 ASSESSMENT — PATIENT HEALTH QUESTIONNAIRE - PHQ9
5. POOR APPETITE OR OVEREATING: 2 - MORE THAN HALF THE DAYS
SUM OF ALL RESPONSES TO PHQ QUESTIONS 1-9: 10
CLINICAL INTERPRETATION OF PHQ2 SCORE: 3

## 2022-07-08 ASSESSMENT — PAIN SCALES - GENERAL: PAINLEVEL: 8=MODERATE-SEVERE PAIN

## 2022-07-08 ASSESSMENT — FIBROSIS 4 INDEX: FIB4 SCORE: 0.27

## 2022-07-08 NOTE — PROGRESS NOTES
Follow-up patient note    Physiatry (physical medicine and  Rehabilitation), interventional spine and sports medicine    Date of Service: July 8, 2022    Chief complaint:   Chief Complaint   Patient presents with   • Follow-Up     Left calf and hip pain       HISTORY    HPI: Carmen Hebert 27 y.o. female who presents today for follow-up evaluation of pain complaints.  She has a diagnosis of fibromyalgia.      Carmen reports that she has been going to a massage therapist.  She has been there for a few visits.  Some temporary worsening of symptoms immediately after the massage, but feels better after that and finds this beneficial.    Lyrica 150mg po tid, continues to help with symptoms.  If she misses a dose, significantly more pain.  Rarely taking tizanidine, no more than every few weeks.    Right ankle pain is better, seemed to resolve.  Recently, in the last week, she has had more back pain and some tingling and numbness in the left thigh that radiates to the top of her left foot.  No weakness.  No injury.  No bowel or bladder changes.  Pain is 7/10 on the NRS.  No acute injury seem to set this up although she has done some lifting related to an office move    Medical records review:  I reviewed the note from the referring provider Mariel Paula A.P.* dated 03/10/2020.  The patient was seen to establish care.  Report that she was diagnosed in 2017 with fibromyalgia, in [facility].  Labs were tested including TSH, HbA1C, vitamin D, 25 hydroxy, HIV ag/ab, T. Pallidum, chlamydia.  She was referred to cardiology for evaluation of palpitations with Holter monitor.  Medications refilled included zoloft 100mg, lyrica 75mg daily, cyclobenzaprine 5mg.  Referral to physiatry placed    Previous treatments:    Physical Therapy: Yes    Medications the patient is tried: lyrica, cyclobenzeprine, amitriptyline, tried savella    Previous interventions: none    Previous surgeries to relieve the above pain:   none    PHQ-9: 10    ROS:   Gen: nausea, history of COVID  Eyes: intermittent blurry vision  CV: anxiety, palpitation (inappropriate sinus tachycardia)  Psych: depression  Red Flags ROS:   Fever, Chills, Sweats: Denies  Involuntary Weight Loss: Denies  Bladder Incontinence: Denies  Bowel Incontinence: Denies  Saddle Anesthesia: Denies    All other systems reviewed and negative.       PMHx:   Past Medical History:   Diagnosis Date   • Anxiety    • Depression    • Fibromyalgia 2017   • Thalassemia minor        PSHx:   History reviewed. No pertinent surgical history.    Family history   Family History   Problem Relation Age of Onset   • Fibromyalgia Mother    • Lung Disease Mother         asthma r/o   • Depression Mother    • Anxiety disorder Mother    • ADD / ADHD Father    • Depression Father          Medications:   Current Outpatient Medications   Medication   • pregabalin (LYRICA) 150 MG Cap   • BELL 1.5/30 1.5-30 MG-MCG Tab   • propranolol LA (INDERAL LA) 60 MG CAPSULE SR 24 HR   • tizanidine (ZANAFLEX) 4 MG Tab   • sertraline (ZOLOFT) 100 MG Tab   • ALPRAZolam (XANAX) 0.25 MG Tab   • fluticasone (FLONASE) 50 MCG/ACT nasal spray     No current facility-administered medications for this visit.       Allergies:   Allergies   Allergen Reactions   • [place]vang-Related Products Hives, Itching, Myalgia and Nausea   • Dairy Food Allergy Diarrhea, Myalgia and Nausea   • Gluten Meal Cough, Diarrhea, Hives, Itching, Myalgia, Nausea, Rash, Runny Nose, Shortness of Breath and Vomiting   • Sesame Seed (Diagnostic) Hives, Rash and Itching     rash   • Soy Allergy Hives, Itching, Myalgia and Nausea       Social Hx:   Social History     Socioeconomic History   • Marital status: Single     Spouse name: Not on file   • Number of children: Not on file   • Years of education: Not on file   • Highest education level: Not on file   Occupational History   • Not on file   Tobacco Use   • Smoking status: Never Smoker   • Smokeless tobacco:  "Never Used   Vaping Use   • Vaping Use: Never used   Substance and Sexual Activity   • Alcohol use: Not Currently     Comment: occ   • Drug use: Not Currently     Types: Marijuana     Comment: rarely   • Sexual activity: Yes     Partners: Male     Birth control/protection: Condom   Other Topics Concern   •  Service No   • Blood Transfusions No   • Caffeine Concern No   • Occupational Exposure No   • Hobby Hazards No   • Sleep Concern Yes   • Stress Concern Yes   • Weight Concern Yes   • Special Diet Yes   • Back Care No   • Exercise No   • Bike Helmet Yes   • Seat Belt Yes   • Self-Exams Yes   Social History Narrative   • Not on file     Social Determinants of Health     Financial Resource Strain: Not on file   Food Insecurity: Not on file   Transportation Needs: Not on file   Physical Activity: Not on file   Stress: Not on file   Social Connections: Not on file   Intimate Partner Violence: Not on file   Housing Stability: Not on file         EXAMINATION     Physical Exam:   Vitals: /64 (BP Location: Right arm, Patient Position: Sitting, BP Cuff Size: Adult long)   Pulse 77   Temp 36.3 °C (97.4 °F) (Temporal)   Ht 1.778 m (5' 10\")   Wt 115 kg (254 lb 3.1 oz)   SpO2 97%     Constitutional:   Body Habitus: Body mass index is 36.47 kg/m².  Cooperation: Fully cooperates with exam  Appearance: Well-groomed, well-nourished, not disheveled, in no acute distress    Eyes: No scleral icterus, no proptosis     ENT -no obvious auditory deficits, wearing a face mask    Skin -no rashes or lesions noted     Respiratory-  breathing comfortable on room air, no audible wheezing    Cardiovascular- no lower extremity edema is noted.     Psychiatric- alert and oriented ×3. Normal affect.     Gait - normal gait, no use of ambulatory device, mildly antalgic.     Musculoskeletal -     Thoracic/Lumbar Spine/Sacral Spine/Hips   Inspection: No evidence of atrophy in bilateral lower extremities throughout     Functional " range of motion in the lumbar spine in flexion extension left and right rotation.     No focal motor or sensory deficits in the lower extremities    Reflexes are 2+ biceps, triceps, patella and achilles bilaterally    Straight leg raise is negative bilaterally    Helena's test causes some back pain on the right and is negative on the left    MEDICAL DECISION MAKING    Medical records review: see under HPI section.     DATA    Labs:   Vitamin D, 25 hydroxy 03/10/2020 : 38  TSH: 2.610 on 03/10/2020  Lab Results   Component Value Date/Time    SODIUM 136 04/15/2021 09:46 AM    POTASSIUM 4.5 04/15/2021 09:46 AM    CHLORIDE 103 04/15/2021 09:46 AM    CO2 25 04/15/2021 09:46 AM    ANION 8.0 04/15/2021 09:46 AM    GLUCOSE 97 04/15/2021 09:46 AM    BUN 10 04/15/2021 09:46 AM    CREATININE 0.74 04/15/2021 09:46 AM    CALCIUM 9.2 04/15/2021 09:46 AM    ASTSGOT 24 04/15/2021 09:46 AM    ALTSGPT 29 04/15/2021 09:46 AM    TBILIRUBIN 0.2 04/15/2021 09:46 AM    ALBUMIN 4.1 04/15/2021 09:46 AM    TOTPROTEIN 7.7 04/15/2021 09:46 AM    GLOBULIN 3.6 (H) 04/15/2021 09:46 AM    AGRATIO 1.1 04/15/2021 09:46 AM       No results found for: PROTHROMBTM, INR     Lab Results   Component Value Date/Time    WBC 13.0 (H) 01/28/2022 09:09 AM    RBC 6.23 (H) 01/28/2022 09:09 AM    HEMOGLOBIN 12.1 01/28/2022 09:09 AM    HEMATOCRIT 39.2 01/28/2022 09:09 AM    MCV 62.9 (L) 01/28/2022 09:09 AM    MCH 19.4 (L) 01/28/2022 09:09 AM    MCHC 30.9 (L) 01/28/2022 09:09 AM    MPV 11.0 01/28/2022 09:09 AM    NEUTSPOLYS 69.40 01/28/2022 09:09 AM    LYMPHOCYTES 21.80 (L) 01/28/2022 09:09 AM    MONOCYTES 5.80 01/28/2022 09:09 AM    EOSINOPHILS 1.90 01/28/2022 09:09 AM    BASOPHILS 0.80 01/28/2022 09:09 AM    HYPOCHROMIA 1+ 07/13/2021 08:29 AM    ANISOCYTOSIS 2+ (A) 07/13/2021 08:29 AM        Lab Results   Component Value Date/Time    HBA1C 5.5 03/10/2020 05:06 PM        Imaging: I personally reviewed following images, these are my reads  Xray cervical spine  08/12/2021  There is reversal of cervical lordosis  No significant degenerative changes    Xray thoracic 07/31/2020  There is mild curvative of the thoracic spine, convex left.  [place] at T3-4    MRI cervical spine on 05/22/2020  There is not of mild loss of cervical lordosis.  No significant degenerative changes, no cervical disc herniations, no central or foraminal stenosis in the cervical spine.     IMAGING radiology reads. I reviewed the following radiology reads   Xray cervical spine 08/12/2021  Impression: No acute fracture is identified    MRI cervical spine on 05/22/2020  Unremarkable pre and postcontrast MR examination of the cervical spine except for loss of cervical lordosis              Xray thoracic 07/31/2020        IMPRESSION:   1.  No acute findings.  2.  Mild spinal curvature, convex left, in the upper thoracic spine                                                                                                                                                         Diagnosis   Visit Diagnoses     ICD-10-CM   1. Low back pain with radiation  M54.50   2. Depression, unspecified depression type  F32.A   3. Fibromyalgia  M79.7           ASSESSMENT:  Carmen Cooperanchi 27 y.o. female seen for above     Carmen was seen today for follow-up.    Diagnoses and all orders for this visit:    Low back pain with radiation  -     DX-LUMBAR SPINE-4+ VIEWS; Future  -     Referral to Physical Therapy    Depression, unspecified depression type  -     Patient has been identified as having a positive depression screening. Appropriate orders and counseling have been given.  -     Referral to Behavioral Health    Fibromyalgia  -     Referral to Behavioral Health  -     pregabalin (LYRICA) 150 MG Cap; Take 1 Capsule by mouth in the morning, at noon, and at bedtime for 90 days.           1.  Discussed plans to get lumbar spine films including AP lateral and flexion-extension films.  2.  Plan to refer her to physical  therapy to work on her low back with radicular symptoms on the left.  No focal motor deficits were identified today.  We will reassess how she does with trial of conservative care.  She will let me know if symptoms should progress or change.  3.  Discussed referral back to behavioral health to work on both depression, fibromyalgia and stress management tools.  4.  Encouraged her to continue with her weight management goals.  Her weight has been reduced by about 8 pounds in the last 8 months.  5.  Reviewed continuing Lyrica 150 mg p.o. 3 times daily.  Her weight has been essentially stable and for now we will continue with this current dose.  6.  Right ankle pain has resolved.  7.  Follow-up with her PCP and cardiology.        Follow-up: Return in about 3 months (around 10/8/2022).    Thank you very much for asking me to participate in Carmen Hebert's care.  Please contact me with any questions or concerns.      Please note that this dictation was created using voice recognition software. I have made every reasonable attempt to correct obvious errors but there may be errors of grammar and content that I may have overlooked prior to finalization of this note.      Mahesh Crane MD  Physical Medicine and Rehabilitation  Interventional Spine and Sports Physiatry  [facility]

## 2022-08-27 ENCOUNTER — HOSPITAL ENCOUNTER (OUTPATIENT)
Facility: MEDICAL CENTER | Age: 27
End: 2022-08-27
Payer: COMMERCIAL

## 2022-08-27 ENCOUNTER — APPOINTMENT (OUTPATIENT)
Dept: RADIOLOGY | Facility: IMAGING CENTER | Age: 27
End: 2022-08-27
Payer: COMMERCIAL

## 2022-08-27 ENCOUNTER — OFFICE VISIT (OUTPATIENT)
Dept: URGENT CARE | Facility: CLINIC | Age: 27
End: 2022-08-27
Payer: COMMERCIAL

## 2022-08-27 VITALS
WEIGHT: 249 LBS | HEART RATE: 76 BPM | DIASTOLIC BLOOD PRESSURE: 58 MMHG | RESPIRATION RATE: 18 BRPM | TEMPERATURE: 98 F | SYSTOLIC BLOOD PRESSURE: 110 MMHG | BODY MASS INDEX: 35.65 KG/M2 | HEIGHT: 70 IN | OXYGEN SATURATION: 95 %

## 2022-08-27 DIAGNOSIS — R50.9 FEVER, UNSPECIFIED FEVER CAUSE: ICD-10-CM

## 2022-08-27 DIAGNOSIS — R06.02 SHORTNESS OF BREATH: ICD-10-CM

## 2022-08-27 DIAGNOSIS — J45.41 MODERATE PERSISTENT ASTHMA WITH EXACERBATION: ICD-10-CM

## 2022-08-27 PROBLEM — F41.9 ANXIETY: Status: ACTIVE | Noted: 2021-12-07

## 2022-08-27 PROBLEM — N60.89 SEBACEOUS CYST OF SKIN OF BREAST: Status: ACTIVE | Noted: 2021-12-07

## 2022-08-27 LAB
APPEARANCE UR: CLEAR
BILIRUB UR STRIP-MCNC: NEGATIVE MG/DL
COLOR UR AUTO: YELLOW
GLUCOSE UR STRIP.AUTO-MCNC: NEGATIVE MG/DL
KETONES UR STRIP.AUTO-MCNC: NEGATIVE MG/DL
LEUKOCYTE ESTERASE UR QL STRIP.AUTO: NORMAL
NITRITE UR QL STRIP.AUTO: NEGATIVE
PH UR STRIP.AUTO: 6.5 [PH] (ref 5–8)
PROT UR QL STRIP: NEGATIVE MG/DL
RBC UR QL AUTO: NORMAL
SP GR UR STRIP.AUTO: <=1.005
UROBILINOGEN UR STRIP-MCNC: 0.2 MG/DL

## 2022-08-27 PROCEDURE — 99214 OFFICE O/P EST MOD 30 MIN: CPT | Mod: 25

## 2022-08-27 PROCEDURE — 87660 TRICHOMONAS VAGIN DIR PROBE: CPT

## 2022-08-27 PROCEDURE — 87086 URINE CULTURE/COLONY COUNT: CPT

## 2022-08-27 PROCEDURE — 87480 CANDIDA DNA DIR PROBE: CPT

## 2022-08-27 PROCEDURE — 81002 URINALYSIS NONAUTO W/O SCOPE: CPT

## 2022-08-27 PROCEDURE — 71046 X-RAY EXAM CHEST 2 VIEWS: CPT | Mod: TC,FY

## 2022-08-27 PROCEDURE — 87510 GARDNER VAG DNA DIR PROBE: CPT

## 2022-08-27 PROCEDURE — 94640 AIRWAY INHALATION TREATMENT: CPT

## 2022-08-27 RX ORDER — FLUTICASONE PROPIONATE AND SALMETEROL 250; 50 UG/1; UG/1
1 POWDER RESPIRATORY (INHALATION) EVERY 12 HOURS
Qty: 1 EACH | Refills: 0 | Status: SHIPPED
Start: 2022-08-27 | End: 2022-09-09

## 2022-08-27 RX ORDER — ALBUTEROL SULFATE 2.5 MG/3ML
2.5 SOLUTION RESPIRATORY (INHALATION) ONCE
Status: COMPLETED | OUTPATIENT
Start: 2022-08-27 | End: 2022-08-27

## 2022-08-27 RX ADMIN — ALBUTEROL SULFATE 2.5 MG: 2.5 SOLUTION RESPIRATORY (INHALATION) at 16:02

## 2022-08-27 ASSESSMENT — FIBROSIS 4 INDEX: FIB4 SCORE: 0.27

## 2022-08-27 NOTE — PROGRESS NOTES
Subjective:   Carmen Hebert is a 27 y.o. female who presents for Fever (X 2 days, fever, body chills, trembling on Thursday, headache.)      HPI:  Patient presents with her mother with complaint of drenching night sweats, fevers, body aches, chills, headache.  Onset of symptoms started Monday 821/22.  Initial symptom was night sweating and fever.  Patient states that her highest recorded temperature has been 101 F. Patient denies contact with sick individuals.  Patient is vaccinated for COVID and flu. Patient denies ear pain, sore throat, nasal congestion, runny nose.  Patient has history of exercise-induced bronchospasm, has not used inhaler in several years. Reports lightheadedness, SOB with activity, fatigue.  Patient had intense muscle cramps in her right thigh two nights ago after the night sweats resolved.    Patient reports she had a Pap smear performed by [facility] on Thursday, 8/25/2022 .  Patient has a endometrial polyp per discussion with provider at [facility].  Patient states the Pap smear was very painful resulting in bleeding and spotting for 2 days.  Patient has soaked through 1 pad in that timeframe without the passage of large clots, heavy cramping .     Patient also reports that she had all 4 wisdom teeth pulled approximately 2 weeks ago.  Patient denies postoperative infection and has been seen by endodontist for follow-up since surgery.  Patient denies drooling, difficulty swallowing saliva.  Patient did receive amoxicillin for postop infection prevention.       ROS as above per HPI    Medications:    Current Outpatient Medications on File Prior to Visit   Medication Sig Dispense Refill    pregabalin (LYRICA) 150 MG Cap Take 1 Capsule by mouth in the morning, at noon, and at bedtime for 90 days. 90 Capsule 2    BELL 1.5/30 1.5-30 MG-MCG Tab TAKE ONE TABLET BY MOUTH EVERY DAY 84 Tablet 3    propranolol LA (INDERAL LA) 60 MG CAPSULE SR 24 HR TAKE ONE CAPSULE BY  "MOUTH ONCE DAILY 90 Capsule 0    tizanidine (ZANAFLEX) 4 MG Tab tizanidine 4 mg tablet      sertraline (ZOLOFT) 100 MG Tab TAKE 1 AND 1/2 TABLETS BY MOUTH ONCE DAILY (150 MG) 45 Tablet 11    ALPRAZolam (XANAX) 0.25 MG Tab Take 0.25 mg by mouth at bedtime as needed for Sleep.      fluticasone (FLONASE) 50 MCG/ACT nasal spray Administer 2 Sprays into affected nostril(S) every day. 16 g 11     No current facility-administered medications on file prior to visit.        Allergies:   Corn-related products, Dairy food allergy, Gluten meal, Sesame seed (diagnostic), and Soy allergy    Problem List:   Patient Active Problem List   Diagnosis    Encounter to establish care    Fibromyalgia    Obesity (BMI 30-39.9)    Palpitations    Thalassemia    Asthma    Celiac disease    Cervical lesion    Seasonal allergies    Mixed anxiety depressive disorder    Major depression, recurrent (HCC)    Pelvic pain    Complication of left ear piercing    Vaginal discharge    Inappropriate sinus tachycardia    Anxiety    Sebaceous cyst of skin of breast        Surgical History:  No past surgical history on file.    Past Social Hx:   Social History     Tobacco Use    Smoking status: Never    Smokeless tobacco: Never   Vaping Use    Vaping Use: Never used   Substance Use Topics    Alcohol use: Yes     Comment: occ    Drug use: Not Currently     Types: Marijuana     Comment: rarely          Problem list, medications, and allergies reviewed by myself today in Epic.     Objective:     /58   Pulse 76   Temp 36.7 °C (98 °F) (Temporal)   Resp 18   Ht 1.778 m (5' 10\")   Wt 113 kg (249 lb)   SpO2 95%   Breastfeeding No   BMI 35.73 kg/m²     Physical Exam  Vitals reviewed.   Constitutional:       Appearance: Normal appearance.   HENT:      Head: Normocephalic and atraumatic.      Right Ear: Tympanic membrane and ear canal normal.      Left Ear: Tympanic membrane and ear canal normal.      Nose: No congestion or rhinorrhea.      Right Sinus: " No maxillary sinus tenderness or frontal sinus tenderness.      Left Sinus: No maxillary sinus tenderness or frontal sinus tenderness.      Mouth/Throat:      Mouth: Mucous membranes are moist.      Pharynx: Oropharynx is clear. Uvula midline. No pharyngeal swelling, oropharyngeal exudate or posterior oropharyngeal erythema.      Tonsils: No tonsillar exudate.   Eyes:      Conjunctiva/sclera: Conjunctivae normal.      Pupils: Pupils are equal, round, and reactive to light.   Cardiovascular:      Rate and Rhythm: Normal rate and regular rhythm.      Heart sounds: Normal heart sounds.   Pulmonary:      Effort: Pulmonary effort is normal. No respiratory distress.      Breath sounds: No stridor. Examination of the right-middle field reveals decreased breath sounds. Examination of the left-middle field reveals decreased breath sounds. Examination of the right-lower field reveals decreased breath sounds. Examination of the left-lower field reveals decreased breath sounds. Decreased breath sounds, wheezing, rhonchi and rales present.   Chest:      Chest wall: Tenderness present.   Abdominal:      General: Bowel sounds are normal.      Palpations: Abdomen is soft.   Skin:     General: Skin is warm and dry.      Capillary Refill: Capillary refill takes less than 2 seconds.      Findings: No rash.   Neurological:      Mental Status: She is alert and oriented to person, place, and time.     Results for orders placed or performed in visit on 08/27/22   POCT Urinalysis   Result Value Ref Range    POC Color Yellow Negative    POC Appearance Clear Negative    POC Leukocyte Esterase Trace Negative    POC Nitrites Negative Negative    POC Urobiligen 0.2 Negative (0.2) mg/dL    POC Protein Negative Negative mg/dL    POC Urine PH 6.5 5.0 - 8.0    POC Blood Trace-intact Negative    POC Specific Gravity <=1.005 <1.005 - >1.030    POC Ketones Negative Negative mg/dL    POC Bilirubin Negative Negative mg/dL    POC Glucose Negative  Negative mg/dL         DX-CHEST-2 VIEWS 08/27/2022    Narrative  8/27/2022 3:28 PM    HISTORY/REASON FOR EXAM:  Shortness of Breath      TECHNIQUE/EXAM DESCRIPTION AND NUMBER OF VIEWS:  Two views of the chest.    COMPARISON:  None.    FINDINGS:    No pulmonary infiltrates or consolidations are noted.  No pleural effusions, no pneumothorax are appreciated.  Normal cardiopericardial silhouette.    Impression  1. No active cardiopulmonary abnormalities are identified.      Assessment/Plan:     Diagnosis and associated orders:   1. Moderate persistent asthma with exacerbation  - Referral to establish with [facility] PCP  - albuterol (PROVENTIL) 2.5mg/3ml nebulizer solution 2.5 mg  - Referral to Allergy  - fluticasone-salmeterol (ADVAIR) 250-50 MCG/ACT AEROSOL POWDER, BREATH ACTIVATED; Inhale 1 Puff every 12 hours.  Dispense: 1 Each; Refill: 0  - Comp Metabolic Panel; Future  - MAGNESIUM; Future    2. Shortness of breath  - DX-CHEST-2 VIEWS; Future    3. Fever, unspecified fever cause  - VAGINAL PATHOGENS DNA PANEL; Future  - POCT Urinalysis  - URINE CULTURE(NEW); Future      Comments/MDM:     UA abnormal in urgent care, positive for leukocytes and blood. Will send urine for culture. Vaginal pathogen panel obtained. Will discuss results with patient when available. Will obtain updated lab work to screen for electrolyte abnormalities due to reported night sweats. Adventitious lung sounds resolved s/p albuterol treatment in urgent care. Will refer patient to establish with [facility] PCP and Allergist for additional asthma workup and management. Patient verbalized understanding and consented to plan of care.           Pt is clinically stable at today's acute urgent care visit.  No acute distress noted. Appropriate for outpatient management at this time.       Discussed DDx, management options (risks,benefits, and alternatives to planned treatment), natural progression and supportive care.  Expressed understanding and the treatment  plan was agreed upon. Questions were encouraged and answered   Return to urgent care prn if new or worsening sx or if there is no improvement in condition prn.    Educated in Red flags and indications to immediately call 911 or present to the Emergency Department.   Advised the patient to follow-up with the primary care physician for recheck, reevaluation, and consideration of further management.      Please note that this dictation was created using voice recognition software. I have made a reasonable attempt to correct obvious errors, but I expect that there are errors of grammar and possibly content that I did not discover before finalizing the note.    This note was electronically signed by Rosette Pathak, DNP

## 2022-08-28 ENCOUNTER — DOCUMENTATION (OUTPATIENT)
Dept: URGENT CARE | Facility: PHYSICIAN GROUP | Age: 27
End: 2022-08-28
Payer: COMMERCIAL

## 2022-08-28 DIAGNOSIS — R50.9 FEVER, UNSPECIFIED FEVER CAUSE: ICD-10-CM

## 2022-08-28 DIAGNOSIS — B96.89 BV (BACTERIAL VAGINOSIS): ICD-10-CM

## 2022-08-28 DIAGNOSIS — N76.0 BV (BACTERIAL VAGINOSIS): ICD-10-CM

## 2022-08-28 LAB
CANDIDA DNA VAG QL PROBE+SIG AMP: NEGATIVE
G VAGINALIS DNA VAG QL PROBE+SIG AMP: POSITIVE
T VAGINALIS DNA VAG QL PROBE+SIG AMP: NEGATIVE

## 2022-08-28 RX ORDER — METRONIDAZOLE 7.5 MG/G
1 GEL TOPICAL
Qty: 45 G | Refills: 0 | Status: SHIPPED | OUTPATIENT
Start: 2022-08-28 | End: 2022-09-02

## 2022-08-28 NOTE — PROGRESS NOTES
This progress note is follow up to patient's urgent care visit on 8/27/22.    Vaginal pathogens panel is positive for G. vaginalis. Will start patient on topical flagyl due to corn sensitivities. Urine culture is pending, will discuss results with patient when available.     1. BV (bacterial vaginosis)  - metronidazole (METROGEL) 0.75 % gel; Apply 1 Applicatorful topically at bedtime for 5 days.  Dispense: 45 g; Refill: 0

## 2022-08-29 ENCOUNTER — TELEPHONE (OUTPATIENT)
Dept: MEDICAL GROUP | Facility: PHYSICIAN GROUP | Age: 27
End: 2022-08-29
Payer: COMMERCIAL

## 2022-08-29 NOTE — TELEPHONE ENCOUNTER
Pharmacy sent a fax to the primary care [place] regarding needing clarification on the MetroNIDAZOLE 0.75%     Document was scanned into patient chart. Can be seen via media or  .

## 2022-08-30 ENCOUNTER — TELEPHONE (OUTPATIENT)
Dept: SCHEDULING | Facility: IMAGING CENTER | Age: 27
End: 2022-08-30

## 2022-08-30 LAB
BACTERIA UR CULT: NORMAL
SIGNIFICANT IND 70042: NORMAL
SITE SITE: NORMAL
SOURCE SOURCE: NORMAL

## 2022-09-04 DIAGNOSIS — R00.2 PALPITATIONS: ICD-10-CM

## 2022-09-06 NOTE — TELEPHONE ENCOUNTER
Is the patient due for a refill? Yes    Was the patient seen the past year? No    Date of last office visit: 03/03/2021    Does the patient have an upcoming appointment?  No    Provider to refill:AK    Does the patients insurance require a 100 day supply?  No

## 2022-09-07 RX ORDER — PROPRANOLOL HCL 60 MG
60 CAPSULE, EXTENDED RELEASE 24HR ORAL DAILY
Qty: 90 CAPSULE | Refills: 0 | Status: SHIPPED | OUTPATIENT
Start: 2022-09-07 | End: 2022-12-07

## 2022-09-09 ENCOUNTER — OFFICE VISIT (OUTPATIENT)
Dept: MEDICAL GROUP | Facility: MEDICAL CENTER | Age: 27
End: 2022-09-09
Payer: COMMERCIAL

## 2022-09-09 ENCOUNTER — HOSPITAL ENCOUNTER (OUTPATIENT)
Facility: MEDICAL CENTER | Age: 27
End: 2022-09-09
Attending: STUDENT IN AN ORGANIZED HEALTH CARE EDUCATION/TRAINING PROGRAM
Payer: COMMERCIAL

## 2022-09-09 VITALS
HEART RATE: 84 BPM | HEIGHT: 70 IN | WEIGHT: 249 LBS | RESPIRATION RATE: 16 BRPM | OXYGEN SATURATION: 96 % | TEMPERATURE: 97.2 F | SYSTOLIC BLOOD PRESSURE: 116 MMHG | DIASTOLIC BLOOD PRESSURE: 72 MMHG | BODY MASS INDEX: 35.65 KG/M2

## 2022-09-09 DIAGNOSIS — D56.3 THALASSEMIA MINOR: ICD-10-CM

## 2022-09-09 DIAGNOSIS — R10.2 PELVIC PAIN: ICD-10-CM

## 2022-09-09 DIAGNOSIS — J30.2 SEASONAL ALLERGIES: ICD-10-CM

## 2022-09-09 DIAGNOSIS — D72.829 LEUKOCYTOSIS, UNSPECIFIED TYPE: ICD-10-CM

## 2022-09-09 DIAGNOSIS — R10.84 GENERALIZED ABDOMINAL PAIN: ICD-10-CM

## 2022-09-09 DIAGNOSIS — N89.8 VAGINAL DISCHARGE: ICD-10-CM

## 2022-09-09 DIAGNOSIS — M79.7 FIBROMYALGIA: ICD-10-CM

## 2022-09-09 DIAGNOSIS — J45.21 MILD INTERMITTENT ASTHMA WITH ACUTE EXACERBATION: ICD-10-CM

## 2022-09-09 DIAGNOSIS — F41.8 MIXED ANXIETY DEPRESSIVE DISORDER: ICD-10-CM

## 2022-09-09 PROBLEM — Z76.89 ENCOUNTER TO ESTABLISH CARE: Status: RESOLVED | Noted: 2020-03-10 | Resolved: 2022-09-09

## 2022-09-09 PROCEDURE — 87510 GARDNER VAG DNA DIR PROBE: CPT

## 2022-09-09 PROCEDURE — 99214 OFFICE O/P EST MOD 30 MIN: CPT | Performed by: STUDENT IN AN ORGANIZED HEALTH CARE EDUCATION/TRAINING PROGRAM

## 2022-09-09 PROCEDURE — 87480 CANDIDA DNA DIR PROBE: CPT

## 2022-09-09 PROCEDURE — 87660 TRICHOMONAS VAGIN DIR PROBE: CPT

## 2022-09-09 RX ORDER — ALBUTEROL SULFATE 90 UG/1
2 AEROSOL, METERED RESPIRATORY (INHALATION) EVERY 4 HOURS PRN
Qty: 1 EACH | Refills: 0 | Status: SHIPPED | OUTPATIENT
Start: 2022-09-09 | End: 2023-09-26 | Stop reason: SDUPTHER

## 2022-09-09 ASSESSMENT — FIBROSIS 4 INDEX: FIB4 SCORE: 0.27

## 2022-09-09 NOTE — PROGRESS NOTES
Subjective:     Chief Complaint   Patient presents with    Establish Care         HPI:   Carmen presents today to establish care.  This is a previous patient of Sarah paling  Asthma  Patient provided albuterol and Advair inhaler at urgent care 1 month ago.  Patient referred to allergy clinic for further evaluation of asthma.  Patient was unable to get inhaler.  Patient notes that symptoms have improved.  Patient notes severe allergies growing up but moved away for college in [place] and did not have allergies at that time.  Discussed daily allergy medication with Flonase/albuterol as needed.    Endometrial polyp  Patient [facility]  [facility] had endometrial polyp, review of previous diagnosis shows cervical lesions.  Vaginal pathogen was positive for BV.  Review of previous PCP notes shows concerns about chronic vaginal discharge and chronic pelvic pain.  Will patient notes that she still is having symptoms of vaginal discharge.  We will get vaginal pathogen swab and refer to GYN for further evaluation.    Thalassemia minor  Patient with neutrophilia, seen by oncology 2/28/2022.  Oncology recommended recheck iron studies in 3 to 6 months.  If iron continues to drop, recommend GI evaluation.  We will repeat iron labs.    Back pain/fibromyalgia  Patient following with physiatry for further evaluation of fibromyalgia back pain and hip pain.  She continues on Lyrica 150 mg 3 times daily.  Physiatry has recommended follow-up with physical therapy.    Depression/anxiety  Physiatry has recommended referral back to psychiatry for further evaluation of depression, fibromyalgia and stress management.    Abdominal pain  Patient presents today for concern about diffuse abdominal pain.  Patient notes that she is frequently constipated.  Patient denies any red or black stool.  Patient notes nausea but nonspecific, occurs multiple times per day not associated with  "meals.  Does drink multiple cups of coffee per day but denies alcohol or spicy foods.  Patient notes no specific area of pain.      ROS:  Gen: no fevers/chills  Pulm: no sob, no cough  CV: no chest pain, no palpitations  GI: no nausea/vomiting, no diarrhea        Objective:     Exam:  /72 (BP Location: Right arm, Patient Position: Sitting, BP Cuff Size: Adult)   Pulse 84   Temp 36.2 °C (97.2 °F) (Temporal)   Resp 16   Ht 1.778 m (5' 10\")   Wt 113 kg (249 lb)   SpO2 96%   BMI 35.73 kg/m²  Body mass index is 35.73 kg/m².    Gen: Alert and oriented, No apparent distress.  Neck: Neck is supple without lymphadenopathy.  Lungs: Normal effort, CTA bilaterally, no wheezes, rhonchi, or rales  CV: Regular rate and rhythm. No murmurs, rubs, or gallops.  Ext: No clubbing, cyanosis, edema.      Assessment & Plan:     27 y.o. female with the following -     1. Thalassemia minor    2. Leukocytosis, unspecified type  Chronic, stable.  We will recheck iron levels per oncology suggestion.  - IRON/TOTAL IRON BIND; Future  - FERRITIN; Future  - CBC WITH DIFFERENTIAL; Future  - Comp Metabolic Panel; Future  - MAGNESIUM; Future    3. Seasonal allergies  Discussed treatment for allergies/asthma with antihistamine, Flonase, albuterol as needed.  Patient advised to follow-up if no relief.  - albuterol 108 (90 Base) MCG/ACT Aero Soln inhalation aerosol; Inhale 2 Puffs every four hours as needed for Shortness of Breath.  Dispense: 1 Each; Refill: 0    4. Pelvic pain  - Referral to Gynecology  - VAGINAL PATHOGENS DNA PANEL; Future    5. Vaginal discharge  Acute on chronic, stable.  We will check for clearance of BV.    6. Fibromyalgia  Chronic, stable.  Patient continues to follow with physiatry.    7. Mixed anxiety depressive disorder  Chronic, stable.  Patient has been referred to psychiatry but no follow-up.    8. Mild intermittent asthma with acute exacerbation  Chronic, stable.    9. Generalized abdominal pain  Acute on " chronic, stable.  Patient encouraged to keep a food diary and journal for more specific evaluation of her abdominal pain.  Patient discussed red flag symptoms that would warrant urgent follow-up or sooner evaluation.    Return in about 3 months (around 12/9/2022).    Please note that this dictation was created using voice recognition software. I have made every reasonable attempt to correct obvious errors, but I expect that there are errors of grammar and possibly content that I did not discover before finalizing the note.

## 2022-09-13 ENCOUNTER — HOSPITAL ENCOUNTER (OUTPATIENT)
Dept: LAB | Facility: MEDICAL CENTER | Age: 27
End: 2022-09-13
Attending: STUDENT IN AN ORGANIZED HEALTH CARE EDUCATION/TRAINING PROGRAM
Payer: COMMERCIAL

## 2022-09-13 DIAGNOSIS — D72.829 LEUKOCYTOSIS, UNSPECIFIED TYPE: ICD-10-CM

## 2022-09-13 LAB
ALBUMIN SERPL BCP-MCNC: 3.8 G/DL (ref 3.2–4.9)
ALBUMIN/GLOB SERPL: 1.1 G/DL
ALP SERPL-CCNC: 108 U/L (ref 30–99)
ALT SERPL-CCNC: 108 U/L (ref 2–50)
ANION GAP SERPL CALC-SCNC: 9 MMOL/L (ref 7–16)
AST SERPL-CCNC: 83 U/L (ref 12–45)
BASOPHILS # BLD AUTO: 1.8 % (ref 0–1.8)
BASOPHILS # BLD: 0.14 K/UL (ref 0–0.12)
BILIRUB SERPL-MCNC: 0.5 MG/DL (ref 0.1–1.5)
BUN SERPL-MCNC: 9 MG/DL (ref 8–22)
CALCIUM SERPL-MCNC: 8.8 MG/DL (ref 8.5–10.5)
CHLORIDE SERPL-SCNC: 103 MMOL/L (ref 96–112)
CO2 SERPL-SCNC: 25 MMOL/L (ref 20–33)
CREAT SERPL-MCNC: 0.76 MG/DL (ref 0.5–1.4)
EOSINOPHIL # BLD AUTO: 0.21 K/UL (ref 0–0.51)
EOSINOPHIL NFR BLD: 2.6 % (ref 0–6.9)
ERYTHROCYTE [DISTWIDTH] IN BLOOD BY AUTOMATED COUNT: 37.2 FL (ref 35.9–50)
FERRITIN SERPL-MCNC: 93.8 NG/ML (ref 10–291)
GFR SERPLBLD CREATININE-BSD FMLA CKD-EPI: 108 ML/MIN/1.73 M 2
GLOBULIN SER CALC-MCNC: 3.6 G/DL (ref 1.9–3.5)
GLUCOSE SERPL-MCNC: 148 MG/DL (ref 65–99)
HCT VFR BLD AUTO: 36.1 % (ref 37–47)
HGB BLD-MCNC: 11.1 G/DL (ref 12–16)
IMM GRANULOCYTES # BLD AUTO: 0.03 K/UL (ref 0–0.11)
IMM GRANULOCYTES NFR BLD AUTO: 0.4 % (ref 0–0.9)
IRON SATN MFR SERPL: 20 % (ref 15–55)
IRON SERPL-MCNC: 75 UG/DL (ref 40–170)
LYMPHOCYTES # BLD AUTO: 4.12 K/UL (ref 1–4.8)
LYMPHOCYTES NFR BLD: 51.7 % (ref 22–41)
MAGNESIUM SERPL-MCNC: 1.9 MG/DL (ref 1.5–2.5)
MCH RBC QN AUTO: 19.1 PG (ref 27–33)
MCHC RBC AUTO-ENTMCNC: 30.7 G/DL (ref 33.6–35)
MCV RBC AUTO: 62.2 FL (ref 81.4–97.8)
MONOCYTES # BLD AUTO: 0.6 K/UL (ref 0–0.85)
MONOCYTES NFR BLD AUTO: 7.5 % (ref 0–13.4)
NEUTROPHILS # BLD AUTO: 2.87 K/UL (ref 2–7.15)
NEUTROPHILS NFR BLD: 36 % (ref 44–72)
NRBC # BLD AUTO: 0 K/UL
NRBC BLD-RTO: 0 /100 WBC
PLATELET # BLD AUTO: 366 K/UL (ref 164–446)
PMV BLD AUTO: 10.9 FL (ref 9–12.9)
POTASSIUM SERPL-SCNC: 4.3 MMOL/L (ref 3.6–5.5)
PROT SERPL-MCNC: 7.4 G/DL (ref 6–8.2)
RBC # BLD AUTO: 5.8 M/UL (ref 4.2–5.4)
SODIUM SERPL-SCNC: 137 MMOL/L (ref 135–145)
TIBC SERPL-MCNC: 367 UG/DL (ref 250–450)
UIBC SERPL-MCNC: 292 UG/DL (ref 110–370)
WBC # BLD AUTO: 8 K/UL (ref 4.8–10.8)

## 2022-09-13 PROCEDURE — 83540 ASSAY OF IRON: CPT

## 2022-09-13 PROCEDURE — 80053 COMPREHEN METABOLIC PANEL: CPT

## 2022-09-13 PROCEDURE — 85025 COMPLETE CBC W/AUTO DIFF WBC: CPT

## 2022-09-13 PROCEDURE — 83735 ASSAY OF MAGNESIUM: CPT

## 2022-09-13 PROCEDURE — 82728 ASSAY OF FERRITIN: CPT

## 2022-09-13 PROCEDURE — 36415 COLL VENOUS BLD VENIPUNCTURE: CPT

## 2022-09-13 PROCEDURE — 83550 IRON BINDING TEST: CPT

## 2022-10-11 ENCOUNTER — OFFICE VISIT (OUTPATIENT)
Dept: PHYSICAL MEDICINE AND REHAB | Facility: MEDICAL CENTER | Age: 27
End: 2022-10-11
Payer: COMMERCIAL

## 2022-10-11 ENCOUNTER — HOSPITAL ENCOUNTER (OUTPATIENT)
Dept: RADIOLOGY | Facility: MEDICAL CENTER | Age: 27
End: 2022-10-11
Attending: PHYSICAL MEDICINE & REHABILITATION
Payer: COMMERCIAL

## 2022-10-11 VITALS
SYSTOLIC BLOOD PRESSURE: 122 MMHG | TEMPERATURE: 97.3 F | HEART RATE: 68 BPM | OXYGEN SATURATION: 96 % | BODY MASS INDEX: 34.07 KG/M2 | WEIGHT: 238 LBS | HEIGHT: 70 IN | DIASTOLIC BLOOD PRESSURE: 84 MMHG

## 2022-10-11 DIAGNOSIS — Z79.899 MEDICATION MANAGEMENT: ICD-10-CM

## 2022-10-11 DIAGNOSIS — M79.7 FIBROMYALGIA: ICD-10-CM

## 2022-10-11 DIAGNOSIS — M41.24 OTHER IDIOPATHIC SCOLIOSIS, THORACIC REGION: ICD-10-CM

## 2022-10-11 DIAGNOSIS — M54.50 LOW BACK PAIN WITH RADIATION: ICD-10-CM

## 2022-10-11 DIAGNOSIS — M43.17 SPONDYLOLISTHESIS OF LUMBOSACRAL REGION: ICD-10-CM

## 2022-10-11 DIAGNOSIS — M54.6 THORACIC SPINE PAIN: ICD-10-CM

## 2022-10-11 DIAGNOSIS — R53.83 OTHER FATIGUE: ICD-10-CM

## 2022-10-11 DIAGNOSIS — R74.01 TRANSAMINITIS: ICD-10-CM

## 2022-10-11 PROCEDURE — 72072 X-RAY EXAM THORAC SPINE 3VWS: CPT

## 2022-10-11 PROCEDURE — 99214 OFFICE O/P EST MOD 30 MIN: CPT | Performed by: PHYSICAL MEDICINE & REHABILITATION

## 2022-10-11 RX ORDER — PREGABALIN 150 MG/1
150 CAPSULE ORAL 3 TIMES DAILY
COMMUNITY
End: 2022-10-11

## 2022-10-11 RX ORDER — PREGABALIN 150 MG/1
150 CAPSULE ORAL 3 TIMES DAILY
Qty: 90 CAPSULE | Refills: 2 | Status: SHIPPED | OUTPATIENT
Start: 2022-10-20 | End: 2022-12-13 | Stop reason: SDUPTHER

## 2022-10-11 ASSESSMENT — PATIENT HEALTH QUESTIONNAIRE - PHQ9
5. POOR APPETITE OR OVEREATING: 1 - SEVERAL DAYS
SUM OF ALL RESPONSES TO PHQ QUESTIONS 1-9: 10
CLINICAL INTERPRETATION OF PHQ2 SCORE: 4

## 2022-10-11 ASSESSMENT — FIBROSIS 4 INDEX: FIB4 SCORE: 0.63

## 2022-10-11 ASSESSMENT — PAIN SCALES - GENERAL: PAINLEVEL: 6=MODERATE PAIN

## 2022-10-12 NOTE — PROGRESS NOTES
Follow-up patient note    Physiatry (physical medicine and  Rehabilitation), interventional spine and sports medicine    Date of Service: 10/11/2022      Chief complaint:   Chief Complaint   Patient presents with    Follow-Up     Low back pain with radiation       HISTORY    HPI: Carmen Hebert 27 y.o. female who presents today for follow-up evaluation of pain complaints.  She has a diagnosis of fibromyalgia.      In the last week, she has been more fatigued.  Seems to be sleeping okay.  Not sure why.    She was not able to start physical therapy for low back pain.  After last visit, not sure what happened with the referral.    Continuing to take Lyrica 150 mg p.o. 3 times daily.  This does seem to be helpful.    Home program for exercise has not been reestablished.    She continues to have low back pain.  Intermittently has radicular symptoms but not on a regular basis.  No significant focal weakness and no report of numbness or tingling in the feet.  She does report increased aching pain in her thoracic spine.  She reports that her breast size is a double-J.    Pain is 6/10 on the NRS.    Medical records review:  I reviewed the note from the referring provider Mariel Paula A.P.* dated 03/10/2020.  The patient was seen to establish care.  Report that she was diagnosed in 2017 with fibromyalgia, in [facility].  Labs were tested including TSH, HbA1C, vitamin D, 25 hydroxy, HIV ag/ab, T. Pallidum, chlamydia.  She was referred to cardiology for evaluation of palpitations with Holter monitor.  Medications refilled included zoloft 100mg, lyrica 75mg daily, cyclobenzaprine 5mg.  Referral to physiatry placed    Previous treatments:    Physical Therapy: Yes    Medications the patient is tried: lyrica, cyclobenzeprine, amitriptyline, tried savella    Previous interventions: none    Previous surgeries to relieve the above pain:  none    PHQ-9: 10    ROS:   Gen: nausea, history of COVID  Eyes: intermittent  blurry vision  CV: anxiety, palpitation (inappropriate sinus tachycardia)  Psych: depression  Red Flags ROS:   Fever, Chills, Sweats: Denies  Involuntary Weight Loss: Denies  Bladder Incontinence: Denies  Bowel Incontinence: Denies  Saddle Anesthesia: Denies    All other systems reviewed and negative.       PMHx:   Past Medical History:   Diagnosis Date    Anxiety     Asthma     Depression     Fibromyalgia 2017    Thalassemia minor        PSHx:   History reviewed. No pertinent surgical history.    Family history   Family History   Problem Relation Age of Onset    Fibromyalgia Mother     Lung Disease Mother         asthma r/o    Depression Mother     Anxiety disorder Mother     ADD / ADHD Father     Depression Father          Medications:   Current Outpatient Medications   Medication    [START ON 10/20/2022] pregabalin (LYRICA) 150 MG Cap    BELL 1.5/30 1.5-30 MG-MCG Tab    albuterol 108 (90 Base) MCG/ACT Aero Soln inhalation aerosol    propranolol LA (INDERAL LA) 60 MG CAPSULE SR 24 HR    tizanidine (ZANAFLEX) 4 MG Tab    sertraline (ZOLOFT) 100 MG Tab    ALPRAZolam (XANAX) 0.25 MG Tab    fluticasone (FLONASE) 50 MCG/ACT nasal spray     No current facility-administered medications for this visit.       Allergies:   Allergies   Allergen Reactions    Corn-Related Products Hives, Itching, Myalgia and Nausea    Dairy Food Allergy Diarrhea, Myalgia and Nausea    Gluten Meal Cough, Diarrhea, Hives, Itching, Myalgia, Nausea, Rash, Runny Nose, Shortness of Breath and Vomiting    Sesame Seed (Diagnostic) Hives, Rash and Itching     rash    Soy Allergy Hives, Itching, Myalgia and Nausea       Social Hx:   Social History     Socioeconomic History    Marital status: Single     Spouse name: Not on file    Number of children: Not on file    Years of education: Not on file    Highest education level: Not on file   Occupational History    Not on file   Tobacco Use    Smoking status: Never    Smokeless tobacco: Never   Vaping Use  "   Vaping Use: Never used   Substance and Sexual Activity    Alcohol use: Yes     Comment: approx 1 drink per week    Drug use: Not Currently     Types: Marijuana     Comment: rarely    Sexual activity: Yes     Partners: Male     Birth control/protection: Pill   Other Topics Concern     Service No    Blood Transfusions No    Caffeine Concern No    Occupational Exposure No    Hobby Hazards No    Sleep Concern Yes    Stress Concern Yes    Weight Concern Yes    Special Diet Yes    Back Care No    Exercise No    Bike Helmet Yes    Seat Belt Yes    Self-Exams Yes   Social History Narrative    Not on file     Social Determinants of Health     Financial Resource Strain: Not on file   Food Insecurity: Not on file   Transportation Needs: Not on file   Physical Activity: Not on file   Stress: Not on file   Social Connections: Not on file   Intimate Partner Violence: Not on file   Housing Stability: Not on file         EXAMINATION     Physical Exam:   Vitals: /84 (BP Location: Right arm, Patient Position: Sitting, BP Cuff Size: Adult)   Pulse 68   Temp 36.3 °C (97.3 °F) (Temporal)   Ht 1.778 m (5' 10\")   Wt 108 kg (238 lb)   SpO2 96%     Constitutional:   Body Habitus: Body mass index is 34.15 kg/m².  Cooperation: Fully cooperates with exam  Appearance: Well-groomed, well-nourished, not disheveled, in no acute distress    Eyes: No scleral icterus, no proptosis     ENT -no obvious auditory deficits, wearing a face mask    Skin -no rashes or lesions noted     Respiratory-  breathing comfortable on room air, no audible wheezing    Cardiovascular- no lower extremity edema is noted.     Psychiatric- alert and oriented ×3. Normal affect.     Gait - normal gait, no use of ambulatory device    Musculoskeletal -     Thoracic/Lumbar Spine/Sacral Spine/Hips   Inspection: No evidence of atrophy in bilateral lower extremities throughout     Tenderness to palpation over thoracic paraspinals and lumbar paraspinals.    No " focal motor or sensory deficits in the lower extremities    Reflexes are 2+ biceps, triceps, patella and achilles bilaterally    Seated straight leg raise is negative bilaterally      MEDICAL DECISION MAKING    Medical records review: see under HPI section.     DATA    Labs:   Vitamin D, 25 hydroxy 03/10/2020 : 38  TSH: 2.610 on 03/10/2020  Lab Results   Component Value Date/Time    SODIUM 137 09/13/2022 09:33 AM    POTASSIUM 4.3 09/13/2022 09:33 AM    CHLORIDE 103 09/13/2022 09:33 AM    CO2 25 09/13/2022 09:33 AM    ANION 9.0 09/13/2022 09:33 AM    GLUCOSE 148 (H) 09/13/2022 09:33 AM    BUN 9 09/13/2022 09:33 AM    CREATININE 0.76 09/13/2022 09:33 AM    CALCIUM 8.8 09/13/2022 09:33 AM    ASTSGOT 83 (H) 09/13/2022 09:33 AM    ALTSGPT 108 (H) 09/13/2022 09:33 AM    TBILIRUBIN 0.5 09/13/2022 09:33 AM    ALBUMIN 3.8 09/13/2022 09:33 AM    TOTPROTEIN 7.4 09/13/2022 09:33 AM    GLOBULIN 3.6 (H) 09/13/2022 09:33 AM    AGRATIO 1.1 09/13/2022 09:33 AM       No results found for: PROTHROMBTM, INR     Lab Results   Component Value Date/Time    WBC 8.0 09/13/2022 09:33 AM    RBC 5.80 (H) 09/13/2022 09:33 AM    HEMOGLOBIN 11.1 (L) 09/13/2022 09:33 AM    HEMATOCRIT 36.1 (L) 09/13/2022 09:33 AM    MCV 62.2 (L) 09/13/2022 09:33 AM    MCH 19.1 (L) 09/13/2022 09:33 AM    MCHC 30.7 (L) 09/13/2022 09:33 AM    MPV 10.9 09/13/2022 09:33 AM    NEUTSPOLYS 36.00 (L) 09/13/2022 09:33 AM    LYMPHOCYTES 51.70 (H) 09/13/2022 09:33 AM    MONOCYTES 7.50 09/13/2022 09:33 AM    EOSINOPHILS 2.60 09/13/2022 09:33 AM    BASOPHILS 1.80 09/13/2022 09:33 AM    HYPOCHROMIA 1+ 07/13/2021 08:29 AM    ANISOCYTOSIS 2+ (A) 07/13/2021 08:29 AM        Lab Results   Component Value Date/Time    HBA1C 5.5 03/10/2020 05:06 PM        Imaging: I personally reviewed following images, these are my reads  Xray lumbar spine July 8. 2022  There is note of levoscoliosis.  Spondylolisthesis at L5-S1 measures 6 mm in extension and 8 mm in flexion.  Suspicious for possible  pars defects.    Xray cervical spine 08/12/2021  There is reversal of cervical lordosis  No significant degenerative changes    Xray thoracic 07/31/2020  There is mild curvative of the thoracic spine, convex left.  [place] at T3-4    MRI cervical spine on 05/22/2020  There is not of mild loss of cervical lordosis.  No significant degenerative changes, no cervical disc herniations, no central or foraminal stenosis in the cervical spine.     IMAGING radiology reads. I reviewed the following radiology reads     Xray lumbar spine July 8, 2022  IMPRESSION:  1.  Anterior subluxation at L5-S1 which measures 6 mm with extension and 8 mm with flexion. There are possible bilateral pars defects at that level.  2.  Convex left scoliotic curvature.      Xray cervical spine 08/12/2021  Impression: No acute fracture is identified    MRI cervical spine on 05/22/2020  Unremarkable pre and postcontrast MR examination of the cervical spine except for loss of cervical lordosis              Xray thoracic 07/31/2020        IMPRESSION:   1.  No acute findings.  2.  Mild spinal curvature, convex left, in the upper thoracic spine                                                                                                                                                         Diagnosis   Visit Diagnoses     ICD-10-CM   1. Thoracic spine pain  M54.6   2. Other idiopathic scoliosis, thoracic region  M41.24   3. Fibromyalgia  M79.7   4. Other fatigue  R53.83   5. Transaminitis  R74.01   6. Medication management  Z79.899   7. Low back pain with radiation  M54.50   8. Spondylolisthesis of lumbosacral region  M43.17             ASSESSMENT:  Carmen Schultecasey Hebert 27 y.o. female seen for above     Caremn was seen today for follow-up.    Diagnoses and all orders for this visit:    Thoracic spine pain  -     DX-THORACIC SPINE-WITH SWIMMERS VIEW; Future  -     Referral to Physical Therapy    Other idiopathic scoliosis, thoracic region  -      DX-THORACIC SPINE-WITH SWIMMERS VIEW; Future    Fibromyalgia  -     pregabalin (LYRICA) 150 MG Cap; Take 1 Capsule by mouth in the morning, at noon, and at bedtime for 90 days.    Other fatigue  -     TSH WITH REFLEX TO FT4; Future    Transaminitis  -     Comp Metabolic Panel; Future    Medication management  -     Comp Metabolic Panel; Future    Low back pain with radiation  -     Referral to Physical Therapy    Spondylolisthesis of lumbosacral region  -     Referral to Physical Therapy         X-ray of the thoracic spine ordered.  Reviewed lumbar spine films with spondylolisthesis and slight instability on flexion extension.  Possible pars defects.  Plan to start with physical therapy.  Discussed referral back to physical therapy to work on both thoracic spine and lumbar spine.  Reviewed most recent labs that were performed with her PCP.  There is mild elevation in liver function test.  I have ordered a repeat study.  She does have mild chronic anemia.  Ordered TSH.  These are all within normal limits it may be that her increased fatigue is related to her fibromyalgia.  But would want to assess for other causes first.  Continue Lyrica 100 mg p.o. 3 times daily.  6.   Follow-up with her PCP.  I have copied her PCP on the most recent lab work        Follow-up: Return in about 2 months (around 12/11/2022).    Thank you very much for asking me to participate in Carmen Hebert's care.  Please contact me with any questions or concerns.      Please note that this dictation was created using voice recognition software. I have made every reasonable attempt to correct obvious errors but there may be errors of grammar and content that I may have overlooked prior to finalization of this note.      Mahesh Crane MD  Physical Medicine and Rehabilitation  Interventional Spine and Sports Physiatry  [facility]     CC: KODY Marquez

## 2022-10-19 ENCOUNTER — HOSPITAL ENCOUNTER (OUTPATIENT)
Facility: MEDICAL CENTER | Age: 27
End: 2022-10-19
Attending: PHYSICIAN ASSISTANT
Payer: COMMERCIAL

## 2022-10-19 ENCOUNTER — OFFICE VISIT (OUTPATIENT)
Dept: URGENT CARE | Facility: CLINIC | Age: 27
End: 2022-10-19
Payer: COMMERCIAL

## 2022-10-19 VITALS
HEART RATE: 98 BPM | HEIGHT: 70 IN | BODY MASS INDEX: 34.79 KG/M2 | SYSTOLIC BLOOD PRESSURE: 112 MMHG | RESPIRATION RATE: 14 BRPM | TEMPERATURE: 97.3 F | DIASTOLIC BLOOD PRESSURE: 74 MMHG | WEIGHT: 243 LBS | OXYGEN SATURATION: 94 %

## 2022-10-19 DIAGNOSIS — J03.90 EXUDATIVE TONSILLITIS: ICD-10-CM

## 2022-10-19 DIAGNOSIS — B27.90 INFECTIOUS MONONUCLEOSIS WITHOUT COMPLICATION, INFECTIOUS MONONUCLEOSIS DUE TO UNSPECIFIED ORGANISM: ICD-10-CM

## 2022-10-19 LAB
EXTERNAL QUALITY CONTROL: NORMAL
FLUAV+FLUBV AG SPEC QL IA: NEGATIVE
HETEROPH AB SER QL LA: POSITIVE
INT CON NEG: NEGATIVE
INT CON POS: POSITIVE
S PYO AG THROAT QL: NEGATIVE
SARS-COV+SARS-COV-2 AG RESP QL IA.RAPID: NEGATIVE

## 2022-10-19 PROCEDURE — 87426 SARSCOV CORONAVIRUS AG IA: CPT | Performed by: PHYSICIAN ASSISTANT

## 2022-10-19 PROCEDURE — 87880 STREP A ASSAY W/OPTIC: CPT | Performed by: PHYSICIAN ASSISTANT

## 2022-10-19 PROCEDURE — 87804 INFLUENZA ASSAY W/OPTIC: CPT | Performed by: PHYSICIAN ASSISTANT

## 2022-10-19 PROCEDURE — U0003 INFECTIOUS AGENT DETECTION BY NUCLEIC ACID (DNA OR RNA); SEVERE ACUTE RESPIRATORY SYNDROME CORONAVIRUS 2 (SARS-COV-2) (CORONAVIRUS DISEASE [COVID-19]), AMPLIFIED PROBE TECHNIQUE, MAKING USE OF HIGH THROUGHPUT TECHNOLOGIES AS DESCRIBED BY CMS-2020-01-R: HCPCS

## 2022-10-19 PROCEDURE — U0005 INFEC AGEN DETEC AMPLI PROBE: HCPCS

## 2022-10-19 PROCEDURE — 86308 HETEROPHILE ANTIBODY SCREEN: CPT | Performed by: PHYSICIAN ASSISTANT

## 2022-10-19 PROCEDURE — 99214 OFFICE O/P EST MOD 30 MIN: CPT | Performed by: PHYSICIAN ASSISTANT

## 2022-10-19 RX ORDER — METHYLPREDNISOLONE 4 MG/1
TABLET ORAL
Qty: 21 TABLET | Refills: 0 | Status: SHIPPED | OUTPATIENT
Start: 2022-10-19 | End: 2022-12-13

## 2022-10-19 ASSESSMENT — ENCOUNTER SYMPTOMS
DIZZINESS: 1
SORE THROAT: 1
FEVER: 1
VOMITING: 0
COUGH: 1
CHILLS: 1
FOCAL WEAKNESS: 0
NAUSEA: 0
HEADACHES: 1
ABDOMINAL PAIN: 0
SPEECH CHANGE: 0
SHORTNESS OF BREATH: 0
WHEEZING: 0
SENSORY CHANGE: 0
MYALGIAS: 1
SPUTUM PRODUCTION: 0
DIARRHEA: 0
TINGLING: 0

## 2022-10-19 ASSESSMENT — FIBROSIS 4 INDEX: FIB4 SCORE: 0.63

## 2022-10-19 NOTE — PROGRESS NOTES
"Subjective     Carmen Caprice Hebert is a 27 y.o. female who presents with Fever ((102.9) x 3 days with congestion, sore throat, mild cough, nausea, bodyaches, S.O.B. and chills.  Hx of fibromyalgia and Asthma. )            Fever   This is a new problem. Episode onset: 3 days. last fever was last nightt. The maximum temperature noted was 102 to 102.9 F. Associated symptoms include congestion, coughing, headaches, muscle aches, sleepiness and a sore throat. Pertinent negatives include no abdominal pain, diarrhea, ear pain, nausea, rash, urinary pain, vomiting or wheezing. Associated symptoms comments: \"Bruised pain in chest wall- pain with palpation.\" Neck pain with movement. . She has tried nothing for the symptoms.   Risk factors: no recent sickness, no recent travel and no sick contacts          Past Medical History:   Diagnosis Date    Anxiety     Asthma     Depression     Fibromyalgia 2017    Thalassemia minor      No past surgical history on file.      Family History   Problem Relation Age of Onset    Fibromyalgia Mother     Lung Disease Mother         asthma r/o    Depression Mother     Anxiety disorder Mother     ADD / ADHD Father     Depression Father          Corn-related products, Dairy food allergy, Gluten meal, Sesame seed (diagnostic), and Soy allergy      Medications, Allergies, and current problem list reviewed today in Epic      Review of Systems   Constitutional:  Positive for chills, fever and malaise/fatigue.   HENT:  Positive for congestion and sore throat. Negative for ear pain.    Respiratory:  Positive for cough. Negative for sputum production, shortness of breath and wheezing.    Gastrointestinal:  Negative for abdominal pain, diarrhea, nausea and vomiting.   Genitourinary:  Negative for dysuria, frequency and urgency.   Musculoskeletal:  Positive for myalgias. Negative for joint pain.        Chest wall pain   Skin:  Negative for rash.   Neurological:  Positive for dizziness and " "headaches. Negative for tingling, sensory change, speech change and focal weakness.        All other systems reviewed and are negative.       Objective     /74   Pulse 98   Temp 36.3 °C (97.3 °F) (Temporal)   Resp 14   Ht 1.778 m (5' 10\")   Wt 110 kg (243 lb)   SpO2 94%   BMI 34.87 kg/m²      Physical Exam  Constitutional:       General: She is not in acute distress.     Appearance: Normal appearance. She is not ill-appearing.   HENT:      Head: Normocephalic and atraumatic.      Right Ear: Tympanic membrane and external ear normal.      Left Ear: Tympanic membrane, ear canal and external ear normal.      Nose: Congestion present.      Mouth/Throat:      Mouth: Mucous membranes are moist.      Pharynx: Oropharyngeal exudate and posterior oropharyngeal erythema present.   Eyes:      Conjunctiva/sclera: Conjunctivae normal.   Cardiovascular:      Rate and Rhythm: Normal rate and regular rhythm.      Heart sounds: Normal heart sounds.   Pulmonary:      Effort: Pulmonary effort is normal. No respiratory distress.      Breath sounds: Normal breath sounds. No wheezing, rhonchi or rales.   Chest:      Chest wall: Tenderness (sternal TTP.) present.   Lymphadenopathy:      Cervical: Cervical adenopathy (mild. L>R) present.   Skin:     General: Skin is warm and dry.      Findings: No rash.   Neurological:      General: No focal deficit present.      Mental Status: She is alert and oriented to person, place, and time.   Psychiatric:         Mood and Affect: Mood normal.         Behavior: Behavior normal.         Thought Content: Thought content normal.         Judgment: Judgment normal.                           Assessment & Plan        1. Exudative tonsillitis  POCT Rapid Strep A    POCT SARS-COV Antigen RJ (Symptomatic only)    POCT Influenza A/B    POCT Mononucleosis (mono)    methylPREDNISolone (MEDROL DOSEPAK) 4 MG Tablet Therapy Pack    SARS-CoV-2 PCR (24 hour In-House): Collect [facility] swab in [facility]      2. " Infectious mononucleosis without complication, infectious mononucleosis due to unspecified organism  methylPREDNISolone (MEDROL DOSEPAK) 4 MG Tablet Therapy Pack    SARS-CoV-2 PCR (24 hour In-House): Collect NP swab in [facility]        Poct strep- negative  Poct COVID- negative   POCT mono- POSITIVE  PCR COVID- pending.    RX Medrol Dosepak    -  AVS printed for Mononucleosis with home care instructions and red flags and warning signs that warrant ER evaluation or immediate follow-up.      Isolation guidelines, conservative measures and ER precautions discussed.       Differential diagnoses, Supportive care, and indications for immediate follow-up discussed with patient.   Pathogenesis of diagnosis discussed including typical length and natural progression.   Instructed to return to clinic or nearest emergency department for any change in condition, further concerns, or worsening of symptoms.        The patient demonstrated a good understanding and agreed with the treatment plan.      Johanne Arguello P.A.-C.

## 2022-10-19 NOTE — LETTER
October 19, 2022         Patient: Carmen Hebert   YOB: 1995   Date of Visit: 10/19/2022           To Whom it May Concern:    Carmen Hebert was seen in my clinic on 10/19/2022. She should be excused from work from 10/19/2022-10/21/2022 due to medical reasons.    If you have any questions or concerns, please don't hesitate to call.        Sincerely,           Johanne Arguello P.A.-C.  Electronically Signed

## 2022-10-20 DIAGNOSIS — B27.90 INFECTIOUS MONONUCLEOSIS WITHOUT COMPLICATION, INFECTIOUS MONONUCLEOSIS DUE TO UNSPECIFIED ORGANISM: ICD-10-CM

## 2022-10-20 DIAGNOSIS — J03.90 EXUDATIVE TONSILLITIS: ICD-10-CM

## 2022-10-21 LAB
SARS-COV-2 RNA RESP QL NAA+PROBE: NOTDETECTED
SPECIMEN SOURCE: NORMAL

## 2022-12-02 ENCOUNTER — PHYSICAL THERAPY (OUTPATIENT)
Dept: PHYSICAL THERAPY | Facility: REHABILITATION | Age: 27
End: 2022-12-02
Attending: PHYSICAL MEDICINE & REHABILITATION
Payer: COMMERCIAL

## 2022-12-02 DIAGNOSIS — M43.17 SPONDYLOLISTHESIS OF LUMBOSACRAL REGION: ICD-10-CM

## 2022-12-02 DIAGNOSIS — M54.6 THORACIC SPINE PAIN: ICD-10-CM

## 2022-12-02 DIAGNOSIS — M54.50 LOW BACK PAIN WITH RADIATION: ICD-10-CM

## 2022-12-02 PROCEDURE — 97162 PT EVAL MOD COMPLEX 30 MIN: CPT

## 2022-12-02 ASSESSMENT — ENCOUNTER SYMPTOMS
ALLEVIATING FACTORS: HEAT APPLICATION
ALLEVIATING FACTORS: COLD/ICE
EXACERBATED BY: SNEEZING
QUALITY: SHARP
QUALITY: BURNING
PAIN SCALE AT LOWEST: 3
EXACERBATED BY: COUGHING
PAIN SCALE: 8

## 2022-12-02 NOTE — OP THERAPY EVALUATION
Outpatient Physical Therapy  INITIAL EVALUATION    [facility] Physical Therapy [address].  [address]  [place] [address]  Phone:  [phone]  Fax:  [phone]    Date of Evaluation: 2022    Patient: Carmen Hebert  YOB: 1995  MRN: [id number]     Referring Provider: Mahesh Crane M.D.  [address]  [place] [address]   Referring Diagnosis Thoracic spine pain [M54.6];Low back pain with radiation [M54.50];Spondylolisthesis of lumbosacral region [M43.17]     Time Calculation  Start time: 0818  Stop time: 0900 Time Calculation (min): 42 minutes         Chief Complaint: Back Problem    Visit Diagnoses     ICD-10-CM   1. Thoracic spine pain  M54.6   2. Low back pain with radiation  M54.50   3. Spondylolisthesis of lumbosacral region  M43.17       Date of onset of impairment: 10/11/2022    Subjective:   History of Present Illness:     Mechanism of injury:  Pt is a 26 yo female presenting to PT with c/o back pain. Pt reports long hx of back pain, recent diagnosis of fibromyalgia in 2017. Reports having scoliosis and large breasts that cause forward flexed position. Works as , so requires sitting most of the day. Reports recent onset of NT in RLE that started ~1 week ago. R hip pain started ~1 month ago. Reports most difficulty with walking and sitting long periods of time. Mid/upper back pain symptoms are worst with standing during daily activities (laundry, cooking, etc.). Reports difficulty navigating stairs d/t pain. Pt goal is to return to yoga, but is too painful.  Sleep disturbance:  Difficulty falling asleep, interrupted sleep and non-restful sleep  Pain:     Current pain ratin    At best pain rating:  3    Quality:  Burning and sharp    Relieving factors:  Heat application and cold/ice (Sitting in comfortable position)    Aggravating factors:  Sneezing and coughing  Patient Goals:     Patient goals for therapy:  Decreased  pain    Past Medical History:   Diagnosis Date    Anxiety     Asthma     Depression     Fibromyalgia 2017    Thalassemia minor      No past surgical history on file.  Social History     Tobacco Use    Smoking status: Never    Smokeless tobacco: Never   Substance Use Topics    Alcohol use: Yes     Comment: approx 1 drink per week     Family and Occupational History     Socioeconomic History    Marital status: Single     Spouse name: Not on file    Number of children: Not on file    Years of education: Not on file    Highest education level: Not on file   Occupational History    Not on file       Objective     Active Range of Motion     Lumbar   Flexion: within functional limits (Pain upon standing)  Extension: decreased (Painful)  Left lateral flexion: Left lateral lumbar spine flexion: Painful.  Right lateral flexion: Right lateral lumbar spine flexion: Painful.  Left rotation: Back rotation left: Painful.  Right rotation: within functional limits    Joint Play   Spine     Central PA [place]        L3: hypermobile       L4: hypermobile       L5: hypermobile      Strength:      Left Hip   Planes of Motion   Flexion: 5  Extension: 4+ (Fatigued quickly)    Right Hip   Planes of Motion   Flexion: 5  Extension: 4+ (Fatigued quickly)    Left Ankle/Foot   Dorsiflexion: 5    Right Ankle/Foot   Dorsiflexion: 5    Additional Strength Details  Bridge hold: 30 seconds  SB Superman hold: 1:13  Abdominals: 5/5 - tested in 9090    Tests       Lumbar spine (right)     Positive slump.     Den LumbarTest   Static tests   Lying prone on elbows: Reported centralization      Therapeutic Exercises (CPT 63582):       Therapeutic Exercise Summary: HEP:  Prone press up, standing lumbar extension    Time-based treatments/modalities:           Assessment, Response and Plan:   Assessment details:  Pt is a 26 yo female presenting to PT w/ chronic mid back pain, as well as recent onset clinical findings suggestive of lumbar radiculopathy.  Pertinent clinical findings include decreased and painful lumbar ROM, and proximal hip weakness. Pt demo centralization of symptoms with extension positioning. Pt ed conducted regarding anatomy of spine and causes of radiculopathy. Recommend using lumbar support pillow while at work to facilitate extension positioning. Pt gave verbal understanding. Impairments are associated w/ difficulty completing ADLs, walking, and standing/sitting for long periods of time. The patient would benefit from skilled PT to address strength and mobility deficits in order to increase participation with daily activities and ADLs. The pt has a good prognosis d/t age and overall good health. The patient states she understands and agrees with the plan of care. HEP w/ handout was reviewed and provided to the pt.   Barriers to therapy:  None  Prognosis: good    Goals:   Short Term Goals:   1. Pt will be independent with HEP 3-5x per week for improving strength, ROM and decreasing pain  2. Pt will demo proper squat mechanics in order to  object from ground  3. Pt will report ability to tolerate sitting x 30 min with pain no greater than 3/10 in order to complete work tasks  4. Pt will report ability to stand 20 min or less with pain no greater than 3/10 in order to complete household chores  Short term goal time span:  2-4 weeks      Long Term Goals:    1. Pt will demo lumbar ROM WNL in order to perform ADLs with pain 2/10 or less  2. Pt will demo global hip strength 5/5 or better for improved stabilty and decreased pain  3. Pt will report ability to complete work shift with pain no greater than 3/10  4. Pt will demo parascapular strength 4+/5 or greater for improved postural stability  Long term goal time span:  4-6 weeks    Plan:   Therapy options:  Physical therapy treatment to continue  Planned therapy interventions:  E Stim Unattended (CPT 48715), Manual Therapy (CPT 70835), Neuromuscular Re-education (CPT 51195), Therapeutic  Activities (CPT 96278) and Therapeutic Exercise (CPT 96519)  Frequency:  2x week  Duration in visits:  12  Discussed with:  Patient    Functional Assessment Used  Zhang Andrea Low Back Pain and Disability Score: 54.17     Referring provider co-signature:  I have reviewed this plan of care and my co-signature certifies the need for services.    Certification Period: 12/02/2022 to  01/27/23    Physician Signature: ________________________________ Date: ______________

## 2022-12-04 DIAGNOSIS — R00.2 PALPITATIONS: ICD-10-CM

## 2022-12-06 NOTE — TELEPHONE ENCOUNTER
Is the patient due for a refill? Yes    Was the patient seen the past year? No    Date of last office visit: 3/3/2021    Does the patient have an upcoming appointment?  No    Provider to refill:AK    Does the patients insurance require a 100 day supply?  No

## 2022-12-07 RX ORDER — PROPRANOLOL HCL 60 MG
60 CAPSULE, EXTENDED RELEASE 24HR ORAL DAILY
Qty: 30 CAPSULE | Refills: 0 | Status: SHIPPED | OUTPATIENT
Start: 2022-12-07 | End: 2023-01-11 | Stop reason: SDUPTHER

## 2022-12-12 ENCOUNTER — PHYSICAL THERAPY (OUTPATIENT)
Dept: PHYSICAL THERAPY | Facility: REHABILITATION | Age: 27
End: 2022-12-12
Attending: PHYSICAL MEDICINE & REHABILITATION
Payer: COMMERCIAL

## 2022-12-12 DIAGNOSIS — M54.6 THORACIC SPINE PAIN: ICD-10-CM

## 2022-12-12 DIAGNOSIS — M54.50 LOW BACK PAIN WITH RADIATION: ICD-10-CM

## 2022-12-12 PROCEDURE — 97110 THERAPEUTIC EXERCISES: CPT

## 2022-12-12 PROCEDURE — 97140 MANUAL THERAPY 1/> REGIONS: CPT

## 2022-12-12 NOTE — OP THERAPY DAILY TREATMENT
"  Outpatient Physical Therapy  DAILY TREATMENT     [facility] Physical [address].  [address]  [place] [address]  Phone:  [phone]  Fax:  [phone]    Date: 12/12/2022    Patient: Carmen Hebert  YOB: 1995  MRN: [id number]     Time Calculation    Start time: 0816  Stop time: 0858 Time Calculation (min): 42 minutes         Chief Complaint: Back Problem    Visit #: 2    SUBJECTIVE:  Pt reports having worsening symptoms of LBP d/t sitting on floor for a week straight for elderly dog. Performed extensions that helped. Pain in L foot has reduced.     OBJECTIVE:  Current objective measures:           Therapeutic Exercises (CPT 87370):     1. Prone press elbows, x3'    2. Prone press up hands, 5x5\" hold    3. Open book, x10 ea    4. T/s ext foam roll, x1'    5. Thread the needle, x10 ea    6. Wall angels, x10, Added to HEP    7. Bird dog, Added to HEP    8. WITYs PTB, x10, Added to HEP      Therapeutic Exercise Summary: HEP:  Prone press up, standing lumbar extension, wall maxi, bird dog    Therapeutic Treatments and Modalities:     1. Manual Therapy (CPT 85563), L3-5 PAs grade 1-2, T4-10 PAs grade 2-3  Time-based treatments/modalities:    Physical Therapy Timed Treatment Charges  Manual therapy minutes (CPT 50208): 15 minutes  Therapeutic exercise minutes (CPT 68882): 27 minutes        ASSESSMENT:   Response to treatment: Progressed parascapular/postural strengthening exercises. Pt tolerated exercises well. Reported some discomfort with t/s rotation. Pt demo good stability during qped activities, able to maintain neutral hips throughout.     PLAN/RECOMMENDATIONS:   Plan for treatment: therapy treatment to continue next visit.  Planned interventions for next visit: continue with current treatment.         "

## 2022-12-13 ENCOUNTER — OFFICE VISIT (OUTPATIENT)
Dept: PHYSICAL MEDICINE AND REHAB | Facility: MEDICAL CENTER | Age: 27
End: 2022-12-13
Payer: COMMERCIAL

## 2022-12-13 VITALS
TEMPERATURE: 96.9 F | BODY MASS INDEX: 35.71 KG/M2 | OXYGEN SATURATION: 96 % | DIASTOLIC BLOOD PRESSURE: 60 MMHG | WEIGHT: 249.4 LBS | SYSTOLIC BLOOD PRESSURE: 124 MMHG | HEART RATE: 73 BPM | HEIGHT: 70 IN

## 2022-12-13 DIAGNOSIS — M41.24 OTHER IDIOPATHIC SCOLIOSIS, THORACIC REGION: ICD-10-CM

## 2022-12-13 DIAGNOSIS — M79.7 FIBROMYALGIA: ICD-10-CM

## 2022-12-13 DIAGNOSIS — F32.A DEPRESSION, UNSPECIFIED DEPRESSION TYPE: ICD-10-CM

## 2022-12-13 DIAGNOSIS — M54.50 LOW BACK PAIN WITH RADIATION: ICD-10-CM

## 2022-12-13 PROCEDURE — 99214 OFFICE O/P EST MOD 30 MIN: CPT | Performed by: PHYSICAL MEDICINE & REHABILITATION

## 2022-12-13 RX ORDER — PREGABALIN 150 MG/1
150 CAPSULE ORAL 3 TIMES DAILY
Qty: 90 CAPSULE | Refills: 1 | Status: SHIPPED | OUTPATIENT
Start: 2023-01-17 | End: 2023-03-14 | Stop reason: SDUPTHER

## 2022-12-13 ASSESSMENT — PAIN SCALES - GENERAL: PAINLEVEL: 5=MODERATE PAIN

## 2022-12-13 ASSESSMENT — PATIENT HEALTH QUESTIONNAIRE - PHQ9
CLINICAL INTERPRETATION OF PHQ2 SCORE: 3
5. POOR APPETITE OR OVEREATING: 2 - MORE THAN HALF THE DAYS
SUM OF ALL RESPONSES TO PHQ QUESTIONS 1-9: 10

## 2022-12-13 ASSESSMENT — FIBROSIS 4 INDEX: FIB4 SCORE: 0.63

## 2022-12-13 NOTE — PROGRESS NOTES
Follow-up patient note    Physiatry (physical medicine and  Rehabilitation), interventional spine and sports medicine    Date of Service: 2022      Chief complaint:   Chief Complaint   Patient presents with    Follow-Up     Back and hip hip       HISTORY    HPI: Carmen Hebert 27 y.o. female who presents today for follow-up evaluation of pain complaints.  She has a diagnosis of fibromyalgia.      Since the last visit, she has started PT.  Two visits so far.  This just started.  Less tingling in her foot so far.    She reports that she has not been able to get labs done yet.    Had to move out of her apartment due to mold.  Additionally, she has had mono.  Since the last visit, her dog .    Continuing to take Lyrica 150 mg p.o. 3 times daily.  No side effects.  Rarely takes tizanidine, this does help, but causes fatigue.    She does report increased aching pain in her thoracic spine, some twisting. She reports that her breast size is a double-J.    Pain is 5/10 on the NRS.    Medical records review:  I reviewed the note from the referring provider Mariel Paula A.P.* dated 03/10/2020.  The patient was seen to establish care.  Report that she was diagnosed in 2017 with fibromyalgia, in [facility].  Labs were tested including TSH, HbA1C, vitamin D, 25 hydroxy, HIV ag/ab, T. Pallidum, chlamydia.  She was referred to cardiology for evaluation of palpitations with Holter monitor.  Medications refilled included zoloft 100mg, lyrica 75mg daily, cyclobenzaprine 5mg.  Referral to physiatry placed    Previous treatments:    Physical Therapy: Yes    Medications the patient is tried: lyrica, cyclobenzeprine, amitriptyline, tried savella    Previous interventions: none    Previous surgeries to relieve the above pain:  none    PHQ-9: 10    ROS:   Gen: nausea, history of COVID  Eyes: intermittent blurry vision  CV: anxiety, palpitation (inappropriate sinus tachycardia)  Psych: depression  Red Flags ROS:    Fever, Chills, Sweats: Denies  Involuntary Weight Loss: Denies  Bladder Incontinence: Denies  Bowel Incontinence: Denies  Saddle Anesthesia: Denies    All other systems reviewed and negative.       PMHx:   Past Medical History:   Diagnosis Date    Anxiety     Asthma     Depression     Fibromyalgia 2017    Thalassemia minor        PSHx:   History reviewed. No pertinent surgical history.    Family history   Family History   Problem Relation Age of Onset    Fibromyalgia Mother     Lung Disease Mother         asthma r/o    Depression Mother     Anxiety disorder Mother     ADD / ADHD Father     Depression Father          Medications:   Current Outpatient Medications   Medication    [START ON 1/17/2023] pregabalin (LYRICA) 150 MG Cap    propranolol LA (INDERAL LA) 60 MG CAPSULE SR 24 HR    BELL 1.5/30 1.5-30 MG-MCG Tab    albuterol 108 (90 Base) MCG/ACT Aero Soln inhalation aerosol    tizanidine (ZANAFLEX) 4 MG Tab    sertraline (ZOLOFT) 100 MG Tab    ALPRAZolam (XANAX) 0.25 MG Tab    fluticasone (FLONASE) 50 MCG/ACT nasal spray     No current facility-administered medications for this visit.       Allergies:   Allergies   Allergen Reactions    Corn-Related Products Hives, Itching, Myalgia and Nausea    Dairy Food Allergy Diarrhea, Myalgia and Nausea    Gluten Meal Cough, Diarrhea, Hives, Itching, Myalgia, Nausea, Rash, Runny Nose, Shortness of Breath and Vomiting    Sesame Seed (Diagnostic) Hives, Rash and Itching     rash    Soy Allergy Hives, Itching, Myalgia and Nausea       Social Hx:   Social History     Socioeconomic History    Marital status: Single     Spouse name: Not on file    Number of children: Not on file    Years of education: Not on file    Highest education level: Not on file   Occupational History    Not on file   Tobacco Use    Smoking status: Never    Smokeless tobacco: Never   Vaping Use    Vaping Use: Never used   Substance and Sexual Activity    Alcohol use: Yes     Comment: approx 1 drink per  "week    Drug use: Not Currently     Types: Marijuana     Comment: rarely    Sexual activity: Yes     Partners: Male     Birth control/protection: Pill   Other Topics Concern     Service No    Blood Transfusions No    Caffeine Concern No    Occupational Exposure No    Hobby Hazards No    Sleep Concern Yes    Stress Concern Yes    Weight Concern Yes    Special Diet Yes    Back Care No    Exercise No    Bike Helmet Yes    Seat Belt Yes    Self-Exams Yes   Social History Narrative    Not on file     Social Determinants of Health     Financial Resource Strain: Not on file   Food Insecurity: Not on file   Transportation Needs: Not on file   Physical Activity: Not on file   Stress: Not on file   Social Connections: Not on file   Intimate Partner Violence: Not on file   Housing Stability: Not on file         EXAMINATION     Physical Exam:   Vitals: /60 (BP Location: Right arm, Patient Position: Sitting, BP Cuff Size: Adult long)   Pulse 73   Temp 36.1 °C (96.9 °F) (Temporal)   Ht 1.778 m (5' 10\")   Wt 113 kg (249 lb 6.4 oz)   SpO2 96%     Constitutional:   Body Habitus: Body mass index is 35.79 kg/m².  Cooperation: Fully cooperates with exam  Appearance: Well-groomed, well-nourished, not disheveled, in no acute distress    Eyes: No scleral icterus, no proptosis     ENT -no obvious auditory deficits, wearing a face mask    Skin -no rashes or lesions noted     Respiratory-  breathing comfortable on room air, no audible wheezing    Cardiovascular- no lower extremity edema is noted.     Psychiatric- alert and oriented ×3. Normal affect.     Gait - normal gait, no use of ambulatory device    Musculoskeletal -     Thoracic/Lumbar Spine/Sacral Spine/Hips   Inspection: No evidence of atrophy in bilateral lower extremities throughout     Tenderness to palpation over thoracic paraspinals and lumbar paraspinals.    No focal motor or sensory deficits in the lower extremities    Reflexes are 2+ biceps, triceps, " patella and achilles bilaterally    Seated straight leg raise is negative bilaterally      MEDICAL DECISION MAKING    Medical records review: see under HPI section.     DATA    Labs:   Vitamin D, 25 hydroxy 03/10/2020 : 38  TSH: 2.610 on 03/10/2020  Lab Results   Component Value Date/Time    SODIUM 137 09/13/2022 09:33 AM    POTASSIUM 4.3 09/13/2022 09:33 AM    CHLORIDE 103 09/13/2022 09:33 AM    CO2 25 09/13/2022 09:33 AM    ANION 9.0 09/13/2022 09:33 AM    GLUCOSE 148 (H) 09/13/2022 09:33 AM    BUN 9 09/13/2022 09:33 AM    CREATININE 0.76 09/13/2022 09:33 AM    CALCIUM 8.8 09/13/2022 09:33 AM    ASTSGOT 83 (H) 09/13/2022 09:33 AM    ALTSGPT 108 (H) 09/13/2022 09:33 AM    TBILIRUBIN 0.5 09/13/2022 09:33 AM    ALBUMIN 3.8 09/13/2022 09:33 AM    TOTPROTEIN 7.4 09/13/2022 09:33 AM    GLOBULIN 3.6 (H) 09/13/2022 09:33 AM    AGRATIO 1.1 09/13/2022 09:33 AM       No results found for: PROTHROMBTM, INR     Lab Results   Component Value Date/Time    WBC 8.0 09/13/2022 09:33 AM    RBC 5.80 (H) 09/13/2022 09:33 AM    HEMOGLOBIN 11.1 (L) 09/13/2022 09:33 AM    HEMATOCRIT 36.1 (L) 09/13/2022 09:33 AM    MCV 62.2 (L) 09/13/2022 09:33 AM    MCH 19.1 (L) 09/13/2022 09:33 AM    MCHC 30.7 (L) 09/13/2022 09:33 AM    MPV 10.9 09/13/2022 09:33 AM    NEUTSPOLYS 36.00 (L) 09/13/2022 09:33 AM    LYMPHOCYTES 51.70 (H) 09/13/2022 09:33 AM    MONOCYTES 7.50 09/13/2022 09:33 AM    EOSINOPHILS 2.60 09/13/2022 09:33 AM    BASOPHILS 1.80 09/13/2022 09:33 AM    HYPOCHROMIA 1+ 07/13/2021 08:29 AM    ANISOCYTOSIS 2+ (A) 07/13/2021 08:29 AM        Lab Results   Component Value Date/Time    HBA1C 5.5 03/10/2020 05:06 PM        Imaging: I personally reviewed following images, these are my reads  Xray thoracic spine 10/11/2022  There is mild levoconvex curvature    Xray lumbar spine July 8. 2022  There is note of levoscoliosis.  Spondylolisthesis at L5-S1 measures 6 mm in extension and 8 mm in flexion.  Suspicious for possible pars defects.    Xray  cervical spine 08/12/2021  There is reversal of cervical lordosis  No significant degenerative changes    Xray thoracic 07/31/2020  There is mild curvative of the thoracic spine, convex left.  [place] at T3-4    MRI cervical spine on 05/22/2020  There is not of mild loss of cervical lordosis.  No significant degenerative changes, no cervical disc herniations, no central or foraminal stenosis in the cervical spine.     IMAGING radiology reads. I reviewed the following radiology reads     Xray thoracic spine 10/11/2022  IMPRESSION:     1.  There is a mild 7 degree levoconvex curvature of the upper thoracic spine.    Xray lumbar spine July 8, 2022  IMPRESSION:  1.  Anterior subluxation at L5-S1 which measures 6 mm with extension and 8 mm with flexion. There are possible bilateral pars defects at that level.  2.  Convex left scoliotic curvature.      Xray cervical spine 08/12/2021  Impression: No acute fracture is identified    MRI cervical spine on 05/22/2020  Unremarkable pre and postcontrast MR examination of the cervical spine except for loss of cervical lordosis              Xray thoracic 07/31/2020        IMPRESSION:   1.  No acute findings.  2.  Mild spinal curvature, convex left, in the upper thoracic spine                                                                                                                                                         Diagnosis   Visit Diagnoses     ICD-10-CM   1. Fibromyalgia  M79.7   2. Low back pain with radiation  M54.50   3. Other idiopathic scoliosis, thoracic region  M41.24   4. Depression, unspecified depression type  F32.A           ASSESSMENT:  Carmen Hebert 27 y.o. female seen for above     Carmen was seen today for follow-up.    Diagnoses and all orders for this visit:    Fibromyalgia  -     pregabalin (LYRICA) 150 MG Cap; Take 1 Capsule by mouth in the morning, at noon, and at bedtime for 60 days.    Low back pain with radiation    Other idiopathic  scoliosis, thoracic region    Depression, unspecified depression type  -     Patient has been identified as having a positive depression screening. Appropriate orders and counseling have been given.         Discussed that she has started PT and feels like she is doing well.  Continue with this for now.  Reviewed xray thoracic spine.  If PT does not help, can consider consultation for breast reduction could be a possibility.  3.   Continue lyrica 150mg po tid.   reviewed.  Stable, continues to help with fibromyalgia symptoms.  4.   Continue zoloft 150mg daily, denies suicidality.  Discussed psychology referral, declines.  Discussed that the can contact me or PCP if she changes her mind.  5.   She will get labs as able.  6.   Follow-up with her PCP.  I have copied her PCP on the most recent lab work        Follow-up: Return in about 3 months (around 3/13/2023).    Thank you very much for asking me to participate in Carmen Hebert's care.  Please contact me with any questions or concerns.      Please note that this dictation was created using voice recognition software. I have made every reasonable attempt to correct obvious errors but there may be errors of grammar and content that I may have overlooked prior to finalization of this note.      Mahesh Crane MD  Physical Medicine and Rehabilitation  Interventional Spine and Sports Physiatry  [facility]     CC: KODY Marquez

## 2022-12-14 ENCOUNTER — PHYSICAL THERAPY (OUTPATIENT)
Dept: PHYSICAL THERAPY | Facility: REHABILITATION | Age: 27
End: 2022-12-14
Attending: PHYSICAL MEDICINE & REHABILITATION
Payer: COMMERCIAL

## 2022-12-14 DIAGNOSIS — M54.50 LOW BACK PAIN WITH RADIATION: ICD-10-CM

## 2022-12-14 DIAGNOSIS — M54.6 THORACIC SPINE PAIN: ICD-10-CM

## 2022-12-14 PROCEDURE — 97140 MANUAL THERAPY 1/> REGIONS: CPT

## 2022-12-14 PROCEDURE — 97110 THERAPEUTIC EXERCISES: CPT

## 2022-12-14 NOTE — OP THERAPY DAILY TREATMENT
"  Outpatient Physical Therapy  DAILY TREATMENT     [facility] Physical [address].  [address]  [place] [address]  Phone:  [phone]  Fax:  [phone]    Date: 12/14/2022    Patient: Carmen Hebert  YOB: 1995  MRN: [id number]     Time Calculation    Start time: 0818  Stop time: 0858 Time Calculation (min): 40 minutes         Chief Complaint: Back Problem    Visit #: 3    SUBJECTIVE:  Pt reports increased LBP over the past few days, but NT is no longer in R foot. Reports t/s symptoms have minimal change, but feels like muscles are sore \"in a good way\"    OBJECTIVE:  Current objective measures: Hinging and TTP at L3-4          Therapeutic Exercises (CPT 24915):     1. Prone press elbows, x3'    2. Prone press up hands, 5x5\" hold    3. T/s mobility matrix, 1x10, 1x5 ea    4. Bird dog, 10x3\" hold    5. WITYs PTB, x10 ea    6. Wall angels, 5x3\" hold    7. GABRIELLE iso at wall PTB, 3x15\" hold    8. Goblet squat progressions, x5', 0#->10# - improved mechanics with goblet sit<>stand      Therapeutic Exercise Summary: HEP:  Prone press up, standing lumbar extension, wall maxi, bird dog    Therapeutic Treatments and Modalities:     1. Manual Therapy (CPT 38492), L3-5 PAs grade 1-2, T4-10 PAs grade 2-3  Time-based treatments/modalities:    Physical Therapy Timed Treatment Charges  Manual therapy minutes (CPT 39264): 15 minutes  Therapeutic exercise minutes (CPT 87480): 25 minutes        ASSESSMENT:   Response to treatment: Progressed functional strengthening exercises. Pt tolerated exercises well, with some reported fatigue in t/s. Demo decreased endurance with postural strengthening exercises. Recommend continuing yoga for focus on maintaining ROM/mobility, starting 1-2x/ week as tolerated. Pt gave verbal understanding.    PLAN/RECOMMENDATIONS:   Plan for treatment: therapy treatment to continue next visit.  Planned interventions for next visit: continue with current " treatment.

## 2022-12-19 ENCOUNTER — PHYSICAL THERAPY (OUTPATIENT)
Dept: PHYSICAL THERAPY | Facility: REHABILITATION | Age: 27
End: 2022-12-19
Attending: PHYSICAL MEDICINE & REHABILITATION
Payer: COMMERCIAL

## 2022-12-19 DIAGNOSIS — M54.50 LOW BACK PAIN WITH RADIATION: ICD-10-CM

## 2022-12-19 DIAGNOSIS — M54.6 THORACIC SPINE PAIN: ICD-10-CM

## 2022-12-19 PROCEDURE — 97140 MANUAL THERAPY 1/> REGIONS: CPT

## 2022-12-19 PROCEDURE — 97110 THERAPEUTIC EXERCISES: CPT

## 2022-12-19 NOTE — OP THERAPY DAILY TREATMENT
"  Outpatient Physical Therapy  DAILY TREATMENT     [facility] Physical [address].  [address]  [place] [address]  Phone:  [phone]  Fax:  [phone]    Date: 12/19/2022    Patient: Carmen Hebert  YOB: 1995  MRN: [id number]     Time Calculation    Start time: 0816  Stop time: 0856 Time Calculation (min): 40 minutes         Chief Complaint: Back Problem    Visit #: 4    SUBJECTIVE:  Pt reports having increased radicular pains after going to the movies and sitting > 2 hrs. No feels like \"back is tight\". Reports completing matrix exercise frequently, which feels like it is helping    OBJECTIVE:  Current objective measures:           Therapeutic Exercises (CPT 65283):     1. Prone press elbows, x3'    2. Prone press up hands, 5x5\" hold    3. t/s cat/camel, x2'    4. qped t/s rotations, x10 ea    5. HADDER box OTB, to fatigue    6. Wall angels, 5x3\" hold    7. GABRIELLE iso at wall PTB, 3x20\" hold    8. Ys PTB, x10 w/ pause, fatigue at 8 reps    9. Wall slide w/ lift off, to fatigue, w/ pillowcase    10. t/s mobility matrix, x10 ea direction      Therapeutic Exercise Summary: HEP:  Prone press up, standing lumbar extension, wall maxi, bird dog, WITYs, t/s mobility matrix,     Therapeutic Treatments and Modalities:     1. Manual Therapy (CPT 80229), T4-10 PAs grade 3-5, L rib 4-9 PAs sydnee 3  Time-based treatments/modalities:    Physical Therapy Timed Treatment Charges  Manual therapy minutes (CPT 49881): 15 minutes  Therapeutic exercise minutes (CPT 80905): 25 minutes        ASSESSMENT:   Response to treatment: Progressed postural strengthening exercises. Pt tolerated exercises well, demo decreased endurance overall. Demo increased tolerance to rotational activities. Pt will benefit from continued skilled therapy to address strength and ROM deficits and to continue focus on centralization of symptoms.    PLAN/RECOMMENDATIONS:   Plan for treatment: therapy treatment to " continue next visit.  Planned interventions for next visit: continue with current treatment.

## 2022-12-21 ENCOUNTER — HOSPITAL ENCOUNTER (OUTPATIENT)
Dept: LAB | Facility: MEDICAL CENTER | Age: 27
End: 2022-12-21
Attending: PHYSICAL MEDICINE & REHABILITATION
Payer: COMMERCIAL

## 2022-12-21 DIAGNOSIS — R53.83 OTHER FATIGUE: ICD-10-CM

## 2022-12-21 DIAGNOSIS — R74.01 TRANSAMINITIS: ICD-10-CM

## 2022-12-21 DIAGNOSIS — Z79.899 MEDICATION MANAGEMENT: ICD-10-CM

## 2022-12-21 LAB
ALBUMIN SERPL BCP-MCNC: 4.2 G/DL (ref 3.2–4.9)
ALBUMIN/GLOB SERPL: 1.2 G/DL
ALP SERPL-CCNC: 71 U/L (ref 30–99)
ALT SERPL-CCNC: 28 U/L (ref 2–50)
ANION GAP SERPL CALC-SCNC: 8 MMOL/L (ref 7–16)
AST SERPL-CCNC: 19 U/L (ref 12–45)
BILIRUB SERPL-MCNC: 0.4 MG/DL (ref 0.1–1.5)
BUN SERPL-MCNC: 9 MG/DL (ref 8–22)
CALCIUM ALBUM COR SERPL-MCNC: 9.1 MG/DL (ref 8.5–10.5)
CALCIUM SERPL-MCNC: 9.3 MG/DL (ref 8.5–10.5)
CHLORIDE SERPL-SCNC: 105 MMOL/L (ref 96–112)
CO2 SERPL-SCNC: 26 MMOL/L (ref 20–33)
CREAT SERPL-MCNC: 0.88 MG/DL (ref 0.5–1.4)
GFR SERPLBLD CREATININE-BSD FMLA CKD-EPI: 91 ML/MIN/1.73 M 2
GLOBULIN SER CALC-MCNC: 3.4 G/DL (ref 1.9–3.5)
GLUCOSE SERPL-MCNC: 94 MG/DL (ref 65–99)
POTASSIUM SERPL-SCNC: 4.2 MMOL/L (ref 3.6–5.5)
PROT SERPL-MCNC: 7.6 G/DL (ref 6–8.2)
SODIUM SERPL-SCNC: 139 MMOL/L (ref 135–145)
TSH SERPL DL<=0.005 MIU/L-ACNC: 1.95 UIU/ML (ref 0.38–5.33)

## 2022-12-21 PROCEDURE — 80053 COMPREHEN METABOLIC PANEL: CPT

## 2022-12-21 PROCEDURE — 36415 COLL VENOUS BLD VENIPUNCTURE: CPT

## 2022-12-21 PROCEDURE — 84443 ASSAY THYROID STIM HORMONE: CPT

## 2022-12-22 ENCOUNTER — PHYSICAL THERAPY (OUTPATIENT)
Dept: PHYSICAL THERAPY | Facility: REHABILITATION | Age: 27
End: 2022-12-22
Attending: PHYSICAL MEDICINE & REHABILITATION
Payer: COMMERCIAL

## 2022-12-22 DIAGNOSIS — M54.50 LOW BACK PAIN WITH RADIATION: ICD-10-CM

## 2022-12-22 DIAGNOSIS — M54.6 THORACIC SPINE PAIN: ICD-10-CM

## 2022-12-22 PROCEDURE — 97140 MANUAL THERAPY 1/> REGIONS: CPT

## 2022-12-22 PROCEDURE — 97110 THERAPEUTIC EXERCISES: CPT

## 2022-12-22 NOTE — OP THERAPY DAILY TREATMENT
"  Outpatient Physical Therapy  DAILY TREATMENT     [facility] Physical [address].  [address]  [place] [address]  Phone:  [phone]  Fax:  [phone]    Date: 12/22/2022    Patient: Carmen Hebert  YOB: 1995  MRN: [id number]     Time Calculation    Start time: 0815  Stop time: 0900 Time Calculation (min): 45 minutes         Chief Complaint: Back Problem    Visit #: 5    SUBJECTIVE:  Pt reports feeling better overall since last weekend. Reports having personal problems and high stress, have noticed muscle tightness and guarding from stress. Has been taking breaks at works, which has been helping. Reports no NT in LEs, but has increased R hip/back pain that increases with prolonged positioning (sitting/standing).    OBJECTIVE:  Current objective measures:   Positive R Fadir, scour  Negative juan antonio  TTP R QL          Therapeutic Exercises (CPT 37503):     1. Piriformis stretch, 2x30\", R side    2. 1/2 kneeling HF stretch, 3x30\"    3. 1/2 kneeling add stretch 8#, 10x5\" hold    4. qped t/s rotations, x10 ea    5. SB prayer stretch 3 way, x2'    6. Prone opp. arm/leg, 10x3\" hold    7. HADDER box OTB, Not today    8. Prone press up hands, 5\" holds between exercises - x5' total    9. Wall angels, Not today    10. GABRIELLE iso at wall PTB, Not today    11. Standing QL stretch, 2x30\" hold      Therapeutic Exercise Summary: HEP:  Prone press up, standing lumbar extension, wall maxi, bird dog, WITYs, t/s mobility matrix,     Therapeutic Treatments and Modalities:     1. Manual Therapy (CPT 27718), R hip cadal distraction, QL STM w/ contract/relax and strain counterstrain. IASTM R side lumbar paraspinals  Time-based treatments/modalities:    Physical Therapy Timed Treatment Charges  Manual therapy minutes (CPT 67806): 18 minutes  Therapeutic exercise minutes (CPT 18904): 27 minutes        ASSESSMENT:   Response to treatment: Pt reported positive response to hip distraction, " "reporting decreased hip pain overall. Positive response to STM of lumbar musculature with focus on QL, reporting \"feeling looser\". Recommend using ball for self-STM, patient gave verbal understanding. Pt ed conducted regarding relationship of stress and increased pain symptoms, importance of practicing stress relief activities/tactics. Pt gave verbal understanding.    PLAN/RECOMMENDATIONS:   Plan for treatment: therapy treatment to continue next visit.  Planned interventions for next visit: continue with current treatment.         "

## 2022-12-27 ENCOUNTER — APPOINTMENT (OUTPATIENT)
Dept: PHYSICAL THERAPY | Facility: REHABILITATION | Age: 27
End: 2022-12-27
Attending: PHYSICAL MEDICINE & REHABILITATION
Payer: COMMERCIAL

## 2022-12-29 ENCOUNTER — APPOINTMENT (OUTPATIENT)
Dept: PHYSICAL THERAPY | Facility: REHABILITATION | Age: 27
End: 2022-12-29
Attending: PHYSICAL MEDICINE & REHABILITATION
Payer: COMMERCIAL

## 2023-01-04 ENCOUNTER — TELEPHONE (OUTPATIENT)
Dept: PHYSICAL THERAPY | Facility: REHABILITATION | Age: 28
End: 2023-01-04
Payer: COMMERCIAL

## 2023-01-04 ENCOUNTER — APPOINTMENT (OUTPATIENT)
Dept: PHYSICAL THERAPY | Facility: REHABILITATION | Age: 28
End: 2023-01-04
Attending: PHYSICAL MEDICINE & REHABILITATION
Payer: COMMERCIAL

## 2023-01-04 NOTE — OP THERAPY DAILY TREATMENT
"  Outpatient Physical Therapy  DAILY TREATMENT     [facility] Physical [address].  [address]  [place] [address]  Phone:  [phone]  Fax:  [phone]    Date: 01/04/2023    Patient: Carmen Hebert  YOB: 1995  MRN: [id number]     Time Calculation                   Chief Complaint: No chief complaint on file.    Visit #: 6    SUBJECTIVE:  ***    OBJECTIVE:  Current objective measures: ***          Therapeutic Exercises (CPT 07202):     1. Piriformis stretch, 2x30\", R side    2. 1/2 kneeling HF stretch, 3x30\"    3. 1/2 kneeling add stretch 8#, 10x5\" hold    4. qped t/s rotations, x10 ea    5. SB prayer stretch 3 way, x2'    6. Prone opp. arm/leg, 10x3\" hold    7. HADDER box OTB, Not today    8. Prone press up hands, 5\" holds between exercises - x5' total    9. Wall angels, Not today    10. GABRIELLE iso at wall PTB, Not today    11. Standing QL stretch, 2x30\" hold      Therapeutic Exercise Summary: HEP:  Prone press up, standing lumbar extension, wall maxi, bird dog, WITYs, t/s mobility matrix,     Therapeutic Treatments and Modalities:     1. Manual Therapy (CPT 08666), R hip cadal distraction, QL STM w/ contract/relax and strain counterstrain. IASTM R side lumbar paraspinals  Time-based treatments/modalities:           Pain rating (1-10) before treatment:  {PAIN NUMBERS_1-10:48180}  Pain rating (1-10) after treatment:  {PAIN NUMBERS_1-10:48419}    ASSESSMENT:   Response to treatment: ***    PLAN/RECOMMENDATIONS:   Plan for treatment: {AMB OP THERAPY - THERAPY PLAN:690202625::\"therapy treatment to continue next visit\"}.  Planned interventions for next visit: {PT PLANNED THERAPY INTERVENTIONS:872423047::\"continue with current treatment\"}.         "

## 2023-01-04 NOTE — OP THERAPY DISCHARGE SUMMARY
Outpatient Physical Therapy  DISCHARGE SUMMARY NOTE      [facility] Physical Therapy [address].  [address]  [place] [address]  Phone:  [phone]  Fax:  [phone]    Date of Visit: 01/04/2023    Patient: Carmen Hebert  YOB: 1995  MRN: [id number]     Referring Provider: Mahesh Crane M.D.  [address]  [place] [address]   Referring Diagnosis Thoracic spine pain [M54.6];Low back pain with radiation [M54.50];Spondylolisthesis of lumbosacral region [M43.17]         Functional Assessment Used        Your patient is being discharged from Physical Therapy with the following comments:   Patient cancelled or missed more than 2 scheduled appointments/non-compliant    Comments:  Pt has attended 5 visits from 12/2/22 to 12/22/22. Pt cancelled 3 consecutive appointments. Is appropriate for DC, unable to assess goals.      Sarah Craig, PT, DPT    Date: 1/4/2023

## 2023-01-11 ENCOUNTER — TELEPHONE (OUTPATIENT)
Dept: CARDIOLOGY | Facility: MEDICAL CENTER | Age: 28
End: 2023-01-11
Payer: COMMERCIAL

## 2023-01-11 DIAGNOSIS — R00.2 PALPITATIONS: ICD-10-CM

## 2023-01-11 RX ORDER — PROPRANOLOL HCL 60 MG
60 CAPSULE, EXTENDED RELEASE 24HR ORAL DAILY
Qty: 30 CAPSULE | Refills: 0 | Status: SHIPPED | OUTPATIENT
Start: 2023-01-11 | End: 2023-01-12 | Stop reason: SDUPTHER

## 2023-01-11 NOTE — TELEPHONE ENCOUNTER
AK    Caller: Carmen Hebert     Medication Name and Dosage:  propranolol LA (INDERAL LA) 60 MG CAPSULE SR 24 HR    Medication amount left: 0    Preferred Pharmacy: [facility]    Other questions (Topic): Please see encounters on 01/01/ 2023 and 01/09/2023- Pt has fv appt tomorrow with     Callback Number (Will only call for issues): [phone] (home)      Thank You,    Alaina WELLINGTON

## 2023-01-11 NOTE — TELEPHONE ENCOUNTER
Is the patient due for a refill? Yes    Was the patient seen the past year? No    Date of last office visit: 3/3/2021    Does the patient have an upcoming appointment?  Yes   If yes, When? 1/12/2023    Provider to refill:Ak    Does the patients insurance require a 100 day supply?  No

## 2023-01-12 ENCOUNTER — OFFICE VISIT (OUTPATIENT)
Dept: CARDIOLOGY | Facility: MEDICAL CENTER | Age: 28
End: 2023-01-12
Payer: COMMERCIAL

## 2023-01-12 VITALS
BODY MASS INDEX: 36.65 KG/M2 | HEIGHT: 70 IN | DIASTOLIC BLOOD PRESSURE: 82 MMHG | SYSTOLIC BLOOD PRESSURE: 118 MMHG | HEART RATE: 87 BPM | OXYGEN SATURATION: 95 % | RESPIRATION RATE: 16 BRPM | WEIGHT: 256 LBS

## 2023-01-12 DIAGNOSIS — R00.2 PALPITATIONS: ICD-10-CM

## 2023-01-12 PROBLEM — F41.9 ANXIETY: Status: RESOLVED | Noted: 2021-12-07 | Resolved: 2023-01-12

## 2023-01-12 LAB — EKG IMPRESSION: NORMAL

## 2023-01-12 PROCEDURE — 93000 ELECTROCARDIOGRAM COMPLETE: CPT | Performed by: INTERNAL MEDICINE

## 2023-01-12 PROCEDURE — 99213 OFFICE O/P EST LOW 20 MIN: CPT

## 2023-01-12 RX ORDER — PROPRANOLOL HCL 60 MG
60 CAPSULE, EXTENDED RELEASE 24HR ORAL DAILY
Qty: 100 CAPSULE | Refills: 3 | Status: SHIPPED | OUTPATIENT
Start: 2023-01-12 | End: 2023-07-13 | Stop reason: SDUPTHER

## 2023-01-12 ASSESSMENT — ENCOUNTER SYMPTOMS
MUSCULOSKELETAL NEGATIVE: 1
NEUROLOGICAL NEGATIVE: 1
GASTROINTESTINAL NEGATIVE: 1
ORTHOPNEA: 0
EYES NEGATIVE: 1
SHORTNESS OF BREATH: 0
DEPRESSION: 0
CONSTITUTIONAL NEGATIVE: 1
PND: 0
PALPITATIONS: 1
NERVOUS/ANXIOUS: 1

## 2023-01-12 ASSESSMENT — FIBROSIS 4 INDEX: FIB4 SCORE: 0.28

## 2023-01-12 NOTE — PROGRESS NOTES
Chief Complaint   Patient presents with    Palpitations       Subjective     Carmen Hebert is a 27 y.o. female who presents today for follow-up. They have a history of palpitations, tachycardia, obesity, thalassemia, asthma, celiac disease, anxiety, depression, and fibromyalgia.     They are a patient of Dr. Sevilla last seen 3/3/2021.  At that time it was suggested that she start ivabradine 5 mg twice daily to help with her palpitations, however she was unable to get insurance to cover this so she was initiated on propanolol 60 mg daily.  She has been tolerating this well.  At that time she was also recommended to get an echocardiogram to evaluate for COVID cardiomyopathy, echo was normal.    Since their last visit they have been feeling occasional palpitations. She has a lot of stress in her life at the moment, has been feeling more anxious than usual, and has been out of her propanolol for a few weeks.    Activity: plays with nieces and nephews    Diet: eats a lot of Thai food, African food, and occasional salads    No symptoms of chest pain, shortness of breath, exercise intolerance, dyspnea, or lower extremity edema.      Past Medical History:   Diagnosis Date    Anxiety     Asthma     Depression     Fibromyalgia 2017    Thalassemia minor      History reviewed. No pertinent surgical history.  Family History   Problem Relation Age of Onset    Fibromyalgia Mother     Lung Disease Mother         asthma r/o    Depression Mother     Anxiety disorder Mother     ADD / ADHD Father     Depression Father      Social History     Socioeconomic History    Marital status: Single     Spouse name: Not on file    Number of children: Not on file    Years of education: Not on file    Highest education level: Not on file   Occupational History    Not on file   Tobacco Use    Smoking status: Never    Smokeless tobacco: Never   Vaping Use    Vaping Use: Never used   Substance and Sexual Activity    Alcohol use:  Yes     Comment: approx 1 drink per week    Drug use: Not Currently     Types: Marijuana     Comment: rarely    Sexual activity: Yes     Partners: Male     Birth control/protection: Pill   Other Topics Concern     Service No    Blood Transfusions No    Caffeine Concern No    Occupational Exposure No    Hobby Hazards No    Sleep Concern Yes    Stress Concern Yes    Weight Concern Yes    Special Diet Yes    Back Care No    Exercise No    Bike Helmet Yes    Seat Belt Yes    Self-Exams Yes   Social History Narrative    Not on file     Social Determinants of Health     Financial Resource Strain: Not on file   Food Insecurity: Not on file   Transportation Needs: Not on file   Physical Activity: Not on file   Stress: Not on file   Social Connections: Not on file   Intimate Partner Violence: Not on file   Housing Stability: Not on file     Allergies   Allergen Reactions    Corn-Related Products Hives, Itching, Myalgia and Nausea    Dairy Food Allergy Diarrhea, Myalgia and Nausea    Gluten Meal Cough, Diarrhea, Hives, Itching, Myalgia, Nausea, Rash, Runny Nose, Shortness of Breath and Vomiting    Sesame Seed (Diagnostic) Hives, Rash and Itching     rash    Soy Allergy Hives, Itching, Myalgia and Nausea     Outpatient Encounter Medications as of 1/12/2023   Medication Sig Dispense Refill    propranolol LA (INDERAL LA) 60 MG CAPSULE SR 24 HR Take 1 Capsule by mouth every day. Please call to schedule follow up appointment for further refills. Please call [phone]. Thank you. 100 Capsule 3    fluticasone (FLONASE) 50 MCG/ACT nasal spray Administer 2 Sprays into affected nostril(S) every day. 16 g 11    sertraline (ZOLOFT) 100 MG Tab TAKE 1 AND 1/2 TABLETS BY MOUTH ONCE DAILY (150 MG) 45 Tablet 11    [START ON 1/17/2023] pregabalin (LYRICA) 150 MG Cap Take 1 Capsule by mouth in the morning, at noon, and at bedtime for 60 days. 90 Capsule 1    BELL 1.5/30 1.5-30 MG-MCG Tab TAKE 1 TABLET BY MOUTH DAILY. TAKE  "CONTINUOUSLY NO, PLACEBO WEEK 84 Tablet 2    albuterol 108 (90 Base) MCG/ACT Aero Soln inhalation aerosol Inhale 2 Puffs every four hours as needed for Shortness of Breath. 1 Each 0    tizanidine (ZANAFLEX) 4 MG Tab tizanidine 4 mg tablet      ALPRAZolam (XANAX) 0.25 MG Tab Take 0.25 mg by mouth at bedtime as needed for Sleep.      [DISCONTINUED] propranolol LA (INDERAL LA) 60 MG CAPSULE SR 24 HR Take 1 Capsule by mouth every day. Please call to schedule follow up appointment for further refills. Please call [phone]. Thank you. (Patient not taking: Reported on 1/12/2023) 30 Capsule 0     No facility-administered encounter medications on file as of 1/12/2023.     Review of Systems   Constitutional: Negative.    HENT: Negative.     Eyes: Negative.    Respiratory:  Negative for shortness of breath.    Cardiovascular:  Positive for palpitations. Negative for chest pain, orthopnea, leg swelling and PND.   Gastrointestinal: Negative.    Genitourinary: Negative.    Musculoskeletal: Negative.    Skin: Negative.    Neurological: Negative.    Endo/Heme/Allergies: Negative.    Psychiatric/Behavioral:  Negative for depression. The patient is nervous/anxious.             Objective     /82 (BP Location: Left arm, Patient Position: Sitting, BP Cuff Size: Adult)   Pulse 87   Resp 16   Ht 1.778 m (5' 10\")   Wt 116 kg (256 lb)   SpO2 95%   BMI 36.73 kg/m²     Physical Exam  Constitutional:       Appearance: Normal appearance. She is obese.   HENT:      Head: Normocephalic and atraumatic.   Neck:      Vascular: No JVD.   Cardiovascular:      Rate and Rhythm: Normal rate and regular rhythm.      Pulses: Normal pulses.      Heart sounds: Normal heart sounds. No murmur heard.    No friction rub.   Pulmonary:      Effort: Pulmonary effort is normal. No respiratory distress.      Breath sounds: Normal breath sounds.   Abdominal:      General: There is no distension.      Palpations: Abdomen is soft.   Musculoskeletal:      " Right lower leg: No edema.      Left lower leg: No edema.   Skin:     General: Skin is warm and dry.   Neurological:      General: No focal deficit present.      Mental Status: She is alert and oriented to person, place, and time.   Psychiatric:         Mood and Affect: Mood normal.         Behavior: Behavior normal.          Cardiac event monitor 4/7/2020  Interpretive Statements   Impression:   1. Predominantly sinus arrhythmia; average heart rate 90 bpm.   2. Rare ventricuar and supraventricular ectopy.   3. Symptoms correlate with sinus rhythm.   4. No atrial fibrillation detected.     Echocardiogram 6/16/2021  CONCLUSIONS  No prior study is available for comparison.   Left ventricular ejection fraction is visually estimated to be 70%.  Normal regional wall motion.  No significant valve abnormalities.     EKG 1/12/2023 SR rate 66, 0.153/0.103/0.449    Assessment & Plan     1. Palpitations  EKG    propranolol LA (INDERAL LA) 60 MG CAPSULE SR 24 HR          Medical Decision Making: Today's Assessment/Status/Plan:        Palpitations  - she has been having these more frequently, has been out of propanolol for a few weeks.  -high levels of stress in her life at this time  -Previously had a cardiac event monitor which showed predominantly sinus rhythm in 2020  -Consider repeating cardiac event monitor if palpitations become more frequent after resuming propanolol  - continue propanolol at current dose    Follow-up with Nikia FOURNIER in 6 months    This note was dictated using Dragon speech recognition software.

## 2023-01-19 ENCOUNTER — PATIENT MESSAGE (OUTPATIENT)
Dept: MEDICAL GROUP | Facility: MEDICAL CENTER | Age: 28
End: 2023-01-19
Payer: COMMERCIAL

## 2023-01-19 DIAGNOSIS — N84.0 UTERINE POLYP: ICD-10-CM

## 2023-01-26 ENCOUNTER — GYNECOLOGY VISIT (OUTPATIENT)
Dept: OBGYN | Facility: CLINIC | Age: 28
End: 2023-01-26
Payer: COMMERCIAL

## 2023-01-26 VITALS — BODY MASS INDEX: 36.73 KG/M2 | DIASTOLIC BLOOD PRESSURE: 73 MMHG | SYSTOLIC BLOOD PRESSURE: 91 MMHG | WEIGHT: 256 LBS

## 2023-01-26 DIAGNOSIS — N93.0 POSTCOITAL BLEEDING: ICD-10-CM

## 2023-01-26 PROCEDURE — 99203 OFFICE O/P NEW LOW 30 MIN: CPT | Performed by: OBSTETRICS & GYNECOLOGY

## 2023-01-26 ASSESSMENT — FIBROSIS 4 INDEX: FIB4 SCORE: 0.28

## 2023-01-26 NOTE — PROGRESS NOTES
New Gynecological Visit    Carmen Hebert    27 y.o.    Chief complaint    Chief Complaint   Patient presents with    Gynecologic Exam     New Patient/ Uterine Polyp       HPI    Patient is a 28 yo G0 who presents as a referral from [facility] with concerns of a uterine polyp seen on pelvic exam last June. Patient reports pap and STI screening was normal. She reports she is amenorrheic on OCPs but does have light bleeding post intercourse for the past several months.   Denies abnormal discharge or changes in bowel/bladder.   Sexually active male partner, monogamous.        Review of Systems:  Review of Systems   All other systems reviewed and are negative.     Past Obstetrical History:    G0    Past Gynecological History:    Last pap: 6/2022    H/o abnormal pap: no    H/o STIs: No    LMP: No LMP recorded (lmp unknown). (Menstrual status: Other).    BCM: OCPs    Past Medical History    Past Medical History:   Diagnosis Date    Anxiety     Asthma     Depression     Fibromyalgia 2017    Thalassemia minor        Past Surgical History    History reviewed. No pertinent surgical history.    Family History   Problem Relation Age of Onset    Fibromyalgia Mother     Lung Disease Mother         asthma r/o    Depression Mother     Anxiety disorder Mother     ADD / ADHD Father     Depression Father        Allergies    Allergies   Allergen Reactions    Corn-Related Products Hives, Itching, Myalgia and Nausea    Dairy Food Allergy Diarrhea, Myalgia and Nausea    Gluten Meal Cough, Diarrhea, Hives, Itching, Myalgia, Nausea, Rash, Runny Nose, Shortness of Breath and Vomiting    Sesame Seed (Diagnostic) Hives, Rash and Itching     rash    Soy Allergy Hives, Itching, Myalgia and Nausea       Medications    Current Outpatient Medications   Medication Sig Dispense Refill    sertraline (ZOLOFT) 100 MG Tab TAKE 1 1/2 TABLET BY MOUTH ONCE DAILY 45 Tablet 11    propranolol LA (INDERAL LA) 60 MG CAPSULE SR 24 HR Take 1  Capsule by mouth every day. Please call to schedule follow up appointment for further refills. Please call [phone]. Thank you. 100 Capsule 3    fluticasone (FLONASE) 50 MCG/ACT nasal spray Administer 2 Sprays into affected nostril(S) every day. 16 g 11    pregabalin (LYRICA) 150 MG Cap Take 1 Capsule by mouth in the morning, at noon, and at bedtime for 60 days. 90 Capsule 1    BELL 1.5/30 1.5-30 MG-MCG Tab TAKE 1 TABLET BY MOUTH DAILY. TAKE CONTINUOUSLY NO, PLACEBO WEEK 84 Tablet 2    albuterol 108 (90 Base) MCG/ACT Aero Soln inhalation aerosol Inhale 2 Puffs every four hours as needed for Shortness of Breath. 1 Each 0    tizanidine (ZANAFLEX) 4 MG Tab tizanidine 4 mg tablet      ALPRAZolam (XANAX) 0.25 MG Tab Take 0.25 mg by mouth at bedtime as needed for Sleep.       No current facility-administered medications for this visit.       Social  Social History     Tobacco Use    Smoking status: Never    Smokeless tobacco: Never   Vaping Use    Vaping Use: Never used   Substance Use Topics    Alcohol use: Yes     Comment: approx 1 drink per week    Drug use: Not Currently     Types: Marijuana     Comment: rarely        OBJECTIVE:    Vitals    BP 91/73   Wt 256 lb   LMP  (LMP Unknown)   BMI 36.73 kg/m²     Physical Exam    GENERAL: Well developed, well nourished, female in no acute distress.    HEENT: NCAT, mucus membranes moist    Abdomen: Soft ND NT.    Ext: RENDON    : Normal Vulva, vagina. No lesions present. No abnormal discharge. No blood.    Cervix smooth pink no lesions, abnormal discharge or blood. Scant physiologic discharge present.     Bimanual exam deferred    Labs/Pathology:    None    A/P    Patient Active Problem List   Diagnosis    Fibromyalgia    Obesity (BMI 30-39.9)    Palpitations    Thalassemia    Asthma    Celiac disease    Cervical lesion    Seasonal allergies    Mixed anxiety depressive disorder    Major depression, recurrent (HCC)    Pelvic pain    Complication of left ear piercing     Vaginal discharge    Inappropriate sinus tachycardia    Sebaceous cyst of skin of breast       Carmen Hebert    27 y.o.        1. Postcoital bleeding    Normal exam today. Will obtain pelvic US to r/o uterine polyps, fibroids or other anatomical abnormalities.     RTC after US complete.         Total time spent is 40 minutes.        Jabier Soto M.D.    Obstetrics and Gynecology    :21 PM

## 2023-03-03 ENCOUNTER — HOSPITAL ENCOUNTER (OUTPATIENT)
Dept: RADIOLOGY | Facility: MEDICAL CENTER | Age: 28
End: 2023-03-03
Attending: OBSTETRICS & GYNECOLOGY
Payer: COMMERCIAL

## 2023-03-03 DIAGNOSIS — N93.0 POSTCOITAL BLEEDING: ICD-10-CM

## 2023-03-03 PROCEDURE — 76830 TRANSVAGINAL US NON-OB: CPT

## 2023-03-14 ENCOUNTER — OFFICE VISIT (OUTPATIENT)
Dept: PHYSICAL MEDICINE AND REHAB | Facility: MEDICAL CENTER | Age: 28
End: 2023-03-14
Payer: COMMERCIAL

## 2023-03-14 VITALS
WEIGHT: 260.4 LBS | SYSTOLIC BLOOD PRESSURE: 110 MMHG | OXYGEN SATURATION: 97 % | HEIGHT: 70 IN | RESPIRATION RATE: 20 BRPM | BODY MASS INDEX: 37.28 KG/M2 | DIASTOLIC BLOOD PRESSURE: 62 MMHG | TEMPERATURE: 97.2 F | HEART RATE: 77 BPM

## 2023-03-14 DIAGNOSIS — F32.A DEPRESSION, UNSPECIFIED DEPRESSION TYPE: ICD-10-CM

## 2023-03-14 DIAGNOSIS — M62.838 MUSCLE SPASM: ICD-10-CM

## 2023-03-14 DIAGNOSIS — M43.17 SPONDYLOLISTHESIS OF LUMBOSACRAL REGION: ICD-10-CM

## 2023-03-14 DIAGNOSIS — M54.16 LUMBAR RADICULOPATHY: ICD-10-CM

## 2023-03-14 DIAGNOSIS — E66.9 OBESITY (BMI 30-39.9): ICD-10-CM

## 2023-03-14 DIAGNOSIS — M79.7 FIBROMYALGIA: ICD-10-CM

## 2023-03-14 PROCEDURE — 99214 OFFICE O/P EST MOD 30 MIN: CPT | Performed by: PHYSICAL MEDICINE & REHABILITATION

## 2023-03-14 RX ORDER — PREGABALIN 150 MG/1
150 CAPSULE ORAL 3 TIMES DAILY
Qty: 90 CAPSULE | Refills: 1 | Status: SHIPPED | OUTPATIENT
Start: 2023-03-14 | End: 2023-04-25 | Stop reason: SDUPTHER

## 2023-03-14 RX ORDER — TIZANIDINE 4 MG/1
TABLET ORAL
Qty: 30 TABLET | Refills: 0 | Status: SHIPPED | OUTPATIENT
Start: 2023-03-14 | End: 2023-04-13

## 2023-03-14 ASSESSMENT — FIBROSIS 4 INDEX: FIB4 SCORE: 0.29

## 2023-03-14 ASSESSMENT — PATIENT HEALTH QUESTIONNAIRE - PHQ9
CLINICAL INTERPRETATION OF PHQ2 SCORE: 3
SUM OF ALL RESPONSES TO PHQ QUESTIONS 1-9: 10
5. POOR APPETITE OR OVEREATING: 2 - MORE THAN HALF THE DAYS

## 2023-03-14 ASSESSMENT — PAIN SCALES - GENERAL: PAINLEVEL: 7=MODERATE-SEVERE PAIN

## 2023-03-14 NOTE — PROGRESS NOTES
Follow-up patient note    Physiatry (physical medicine and  Rehabilitation), interventional spine and sports medicine    Date of Service: 03/14/2023      Chief complaint:   Chief Complaint   Patient presents with    Follow-Up     Fibromyalgia       HISTORY    HPI: Carmen Hebert 28 y.o. female who presents today for follow-up evaluation of pain complaints.  She has a diagnosis of fibromyalgia.      Carmen reports that her low back pain is worse.  This has been getting worse, despite doing her home exercises from PT about every day to every other day.  This is associated with low back and right leg pain with some tingling in the feet.  Pain is 7/10 on the NRS.  This has been bilateral.      No bowel or bladder changes.  She does feel that she has been bloated.    Continuing to take Lyrica 150 mg p.o. 3 times daily.  No side effects.  Rarely takes tizanidine, this does help with her muscle spasms.  Last script was in 2021.    She does report some aching pain in her thoracic spine, less than the lumbar spine.  She reports that her breast size is a double-J.    Pain is 7/10 on the NRS.    Medical records review:  I reviewed the note from the referring provider Mariel Paula A.P.* dated 03/10/2020.  The patient was seen to establish care.  Report that she was diagnosed in 2017 with fibromyalgia, in [facility] [place].  Labs were tested including TSH, HbA1C, vitamin D, 25 hydroxy, HIV ag/ab, T. Pallidum, chlamydia.  She was referred to cardiology for evaluation of palpitations with Holter monitor.  Medications refilled included zoloft 100mg, lyrica 75mg daily, cyclobenzaprine 5mg.  Referral to physiatry placed    Previous treatments:    Physical Therapy: Yes    Medications the patient is tried: lyrica, cyclobenzeprine, amitriptyline, tried savella    Previous interventions: none    Previous surgeries to relieve the above pain:  none    PHQ-9: 10    ROS:   Gen: nausea, history of COVID  Eyes: intermittent blurry  vision  CV: anxiety, palpitation (inappropriate sinus tachycardia)  Psych: depression  Red Flags ROS:   Fever, Chills, Sweats: Denies  Involuntary Weight Loss: Denies  Bladder Incontinence: Denies  Bowel Incontinence: Denies  Saddle Anesthesia: Denies    All other systems reviewed and negative.       PMHx:   Past Medical History:   Diagnosis Date    Anxiety     Asthma     Depression     Fibromyalgia 2017    Thalassemia minor        PSHx:   History reviewed. No pertinent surgical history.    Family history   Family History   Problem Relation Age of Onset    Fibromyalgia Mother     Lung Disease Mother         asthma r/o    Depression Mother     Anxiety disorder Mother     ADD / ADHD Father     Depression Father          Medications:   Current Outpatient Medications   Medication    pregabalin (LYRICA) 150 MG Cap    tizanidine (ZANAFLEX) 4 MG Tab    sertraline (ZOLOFT) 100 MG Tab    propranolol LA (INDERAL LA) 60 MG CAPSULE SR 24 HR    fluticasone (FLONASE) 50 MCG/ACT nasal spray    BELL 1.5/30 1.5-30 MG-MCG Tab    albuterol 108 (90 Base) MCG/ACT Aero Soln inhalation aerosol    ALPRAZolam (XANAX) 0.25 MG Tab     No current facility-administered medications for this visit.       Allergies:   Allergies   Allergen Reactions    Corn-Related Products Hives, Itching, Myalgia and Nausea    Dairy Food Allergy Diarrhea, Myalgia and Nausea    Gluten Meal Cough, Diarrhea, Hives, Itching, Myalgia, Nausea, Rash, Runny Nose, Shortness of Breath and Vomiting    Sesame Seed (Diagnostic) Hives, Rash and Itching     rash    Soy Allergy Hives, Itching, Myalgia and Nausea       Social Hx:   Social History     Socioeconomic History    Marital status: Single     Spouse name: Not on file    Number of children: Not on file    Years of education: Not on file    Highest education level: Not on file   Occupational History    Not on file   Tobacco Use    Smoking status: Never    Smokeless tobacco: Never   Vaping Use    Vaping Use: Never used  "  Substance and Sexual Activity    Alcohol use: Yes     Comment: approx 1 drink per week    Drug use: Not Currently     Types: Marijuana     Comment: rarely    Sexual activity: Yes     Partners: Male     Birth control/protection: Pill, Condom   Other Topics Concern     Service No    Blood Transfusions No    Caffeine Concern No    Occupational Exposure No    Hobby Hazards No    Sleep Concern Yes    Stress Concern Yes    Weight Concern Yes    Special Diet Yes    Back Care No    Exercise No    Bike Helmet Yes    Seat Belt Yes    Self-Exams Yes   Social History Narrative    Not on file     Social Determinants of Health     Financial Resource Strain: Not on file   Food Insecurity: Not on file   Transportation Needs: Not on file   Physical Activity: Not on file   Stress: Not on file   Social Connections: Not on file   Intimate Partner Violence: Not on file   Housing Stability: Not on file         EXAMINATION     Physical Exam:   Vitals: /62 (BP Location: Right arm, Patient Position: Sitting, BP Cuff Size: Large adult)   Pulse 77   Temp 36.2 °C (97.2 °F) (Temporal)   Resp 20   Ht 1.778 m (5' 10\")   Wt 118 kg (260 lb 6.4 oz)   SpO2 97%     Constitutional:   Body Habitus: Body mass index is 37.36 kg/m².  Cooperation: Fully cooperates with exam  Appearance: Well-groomed, well-nourished, not disheveled, in no acute distress    Eyes: No scleral icterus, no proptosis     ENT -no obvious auditory deficits, wearing a face mask    Skin -no rashes or lesions noted     Respiratory-  breathing comfortable on room air, no audible wheezing    Cardiovascular- no lower extremity edema is noted.     Psychiatric- alert and oriented ×3. Normal affect.     Gait - normal gait, no use of ambulatory device.  Heel walking is poorer quality on the left and toe walking intact bilaterally    Musculoskeletal -     Thoracic/Lumbar Spine/Sacral Spine/Hips   Inspection: No evidence of atrophy in bilateral lower extremities throughout "     Tenderness to palpation over thoracic paraspinals and lumbar paraspinals.    5-/5 dorsiflexion on the left but otherwise no focal motor or sensory deficits in the lower extremities    Reflexes are 2+ patella and achilles bilaterally    Straight leg raise is positive on the left and negative on the right      MEDICAL DECISION MAKING    Medical records review: see under HPI section.     DATA    Labs:   Vitamin D, 25 hydroxy 03/10/2020 : 38  TSH: 2.610 on 03/10/2020  Lab Results   Component Value Date/Time    SODIUM 139 12/21/2022 08:44 AM    POTASSIUM 4.2 12/21/2022 08:44 AM    CHLORIDE 105 12/21/2022 08:44 AM    CO2 26 12/21/2022 08:44 AM    ANION 8.0 12/21/2022 08:44 AM    GLUCOSE 94 12/21/2022 08:44 AM    BUN 9 12/21/2022 08:44 AM    CREATININE 0.88 12/21/2022 08:44 AM    CALCIUM 9.3 12/21/2022 08:44 AM    ASTSGOT 19 12/21/2022 08:44 AM    ALTSGPT 28 12/21/2022 08:44 AM    TBILIRUBIN 0.4 12/21/2022 08:44 AM    ALBUMIN 4.2 12/21/2022 08:44 AM    TOTPROTEIN 7.6 12/21/2022 08:44 AM    GLOBULIN 3.4 12/21/2022 08:44 AM    AGRATIO 1.2 12/21/2022 08:44 AM       No results found for: PROTHROMBTM, INR     Lab Results   Component Value Date/Time    WBC 8.0 09/13/2022 09:33 AM    RBC 5.80 (H) 09/13/2022 09:33 AM    HEMOGLOBIN 11.1 (L) 09/13/2022 09:33 AM    HEMATOCRIT 36.1 (L) 09/13/2022 09:33 AM    MCV 62.2 (L) 09/13/2022 09:33 AM    MCH 19.1 (L) 09/13/2022 09:33 AM    MCHC 30.7 (L) 09/13/2022 09:33 AM    MPV 10.9 09/13/2022 09:33 AM    NEUTSPOLYS 36.00 (L) 09/13/2022 09:33 AM    LYMPHOCYTES 51.70 (H) 09/13/2022 09:33 AM    MONOCYTES 7.50 09/13/2022 09:33 AM    EOSINOPHILS 2.60 09/13/2022 09:33 AM    BASOPHILS 1.80 09/13/2022 09:33 AM    HYPOCHROMIA 1+ 07/13/2021 08:29 AM    ANISOCYTOSIS 2+ (A) 07/13/2021 08:29 AM        Lab Results   Component Value Date/Time    HBA1C 5.5 03/10/2020 05:06 PM        Imaging: I personally reviewed following images, these are my reads  Xray thoracic spine 10/11/2022  There is mild  levoconvex curvature    Xray lumbar spine July 8. 2022  There is note of levoscoliosis.  Spondylolisthesis at L5-S1 measures 6 mm in extension and 8 mm in flexion.  Suspicious for possible pars defects.    Xray cervical spine 08/12/2021  There is reversal of cervical lordosis  No significant degenerative changes    Xray thoracic 07/31/2020  There is mild curvative of the thoracic spine, convex left.  [place] at T3-4    MRI cervical spine on 05/22/2020  There is not of mild loss of cervical lordosis.  No significant degenerative changes, no cervical disc herniations, no central or foraminal stenosis in the cervical spine.     IMAGING radiology reads. I reviewed the following radiology reads     Xray thoracic spine 10/11/2022  IMPRESSION:     1.  There is a mild 7 degree levoconvex curvature of the upper thoracic spine.    Xray lumbar spine July 8, 2022  IMPRESSION:  1.  Anterior subluxation at L5-S1 which measures 6 mm with extension and 8 mm with flexion. There are possible bilateral pars defects at that level.  2.  Convex left scoliotic curvature.      Xray cervical spine 08/12/2021  Impression: No acute fracture is identified    MRI cervical spine on 05/22/2020  Unremarkable pre and postcontrast MR examination of the cervical spine except for loss of cervical lordosis              Xray thoracic 07/31/2020        IMPRESSION:   1.  No acute findings.  2.  Mild spinal curvature, convex left, in the upper thoracic spine                                                                                                                                                         Diagnosis   Visit Diagnoses     ICD-10-CM   1. Spondylolisthesis of lumbosacral region  M43.17   2. Lumbar radiculopathy  M54.16   3. Obesity (BMI 30-39.9)  E66.9   4. Fibromyalgia  M79.7   5. Depression, unspecified depression type  F32.A   6. Muscle spasm  M62.838             ASSESSMENT:  Carmen Hebert 28 y.o. female seen for above      Carmen was seen today for follow-up.    Diagnoses and all orders for this visit:    Spondylolisthesis of lumbosacral region  -     MR-LUMBAR SPINE-W/O; Future    Lumbar radiculopathy  -     MR-LUMBAR SPINE-W/O; Future    Obesity (BMI 30-39.9)  -     Patient identified as having weight management issue.  Appropriate orders and counseling given.    Fibromyalgia  -     pregabalin (LYRICA) 150 MG Cap; Take 1 Capsule by mouth in the morning, at noon, and at bedtime for 60 days.  -     Referral to Behavioral Health    Depression, unspecified depression type  -     Patient has been identified as having a positive depression screening. Appropriate orders and counseling have been given.  -     Referral to Behavioral Health    Muscle spasm  -     tizanidine (ZANAFLEX) 4 MG Tab; 0.5-1 tablet by mouth every 6 hours prn for muscle spasms        Discussed ongoing radicular symptoms, despite physical therapy.  Plan for MRI lumbar spine.  Discussed continuing lyrica 150mg po tid.  Script given.  Referral to behavioral health given.  Continue zoloft 150mg daily.  Denies suicidality.  Discussed tizanidine for muscle spasms, script given.  She takes this only rarely.  Continue to work on activity, sleep hygeine, weight management.  Continue to follow-up PCP.      Follow-up: Return in about 6 weeks (around 4/25/2023), or if symptoms worsen or fail to improve.    Thank you very much for asking me to participate in Carmen Hebert's care.  Please contact me with any questions or concerns.      Please note that this dictation was created using voice recognition software. I have made every reasonable attempt to correct obvious errors but there may be errors of grammar and content that I may have overlooked prior to finalization of this note.      Mahesh Crane MD  Physical Medicine and Rehabilitation  Interventional Spine and Sports Physiatry  [facility]     CC: KODY Marquez

## 2023-03-21 ENCOUNTER — APPOINTMENT (OUTPATIENT)
Dept: RADIOLOGY | Facility: MEDICAL CENTER | Age: 28
End: 2023-03-21
Attending: PHYSICAL MEDICINE & REHABILITATION
Payer: COMMERCIAL

## 2023-03-21 DIAGNOSIS — M43.17 SPONDYLOLISTHESIS OF LUMBOSACRAL REGION: ICD-10-CM

## 2023-03-21 DIAGNOSIS — M54.16 LUMBAR RADICULOPATHY: ICD-10-CM

## 2023-03-21 DIAGNOSIS — M54.6 THORACIC SPINE PAIN: ICD-10-CM

## 2023-03-21 PROCEDURE — 72148 MRI LUMBAR SPINE W/O DYE: CPT

## 2023-04-13 ENCOUNTER — TELEMEDICINE (OUTPATIENT)
Dept: MEDICAL GROUP | Facility: MEDICAL CENTER | Age: 28
End: 2023-04-13
Payer: COMMERCIAL

## 2023-04-13 VITALS — WEIGHT: 250 LBS | BODY MASS INDEX: 35.79 KG/M2 | HEIGHT: 70 IN

## 2023-04-13 DIAGNOSIS — U07.1 COVID: ICD-10-CM

## 2023-04-13 PROCEDURE — 99213 OFFICE O/P EST LOW 20 MIN: CPT | Mod: 95 | Performed by: STUDENT IN AN ORGANIZED HEALTH CARE EDUCATION/TRAINING PROGRAM

## 2023-04-13 ASSESSMENT — FIBROSIS 4 INDEX: FIB4 SCORE: 0.29

## 2023-04-13 NOTE — LETTER
April 13, 2023    To Whom It May Concern:         This is confirmation that Carmen Hebert attended her scheduled appointment with Kristine Nails P.A.-C. on 4/13/23.  Please excuse patient from work 4/12/22 through 4/18/2022 due to recent illness.         If you have any questions please do not hesitate to call me at the phone number listed below.    Sincerely,          Kristine Nails P.A.-C.  [phone]

## 2023-04-13 NOTE — PROGRESS NOTES
"Virtual Visit: Established Patient   This visit was conducted via Zoom using secure and encrypted videoconferencing technology.   The patient was in their home in the state [facility].    The patient's identity was confirmed and verbal consent was obtained for this virtual visit.     Subjective:   CC:   Chief Complaint   Patient presents with    Follow-Up     Covid positive 2 days ago       Carmen Hebert is a 28 y.o. female presenting for evaluation and management of:    COVID-positive  Presents today noting that she started having symptoms 2 days ago and tested positive for COVID yesterday.  Patient has a history of asthma.  Patient with Flonase and albuterol for asthma treatment.  Patient notes symptoms of sore throat, congestion, fever, body aches, cough and fatigue.  Patient notes that she has been out of work since yesterday 4/12.    ROS   See hpi     Objective:   Ht 1.778 m (5' 10\")   Wt 113 kg (250 lb)   BMI 35.87 kg/m²     Physical Exam:  Constitutional: Alert, no distress, well-groomed.  Skin: No rashes in visible areas.  Eye: Round. Conjunctiva clear, lids normal. No icterus.   ENMT: Lips pink without lesions, good dentition, moist mucous membranes. Phonation normal.  Neck: No masses, no thyromegaly. Moves freely without pain.  Respiratory: Unlabored respiratory effort, no cough or audible wheeze  Psych: Alert and oriented x3, normal affect and mood.     Assessment and Plan:   The following treatment plan was discussed:     1. COVID  Acute, stable.  Reviewed antiviral treatment and over-the-counter treatments with patient.  Discussed pros and cons of treating with antiviral.  Due to patient's history of asthma, will start patient on Paxlovid.  Patient provided work note 4/12 through 4/18.  Patient advised to follow-up if no improvement.  Discussed signs that would warrant emergent evaluation in the ED.  - Nirmatrelvir&Ritonavir 300/100 20 x 150 MG & 10 x 100MG Tablet Therapy Pack; Take 300 " mg nirmatrelvir (two 150 mg tablets) with 100 mg ritonavir (one 100 mg tablet) by mouth, with all three tablets taken together twice daily for 5 days.  Dispense: 30 Each; Refill: 0      Follow-up: Return if symptoms worsen or fail to improve.

## 2023-04-13 NOTE — LETTER
April 13, 2023    To Whom It May Concern:         This is confirmation that Carmen Hebert attended her scheduled appointment with Kristine Nails P.A.-C. on 4/13/23.Please excuse patient from work 4/12/2023 through 4/18/2023 due to recent illness.         If you have any questions please do not hesitate to call me at the phone number listed below.    Sincerely,          Kristine Nails P.A.-C.  [phone]

## 2023-04-25 ENCOUNTER — OFFICE VISIT (OUTPATIENT)
Dept: PHYSICAL MEDICINE AND REHAB | Facility: MEDICAL CENTER | Age: 28
End: 2023-04-25
Payer: COMMERCIAL

## 2023-04-25 VITALS
OXYGEN SATURATION: 97 % | DIASTOLIC BLOOD PRESSURE: 58 MMHG | HEART RATE: 72 BPM | WEIGHT: 262.8 LBS | TEMPERATURE: 96.6 F | SYSTOLIC BLOOD PRESSURE: 122 MMHG | BODY MASS INDEX: 37.62 KG/M2 | HEIGHT: 70 IN

## 2023-04-25 DIAGNOSIS — M54.6 THORACIC SPINE PAIN: ICD-10-CM

## 2023-04-25 DIAGNOSIS — E66.9 OBESITY (BMI 30-39.9): ICD-10-CM

## 2023-04-25 DIAGNOSIS — M79.7 FIBROMYALGIA: ICD-10-CM

## 2023-04-25 DIAGNOSIS — M43.17 SPONDYLOLISTHESIS OF LUMBOSACRAL REGION: ICD-10-CM

## 2023-04-25 DIAGNOSIS — F32.A DEPRESSION, UNSPECIFIED DEPRESSION TYPE: ICD-10-CM

## 2023-04-25 DIAGNOSIS — M62.838 MUSCLE SPASM: ICD-10-CM

## 2023-04-25 DIAGNOSIS — M47.816 LUMBAR SPONDYLOSIS: ICD-10-CM

## 2023-04-25 DIAGNOSIS — M51.36 BULGE OF LUMBAR DISC WITHOUT MYELOPATHY: ICD-10-CM

## 2023-04-25 PROCEDURE — 99214 OFFICE O/P EST MOD 30 MIN: CPT | Performed by: PHYSICAL MEDICINE & REHABILITATION

## 2023-04-25 RX ORDER — TIZANIDINE 4 MG/1
4 TABLET ORAL EVERY 6 HOURS PRN
COMMUNITY

## 2023-04-25 RX ORDER — TIZANIDINE 4 MG/1
2-4 TABLET ORAL EVERY 6 HOURS PRN
Qty: 30 TABLET | Refills: 0 | Status: SHIPPED | OUTPATIENT
Start: 2023-04-25 | End: 2023-05-05

## 2023-04-25 RX ORDER — PREGABALIN 150 MG/1
150 CAPSULE ORAL 3 TIMES DAILY
Qty: 90 CAPSULE | Refills: 1 | Status: SHIPPED | OUTPATIENT
Start: 2023-05-13 | End: 2023-06-30 | Stop reason: SDUPTHER

## 2023-04-25 ASSESSMENT — PATIENT HEALTH QUESTIONNAIRE - PHQ9
SUM OF ALL RESPONSES TO PHQ QUESTIONS 1-9: 8
CLINICAL INTERPRETATION OF PHQ2 SCORE: 3
5. POOR APPETITE OR OVEREATING: 1 - SEVERAL DAYS

## 2023-04-25 ASSESSMENT — PAIN SCALES - GENERAL: PAINLEVEL: 8=MODERATE-SEVERE PAIN

## 2023-04-25 ASSESSMENT — FIBROSIS 4 INDEX: FIB4 SCORE: 0.29

## 2023-04-25 NOTE — PROGRESS NOTES
"Follow-up patient note    Physiatry (physical medicine and  Rehabilitation), interventional spine and sports medicine    Date of Service: 04/25/2023      Chief complaint:   Chief Complaint   Patient presents with    Follow-Up       HISTORY    HPI: Carmen Hebert 28 y.o. female who presents today for follow-up evaluation of pain complaints.  She has a diagnosis of fibromyalgia.      Carmen reports that she feels like her back is \"not in line.\"  She has axial back pain that was making it difficult for her to stand.  Pain is 8/10 on the NRS.  Tingling in the feet bilaterally is intermittent, a little bit less compared with the axial back pain.    She reports that she has been constipated.  No bowel or bladder changes.      Continuing to take Lyrica 150 mg p.o. 3 times daily.  No side effects.  She has been taking tizanidine daily for the last few days.  She has tried a lidocaine patch.    She does report some aching pain in her thoracic spine, less than the lumbar spine.  She reports that her breast size is a double-J.    Medical records review:  I reviewed the note from the referring provider Mariel Paula A.P.* dated 03/10/2020.  The patient was seen to establish care.  Report that she was diagnosed in 2017 with fibromyalgia, in [facility].  Labs were tested including TSH, HbA1C, vitamin D, 25 hydroxy, HIV ag/ab, T. Pallidum, chlamydia.  She was referred to cardiology for evaluation of palpitations with Holter monitor.  Medications refilled included zoloft 100mg, lyrica 75mg daily, cyclobenzaprine 5mg.  Referral to physiatry placed    Previous treatments:    Physical Therapy: Yes    Medications the patient is tried: lyrica, cyclobenzeprine, amitriptyline, tried savella    Previous interventions: none    Previous surgeries to relieve the above pain:  none    PHQ-9: 10    ROS:   Gen: nausea, history of COVID  Eyes: intermittent blurry vision  CV: anxiety, palpitation (inappropriate sinus " tachycardia)  Psych: depression  Red Flags ROS:   Fever, Chills, Sweats: Denies  Involuntary Weight Loss: Denies  Bladder Incontinence: Denies  Bowel Incontinence: Denies  Saddle Anesthesia: Denies    All other systems reviewed and negative.       PMHx:   Past Medical History:   Diagnosis Date    Anxiety     Asthma     Depression     Fibromyalgia 2017    Thalassemia minor        PSHx:   History reviewed. No pertinent surgical history.    Family history   Family History   Problem Relation Age of Onset    Fibromyalgia Mother     Lung Disease Mother         asthma r/o    Depression Mother     Anxiety disorder Mother     ADD / ADHD Father     Depression Father          Medications:   Current Outpatient Medications   Medication    tizanidine (ZANAFLEX) 4 MG Tab    [START ON 5/13/2023] pregabalin (LYRICA) 150 MG Cap    tizanidine (ZANAFLEX) 4 MG Tab    Nirmatrelvir&Ritonavir 300/100 20 x 150 MG & 10 x 100MG Tablet Therapy Pack    sertraline (ZOLOFT) 100 MG Tab    propranolol LA (INDERAL LA) 60 MG CAPSULE SR 24 HR    fluticasone (FLONASE) 50 MCG/ACT nasal spray    BELL 1.5/30 1.5-30 MG-MCG Tab    albuterol 108 (90 Base) MCG/ACT Aero Soln inhalation aerosol    ALPRAZolam (XANAX) 0.25 MG Tab     No current facility-administered medications for this visit.       Allergies:   Allergies   Allergen Reactions    Corn-Related Products Hives, Itching, Myalgia and Nausea    Dairy Food Allergy Diarrhea, Myalgia and Nausea    Gluten Meal Cough, Diarrhea, Hives, Itching, Myalgia, Nausea, Rash, Runny Nose, Shortness of Breath and Vomiting    Sesame Seed (Diagnostic) Hives, Rash and Itching     rash    Soy Allergy Hives, Itching, Myalgia and Nausea       Social Hx:   Social History     Socioeconomic History    Marital status: Single     Spouse name: Not on file    Number of children: Not on file    Years of education: Not on file    Highest education level: Not on file   Occupational History    Not on file   Tobacco Use    Smoking  "status: Never    Smokeless tobacco: Never   Vaping Use    Vaping Use: Never used   Substance and Sexual Activity    Alcohol use: Yes     Comment: approx 1 drink per week    Drug use: Not Currently     Types: Marijuana     Comment: rarely    Sexual activity: Yes     Partners: Male     Birth control/protection: Pill, Condom   Other Topics Concern     Service No    Blood Transfusions No    Caffeine Concern No    Occupational Exposure No    Hobby Hazards No    Sleep Concern Yes    Stress Concern Yes    Weight Concern Yes    Special Diet Yes    Back Care No    Exercise No    Bike Helmet Yes    Seat Belt Yes    Self-Exams Yes   Social History Narrative    Not on file     Social Determinants of Health     Financial Resource Strain: Not on file   Food Insecurity: Not on file   Transportation Needs: Not on file   Physical Activity: Not on file   Stress: Not on file   Social Connections: Not on file   Intimate Partner Violence: Not on file   Housing Stability: Not on file         EXAMINATION     Physical Exam:   Vitals: /58 (BP Location: Right arm, Patient Position: Sitting, BP Cuff Size: Large adult)   Pulse 72   Temp 35.9 °C (96.6 °F) (Temporal)   Ht 1.778 m (5' 10\")   Wt 119 kg (262 lb 12.8 oz)   SpO2 97%     Constitutional:   Body Habitus: Body mass index is 37.71 kg/m².  Cooperation: Fully cooperates with exam  Appearance: Well-groomed, well-nourished, not disheveled, in no acute distress    Eyes: No scleral icterus, no proptosis     ENT -no obvious auditory deficits, wearing a face mask    Skin -no rashes or lesions noted     Respiratory-  breathing comfortable on room air, no audible wheezing     Cardiovascular- no lower extremity edema is noted.     Psychiatric- alert and oriented ×3. Normal affect.     Gait - normal gait, no use of ambulatory device.      Musculoskeletal -     Thoracic/Lumbar Spine/Sacral Spine/Hips   Inspection: No evidence of atrophy in bilateral lower extremities throughout "     Tenderness to palpation over thoracic paraspinals and lumbar paraspinals.  Decreased ROM of the lumbar spine.  Pain increases with flexion.    5-/5 dorsiflexion on the left but otherwise no focal motor or sensory deficits in the lower extremities    Reflexes are 2+ patella and achilles bilaterally    Straight leg raise is positive on the left and negative on the right    Negative Helena's test bilaterally      MEDICAL DECISION MAKING    Medical records review: see under HPI section.     DATA    Labs:   Vitamin D, 25 hydroxy 03/10/2020 : 38  TSH: 2.610 on 03/10/2020  Lab Results   Component Value Date/Time    SODIUM 139 12/21/2022 08:44 AM    POTASSIUM 4.2 12/21/2022 08:44 AM    CHLORIDE 105 12/21/2022 08:44 AM    CO2 26 12/21/2022 08:44 AM    ANION 8.0 12/21/2022 08:44 AM    GLUCOSE 94 12/21/2022 08:44 AM    BUN 9 12/21/2022 08:44 AM    CREATININE 0.88 12/21/2022 08:44 AM    CALCIUM 9.3 12/21/2022 08:44 AM    ASTSGOT 19 12/21/2022 08:44 AM    ALTSGPT 28 12/21/2022 08:44 AM    TBILIRUBIN 0.4 12/21/2022 08:44 AM    ALBUMIN 4.2 12/21/2022 08:44 AM    TOTPROTEIN 7.6 12/21/2022 08:44 AM    GLOBULIN 3.4 12/21/2022 08:44 AM    AGRATIO 1.2 12/21/2022 08:44 AM       No results found for: PROTHROMBTM, INR     Lab Results   Component Value Date/Time    WBC 8.0 09/13/2022 09:33 AM    RBC 5.80 (H) 09/13/2022 09:33 AM    HEMOGLOBIN 11.1 (L) 09/13/2022 09:33 AM    HEMATOCRIT 36.1 (L) 09/13/2022 09:33 AM    MCV 62.2 (L) 09/13/2022 09:33 AM    MCH 19.1 (L) 09/13/2022 09:33 AM    MCHC 30.7 (L) 09/13/2022 09:33 AM    MPV 10.9 09/13/2022 09:33 AM    NEUTSPOLYS 36.00 (L) 09/13/2022 09:33 AM    LYMPHOCYTES 51.70 (H) 09/13/2022 09:33 AM    MONOCYTES 7.50 09/13/2022 09:33 AM    EOSINOPHILS 2.60 09/13/2022 09:33 AM    BASOPHILS 1.80 09/13/2022 09:33 AM    HYPOCHROMIA 1+ 07/13/2021 08:29 AM    ANISOCYTOSIS 2+ (A) 07/13/2021 08:29 AM        Lab Results   Component Value Date/Time    HBA1C 5.5 03/10/2020 05:06 PM        Imaging: I  personally reviewed following images, these are my reads  MRI lumbar spine 03/21/2023  There is note of a disc herniation at T12-L1 that does not cause cord compression  At L1-2, no central or foraminal stenosis  At L2-3, no central or foraminal stenosis  At L3-4, no central or foraminal stenosis  At L4-5, disc bulge with mild facet arthropathy, no central or foraminal stenosis  At L5-S1, no central or foraminal stenosis      Xray thoracic spine 10/11/2022  There is mild levoconvex curvature    Xray lumbar spine July 8. 2022  There is note of levoscoliosis.  Spondylolisthesis at L5-S1 measures 6 mm in extension and 8 mm in flexion.  Suspicious for possible pars defects.    Xray cervical spine 08/12/2021  There is reversal of cervical lordosis  No significant degenerative changes    Xray thoracic 07/31/2020  There is mild curvative of the thoracic spine, convex left.  [place]asta at T3-4    MRI cervical spine on 05/22/2020  There is not of mild loss of cervical lordosis.  No significant degenerative changes, no cervical disc herniations, no central or foraminal stenosis in the cervical spine.     IMAGING radiology reads. I reviewed the following radiology reads   MRI lumbar spine 03/21/2023  T12-L1: Small left paracentral disc protrusion that minimally encroaches upon the spinal canal without cord compression.  L1-2: Normal.  L2-3: Normal.  L3-4: Normal.  L4-5: Broad-based disc bulge and bilateral mild facet degeneration without canal stenosis or nerve impingement.  L5-S1: Normal.     The prevertebral soft tissues are within normal limits.     IMPRESSION:     Small left paracentral protrusion at T12-L1 that minimally encroaches upon the spinal canal. There is no significant canal stenosis or foraminal narrowing in the lumbar spine.    Xray thoracic spine 10/11/2022  IMPRESSION:     1.  There is a mild 7 degree levoconvex curvature of the upper thoracic spine.    Xray lumbar spine July 8, 2022  IMPRESSION:  1.  Anterior  subluxation at L5-S1 which measures 6 mm with extension and 8 mm with flexion. There are possible bilateral pars defects at that level.  2.  Convex left scoliotic curvature.      Xray cervical spine 08/12/2021  Impression: No acute fracture is identified    MRI cervical spine on 05/22/2020  Unremarkable pre and postcontrast MR examination of the cervical spine except for loss of cervical lordosis              Xray thoracic 07/31/2020        IMPRESSION:   1.  No acute findings.  2.  Mild spinal curvature, convex left, in the upper thoracic spine                                                                                                                                                         Diagnosis   Visit Diagnoses     ICD-10-CM   1. Bulge of lumbar disc without myelopathy  M51.36   2. Spondylolisthesis of lumbosacral region  M43.17   3. Obesity (BMI 30-39.9)  E66.9   4. Lumbar spondylosis  M47.816   5. Fibromyalgia  M79.7   6. Depression, unspecified depression type  F32.A   7. Muscle spasm  M62.838   8. Thoracic spine pain  M54.6         ASSESSMENT:  Carmen Vasques Ynes Hebert 28 y.o. female seen for above     Carmen was seen today for follow-up.    Diagnoses and all orders for this visit:    Bulge of lumbar disc without myelopathy  -     Referral to Physical Therapy    Spondylolisthesis of lumbosacral region  -     Referral to Physical Therapy    Obesity (BMI 30-39.9)    Lumbar spondylosis  -     Referral to Physical Therapy    Fibromyalgia  -     pregabalin (LYRICA) 150 MG Cap; Take 1 Capsule by mouth in the morning, at noon, and at bedtime for 60 days.  -     Referral to Behavioral Health    Depression, unspecified depression type  -     Referral to Behavioral Health    Muscle spasm  -     tizanidine (ZANAFLEX) 4 MG Tab; Take 0.5-1 Tablets by mouth every 6 hours as needed (spasms) for up to 10 days.    Thoracic spine pain        Reviewed MRI lumbar spine.  Discussed options and agreed to a trial of  physical therapy for lumbar spine.  Hold on MRI thoracic spine for now.  Axial view does not suggest cord compression or stenosis.  Continue lyrica 150mg po tid.  Refilled today.  Discussed continuing tizanidine, she has been taking this at bedtime.  Continue to work on activity, sleep hygeine, weight management.  Referral for behavioral health sent.  She was referred to a location that does not take her insurance, she reports.  Continue with plans to see surgeon about possible breast reduction.  Failed PT.  Continue to follow-up PCP.      Follow-up: Return in about 2 months (around 6/25/2023).    Thank you very much for asking me to participate in Carmen Hebert's care.  Please contact me with any questions or concerns.      Please note that this dictation was created using voice recognition software. I have made every reasonable attempt to correct obvious errors but there may be errors of grammar and content that I may have overlooked prior to finalization of this note.      Mahesh Crane MD  Physical Medicine and Rehabilitation  Interventional Spine and Sports Physiatry  [facility]     CC: KODY Marquez

## 2023-05-05 ENCOUNTER — PHYSICAL THERAPY (OUTPATIENT)
Dept: PHYSICAL THERAPY | Facility: REHABILITATION | Age: 28
End: 2023-05-05
Attending: PHYSICAL MEDICINE & REHABILITATION
Payer: COMMERCIAL

## 2023-05-05 DIAGNOSIS — M43.16 SPONDYLOLISTHESIS OF LUMBAR REGION: ICD-10-CM

## 2023-05-05 PROCEDURE — 97162 PT EVAL MOD COMPLEX 30 MIN: CPT

## 2023-05-05 ASSESSMENT — ENCOUNTER SYMPTOMS
PAIN SCALE AT HIGHEST: 10
PAIN SCALE: 6
PAIN SCALE AT LOWEST: 6

## 2023-05-05 NOTE — OP THERAPY EVALUATION
"  Outpatient Physical Therapy  INITIAL EVALUATION    [facility] [address].  [address]  [place] [address]  Phone:  [phone]  Fax:  [phone]    Date of Evaluation: 05/05/2023    Patient: Carmen Hebert  YOB: 1995  MRN: [id number]     Referring Provider: Mahesh Crane M.D.  [address]  [place] [address]   Referring Diagnosis Spondylolisthesis of lumbosacral region [M43.17];Bulge of lumbar disc without myelopathy [M51.36];Lumbar spondylosis [M47.816]     Time Calculation  Start time: 0816  Stop time: 0856 Time Calculation (min): 40 minutes         Chief Complaint: Back Problem    Visit Diagnoses     ICD-10-CM   1. Spondylolisthesis of lumbar region  M43.16       Date of onset of impairment: 11/5/2022    Subjective:   History of Present Illness:     Mechanism of injury:  Pt. is a 28 Y.O. F who reports expreme pain in low back in early March.  \"I couldn't get out of bed or straighten up had to walk at a 90 degree angle. She went to her pain management specialist was diagnosed w/spondylolistheses and per Epic chart her note reads: \"  \"Carmen reports that her low back pain is worse.  This has been getting worse, despite doing her home exercises from PT about every day to every other day.  This is associated with low back and right leg pain with some tingling in the feet.  Pain is 7/10 on the NRS.  This has been bilateral.  No bowel or bladder changes.  She does feel that she has been bloated. She takes tizanidine, this does help with her muscle spasms.\"  Recent MRI which showed + findings for disc herniations (Not Spondylolistheses).  Pain meds don't help. Pt. stated a side note of breast reduction planned which she hopes will help with her T/S DDD.   CLOF:  -sit desk, events standing walking, recent loss of pet, Pt. wants to be more health , exercise , but afraid of FM flair up   PMHx: significant for LBP, FM,  Asthma, " DPN     Pain:     Current pain ratin    At best pain ratin    At worst pain rating:  10    Pain Comments::  PAIN  Location: 1. Lumbar central spreads laterally, refers to: Lateral hips  2. Lateral thigh, skips top of feet  Descriptors: 1. Sharp radiadiating, pain  can get to a spasm level 2. N.T.  Aggravated with bending, standing/sitting, settles with supine or S/L 30 min to 1 Hr.  Eases with FM meds, rest  Progression: ?   Diagnostic Tests:     MRI studies: abnormal (2023)      Diagnostic Tests Comments:  Small left paracentral protrusion at T12-L1 that minimally encroaches upon the spinal canal. There is no significant canal stenosis or foraminal narrowing in the lumbar spine.    Past Medical History:   Diagnosis Date    Anxiety     Asthma     Depression     Fibromyalgia 2017    Thalassemia minor      No past surgical history on file.  Social History     Tobacco Use    Smoking status: Never    Smokeless tobacco: Never   Substance Use Topics    Alcohol use: Yes     Comment: approx 1 drink per week     Family and Occupational History     Socioeconomic History    Marital status: Single     Spouse name: Not on file    Number of children: Not on file    Years of education: Not on file    Highest education level: Not on file   Occupational History    Not on file       Objective     Postural Observations  Standing posture: fair  Correction of posture: T/S - mid pain and UTs.      Neurological Testing     Reflexes   Left   Patellar (L4): brisk (3+)    Myotome testing   Lumbar (left)   L1 (hip flexors): 5  L2 (hip flexors): 5  L3 (knee extensors): 5  L4 (ankle dorsiflexors): 5  L5 (great toe extension): 5  S1 (ankle plantar flexors): 4+    Lumbar (right)   L1 (hip flexors): 5  L2 (hip flexors): 5  L3 (knee extensors): 5  L4 (ankle dorsiflexors): 5  S1 (ankle plantar flexors): 5    Dermatome testing   Lumbar (left)   All left lumbar dermatomes intact    Lumbar (right)   All right lumbar dermatomes  intact    Palpation   Left   Tenderness of the lumbar paraspinals.     Right   Tenderness of the lumbar paraspinals.     Joint Play   Spine     Central PA [place]        T12: painful       L1: painful    Exercises/Treatment  Time-based treatments/modalities:           Assessment, Response and Plan:   Assessment details:  Pt. Is a 28 y.o. F who presents with above impairments and S: mod-severe I: mod N: L4-5 disc herniation T12-L1 disc protrusion S:  chronic. Pt. Is appropriate for skilled PT intervention. Pt. was educated in anatomical, pathoanatomical considerations of PT diagosis and treatment options to address it.  Pt. Also educated in compliance with PT visits/HEP to improve functional limitations and reach goals.     Barriers to therapy:  Transportation, poorly tolerated treatments, comorbidities and psychosocial  Prognosis: fair    Goals:   Short Term Goals:   Pt. I w/hep and self care techniques to lower pain level to be able to do ADLs/IADLs  Able to do L/S R bend = to R  Short term goal time span:  2-4 weeks      Long Term Goals:    Improve score by >or=4 points on the Oswestry questionairre  Long term goal time span:  2-4 months    Plan:   Therapy options:  Physical therapy treatment to continue  Planned therapy interventions:  Neuromuscular Re-education (CPT 47353), Therapeutic Exercise (CPT 88905), Manual Therapy (CPT 43310), Hot or Cold Pack Therapy (CPT 95840), E Stim Unattended (CPT 34372), TENS Applicaton (CPT 49816) and Self Care ADL Training (CPT 96845)  Frequency:  2x week  Duration in visits:  12  Discussed with:  Patient    Functional Assessment Used  Oswestry Low Back Pain Disability Total Score: 56     Referring provider co-signature:  I have reviewed this plan of care and my co-signature certifies the need for services.    Certification Period: 05/05/2023 to  08/03/23    Physician Signature: ________________________________ Date: ______________

## 2023-05-09 ENCOUNTER — PHYSICAL THERAPY (OUTPATIENT)
Dept: PHYSICAL THERAPY | Facility: REHABILITATION | Age: 28
End: 2023-05-09
Attending: PHYSICAL MEDICINE & REHABILITATION
Payer: COMMERCIAL

## 2023-05-09 DIAGNOSIS — M51.36 BULGE OF LUMBAR DISC WITHOUT MYELOPATHY: ICD-10-CM

## 2023-05-09 PROCEDURE — 97112 NEUROMUSCULAR REEDUCATION: CPT

## 2023-05-09 PROCEDURE — 97110 THERAPEUTIC EXERCISES: CPT

## 2023-05-09 PROCEDURE — 97012 MECHANICAL TRACTION THERAPY: CPT

## 2023-05-09 NOTE — OP THERAPY DAILY TREATMENT
"  Outpatient Physical Therapy  DAILY TREATMENT     [facility] [address]  [address]  [place] [address]  Phone:  [phone]  Fax:  [phone]    Date: 05/09/2023    Patient: Carmen Hebert  YOB: 1995  MRN: [id number]     Time Calculation    Start time: 1122  Stop time: 1207 Time Calculation (min): 45 minutes         Chief Complaint: No chief complaint on file.    Visit #: 2    SUBJECTIVE:  \"I'm ok right now.\"    Location: 1. Lumbar central spreads laterally, refers to: Lateral hips  2. Lateral thigh, skips top of feet  Descriptors: 1. Sharp radiadiating, pain  can get to a spasm level 2. N.T.  Aggravated with bending, standing/sitting, settles with supine or S/L with RLE extended    OBJECTIVE:  Current objective measures:          Therapeutic Exercises (CPT 48837):     1. Diaphram breath, 5, vc for PLB    2. AB set + kegal, 8, vc for sequencing    3. KFO w/1 LE extended, L, R 8, vc for sequencing    4. LTR, 3-4 ea., easing technique      Therapeutic Exercise Summary: Back stretches Extension bias [[id number]]    Abdominal brace -  Repeat 10 Times, Hold 2 Seconds, Complete 1 Set, Perform 1 Times a Day    KNEE FALL OUT  -  Repeat 1 Time, Hold 1 Second(s), Complete 1 Set, Perform 1 Times a Day    PRONE TRACTION - FOREARM PULLS -  Repeat 5 Times, Hold 10 Seconds, Complete 1 Set, Perform 1 Times a Day    Therapeutic Treatments and Modalities:     1. Mechanical Traction (CPT 26160), L/S 95#/45 # 60\"/20\" on/off x15', w/MHP    2. Self Care ADL Training (CPT 15007), hep handout w/sets, reps, freq., pp: semi-reclined  Time-based treatments/modalities:    Physical Therapy Timed Treatment Charges  Neuromusc re-ed, balance, coor, post minutes (CPT 63010): 10 minutes  Therapeutic exercise minutes (CPT 84329): 15 minutes      Pain rating (1-10) before treatment:  3/10  Pain rating (1-10) after treatment: 2    ASSESSMENT:   Response to treatment: Pt. Uses rectus ab, so " focus was on recruiting TvA mm. She is weaker on L side and got her sx.  LTR helped some and traction brought sx. To baseline.  Pt. P/W  L/S & T/S discogenic pain.    PLAN/RECOMMENDATIONS:   Plan for treatment: therapy treatment to continue next visit.Pt. has a gap in schedule d/t office moved her IE up.  Planned interventions for next visit:  postural and L/S strengthening try prone/qped and/or seated positions and continue with current treatment.

## 2023-06-27 ENCOUNTER — APPOINTMENT (OUTPATIENT)
Dept: PHYSICAL THERAPY | Facility: REHABILITATION | Age: 28
End: 2023-06-27
Attending: PHYSICAL MEDICINE & REHABILITATION
Payer: COMMERCIAL

## 2023-06-27 NOTE — OP THERAPY DAILY TREATMENT
"  Outpatient Physical Therapy  DAILY TREATMENT     [facility] [address].  [address]  [place] [address]  Phone:  [phone]  Fax:  [phone]    Date: 06/27/2023    Patient: Carmen Hebert  YOB: 1995  MRN: [id number]     Time Calculation                   Chief Complaint: No chief complaint on file.    Visit #: 3    SUBJECTIVE:  \"I'm ok right now.\"    Location: 1. Lumbar central spreads laterally, refers to: Lateral hips  2. Lateral thigh, skips top of feet  Descriptors: 1. Sharp radiadiating, pain  can get to a spasm level 2. N.T.  Aggravated with bending, standing/sitting, settles with supine or S/L with RLE extended    OBJECTIVE:  Current objective measures:          Therapeutic Exercises (CPT 37404):     1. Diaphram breath, 5, vc for PLB    2. AB set + kegal, 8, vc for sequencing    3. KFO w/1 LE extended, L, R 8, vc for sequencing    4. LTR, 3-4 ea., easing technique    5. seated ball march, alt. UE, LE elevate      Therapeutic Exercise Summary: Back stretches Extension bias [7HBYGYE]    Abdominal brace -  Repeat 10 Times, Hold 2 Seconds, Complete 1 Set, Perform 1 Times a Day    KNEE FALL OUT  -  Repeat 1 Time, Hold 1 Second(s), Complete 1 Set, Perform 1 Times a Day    PRONE TRACTION - FOREARM PULLS -  Repeat 5 Times, Hold 10 Seconds, Complete 1 Set, Perform 1 Times a Day    Therapeutic Treatments and Modalities:     1. Mechanical Traction (CPT 32998), L/S 95#/45 # 60\"/20\" on/off x15', w/MHP    2. Self Care ADL Training (CPT 62213), hep handout w/sets, reps, freq., pp: semi-reclined    Time-based treatments/modalities:           Pain rating (1-10) before treatment:  3/10  Pain rating (1-10) after treatment: 2    ASSESSMENT:   Response to treatment: Pt. Uses rectus ab, so focus was on recruiting TvA mm. She is weaker on L side and got her sx.  LTR helped some and traction brought sx. To baseline.  Pt. P/W  L/S & T/S discogenic " pain.    PLAN/RECOMMENDATIONS:   Plan for treatment: therapy treatment to continue next visit.Pt. has a gap in schedule d/t office moved her IE up.  Planned interventions for next visit:  postural and L/S strengthening try prone/qped and/or seated positions and continue with current treatment.

## 2023-06-30 ENCOUNTER — PHYSICAL THERAPY (OUTPATIENT)
Dept: PHYSICAL THERAPY | Facility: REHABILITATION | Age: 28
End: 2023-06-30
Attending: PHYSICAL MEDICINE & REHABILITATION
Payer: COMMERCIAL

## 2023-06-30 ENCOUNTER — OFFICE VISIT (OUTPATIENT)
Dept: PHYSICAL MEDICINE AND REHAB | Facility: MEDICAL CENTER | Age: 28
End: 2023-06-30
Payer: COMMERCIAL

## 2023-06-30 VITALS
SYSTOLIC BLOOD PRESSURE: 112 MMHG | OXYGEN SATURATION: 93 % | HEART RATE: 106 BPM | BODY MASS INDEX: 38.34 KG/M2 | HEIGHT: 70 IN | WEIGHT: 267.8 LBS | TEMPERATURE: 96.9 F | DIASTOLIC BLOOD PRESSURE: 64 MMHG

## 2023-06-30 DIAGNOSIS — M43.17 SPONDYLOLISTHESIS OF LUMBOSACRAL REGION: ICD-10-CM

## 2023-06-30 DIAGNOSIS — E66.9 OBESITY (BMI 30-39.9): ICD-10-CM

## 2023-06-30 DIAGNOSIS — M51.36 BULGE OF LUMBAR DISC WITHOUT MYELOPATHY: ICD-10-CM

## 2023-06-30 DIAGNOSIS — M54.6 THORACIC SPINE PAIN: ICD-10-CM

## 2023-06-30 DIAGNOSIS — M79.7 FIBROMYALGIA: ICD-10-CM

## 2023-06-30 DIAGNOSIS — F32.A DEPRESSION, UNSPECIFIED DEPRESSION TYPE: ICD-10-CM

## 2023-06-30 PROCEDURE — 97012 MECHANICAL TRACTION THERAPY: CPT

## 2023-06-30 PROCEDURE — 99214 OFFICE O/P EST MOD 30 MIN: CPT | Performed by: PHYSICAL MEDICINE & REHABILITATION

## 2023-06-30 PROCEDURE — 3074F SYST BP LT 130 MM HG: CPT | Performed by: PHYSICAL MEDICINE & REHABILITATION

## 2023-06-30 PROCEDURE — 3078F DIAST BP <80 MM HG: CPT | Performed by: PHYSICAL MEDICINE & REHABILITATION

## 2023-06-30 PROCEDURE — 1125F AMNT PAIN NOTED PAIN PRSNT: CPT | Performed by: PHYSICAL MEDICINE & REHABILITATION

## 2023-06-30 PROCEDURE — 97110 THERAPEUTIC EXERCISES: CPT

## 2023-06-30 RX ORDER — PREGABALIN 150 MG/1
150 CAPSULE ORAL 3 TIMES DAILY
Qty: 90 CAPSULE | Refills: 2 | Status: SHIPPED | OUTPATIENT
Start: 2023-07-24 | End: 2023-08-01

## 2023-06-30 ASSESSMENT — PATIENT HEALTH QUESTIONNAIRE - PHQ9
5. POOR APPETITE OR OVEREATING: 1 - SEVERAL DAYS
SUM OF ALL RESPONSES TO PHQ QUESTIONS 1-9: 11
CLINICAL INTERPRETATION OF PHQ2 SCORE: 3

## 2023-06-30 ASSESSMENT — FIBROSIS 4 INDEX: FIB4 SCORE: 0.29

## 2023-06-30 ASSESSMENT — PAIN SCALES - GENERAL: PAINLEVEL: 7=MODERATE-SEVERE PAIN

## 2023-06-30 NOTE — PROGRESS NOTES
Follow-up patient note    Physiatry (physical medicine and  Rehabilitation), interventional spine and sports medicine    Date of Service:06/30/2023      Chief complaint:   Chief Complaint   Patient presents with    Follow-Up       HISTORY    HPI: Carmen Hebert 28 y.o. female who presents today for follow-up evaluation of pain complaints.  She has a diagnosis of fibromyalgia.      Since the last visit, Carmen reports that she had been doing well, pain increased some with recent events.  She moved about 2 weeks ago.  Big work event about a week ago.  Reports that she has ongoing axial low back pain with some radicular symptoms in the left leg.  This is recent without other new symptoms.  This is intermittent.      Started PT with second visit this morning.      Doing well with lyrica 150mg po tid.      Reports overall pain is 7/10 on the NRS.    She reports that she has been constipated, seems to alternate with looser stool.      Sleep is pretty good.  Averages about 7 hours a night.    Medical records review:  I reviewed the note from the referring provider Mariel Paula A.P.* dated 03/10/2020.  The patient was seen to establish care.  Report that she was diagnosed in 2017 with fibromyalgia, in [facility].  Labs were tested including TSH, HbA1C, vitamin D, 25 hydroxy, HIV ag/ab, T. Pallidum, chlamydia.  She was referred to cardiology for evaluation of palpitations with Holter monitor.  Medications refilled included zoloft 100mg, lyrica 75mg daily, cyclobenzaprine 5mg.  Referral to physiatry placed    Previous treatments:    Physical Therapy: Yes    Medications the patient is tried: lyrica, cyclobenzeprine, amitriptyline, tried savella    Previous interventions: none    Previous surgeries to relieve the above pain:  none    PHQ-9: 10    ROS:   Gen: nausea, history of COVID  Eyes: intermittent blurry vision  CV: anxiety, palpitation (inappropriate sinus tachycardia)  Psych: depression  Red Flags ROS:    Fever, Chills, Sweats: Denies  Involuntary Weight Loss: Denies  Bladder Incontinence: Denies  Bowel Incontinence: Denies  Saddle Anesthesia: Denies    All other systems reviewed and negative.       PMHx:   Past Medical History:   Diagnosis Date    Anxiety     Asthma     Depression     Fibromyalgia 2017    Thalassemia minor        PSHx:   History reviewed. No pertinent surgical history.    Family history   Family History   Problem Relation Age of Onset    Fibromyalgia Mother     Lung Disease Mother         asthma r/o    Depression Mother     Anxiety disorder Mother     ADD / ADHD Father     Depression Father          Medications:   Current Outpatient Medications   Medication    [START ON 7/24/2023] pregabalin (LYRICA) 150 MG Cap    tizanidine (ZANAFLEX) 4 MG Tab    Nirmatrelvir&Ritonavir 300/100 20 x 150 MG & 10 x 100MG Tablet Therapy Pack    sertraline (ZOLOFT) 100 MG Tab    propranolol LA (INDERAL LA) 60 MG CAPSULE SR 24 HR    fluticasone (FLONASE) 50 MCG/ACT nasal spray    BELL 1.5/30 1.5-30 MG-MCG Tab    albuterol 108 (90 Base) MCG/ACT Aero Soln inhalation aerosol    ALPRAZolam (XANAX) 0.25 MG Tab     No current facility-administered medications for this visit.       Allergies:   Allergies   Allergen Reactions    Corn-Related Products Hives, Itching, Myalgia and Nausea    Dairy Food Allergy Diarrhea, Myalgia and Nausea    Gluten Meal Cough, Diarrhea, Hives, Itching, Myalgia, Nausea, Rash, Runny Nose, Shortness of Breath and Vomiting    Sesame Seed (Diagnostic) Hives, Rash and Itching     rash    Soy Allergy Hives, Itching, Myalgia and Nausea       Social Hx:   Social History     Socioeconomic History    Marital status: Single     Spouse name: Not on file    Number of children: Not on file    Years of education: Not on file    Highest education level: Not on file   Occupational History    Not on file   Tobacco Use    Smoking status: Never    Smokeless tobacco: Never   Vaping Use    Vaping Use: Never used  "  Substance and Sexual Activity    Alcohol use: Yes     Comment: approx 1 drink per week    Drug use: Not Currently     Types: Marijuana     Comment: rarely    Sexual activity: Yes     Partners: Male     Birth control/protection: Pill, Condom   Other Topics Concern     Service No    Blood Transfusions No    Caffeine Concern No    Occupational Exposure No    Hobby Hazards No    Sleep Concern Yes    Stress Concern Yes    Weight Concern Yes    Special Diet Yes    Back Care No    Exercise No    Bike Helmet Yes    Seat Belt Yes    Self-Exams Yes   Social History Narrative    Not on file     Social Determinants of Health     Financial Resource Strain: Not on file   Food Insecurity: Not on file   Transportation Needs: Not on file   Physical Activity: Not on file   Stress: Not on file   Social Connections: Not on file   Intimate Partner Violence: Not on file   Housing Stability: Not on file         EXAMINATION     Physical Exam:   Vitals: /64 (BP Location: Right arm, Patient Position: Sitting, BP Cuff Size: Large adult)   Pulse (!) 106   Temp 36.1 °C (96.9 °F) (Temporal)   Ht 1.778 m (5' 10\")   Wt 121 kg (267 lb 12.8 oz)   SpO2 93%     Constitutional:   Body Habitus: Body mass index is 38.43 kg/m².  Cooperation: Fully cooperates with exam  Appearance: Well-groomed, well-nourished, not disheveled, in no acute distress    Eyes: No scleral icterus, no proptosis     ENT -no obvious auditory deficits, wearing a face mask    Skin -no rashes or lesions noted     Respiratory-  breathing comfortable on room air, no audible wheezing     Cardiovascular- no lower extremity edema is noted.     Psychiatric- alert and oriented ×3. Normal affect.     Gait - normal gait, no use of ambulatory device.      Musculoskeletal -     Thoracic/Lumbar Spine/Sacral Spine/Hips   Inspection: No evidence of atrophy in bilateral lower extremities throughout     Tenderness to palpation over thoracic paraspinals and lumbar paraspinals.  " Decreased ROM of the lumbar spine.  Pain increases with flexion.    No focal motor or sensory deficits in the lower extremities    Reflexes are 2+ patella and achilles bilaterally      MEDICAL DECISION MAKING    Medical records review: see under HPI section.     DATA    Labs:   Vitamin D, 25 hydroxy 03/10/2020 : 38  TSH: 2.610 on 03/10/2020  Lab Results   Component Value Date/Time    SODIUM 139 12/21/2022 08:44 AM    POTASSIUM 4.2 12/21/2022 08:44 AM    CHLORIDE 105 12/21/2022 08:44 AM    CO2 26 12/21/2022 08:44 AM    ANION 8.0 12/21/2022 08:44 AM    GLUCOSE 94 12/21/2022 08:44 AM    BUN 9 12/21/2022 08:44 AM    CREATININE 0.88 12/21/2022 08:44 AM    CALCIUM 9.3 12/21/2022 08:44 AM    ASTSGOT 19 12/21/2022 08:44 AM    ALTSGPT 28 12/21/2022 08:44 AM    TBILIRUBIN 0.4 12/21/2022 08:44 AM    ALBUMIN 4.2 12/21/2022 08:44 AM    TOTPROTEIN 7.6 12/21/2022 08:44 AM    GLOBULIN 3.4 12/21/2022 08:44 AM    AGRATIO 1.2 12/21/2022 08:44 AM       No results found for: PROTHROMBTM, INR     Lab Results   Component Value Date/Time    WBC 8.0 09/13/2022 09:33 AM    RBC 5.80 (H) 09/13/2022 09:33 AM    HEMOGLOBIN 11.1 (L) 09/13/2022 09:33 AM    HEMATOCRIT 36.1 (L) 09/13/2022 09:33 AM    MCV 62.2 (L) 09/13/2022 09:33 AM    MCH 19.1 (L) 09/13/2022 09:33 AM    MCHC 30.7 (L) 09/13/2022 09:33 AM    MPV 10.9 09/13/2022 09:33 AM    NEUTSPOLYS 36.00 (L) 09/13/2022 09:33 AM    LYMPHOCYTES 51.70 (H) 09/13/2022 09:33 AM    MONOCYTES 7.50 09/13/2022 09:33 AM    EOSINOPHILS 2.60 09/13/2022 09:33 AM    BASOPHILS 1.80 09/13/2022 09:33 AM    HYPOCHROMIA 1+ 07/13/2021 08:29 AM    ANISOCYTOSIS 2+ (A) 07/13/2021 08:29 AM        Lab Results   Component Value Date/Time    HBA1C 5.5 03/10/2020 05:06 PM        Imaging: I personally reviewed following images, these are my reads  MRI lumbar spine 03/21/2023  There is note of a disc herniation at T12-L1 that does not cause cord compression  At L1-2, no central or foraminal stenosis  At L2-3, no central or  foraminal stenosis  At L3-4, no central or foraminal stenosis  At L4-5, disc bulge with mild facet arthropathy, no central or foraminal stenosis  At L5-S1, no central or foraminal stenosis      Xray thoracic spine 10/11/2022  There is mild levoconvex curvature    Xray lumbar spine July 8. 2022  There is note of levoscoliosis.  Spondylolisthesis at L5-S1 measures 6 mm in extension and 8 mm in flexion.  Suspicious for possible pars defects.    Xray cervical spine 08/12/2021  There is reversal of cervical lordosis  No significant degenerative changes    Xray thoracic 07/31/2020  There is mild curvative of the thoracic spine, convex left.  [place] at T3-4    MRI cervical spine on 05/22/2020  There is not of mild loss of cervical lordosis.  No significant degenerative changes, no cervical disc herniations, no central or foraminal stenosis in the cervical spine.     IMAGING radiology reads. I reviewed the following radiology reads   MRI lumbar spine 03/21/2023  T12-L1: Small left paracentral disc protrusion that minimally encroaches upon the spinal canal without cord compression.  L1-2: Normal.  L2-3: Normal.  L3-4: Normal.  L4-5: Broad-based disc bulge and bilateral mild facet degeneration without canal stenosis or nerve impingement.  L5-S1: Normal.     The prevertebral soft tissues are within normal limits.     IMPRESSION:     Small left paracentral protrusion at T12-L1 that minimally encroaches upon the spinal canal. There is no significant canal stenosis or foraminal narrowing in the lumbar spine.    Xray thoracic spine 10/11/2022  IMPRESSION:     1.  There is a mild 7 degree levoconvex curvature of the upper thoracic spine.    Xray lumbar spine July 8, 2022  IMPRESSION:  1.  Anterior subluxation at L5-S1 which measures 6 mm with extension and 8 mm with flexion. There are possible bilateral pars defects at that level.  2.  Convex left scoliotic curvature.      Xray cervical spine 08/12/2021  Impression: No acute fracture  is identified    MRI cervical spine on 05/22/2020  Unremarkable pre and postcontrast MR examination of the cervical spine except for loss of cervical lordosis              Xray thoracic 07/31/2020        IMPRESSION:   1.  No acute findings.  2.  Mild spinal curvature, convex left, in the upper thoracic spine                                                                                                                                                         Diagnosis   Visit Diagnoses     ICD-10-CM   1. Fibromyalgia  M79.7   2. Depression, unspecified depression type  F32.A   3. Bulge of lumbar disc without myelopathy  M51.36   4. Spondylolisthesis of lumbosacral region  M43.17   5. Obesity (BMI 30-39.9)  E66.9   6. Thoracic spine pain  M54.6         ASSESSMENT:  Carmen Hebert 28 y.o. female seen for above     Carmen was seen today for follow-up.    Diagnoses and all orders for this visit:    Fibromyalgia  -     pregabalin (LYRICA) 150 MG Cap; Take 1 Capsule by mouth in the morning, at noon, and at bedtime for 90 days.    Depression, unspecified depression type  -     Patient has been identified as having a positive depression screening. Appropriate orders and counseling have been given.    Bulge of lumbar disc without myelopathy    Spondylolisthesis of lumbosacral region    Obesity (BMI 30-39.9)    Thoracic spine pain      Discussed that she has been stable on lyrica 150mg po tid.   reviewed.  Refilled for the next three months.  This will be managed by her PCP going forward.  Mood is stable.  Denies suicidality.  Taking zoloft.  Continue activity as tolerated.  Discussed continuing with PT for low back.  Previously, placed referral for surgical consultation regarding possible breast reduction.  She did not respond to PT for thoracic pain.  If symptoms persist after PT, advised that she should follow-up with our clinic.        Follow-up: Return if symptoms worsen or fail to improve.    Thank you  very much for asking me to participate in Carmen Vasques Ynes Hebert's care.  Please contact me with any questions or concerns.      Please note that this dictation was created using voice recognition software. I have made every reasonable attempt to correct obvious errors but there may be errors of grammar and content that I may have overlooked prior to finalization of this note.      Mahesh Crane MD  Physical Medicine and Rehabilitation  Interventional Spine and Sports Physiatry  [facility]     CC: KODY Marquez

## 2023-06-30 NOTE — OP THERAPY DAILY TREATMENT
"  Outpatient Physical Therapy  DAILY TREATMENT     [facility] [address]  [address]  [place] [address]  Phone:  [phone]  Fax:  [phone]    Date: 06/30/2023    Patient: Carmen Hebert  YOB: 1995  MRN: [id number]     Time Calculation    Start time: 0815  Stop time: 0905 Time Calculation (min): 50 minutes         Pt. Dx: L5 discogenic pain    Visit #: 3    SUBJECTIVE:  \"II ad an event on Sunday, and on my feet, the next day by JYOTHI Les were n. And walked hunched over. On Tues. I .\"  I have a (stability) ball.  Location: 1. Lumbar central spreads laterally, refers to: Lateral hips  2. Lateral thigh, skips top of feet  Descriptors: 1. Sharp radiadiating, pain  can get to a spasm level 2. N.T.  Aggravated with bending, standing/sitting, settles with supine or S/L with RLE extended    OBJECTIVE:  Current objective measures:          Therapeutic Exercises (CPT 31409):     1. Diaphram breath, 5, hep    2. Heel slide w/TvA, 5, vc for sequencing    3. KFO w/1 LE extended, -, HEP    4. LTR FT. ON BALL, 3-4 ea., easing technique    5. hip abd w/KF    6. bridge ft. on bALL, 5X X5\"    7. SB Qped alt. reach, 2X3-4    8. Traction w/ball w/Les on ball, 5'      Therapeutic Exercise Summary: Back stretches Extension bias [7HBYGYE]    Abdominal brace -  Repeat 10 Times, Hold 2 Seconds, Complete 1 Set, Perform 1 Times a Day    KNEE FALL OUT  -  Repeat 1 Time, Hold 1 Second(s), Complete 1 Set, Perform 1 Times a Day    PRONE TRACTION - FOREARM PULLS -  Repeat 5 Times, Hold 10 Seconds, Complete 1 Set, Perform 1 Times a Day    Therapeutic Treatments and Modalities:     1. Mechanical Traction (CPT 13033), L/S 95#/45 # 60\"/20\" on/off x15', w/MHP    2. Self Care ADL Training (CPT 97412), hep handout w/sets, reps, freq., pp: semi-reclined    Time-based treatments/modalities:    Physical Therapy Timed Treatment Charges  Therapeutic exercise minutes (CPT 61553): 30 " minutes      Pain rating (1-10) before treatment:  0/10  Pain rating (1-10) after treatment: 0    ASSESSMENT:   Response to treatment: Pt. Progressed w/ L/S stabilization ex. today.  Pt. P/W  L/S & T/S discogenic pain.    PLAN/RECOMMENDATIONS:   Plan for treatment: therapy treatment to continue next visit.Pt. has a gap in schedule d/t office moved her IE up.  Planned interventions for next visit:  postural and L/S strengthening try prone/qped and/or seated positions and continue with current treatment.

## 2023-07-07 ENCOUNTER — PHYSICAL THERAPY (OUTPATIENT)
Dept: PHYSICAL THERAPY | Facility: REHABILITATION | Age: 28
End: 2023-07-07
Attending: PHYSICAL MEDICINE & REHABILITATION
Payer: COMMERCIAL

## 2023-07-07 DIAGNOSIS — M51.36 BULGE OF LUMBAR DISC WITHOUT MYELOPATHY: ICD-10-CM

## 2023-07-07 PROCEDURE — 97110 THERAPEUTIC EXERCISES: CPT

## 2023-07-07 PROCEDURE — 97012 MECHANICAL TRACTION THERAPY: CPT

## 2023-07-07 PROCEDURE — 97112 NEUROMUSCULAR REEDUCATION: CPT

## 2023-07-07 NOTE — OP THERAPY DAILY TREATMENT
"  Outpatient Physical Therapy  DAILY TREATMENT     [facility] [address].  [address]  [place] [address]  Phone:  [phone]  Fax:  [phone]    Date: 07/07/2023    Patient: Carmen Hebert  YOB: 1995  MRN: [id number]     Time Calculation    Start time: 0816  Stop time: 0910 Time Calculation (min): 54 minutes         Pt. Dx: L5 discogenic pain    Visit #: 4    SUBJECTIVE:  \"I'm always worse in the morning.pt. states her family has a h/o back issues\"    Location: 1. Lumbar central spreads laterally, refers to: Lateral hips  2. Lateral thigh, skips top of feet  Descriptors: 1. Sharp radiadiating, pain  can get to a spasm level 2. N.T.  Aggravated with bending, standing/sitting, settles with supine or S/L with RLE extended    OBJECTIVE:  Current objective measures:          Therapeutic Exercises (CPT 93425):     1. seated ball march, LAQ, 1x10 ea., VC, demo    2. Heel slide w/TvA, 8, vc for sequencing    3. KFO w/1 LE extended, -, HEP    4. LTR FT. ON BALL, ea., P+    5. hip abd w/KF    6. bridge ft. on bALL, 10X X5\"    7. SB Qped alt. reach; LE ext. R, L, 1X7; 1x10    8. Traction w/ball w/Les on ball, -    9. 1/2 kneel hip flexor stretch, 3x30\"      Therapeutic Exercise Summary: Back stretches Extension bias [[id number]]    Abdominal brace -  Repeat 10 Times, Hold 2 Seconds, Complete 1 Set, Perform 1 Times a Day    KNEE FALL OUT  -  Repeat 1 Time, Hold 1 Second(s), Complete 1 Set, Perform 1 Times a Day    PRONE TRACTION - FOREARM PULLS -  Repeat 5 Times, Hold 10 Seconds, Complete 1 Set, Perform 1 Times a Day    Therapeutic Treatments and Modalities:     1. Mechanical Traction (CPT 17210), L/S 110#/55 # 60\"/20\" on/off x15', w/MHP    Time-based treatments/modalities:    Physical Therapy Timed Treatment Charges  Neuromusc re-ed, balance, coor, post minutes (CPT 59228): 13 minutes  Therapeutic exercise minutes (CPT 30023): 25 minutes      Pain rating (1-10) before " treatment:  3-4/10  Pain rating (1-10) after treatment: 0-1    ASSESSMENT:   Response to treatment: continued w/ L/S stabilization ex. today. She got her sx. With LTR on ball so held [facility]. Pt. P/W  L/S & T/S discogenic pain.    PLAN/RECOMMENDATIONS:   Plan for treatment: therapy treatment to continue next visit.Pt. has a gap in schedule d/t office moved her IE up.  Planned interventions for next visit:  postural and L/S strengthening try prone/qped and/or seated positions and continue with current treatment.

## 2023-07-11 ENCOUNTER — PHYSICAL THERAPY (OUTPATIENT)
Dept: PHYSICAL THERAPY | Facility: REHABILITATION | Age: 28
End: 2023-07-11
Attending: PHYSICAL MEDICINE & REHABILITATION
Payer: COMMERCIAL

## 2023-07-11 DIAGNOSIS — M43.17 SPONDYLOLISTHESIS OF LUMBOSACRAL REGION: ICD-10-CM

## 2023-07-11 PROCEDURE — 97012 MECHANICAL TRACTION THERAPY: CPT

## 2023-07-11 PROCEDURE — 97110 THERAPEUTIC EXERCISES: CPT

## 2023-07-11 NOTE — OP THERAPY DAILY TREATMENT
"  Outpatient Physical Therapy  DAILY TREATMENT     [facility] [address].  [address]  [place] [address]  Phone:  [phone]  Fax:  [phone]    Date: 07/11/2023    Patient: Carmen Hebert  YOB: 1995  MRN: [id number]     Time Calculation                   Pt. Dx: L5 discogenic pain    Visit #: 5    SUBJECTIVE:  \"I'm better, I felt taller after last treatment\"      Location: 1. Mid Thoracic 2. R Thigh post  Descriptors: 1. ache pain, spasm  2. N.T.  Aggravated with bending, standing/sitting, settles with supine or S/L with RLE extended    OBJECTIVE:  Current objective measures:          Therapeutic Exercises (CPT 56200):     1. seated ball march, LAQ, 1x14 alt.; 1x12, pt. got symptoms w/march    2. Heel slide w/TvA, 8, vc for sequencing    3. KFO w/1 LE extended, -, HEP    4. LTR FT. ON BALL, ea., P+    5. hip abduction, shuttle 4C 1x14    6. bridge ft. on bALL, -    7. SB Qped alt. reach; LE ext. R, L, 1X10; 1x10    8. Traction w/ball w/Les on ball, -    9. 1/2 kneel hip flexor stretch, -    10. SH Ext, Sh ABD-Ext, shuttle 2C 1x10 ea.      Therapeutic Exercise Summary: Back stretches Extension bias [7HBYGYE]    Abdominal brace -  Repeat 10 Times, Hold 2 Seconds, Complete 1 Set, Perform 1 Times a Day    KNEE FALL OUT  -  Repeat 1 Time, Hold 1 Second(s), Complete 1 Set, Perform 1 Times a Day    PRONE TRACTION - FOREARM PULLS -  Repeat 5 Times, Hold 10 Seconds, Complete 1 Set, Perform 1 Times a Day    Therapeutic Treatments and Modalities:     1. Mechanical Traction (CPT 18326), L/S 110#/55 # 60\"/20\" on/off x15', w/MHP    Time-based treatments/modalities:           Pain rating (1-10) before treatment:  3-4/10  Pain rating (1-10) after treatment: 0-1    ASSESSMENT:   Response to treatment: continued w/ L/S stabilization ex. today. She got her sx. With LTR on ball so held [facility]. Pt. P/W  L/S & T/S discogenic pain.    PLAN/RECOMMENDATIONS: "   Plan for treatment: therapy treatment to continue next visit.Pt. has a gap in schedule d/t office moved her IE up.  Planned interventions for next visit:  postural and L/S strengthening try prone/qped and/or seated positions and continue with current treatment.

## 2023-07-13 ENCOUNTER — OFFICE VISIT (OUTPATIENT)
Dept: CARDIOLOGY | Facility: MEDICAL CENTER | Age: 28
End: 2023-07-13
Payer: COMMERCIAL

## 2023-07-13 VITALS
HEART RATE: 79 BPM | BODY MASS INDEX: 38.22 KG/M2 | DIASTOLIC BLOOD PRESSURE: 62 MMHG | WEIGHT: 267 LBS | HEIGHT: 70 IN | RESPIRATION RATE: 16 BRPM | SYSTOLIC BLOOD PRESSURE: 118 MMHG | OXYGEN SATURATION: 95 %

## 2023-07-13 DIAGNOSIS — R00.2 PALPITATIONS: ICD-10-CM

## 2023-07-13 PROBLEM — F41.9 ANXIETY: Status: ACTIVE | Noted: 2021-12-06

## 2023-07-13 PROCEDURE — 3074F SYST BP LT 130 MM HG: CPT

## 2023-07-13 PROCEDURE — 3078F DIAST BP <80 MM HG: CPT

## 2023-07-13 PROCEDURE — 99213 OFFICE O/P EST LOW 20 MIN: CPT

## 2023-07-13 PROCEDURE — 99212 OFFICE O/P EST SF 10 MIN: CPT

## 2023-07-13 RX ORDER — PROPRANOLOL HCL 60 MG
60 CAPSULE, EXTENDED RELEASE 24HR ORAL DAILY
Qty: 100 CAPSULE | Refills: 3 | Status: SHIPPED | OUTPATIENT
Start: 2023-07-13 | End: 2023-12-17 | Stop reason: SDUPTHER

## 2023-07-13 ASSESSMENT — ENCOUNTER SYMPTOMS
ORTHOPNEA: 0
PALPITATIONS: 1
CONSTITUTIONAL NEGATIVE: 1
NEUROLOGICAL NEGATIVE: 1
EYES NEGATIVE: 1
DEPRESSION: 0
NERVOUS/ANXIOUS: 0
SHORTNESS OF BREATH: 0
PND: 0
GASTROINTESTINAL NEGATIVE: 1
MUSCULOSKELETAL NEGATIVE: 1

## 2023-07-13 ASSESSMENT — FIBROSIS 4 INDEX: FIB4 SCORE: 0.29

## 2023-07-13 NOTE — PROGRESS NOTES
Chief Complaint   Patient presents with    Palpitations       Subjective     Carmen Hebert is a 28 y.o. female who presents today for follow up. They have a history of palpitations, tachycardia, obesity, thalassemia, asthma, celiac disease, anxiety, depression, and fibromyalgia.      They are a patient of Dr. Sevilla last seen 3/3/2021.  At that time it was suggested that she start ivabradine 5 mg twice daily to help with her palpitations, however she was unable to get insurance to cover this so she was initiated on propanolol 60 mg daily.  She has been tolerating this well.  At that time she was also recommended to get an echocardiogram to evaluate for COVID cardiomyopathy, echo was normal.     At my visit with her on 1/12/2023: She had been feeling occasional palpitations. She has a lot of stress in her life at the moment, has been feeling more anxious than usual, and has been out of her propanolol for a few weeks.    Today: She is feeling much better on the propanolol, occasional rare palpitations, no other issues        Past Medical History:   Diagnosis Date    Anxiety     Asthma     Depression     Fibromyalgia 2017    Thalassemia minor      No past surgical history on file.  Family History   Problem Relation Age of Onset    Fibromyalgia Mother     Lung Disease Mother         asthma r/o    Depression Mother     Anxiety disorder Mother     ADD / ADHD Father     Depression Father      Social History     Socioeconomic History    Marital status: Single     Spouse name: Not on file    Number of children: Not on file    Years of education: Not on file    Highest education level: Not on file   Occupational History    Not on file   Tobacco Use    Smoking status: Never    Smokeless tobacco: Never   Vaping Use    Vaping Use: Never used   Substance and Sexual Activity    Alcohol use: Yes     Comment: approx 1 drink per week    Drug use: Not Currently     Types: Marijuana     Comment: rarely    Sexual  activity: Yes     Partners: Male     Birth control/protection: Pill, Condom   Other Topics Concern     Service No    Blood Transfusions No    Caffeine Concern No    Occupational Exposure No    Hobby Hazards No    Sleep Concern Yes    Stress Concern Yes    Weight Concern Yes    Special Diet Yes    Back Care No    Exercise No    Bike Helmet Yes    Seat Belt Yes    Self-Exams Yes   Social History Narrative    Not on file     Social Determinants of Health     Financial Resource Strain: Not on file   Food Insecurity: Not on file   Transportation Needs: Not on file   Physical Activity: Not on file   Stress: Not on file   Social Connections: Not on file   Intimate Partner Violence: Not on file   Housing Stability: Not on file     Allergies   Allergen Reactions    Corn-Related Products Hives, Itching, Myalgia and Nausea    Dairy Food Allergy Diarrhea, Myalgia and Nausea    Gluten Meal Cough, Diarrhea, Hives, Itching, Myalgia, Nausea, Rash, Runny Nose, Shortness of Breath and Vomiting    Sesame Seed (Diagnostic) Hives, Rash and Itching     rash    Soy Allergy Hives, Itching, Myalgia and Nausea     Outpatient Encounter Medications as of 7/13/2023   Medication Sig Dispense Refill    propranolol LA (INDERAL LA) 60 MG CAPSULE SR 24 HR Take 1 Capsule by mouth every day. Please call to schedule follow up appointment for further refills. Please call [phone]. Thank you. 100 Capsule 3    [START ON 7/24/2023] pregabalin (LYRICA) 150 MG Cap Take 1 Capsule by mouth in the morning, at noon, and at bedtime for 90 days. 90 Capsule 2    tizanidine (ZANAFLEX) 4 MG Tab Take 4 mg by mouth every 6 hours as needed.      sertraline (ZOLOFT) 100 MG Tab TAKE 1 1/2 TABLET BY MOUTH ONCE DAILY 45 Tablet 11    fluticasone (FLONASE) 50 MCG/ACT nasal spray Administer 2 Sprays into affected nostril(S) every day. 16 g 11    BELL 1.5/30 1.5-30 MG-MCG Tab TAKE 1 TABLET BY MOUTH DAILY. TAKE CONTINUOUSLY NO, PLACEBO WEEK 84 Tablet 2    albuterol  "108 (90 Base) MCG/ACT Aero Soln inhalation aerosol Inhale 2 Puffs every four hours as needed for Shortness of Breath. 1 Each 0    ALPRAZolam (XANAX) 0.25 MG Tab Take 0.25 mg by mouth at bedtime as needed for Sleep.      [DISCONTINUED] Nirmatrelvir&Ritonavir 300/100 20 x 150 MG & 10 x 100MG Tablet Therapy Pack Take 300 mg nirmatrelvir (two 150 mg tablets) with 100 mg ritonavir (one 100 mg tablet) by mouth, with all three tablets taken together twice daily for 5 days. 30 Each 0    [DISCONTINUED] propranolol LA (INDERAL LA) 60 MG CAPSULE SR 24 HR Take 1 Capsule by mouth every day. Please call to schedule follow up appointment for further refills. Please call [phone]. Thank you. 100 Capsule 3     No facility-administered encounter medications on file as of 7/13/2023.     Review of Systems   Constitutional: Negative.    HENT: Negative.     Eyes: Negative.    Respiratory:  Negative for shortness of breath.    Cardiovascular:  Positive for palpitations. Negative for chest pain, orthopnea, leg swelling and PND.   Gastrointestinal: Negative.    Genitourinary: Negative.    Musculoskeletal: Negative.    Skin: Negative.    Neurological: Negative.    Endo/Heme/Allergies: Negative.    Psychiatric/Behavioral:  Negative for depression. The patient is not nervous/anxious.               Objective     /62 (BP Location: Left arm, Patient Position: Sitting, BP Cuff Size: Adult)   Pulse 79   Resp 16   Ht 1.778 m (5' 10\")   Wt 121 kg (267 lb)   SpO2 95%   BMI 38.31 kg/m²     Physical Exam  Constitutional:       Appearance: Normal appearance. She is obese.   HENT:      Head: Normocephalic and atraumatic.   Neck:      Vascular: No JVD.   Cardiovascular:      Rate and Rhythm: Normal rate and regular rhythm.      Pulses: Normal pulses.      Heart sounds: Normal heart sounds. No murmur heard.     No friction rub.   Pulmonary:      Effort: Pulmonary effort is normal. No respiratory distress.      Breath sounds: Normal breath " sounds.   Abdominal:      General: There is no distension.      Palpations: Abdomen is soft.   Musculoskeletal:      Right lower leg: No edema.      Left lower leg: No edema.   Skin:     General: Skin is warm and dry.   Neurological:      General: No focal deficit present.      Mental Status: She is alert and oriented to person, place, and time.   Psychiatric:         Mood and Affect: Mood normal.         Behavior: Behavior normal.            No results found for: CHOLSTRLTOT, LDL, HDL, TRIGLYCERIDE    Lab Results   Component Value Date/Time    SODIUM 139 12/21/2022 08:44 AM    POTASSIUM 4.2 12/21/2022 08:44 AM    CHLORIDE 105 12/21/2022 08:44 AM    CO2 26 12/21/2022 08:44 AM    GLUCOSE 94 12/21/2022 08:44 AM    BUN 9 12/21/2022 08:44 AM    CREATININE 0.88 12/21/2022 08:44 AM     Lab Results   Component Value Date/Time    ALKPHOSPHAT 71 12/21/2022 08:44 AM    ASTSGOT 19 12/21/2022 08:44 AM    ALTSGPT 28 12/21/2022 08:44 AM    TBILIRUBIN 0.4 12/21/2022 08:44 AM          Cardiac event monitor 4/7/2020  Interpretive Statements   Impression:   1. Predominantly sinus arrhythmia; average heart rate 90 bpm.   2. Rare ventricuar and supraventricular ectopy.   3. Symptoms correlate with sinus rhythm.   4. No atrial fibrillation detected.      Echocardiogram 6/16/2021  CONCLUSIONS  No prior study is available for comparison.   Left ventricular ejection fraction is visually estimated to be 70%.  Normal regional wall motion.  No significant valve abnormalities.      EKG 1/12/2023 SR rate 66, 0.153/0.103/0.449    Assessment & Plan     1. Palpitations  propranolol LA (INDERAL LA) 60 MG CAPSULE SR 24 HR          Medical Decision Making: Today's Assessment/Status/Plan:        Palpitations  - she has been having these more frequently, has been out of propanolol for a few weeks.  -high levels of stress in her life at this time  -Previously had a cardiac event monitor which showed predominantly sinus rhythm in 2020  -Consider  repeating cardiac event monitor if palpitations become more frequent after resuming propanolol  - continue propanolol at current dose     Follow-up with Nikia FOURNIER in 1 year     This note was dictated using Dragon speech recognition software.

## 2023-07-14 ENCOUNTER — PHYSICAL THERAPY (OUTPATIENT)
Dept: PHYSICAL THERAPY | Facility: REHABILITATION | Age: 28
End: 2023-07-14
Attending: PHYSICAL MEDICINE & REHABILITATION
Payer: COMMERCIAL

## 2023-07-14 DIAGNOSIS — M51.36 BULGE OF LUMBAR DISC WITHOUT MYELOPATHY: ICD-10-CM

## 2023-07-14 PROCEDURE — 97012 MECHANICAL TRACTION THERAPY: CPT

## 2023-07-14 PROCEDURE — 97110 THERAPEUTIC EXERCISES: CPT

## 2023-07-14 PROCEDURE — 97112 NEUROMUSCULAR REEDUCATION: CPT

## 2023-07-14 NOTE — OP THERAPY DAILY TREATMENT
"  Outpatient Physical Therapy  DAILY TREATMENT     [facility] Physical [address].  [address]  [place] [address]  Phone:  [phone]  Fax:  [phone]    Date: 07/14/2023    Patient: Carmen Hebert  YOB: 1995  MRN: [id number]     Time Calculation    Start time: 0815  Stop time: 0900 Time Calculation (min): 45 minutes         Chief Complaint: Back Problem    Visit #: 6    SUBJECTIVE:  I felt my abdominal muscles after last rx; I want to do MT today too.\" C/o virtually no LBP     OBJECTIVE:  Current objective measures:           Therapeutic Exercises (CPT 67756):     1. seated ball march, LAQ    2. Heel slide w/TvA, hep    3. KFO w/1 LE extended, -, hep    4. DKTC FT. ON BALL, 1x sustained    5. hip abduction, shuttle 3C 1x14    6. bridge ft. on bALL, w/SLL, 1x10-12, 1x6-7    7. SB Qped LE ext. R, L, 1X10 ea    8. SB Qped alt. reach; LE ext. R, L, 1x10    9. 1/2 kneel hip flexor stretch, -    10. SH Ext, Sh ABD-Ext, shuttle 2C 1x10 ea.      Therapeutic Exercise Summary: Back stretches Extension bias [7HBYGYE]    Abdominal brace -  Repeat 10 Times, Hold 2 Seconds, Complete 1 Set, Perform 1 Times a Day    KNEE FALL OUT  -  Repeat 1 Time, Hold 1 Second(s), Complete 1 Set, Perform 1 Times a Day    PRONE TRACTION - FOREARM PULLS -  Repeat 5 Times, Hold 10 Seconds, Complete 1 Set, Perform 1 Times a Day    Therapeutic Treatments and Modalities:     1. Mechanical Traction (CPT 36218), L/S 110#/55 # 60\"/20\" on/off x15'    Time-based treatments/modalities:    Physical Therapy Timed Treatment Charges  Neuromusc re-ed, balance, coor, post minutes (CPT 78820): 15 minutes  Therapeutic exercise minutes (CPT 79238): 25 minutes  Pain rating (1-10) before treatment:  1  Pain rating (1-10) after treatment:  0    ASSESSMENT:   Response to treatment: pt. Responding well with L/S stabilization exercises both flexion and extension-bias.      PLAN/RECOMMENDATIONS:   Plan for treatment: " therapy treatment to continue next visit.  Planned interventions for next visit: continue with current treatment.

## 2023-07-18 ENCOUNTER — PHYSICAL THERAPY (OUTPATIENT)
Dept: PHYSICAL THERAPY | Facility: REHABILITATION | Age: 28
End: 2023-07-18
Attending: PHYSICAL MEDICINE & REHABILITATION
Payer: COMMERCIAL

## 2023-07-18 DIAGNOSIS — M51.36 BULGE OF LUMBAR DISC WITHOUT MYELOPATHY: ICD-10-CM

## 2023-07-18 PROCEDURE — 97110 THERAPEUTIC EXERCISES: CPT

## 2023-07-18 PROCEDURE — 97012 MECHANICAL TRACTION THERAPY: CPT

## 2023-07-18 NOTE — OP THERAPY DAILY TREATMENT
"  Outpatient Physical Therapy  DAILY TREATMENT     [address]  [address]  [place] [address]  Phone:  [phone]  Fax:  [phone]    Date: 07/18/2023    Patient: Carmen Hebert  YOB: 1995  MRN: [id number]     Time Calculation    Start time: 0816  Stop time: 0900 Time Calculation (min): 44 minutes         Chief Complaint: Back Problem    Visit #: 7    SUBJECTIVE:  I feel crunchy today, lifted boxes of t-shirts yesterdayfelt my abdominal muscles after last rx; I want to do MT today too.\" C/o virtually no LBP     OBJECTIVE:  Current objective measures:           Therapeutic Exercises (CPT 65790):     1. seated ball march, LAQ    2. Heel slide w/TvA, hep    3. KFO w/1 LE extended, -, hep    4. DKTC FT. ON BALL    5. hip abduction, shuttle 3C 1x14    6. bridge ft. on bALL, w/SLL, 1x10-12, 1x6-7    7. SB Qped LE ext. R, L, 1X10 ea    8. SB Qped alt. reach; LE ext. R, L, 1x10    9. 1/2 kneel hip flexor stretch, -    10. SH Ext, Sh ABD-Ext, shuttle 2C 1x10 ea.    11. kneeling Hip extensions, shuttle 2x5 2C, pt. had a LOB at rep 5, rested for 30\" then continued, consider holding    12. foam roller series, massage, snow maxi 10, rope pull 10, alt. UE ext. 10, pect stretch    13. kneeling ab/lat outs, ball      Therapeutic Exercise Summary: Back stretches Extension bias [7HBYGYE]    Abdominal brace -  Repeat 10 Times, Hold 2 Seconds, Complete 1 Set, Perform 1 Times a Day    KNEE FALL OUT  -  Repeat 1 Time, Hold 1 Second(s), Complete 1 Set, Perform 1 Times a Day    PRONE TRACTION - FOREARM PULLS -  Repeat 5 Times, Hold 10 Seconds, Complete 1 Set, Perform 1 Times a Day    Therapeutic Treatments and Modalities:     1. Mechanical Traction (CPT 01583), L/S 110#/55 # 60\"/20\" on/off x15'    Time-based treatments/modalities:    Physical Therapy Timed Treatment Charges  Therapeutic exercise minutes (CPT 74463): 30 minutes  Pain rating (1-10) before treatment:  " 1  Pain rating (1-10) after treatment:  0    ASSESSMENT:   Response to treatment: Continued with L/S stabilization exercises: the hip extension exercise was too high of a level for her so consider holding. Pt. Only tolerant to 30 min. Of TE.  P/w a resolving back problem.    PLAN/RECOMMENDATIONS:   Plan for treatment: therapy treatment to continue next visit.  Planned interventions for next visit: continue with current treatment.

## 2023-07-21 ENCOUNTER — PHYSICAL THERAPY (OUTPATIENT)
Dept: PHYSICAL THERAPY | Facility: REHABILITATION | Age: 28
End: 2023-07-21
Attending: PHYSICAL MEDICINE & REHABILITATION
Payer: COMMERCIAL

## 2023-07-21 DIAGNOSIS — M51.36 DEGENERATION OF LUMBAR INTERVERTEBRAL DISC: ICD-10-CM

## 2023-07-21 PROCEDURE — 97535 SELF CARE MNGMENT TRAINING: CPT

## 2023-07-21 PROCEDURE — 97012 MECHANICAL TRACTION THERAPY: CPT

## 2023-07-21 PROCEDURE — 97110 THERAPEUTIC EXERCISES: CPT

## 2023-07-21 NOTE — OP THERAPY DAILY TREATMENT
"  Outpatient Physical Therapy  DAILY TREATMENT     [facility] Physical [address].  [address]  [place] [address]  Phone:  [phone]  Fax:  [phone]    Date: 07/21/2023    Patient: Carmen Hebert  YOB: 1995  MRN: [id number]     Time Calculation    Start time: 0815  Stop time: 0915 Time Calculation (min): 60 minutes         Chief Complaint: No chief complaint on file.    Visit #: 8    SUBJECTIVE:  \"I woke up in a ball this a.m. It's been hard to move since\"    Pain rating (1-10) before treatment:  3-4  Pain rating (1-10) after treatment:  0-1    OBJECTIVE:  Current objective measures:   Oswestry AT score:   Oswestry Low Back Pain Disability Total Score: 30       Therapeutic Exercises (CPT 43112):     1. march, foam roller    4. body float/ ppu, foam roller    5. hip abduction, shuttle 3C 1x14    6. bridge ft. on bALL, w/SLL, 1x10-12, 1x6-7    7. SB Qped LE ext. R, L, -    8. SB Qped alt. reach; LE ext. R, L, 1x10    9. kneeling lat rolls, foam roller    10. SH Ext, Sh ABD-Ext, shuttle 2C 1x10 ea.    11. Trunk rotations/ thread the needle, foam roller    12. foam roller series, massage, snow maxi 10, rope pull 10, alt. UE ext. 10, pect stretch    13. seated paloff press, 10 ball and BlTB      Therapeutic Exercise Summary: Back stretches Extension bias [7HBYGYE]    Abdominal brace -  Repeat 10 Times, Hold 2 Seconds, Complete 1 Set, Perform 1 Times a Day    KNEE FALL OUT  -  Repeat 1 Time, Hold 1 Second(s), Complete 1 Set, Perform 1 Times a Day    PRONE TRACTION - FOREARM PULLS -  Repeat 5 Times, Hold 10 Seconds, Complete 1 Set, Perform 1 Times a Day    Therapeutic Treatments and Modalities:     1. Mechanical Traction (CPT 78025), L/S 110#/55 # 60\"/20\" on/off x15'    2. Self Care ADL Training (CPT 42325), internet search on foam rollers - med density - avail at [facility]     Time-based treatments/modalities:    Physical Therapy Timed Treatment Charges  Functional " training, self care minutes (CPT 08858): 8 minutes  Therapeutic exercise minutes (CPT 87884): 30 minutes      ASSESSMENT:   Response to treatment: well, pt. Responds well to the L/S  extension exercises. Improvement made on Oswestry AT.  Recommend continuation of PT.  P/w a resolving L/S disc herniation.    PLAN/RECOMMENDATIONS:   Plan for treatment:auth expires 08/03. PrN to  reassess/ extend date therapy treatment to continue next visit.  Planned interventions for next visit: continue with current treatment.

## 2023-07-21 NOTE — OP THERAPY PROGRESS SUMMARY
Outpatient Physical Therapy  PROGRESS SUMMARY NOTE      [facility] Physical Therapy [address]  [address]  [place] [address]  Phone:  [phone]  Fax:  [phone]    Date of Visit: 07/21/2023    Patient: Carmen Hebert  YOB: 1995  MRN: [id number]     Referring Provider: Mahesh Crane M.D.  [address]  [address]  [place] [address]   Referring Diagnosis Spondylolisthesis, lumbosacral region [M43.17];Other intervertebral disc degeneration, lumbar region [M51.36];Spondylosis without myelopathy or radiculopathy, lumbar region [M47.816]     Visit Diagnoses     ICD-10-CM   1. Degeneration of lumbar intervertebral disc  M51.36       Rehab Potential: good    Progress Report Period: 05/0/ - 07/21/2023    Functional Assessment Used  Oswestry Low Back Pain Disability Total Score: 30       Objective Findings and Assessment:   Patient progression towards goals: Short Term Goals:   Pt. I w/hep and self care techniques to lower pain level to be able to do ADLs/IADLs - partially met w/selfcare techniques  Able to do L/S R bend = to L- met  Short term goal time span:  2-4 weeks      Long Term Goals:    Improve score by >or=4 points on the Oswestry questionairre - met (from 50 at SOC to 30)  Long term goal time span:  2-4 months      Objective findings and assessment details: Current Problem list:  1. Pain level at mild-moderate level   2. no progressed hep of Lx stabilization ex. To support back during day    Recommendations:  Pt. Has an I HEP she is following.  Recommend further skilled therapy to reach all goals. HEP alone cannot help pt. work on problems.       Goals:   Short Term Goals:   1. Pt. Has a HEP of Lx stabilization ex. To lower pain level  2. Improve score on Oswestry questionairre by 4 points     Short term goal time span:  6-8 weeks    Plan:   Planned therapy interventions:  Self Care ADL Training (CPT 06851), E Stim Unattended (CPT 01470), Therapeutic  Exercise (CPT 99122), Neuromuscular Re-education (CPT 34083), Manual Therapy (CPT 01250), Hot or Cold Pack Therapy (CPT 90690) and Gait Training (CPT 92919)  Frequency:  1x week  Duration in visits:  4      Referring provider co-signature:  I have reviewed this plan of care and my co-signature certifies the need for services.     Certification Period: 07/21/2023 to 09/22/23    Physician Signature: ________________________________ Date: ______________

## 2023-07-25 ENCOUNTER — PHYSICAL THERAPY (OUTPATIENT)
Dept: PHYSICAL THERAPY | Facility: REHABILITATION | Age: 28
End: 2023-07-25
Attending: PHYSICAL MEDICINE & REHABILITATION
Payer: COMMERCIAL

## 2023-07-25 DIAGNOSIS — M51.36 BULGE OF LUMBAR DISC WITHOUT MYELOPATHY: ICD-10-CM

## 2023-07-25 PROCEDURE — 97110 THERAPEUTIC EXERCISES: CPT

## 2023-07-25 PROCEDURE — 97012 MECHANICAL TRACTION THERAPY: CPT

## 2023-07-25 NOTE — OP THERAPY DAILY TREATMENT
"  Outpatient Physical Therapy  DAILY TREATMENT     [facility] Physical [address].  [address]  [place] [address]  Phone:  [phone]  Fax:  [phone]    Date: 07/25/2023    Patient: Carmen Hebert  YOB: 1995  MRN: [id number]     Time Calculation    Start time: 0817  Stop time: 0907 Time Calculation (min): 50 minutes         Chief Complaint: Back Problem    Visit #: 9    SUBJECTIVE:  \"I'm stiff this a.m.\"  pt renovating asad in house, no space for able to do hep.  Pain rating (1-10) before treatment:  3-4  Pain rating (1-10) after treatment:  0-1    OBJECTIVE:  Current objective measures:   Oswestry AT score:           Therapeutic Exercises (CPT 10682):     1. march, foam roller    4. body float/ ppu, foam roller    5. hip abduction, shuttle 3C 1x14    6. bridge ft. on bALL; neutral bridge, 1x10-12; 1x10    7. SB Qped LE ext. R, L, -    8. SB Qped alt. reach; LE ext. R, L, -    9. kneeling lat rolls, foam roller    10. SH Ext, Sh ABD-Ext, shuttle 2C 1x10 ea.    11. Trunk rotations/ thread the needle, foam roller    12. foam roller series, massage, snow maxi 10, rope pull 10, alt. UE ext. 10, pect stretch    13. seated paloff press, 10 ball and BlTB      Therapeutic Exercise Summary: Back stretches Extension bias [7HBYGYE]    Abdominal brace -  Repeat 10 Times, Hold 2 Seconds, Complete 1 Set, Perform 1 Times a Day    KNEE FALL OUT  -  Repeat 1 Time, Hold 1 Second(s), Complete 1 Set, Perform 1 Times a Day    PRONE TRACTION - FOREARM PULLS -  Repeat 5 Times, Hold 10 Seconds, Complete 1 Set, Perform 1 Times a Day    Therapeutic Treatments and Modalities:     1. Mechanical Traction (CPT 95997), L/S 110#/55 # 60\"/20\" on/off x15'    2. Self Care ADL Training (CPT 07815), internet search on foam rollers - med density - avail at [facility]    Time-based treatments/modalities:    Physical Therapy Timed Treatment Charges  Therapeutic exercise minutes (CPT 28559): 30 " minutes      ASSESSMENT:   Response to treatment: well, pt. Responds well to the L/S  extension exercises.  P/w a resolving L/S disc herniation.    PLAN/RECOMMENDATIONS:   Plan for treatment:PrN complete new auth for 4 visits   therapy treatment to continue next visit.  Planned interventions for next visit: continue with current treatment.

## 2023-07-31 DIAGNOSIS — M79.7 FIBROMYALGIA: ICD-10-CM

## 2023-08-01 RX ORDER — PREGABALIN 150 MG/1
150 CAPSULE ORAL 3 TIMES DAILY
Qty: 90 CAPSULE | Refills: 1 | Status: SHIPPED | OUTPATIENT
Start: 2023-08-01 | End: 2023-09-30

## 2023-08-03 NOTE — OP THERAPY DAILY TREATMENT
"  Outpatient Physical Therapy  DAILY TREATMENT     [facility] Physical [address]  [address]  [place] [address]  Phone:  [phone]  Fax:  [phone]    Date: 08/04/2023    Patient: Carmen Hebert  YOB: 1995  MRN: [id number]     Time Calculation    Start time: 1120  Stop time: 1205 Time Calculation (min): 45 minutes         Chief Complaint: No chief complaint on file.    Visit #: 10    SUBJECTIVE:  \"I'm super out of it I had to take benedryl It may need to be a non-active day.\"  \"My back is 'crunchy' now, I got a foam roller.  Pain rating (1-10) before treatment:  1  Pain rating (1-10) after treatment:  1    OBJECTIVE:  Current objective measures:   Oswestry AT score:           Therapeutic Exercises (CPT 81849):     1. march, 2x10 foam roller    4. body float/ ppu, foam roller    5. hip abduction, shuttle 3C 2x10-14    6. bridge ft. on bALL; neutral bridge, 1x10-12; 1x10    7. SB Qped LE ext. R, L, -    8. SB Qped alt. reach; LE ext. R, L, -    9. kneeling lat rolls, foam roller    10. SH Ext, Sh ABD-Ext, shuttle 3C 1x10 ea.    11. Trunk rotations/ thread the needle, foam roller    12. foam roller series, massage, snow maxi 10, rope pull 10, alt. UE ext. 10, pect stretch    13. seated paloff press, -      Therapeutic Exercise Summary: Back stretches Extension bias [7HBYGYE]    Abdominal brace -  Repeat 10 Times, Hold 2 Seconds, Complete 1 Set, Perform 1 Times a Day    KNEE FALL OUT  -  Repeat 1 Time, Hold 1 Second(s), Complete 1 Set, Perform 1 Times a Day    PRONE TRACTION - FOREARM PULLS -  Repeat 5 Times, Hold 10 Seconds, Complete 1 Set, Perform 1 Times a Day    Therapeutic Treatments and Modalities:     1. Mechanical Traction (CPT 43546), L/S 110#/55 # 60\"/20\" on/off x15'    2. Self Care ADL Training (CPT 80620), hep sets/reps freq.    Time-based treatments/modalities:    Physical Therapy Timed Treatment Charges  Functional training, self care minutes (CPT " 28758): 10 minutes  Therapeutic exercise minutes (CPT 80985): 20 minutes  ASSESSMENT:   Response to treatment: well, pt. Responds well to the L/S  extension exercises and traction. She returns correct demo of hep to do at home. Pt. Has a roller to self-help her symptoms.  P/w a resolving L/S disc herniation.    PLAN/RECOMMENDATIONS:   Plan for treatment none scheduled/schedule conflict: pt to do her hep  Planned interventions for next visit: continue with current treatment.

## 2023-08-04 ENCOUNTER — PHYSICAL THERAPY (OUTPATIENT)
Dept: PHYSICAL THERAPY | Facility: REHABILITATION | Age: 28
End: 2023-08-04
Attending: PHYSICAL MEDICINE & REHABILITATION
Payer: COMMERCIAL

## 2023-08-04 DIAGNOSIS — M47.816 LUMBAR ARTHROPATHY: ICD-10-CM

## 2023-08-04 PROCEDURE — 97110 THERAPEUTIC EXERCISES: CPT

## 2023-08-04 PROCEDURE — 97012 MECHANICAL TRACTION THERAPY: CPT

## 2023-08-04 PROCEDURE — 97535 SELF CARE MNGMENT TRAINING: CPT

## 2023-08-05 ENCOUNTER — HOSPITAL ENCOUNTER (OUTPATIENT)
Facility: MEDICAL CENTER | Age: 28
End: 2023-08-05
Attending: NURSE PRACTITIONER
Payer: COMMERCIAL

## 2023-08-05 ENCOUNTER — OFFICE VISIT (OUTPATIENT)
Dept: URGENT CARE | Facility: CLINIC | Age: 28
End: 2023-08-05
Payer: COMMERCIAL

## 2023-08-05 VITALS
WEIGHT: 250 LBS | TEMPERATURE: 97 F | DIASTOLIC BLOOD PRESSURE: 80 MMHG | OXYGEN SATURATION: 97 % | SYSTOLIC BLOOD PRESSURE: 124 MMHG | RESPIRATION RATE: 16 BRPM | HEIGHT: 70 IN | BODY MASS INDEX: 35.79 KG/M2 | HEART RATE: 80 BPM

## 2023-08-05 DIAGNOSIS — Z11.3 SCREEN FOR STD (SEXUALLY TRANSMITTED DISEASE): ICD-10-CM

## 2023-08-05 DIAGNOSIS — K13.70 ORAL LESION: ICD-10-CM

## 2023-08-05 PROCEDURE — 87529 HSV DNA AMP PROBE: CPT | Mod: 91

## 2023-08-05 PROCEDURE — 3074F SYST BP LT 130 MM HG: CPT | Performed by: NURSE PRACTITIONER

## 2023-08-05 PROCEDURE — 87660 TRICHOMONAS VAGIN DIR PROBE: CPT

## 2023-08-05 PROCEDURE — 87491 CHLMYD TRACH DNA AMP PROBE: CPT

## 2023-08-05 PROCEDURE — 87510 GARDNER VAG DNA DIR PROBE: CPT

## 2023-08-05 PROCEDURE — 99213 OFFICE O/P EST LOW 20 MIN: CPT | Performed by: NURSE PRACTITIONER

## 2023-08-05 PROCEDURE — 87480 CANDIDA DNA DIR PROBE: CPT

## 2023-08-05 PROCEDURE — 87591 N.GONORRHOEAE DNA AMP PROB: CPT

## 2023-08-05 PROCEDURE — 3079F DIAST BP 80-89 MM HG: CPT | Performed by: NURSE PRACTITIONER

## 2023-08-05 RX ORDER — VALACYCLOVIR HYDROCHLORIDE 1 G/1
1000 TABLET, FILM COATED ORAL 2 TIMES DAILY
Qty: 14 TABLET | Refills: 0 | Status: SHIPPED | OUTPATIENT
Start: 2023-08-05 | End: 2023-08-12

## 2023-08-05 ASSESSMENT — FIBROSIS 4 INDEX: FIB4 SCORE: 0.29

## 2023-08-05 NOTE — PROGRESS NOTES
Chief Complaint   Patient presents with    Sore     Sx Tuesday / sexual encounter / concerned HSV        HISTORY OF PRESENT ILLNESS: Patient is a pleasant 28 y.o. female who presents to urgent care today with concerns of oral sores and lesions.  The patient has noticed these lesions over the past several days.  The lesions are irritated and somewhat itchy.  She is concerned as they started after she had oral intercourse with a male, does not know his sexual history.  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  She has not tried any medication for symptom relief.  Denies any vaginal complaints.  She would like routine STI screening today.    Patient Active Problem List    Diagnosis Date Noted    Inappropriate sinus tachycardia 12/08/2021    Sebaceous cyst of skin of breast 12/07/2021    Anxiety 12/06/2021    Vaginal discharge 11/01/2021    Pelvic pain 08/30/2021    Complication of left ear piercing 08/30/2021    Major depression, recurrent (HCC) 04/02/2021    Thalassemia 04/08/2020    Asthma 04/08/2020    Celiac disease 04/08/2020    Cervical lesion 04/08/2020    Seasonal allergies 04/08/2020    Mixed anxiety depressive disorder 04/08/2020    Fibromyalgia 03/10/2020    Obesity (BMI 30-39.9) 03/10/2020    Palpitations 03/10/2020       Allergies:Corn-related products, Dairy food allergy, Gluten meal, Sesame seed (diagnostic), and Soy allergy    Current Outpatient Medications Ordered in Epic   Medication Sig Dispense Refill    valacyclovir (VALTREX) 1 GM Tab Take 1 Tablet by mouth 2 times a day for 7 days. 14 Tablet 0    pregabalin (LYRICA) 150 MG Cap Take 1 Capsule by mouth 3 times a day for 60 days. 90 Capsule 1    propranolol LA (INDERAL LA) 60 MG CAPSULE SR 24 HR Take 1 Capsule by mouth every day. Please call to schedule follow up appointment for further refills. Please call [phone]. Thank you. 100 Capsule 3    tizanidine (ZANAFLEX) 4 MG Tab Take 4 mg by mouth every 6 hours as needed.      sertraline (ZOLOFT)  100 MG Tab TAKE 1 1/2 TABLET BY MOUTH ONCE DAILY 45 Tablet 11    fluticasone (FLONASE) 50 MCG/ACT nasal spray Administer 2 Sprays into affected nostril(S) every day. 16 g 11    BELL 1.5/30 1.5-30 MG-MCG Tab TAKE 1 TABLET BY MOUTH DAILY. TAKE CONTINUOUSLY NO, PLACEBO WEEK 84 Tablet 2    albuterol 108 (90 Base) MCG/ACT Aero Soln inhalation aerosol Inhale 2 Puffs every four hours as needed for Shortness of Breath. 1 Each 0    ALPRAZolam (XANAX) 0.25 MG Tab Take 0.25 mg by mouth at bedtime as needed for Sleep.       No current Epic-ordered facility-administered medications on file.       Past Medical History:   Diagnosis Date    Anxiety     Asthma     Depression     Fibromyalgia 2017    Thalassemia minor        Social History     Tobacco Use    Smoking status: Never    Smokeless tobacco: Never   Vaping Use    Vaping Use: Never used   Substance Use Topics    Alcohol use: Yes     Comment: approx 1 drink per week    Drug use: Not Currently     Types: Marijuana     Comment: rarely       Family Status   Relation Name Status    Mo  Alive    Fa  Alive     Family History   Problem Relation Age of Onset    Fibromyalgia Mother     Lung Disease Mother         asthma r/o    Depression Mother     Anxiety disorder Mother     ADD / ADHD Father     Depression Father        ROS:  Review of Systems   Constitutional: Negative for fever, chills, weight loss, malaise, and fatigue.   HENT: Positive for oral sores.  Negative for ear pain, nosebleeds, congestion, sore throat and neck pain.    Eyes: Negative for vision changes.   Neuro: Negative for headache, sensory changes, weakness, seizure, LOC.   Cardiovascular: Negative for chest pain, palpitations, orthopnea and leg swelling.   Respiratory: Negative for cough, sputum production, shortness of breath and wheezing.   Gastrointestinal: Negative for abdominal pain, nausea, vomiting or diarrhea.   Genitourinary: Negative for dysuria, urgency and frequency.  Musculoskeletal: Negative for  "falls, neck pain, back pain, joint pain, myalgias.   Skin: Negative for rash, diaphoresis.     Exam:  /80 (BP Location: Left arm, Patient Position: Sitting, BP Cuff Size: Adult)   Pulse 80   Temp 36.1 °C (97 °F) (Temporal)   Resp 16   Ht 1.778 m (5' 10\")   Wt 113 kg (250 lb)   SpO2 97%   General: well-nourished, well-developed female in NAD  Head: normocephalic, atraumatic  Eyes: PERRLA, no conjunctival injection, acuity grossly intact, lids normal.  Ears: normal shape and symmetry, no tenderness, no discharge. External canals are without any significant edema or erythema. Tympanic membranes are without any inflammation, no effusion. Gross auditory acuity is intact.  Nose: symmetrical without tenderness, no discharge.  Mouth/Throat: reasonable hygiene, no erythema, exudates or tonsillar enlargement.  Small ulcerated lesions noted to soft palate and on tongue.  Neck: no masses, range of motion within normal limits, no tracheal deviation. No obvious thyroid enlargement.   Lymph: no cervical adenopathy. No supraclavicular adenopathy.   Neuro: alert and oriented. Cranial nerves 1-12 grossly intact. No sensory deficit.   Cardiovascular: regular rate and rhythm. No edema.  Musculoskeletal: no clubbing, appropriate muscle tone, gait is stable.  Skin: warm, dry, intact, no clubbing, no cyanosis, no rashes.   Psych: appropriate mood, affect, judgement.         Assessment/Plan:  1. Screen for STD (sexually transmitted disease)  VAGINAL PATHOGENS DNA PANEL    Chlamydia/GC, PCR (Urine)    HERPES SCREEN VIRAL CULTURE    HIV ANTIBODIES    RPR (SYPHILIS)      2. Oral lesion  valacyclovir (VALTREX) 1 GM Tab          Patient is a pleasant 28-year-old who presents with oral lesions, HSV swab obtained, Valtrex as directed.  In addition, vaginal pathogens, GC, HIV, and RPR ordered for patient.  Safe intercourse practices discussed.   Supportive care, differential diagnoses, and indications for immediate follow-up discussed " with patient.   Pathogenesis of diagnosis discussed including typical length and natural progression.   Instructed to return to clinic or nearest emergency department for any change in condition, further concerns, or worsening of symptoms.  Patient states understanding of the plan of care and discharge instructions.  Instructed to make an appointment, for follow up, with her primary care provider.        Please note that this dictation was created using voice recognition software. I have made every reasonable attempt to correct obvious errors, but I expect that there are errors of grammar and possibly content that I did not discover before finalizing the note.      CHAD Donovan.

## 2023-08-06 LAB
C TRACH DNA GENITAL QL NAA+PROBE: NEGATIVE
CANDIDA DNA VAG QL PROBE+SIG AMP: POSITIVE
G VAGINALIS DNA VAG QL PROBE+SIG AMP: POSITIVE
N GONORRHOEA DNA GENITAL QL NAA+PROBE: NEGATIVE
SPECIMEN SOURCE: NORMAL
T VAGINALIS DNA VAG QL PROBE+SIG AMP: NEGATIVE

## 2023-08-07 ENCOUNTER — HOSPITAL ENCOUNTER (OUTPATIENT)
Dept: LAB | Facility: MEDICAL CENTER | Age: 28
End: 2023-08-07
Attending: NURSE PRACTITIONER
Payer: COMMERCIAL

## 2023-08-07 DIAGNOSIS — N76.0 BV (BACTERIAL VAGINOSIS): ICD-10-CM

## 2023-08-07 DIAGNOSIS — Z11.3 SCREEN FOR STD (SEXUALLY TRANSMITTED DISEASE): ICD-10-CM

## 2023-08-07 DIAGNOSIS — B96.89 BV (BACTERIAL VAGINOSIS): ICD-10-CM

## 2023-08-07 DIAGNOSIS — B37.9 CANDIDIASIS: ICD-10-CM

## 2023-08-07 LAB
HIV 1+2 AB+HIV1 P24 AG SERPL QL IA: NORMAL
T PALLIDUM AB SER QL IA: NORMAL

## 2023-08-07 PROCEDURE — 86780 TREPONEMA PALLIDUM: CPT

## 2023-08-07 PROCEDURE — 87389 HIV-1 AG W/HIV-1&-2 AB AG IA: CPT

## 2023-08-07 PROCEDURE — 36415 COLL VENOUS BLD VENIPUNCTURE: CPT

## 2023-08-07 RX ORDER — METRONIDAZOLE 500 MG/1
500 TABLET ORAL 2 TIMES DAILY
Qty: 14 TABLET | Refills: 0 | Status: SHIPPED | OUTPATIENT
Start: 2023-08-07 | End: 2023-08-14

## 2023-08-07 RX ORDER — FLUCONAZOLE 150 MG/1
150 TABLET ORAL DAILY
Qty: 1 TABLET | Refills: 0 | Status: SHIPPED
Start: 2023-08-07 | End: 2023-10-30

## 2023-08-10 LAB
HSV1 DNA CSF QL NAA+PROBE: NOT DETECTED
HSV2 DNA CSF QL NAA+PROBE: NOT DETECTED
SPECIMEN SOURCE: NORMAL

## 2023-09-29 DIAGNOSIS — Z30.41 ENCOUNTER FOR SURVEILLANCE OF CONTRACEPTIVE PILLS: ICD-10-CM

## 2023-09-29 DIAGNOSIS — R10.2 PELVIC PAIN: ICD-10-CM

## 2023-09-29 RX ORDER — NORETHINDRONE ACETATE AND ETHINYL ESTRADIOL .03; 1.5 MG/1; MG/1
TABLET ORAL
Qty: 84 TABLET | Refills: 2 | Status: SHIPPED | OUTPATIENT
Start: 2023-09-29 | End: 2023-12-17 | Stop reason: SDUPTHER

## 2023-10-05 ENCOUNTER — HOSPITAL ENCOUNTER (OUTPATIENT)
Dept: LAB | Facility: MEDICAL CENTER | Age: 28
End: 2023-10-05
Attending: ANESTHESIOLOGY
Payer: COMMERCIAL

## 2023-10-05 LAB
BASOPHILS # BLD AUTO: 0.9 % (ref 0–1.8)
BASOPHILS # BLD: 0.08 K/UL (ref 0–0.12)
EOSINOPHIL # BLD AUTO: 0.17 K/UL (ref 0–0.51)
EOSINOPHIL NFR BLD: 1.8 % (ref 0–6.9)
ERYTHROCYTE [DISTWIDTH] IN BLOOD BY AUTOMATED COUNT: 35.5 FL (ref 35.9–50)
HCT VFR BLD AUTO: 37.3 % (ref 37–47)
HGB BLD-MCNC: 11.2 G/DL (ref 12–16)
IMM GRANULOCYTES # BLD AUTO: 0.01 K/UL (ref 0–0.11)
IMM GRANULOCYTES NFR BLD AUTO: 0.1 % (ref 0–0.9)
LYMPHOCYTES # BLD AUTO: 2.75 K/UL (ref 1–4.8)
LYMPHOCYTES NFR BLD: 29.3 % (ref 22–41)
MCH RBC QN AUTO: 19.2 PG (ref 27–33)
MCHC RBC AUTO-ENTMCNC: 30 G/DL (ref 32.2–35.5)
MCV RBC AUTO: 63.9 FL (ref 81.4–97.8)
MONOCYTES # BLD AUTO: 0.64 K/UL (ref 0–0.85)
MONOCYTES NFR BLD AUTO: 6.8 % (ref 0–13.4)
NEUTROPHILS # BLD AUTO: 5.74 K/UL (ref 1.82–7.42)
NEUTROPHILS NFR BLD: 61.1 % (ref 44–72)
NRBC # BLD AUTO: 0 K/UL
NRBC BLD-RTO: 0 /100 WBC (ref 0–0.2)
PLATELET # BLD AUTO: 395 K/UL (ref 164–446)
PMV BLD AUTO: 11 FL (ref 9–12.9)
RBC # BLD AUTO: 5.84 M/UL (ref 4.2–5.4)
WBC # BLD AUTO: 9.4 K/UL (ref 4.8–10.8)

## 2023-10-05 PROCEDURE — 36415 COLL VENOUS BLD VENIPUNCTURE: CPT

## 2023-10-05 PROCEDURE — 85025 COMPLETE CBC W/AUTO DIFF WBC: CPT

## 2023-10-30 ENCOUNTER — TELEMEDICINE (OUTPATIENT)
Dept: MEDICAL GROUP | Facility: MEDICAL CENTER | Age: 28
End: 2023-10-30
Payer: COMMERCIAL

## 2023-10-30 VITALS — BODY MASS INDEX: 35.79 KG/M2 | HEIGHT: 70 IN | WEIGHT: 250 LBS

## 2023-10-30 DIAGNOSIS — M79.7 FIBROMYALGIA: ICD-10-CM

## 2023-10-30 DIAGNOSIS — R00.2 PALPITATIONS: ICD-10-CM

## 2023-10-30 PROCEDURE — 99214 OFFICE O/P EST MOD 30 MIN: CPT | Mod: 95 | Performed by: STUDENT IN AN ORGANIZED HEALTH CARE EDUCATION/TRAINING PROGRAM

## 2023-10-30 RX ORDER — PREGABALIN 150 MG/1
150 CAPSULE ORAL 3 TIMES DAILY
Qty: 90 CAPSULE | Refills: 2 | Status: SHIPPED | OUTPATIENT
Start: 2023-10-30 | End: 2023-12-17 | Stop reason: SDUPTHER

## 2023-10-30 RX ORDER — SCOLOPAMINE TRANSDERMAL SYSTEM 1 MG/1
PATCH, EXTENDED RELEASE TRANSDERMAL
COMMUNITY
Start: 2023-10-03 | End: 2023-10-30

## 2023-10-30 RX ORDER — OXYCODONE AND ACETAMINOPHEN 7.5; 325 MG/1; MG/1
TABLET ORAL
COMMUNITY
Start: 2023-10-03 | End: 2023-10-30

## 2023-10-30 RX ORDER — CEPHALEXIN 500 MG/1
CAPSULE ORAL
COMMUNITY
Start: 2023-10-03 | End: 2023-10-30

## 2023-10-30 ASSESSMENT — FIBROSIS 4 INDEX: FIB4 SCORE: 0.27

## 2023-10-30 NOTE — PROGRESS NOTES
"Virtual Visit: Established Patient   This visit was conducted via Zoom using secure and encrypted videoconferencing technology.   The patient was in their home in the state [facility].    The patient's identity was confirmed and verbal consent was obtained for this virtual visit.     Subjective:   CC:   Chief Complaint   Patient presents with    Medication Refill     Pregabalin          Carmen Hebert is a 28 y.o. female presenting for evaluation and management of:    Fibromyalgia  Pulled off lumbar disc  Patient has been seeing physiatry for fibromyalgia and chronic pain.  Patient taking Lyrica 150 mg 3 times daily.  Stable on medication, will take over prescription.  Patient presents today for refill.  Patient has been advised to continue following with physical therapy.    Palpitations  Patient evaluated by cardiology for concern about palpitations.  Patient had a cardiac event monitor in 2020 which showed predominantly sinus rhythm.  Patient continues on propranolol 60 mg daily.      ROS   See hpi       Objective:   Ht 1.778 m (5' 10\") Comment: per pt  Wt 113 kg (250 lb) Comment: per pt  BMI 35.87 kg/m²     Physical Exam:  Constitutional: Alert, no distress, well-groomed.  Skin: No rashes in visible areas.  Eye: Round. Conjunctiva clear, lids normal. No icterus.   ENMT: Lips pink without lesions, good dentition, moist mucous membranes. Phonation normal.  Neck: No masses, no thyromegaly. Moves freely without pain.  Respiratory: Unlabored respiratory effort, no cough or audible wheeze  Psych: Alert and oriented x3, normal affect and mood.     Assessment and Plan:   The following treatment plan was discussed:     1. Fibromyalgia  Medication previously prescribed by physiatry.   reviewed.  No aberrant or addictive use has been observed.  Patient has been stable on this medication.  Patient understands appropriate use and storage.  We will continue to follow-up every 3 months for refills.  - " pregabalin (LYRICA) 150 MG Cap; Take 1 Capsule by mouth in the morning, at noon, and at bedtime for 90 days.  Dispense: 90 Capsule; Refill: 2    2. Palpitations  Chronic, stable.  Patient continues stable on propranolol 60 mg.    Follow-up: No follow-ups on file.

## 2023-11-06 ENCOUNTER — PHARMACY VISIT (OUTPATIENT)
Dept: PHARMACY | Facility: MEDICAL CENTER | Age: 28
End: 2023-11-06
Payer: COMMERCIAL

## 2023-11-06 PROCEDURE — RXMED WILLOW AMBULATORY MEDICATION CHARGE: Performed by: INTERNAL MEDICINE

## 2023-11-06 RX ORDER — COVID-19 VACCINE, MRNA 0.04 MG/.418ML
INJECTION, SUSPENSION INTRAMUSCULAR
Qty: 0.3 ML | Refills: 0 | Status: SHIPPED | OUTPATIENT
Start: 2023-11-06 | End: 2024-01-11

## 2023-11-06 RX ORDER — INFLUENZA A VIRUS A/BRISBANE/02/2018 IVR-190 (H1N1) ANTIGEN (FORMALDEHYDE INACTIVATED), INFLUENZA A VIRUS A/KANSAS/14/2017 X-327 (H3N2) ANTIGEN (FORMALDEHYDE INACTIVATED), INFLUENZA B VIRUS B/PHUKET/3073/2013 ANTIGEN (FORMALDEHYDE INACTIVATED), AND INFLUENZA B VIRUS B/MARYLAND/15/2016 BX-69A ANTIGEN (FORMALDEHYDE INACTIVATED) 15; 15; 15; 15 UG/.5ML; UG/.5ML; UG/.5ML; UG/.5ML
INJECTION, SUSPENSION INTRAMUSCULAR
Qty: 0.5 ML | Refills: 0 | Status: SHIPPED | OUTPATIENT
Start: 2023-11-06 | End: 2024-01-11

## 2023-12-17 DIAGNOSIS — J30.2 SEASONAL ALLERGIES: ICD-10-CM

## 2023-12-17 DIAGNOSIS — R10.2 PELVIC PAIN: ICD-10-CM

## 2023-12-17 DIAGNOSIS — R00.2 PALPITATIONS: ICD-10-CM

## 2023-12-17 DIAGNOSIS — Z30.41 ENCOUNTER FOR SURVEILLANCE OF CONTRACEPTIVE PILLS: ICD-10-CM

## 2023-12-17 DIAGNOSIS — M79.7 FIBROMYALGIA: ICD-10-CM

## 2023-12-19 RX ORDER — PROPRANOLOL HCL 60 MG
60 CAPSULE, EXTENDED RELEASE 24HR ORAL DAILY
Qty: 100 CAPSULE | Refills: 1 | Status: SHIPPED | OUTPATIENT
Start: 2023-12-19

## 2024-01-09 RX ORDER — FLUTICASONE PROPIONATE 50 MCG
2 SPRAY, SUSPENSION (ML) NASAL DAILY
Qty: 16 G | Refills: 11 | Status: SHIPPED | OUTPATIENT
Start: 2024-01-09

## 2024-01-09 RX ORDER — ALBUTEROL SULFATE 90 UG/1
2 AEROSOL, METERED RESPIRATORY (INHALATION) EVERY 4 HOURS PRN
Qty: 1 EACH | Refills: 11 | Status: SHIPPED | OUTPATIENT
Start: 2024-01-09

## 2024-01-09 RX ORDER — NORETHINDRONE ACETATE AND ETHINYL ESTRADIOL .03; 1.5 MG/1; MG/1
TABLET ORAL
Qty: 84 TABLET | Refills: 2 | Status: SHIPPED | OUTPATIENT
Start: 2024-01-09 | End: 2024-02-09 | Stop reason: SDUPTHER

## 2024-01-09 RX ORDER — PREGABALIN 150 MG/1
150 CAPSULE ORAL 3 TIMES DAILY
Qty: 90 CAPSULE | Refills: 0 | Status: SHIPPED | OUTPATIENT
Start: 2024-02-01 | End: 2024-02-13 | Stop reason: SDUPTHER

## 2024-01-11 ENCOUNTER — APPOINTMENT (OUTPATIENT)
Dept: RADIOLOGY | Facility: MEDICAL CENTER | Age: 29
DRG: 871 | End: 2024-01-11
Attending: EMERGENCY MEDICINE
Payer: COMMERCIAL

## 2024-01-11 ENCOUNTER — HOSPITAL ENCOUNTER (INPATIENT)
Facility: MEDICAL CENTER | Age: 29
LOS: 3 days | DRG: 871 | End: 2024-01-14
Attending: EMERGENCY MEDICINE | Admitting: STUDENT IN AN ORGANIZED HEALTH CARE EDUCATION/TRAINING PROGRAM
Payer: COMMERCIAL

## 2024-01-11 DIAGNOSIS — R00.0 SINUS TACHYCARDIA: ICD-10-CM

## 2024-01-11 DIAGNOSIS — R09.02 HYPOXIA: ICD-10-CM

## 2024-01-11 DIAGNOSIS — E66.9 OBESITY (BMI 30-39.9): ICD-10-CM

## 2024-01-11 DIAGNOSIS — J18.9 MULTIFOCAL PNEUMONIA: Primary | ICD-10-CM

## 2024-01-11 DIAGNOSIS — A41.9 SEPSIS, DUE TO UNSPECIFIED ORGANISM, UNSPECIFIED WHETHER ACUTE ORGAN DYSFUNCTION PRESENT (HCC): ICD-10-CM

## 2024-01-11 DIAGNOSIS — E86.0 DEHYDRATION: ICD-10-CM

## 2024-01-11 DIAGNOSIS — A41.9 SEPSIS WITH ACUTE ORGAN DYSFUNCTION WITHOUT SEPTIC SHOCK, DUE TO UNSPECIFIED ORGANISM, UNSPECIFIED ORGAN DYSFUNCTION TYPE (HCC): ICD-10-CM

## 2024-01-11 DIAGNOSIS — R65.20 SEPSIS WITH ACUTE ORGAN DYSFUNCTION WITHOUT SEPTIC SHOCK, DUE TO UNSPECIFIED ORGANISM, UNSPECIFIED ORGAN DYSFUNCTION TYPE (HCC): ICD-10-CM

## 2024-01-11 PROBLEM — R65.10 SIRS (SYSTEMIC INFLAMMATORY RESPONSE SYNDROME) (HCC): Status: ACTIVE | Noted: 2024-01-11

## 2024-01-11 LAB
ALBUMIN SERPL BCP-MCNC: 3.9 G/DL (ref 3.2–4.9)
ALBUMIN/GLOB SERPL: 1 G/DL
ALP SERPL-CCNC: 89 U/L (ref 30–99)
ALT SERPL-CCNC: 22 U/L (ref 2–50)
ANION GAP SERPL CALC-SCNC: 14 MMOL/L (ref 7–16)
APPEARANCE UR: ABNORMAL
AST SERPL-CCNC: 30 U/L (ref 12–45)
B-HCG SERPL-ACNC: <1 MIU/ML (ref 0–5)
BACTERIA #/AREA URNS HPF: ABNORMAL /HPF
BASOPHILS # BLD AUTO: 0.7 % (ref 0–1.8)
BASOPHILS # BLD: 0.12 K/UL (ref 0–0.12)
BILIRUB SERPL-MCNC: 0.5 MG/DL (ref 0.1–1.5)
BILIRUB UR QL STRIP.AUTO: NEGATIVE
BUN SERPL-MCNC: 6 MG/DL (ref 8–22)
CALCIUM ALBUM COR SERPL-MCNC: 8.8 MG/DL (ref 8.5–10.5)
CALCIUM SERPL-MCNC: 8.7 MG/DL (ref 8.5–10.5)
CHLORIDE SERPL-SCNC: 104 MMOL/L (ref 96–112)
CO2 SERPL-SCNC: 19 MMOL/L (ref 20–33)
COLOR UR: YELLOW
CREAT SERPL-MCNC: 0.8 MG/DL (ref 0.5–1.4)
EKG IMPRESSION: NORMAL
EOSINOPHIL # BLD AUTO: 0.01 K/UL (ref 0–0.51)
EOSINOPHIL NFR BLD: 0.1 % (ref 0–6.9)
EPI CELLS #/AREA URNS HPF: ABNORMAL /HPF
ERYTHROCYTE [DISTWIDTH] IN BLOOD BY AUTOMATED COUNT: 32.1 FL (ref 35.9–50)
FLUAV RNA SPEC QL NAA+PROBE: NEGATIVE
FLUBV RNA SPEC QL NAA+PROBE: NEGATIVE
GFR SERPLBLD CREATININE-BSD FMLA CKD-EPI: 101 ML/MIN/1.73 M 2
GLOBULIN SER CALC-MCNC: 4.1 G/DL (ref 1.9–3.5)
GLUCOSE SERPL-MCNC: 114 MG/DL (ref 65–99)
GLUCOSE UR STRIP.AUTO-MCNC: NEGATIVE MG/DL
HCG UR QL: POSITIVE
HCT VFR BLD AUTO: 37.6 % (ref 37–47)
HGB BLD-MCNC: 12 G/DL (ref 12–16)
HYALINE CASTS #/AREA URNS LPF: ABNORMAL /LPF
IMM GRANULOCYTES # BLD AUTO: 0.1 K/UL (ref 0–0.11)
IMM GRANULOCYTES NFR BLD AUTO: 0.6 % (ref 0–0.9)
KETONES UR STRIP.AUTO-MCNC: NEGATIVE MG/DL
LACTATE SERPL-SCNC: 1 MMOL/L (ref 0.5–2)
LACTATE SERPL-SCNC: 1 MMOL/L (ref 0.5–2)
LEUKOCYTE ESTERASE UR QL STRIP.AUTO: ABNORMAL
LYMPHOCYTES # BLD AUTO: 2.55 K/UL (ref 1–4.8)
LYMPHOCYTES NFR BLD: 14.9 % (ref 22–41)
MCH RBC QN AUTO: 19.3 PG (ref 27–33)
MCHC RBC AUTO-ENTMCNC: 31.9 G/DL (ref 32.2–35.5)
MCV RBC AUTO: 60.4 FL (ref 81.4–97.8)
MICRO URNS: ABNORMAL
MONOCYTES # BLD AUTO: 0.82 K/UL (ref 0–0.85)
MONOCYTES NFR BLD AUTO: 4.8 % (ref 0–13.4)
NEUTROPHILS # BLD AUTO: 13.52 K/UL (ref 1.82–7.42)
NEUTROPHILS NFR BLD: 78.9 % (ref 44–72)
NITRITE UR QL STRIP.AUTO: NEGATIVE
NRBC # BLD AUTO: 0 K/UL
NRBC BLD-RTO: 0 /100 WBC (ref 0–0.2)
PH UR STRIP.AUTO: 7 [PH] (ref 5–8)
PLATELET # BLD AUTO: 377 K/UL (ref 164–446)
PMV BLD AUTO: 10.5 FL (ref 9–12.9)
POTASSIUM SERPL-SCNC: 4.1 MMOL/L (ref 3.6–5.5)
PROT SERPL-MCNC: 8 G/DL (ref 6–8.2)
PROT UR QL STRIP: NEGATIVE MG/DL
RBC # BLD AUTO: 6.23 M/UL (ref 4.2–5.4)
RBC # URNS HPF: ABNORMAL /HPF
RBC UR QL AUTO: ABNORMAL
RSV RNA SPEC QL NAA+PROBE: NEGATIVE
SARS-COV-2 RNA RESP QL NAA+PROBE: NOTDETECTED
SODIUM SERPL-SCNC: 137 MMOL/L (ref 135–145)
SP GR UR STRIP.AUTO: 1.01
TRANS CELLS #/AREA URNS HPF: ABNORMAL /HPF
UROBILINOGEN UR STRIP.AUTO-MCNC: 1 MG/DL
WBC # BLD AUTO: 17.1 K/UL (ref 4.8–10.8)
WBC #/AREA URNS HPF: ABNORMAL /HPF

## 2024-01-11 PROCEDURE — 96374 THER/PROPH/DIAG INJ IV PUSH: CPT

## 2024-01-11 PROCEDURE — 700105 HCHG RX REV CODE 258: Performed by: STUDENT IN AN ORGANIZED HEALTH CARE EDUCATION/TRAINING PROGRAM

## 2024-01-11 PROCEDURE — 80053 COMPREHEN METABOLIC PANEL: CPT

## 2024-01-11 PROCEDURE — 85025 COMPLETE CBC W/AUTO DIFF WBC: CPT

## 2024-01-11 PROCEDURE — 700111 HCHG RX REV CODE 636 W/ 250 OVERRIDE (IP): Mod: JZ | Performed by: STUDENT IN AN ORGANIZED HEALTH CARE EDUCATION/TRAINING PROGRAM

## 2024-01-11 PROCEDURE — 81001 URINALYSIS AUTO W/SCOPE: CPT

## 2024-01-11 PROCEDURE — 0241U HCHG SARS-COV-2 COVID-19 NFCT DS RESP RNA 4 TRGT ED POC: CPT

## 2024-01-11 PROCEDURE — 71045 X-RAY EXAM CHEST 1 VIEW: CPT

## 2024-01-11 PROCEDURE — 93005 ELECTROCARDIOGRAM TRACING: CPT | Performed by: EMERGENCY MEDICINE

## 2024-01-11 PROCEDURE — 93005 ELECTROCARDIOGRAM TRACING: CPT

## 2024-01-11 PROCEDURE — 87086 URINE CULTURE/COLONY COUNT: CPT

## 2024-01-11 PROCEDURE — 700102 HCHG RX REV CODE 250 W/ 637 OVERRIDE(OP): Performed by: STUDENT IN AN ORGANIZED HEALTH CARE EDUCATION/TRAINING PROGRAM

## 2024-01-11 PROCEDURE — 700111 HCHG RX REV CODE 636 W/ 250 OVERRIDE (IP): Mod: JZ,UD | Performed by: EMERGENCY MEDICINE

## 2024-01-11 PROCEDURE — 700102 HCHG RX REV CODE 250 W/ 637 OVERRIDE(OP): Mod: UD | Performed by: EMERGENCY MEDICINE

## 2024-01-11 PROCEDURE — 99285 EMERGENCY DEPT VISIT HI MDM: CPT

## 2024-01-11 PROCEDURE — 99222 1ST HOSP IP/OBS MODERATE 55: CPT | Performed by: STUDENT IN AN ORGANIZED HEALTH CARE EDUCATION/TRAINING PROGRAM

## 2024-01-11 PROCEDURE — 87040 BLOOD CULTURE FOR BACTERIA: CPT

## 2024-01-11 PROCEDURE — A9270 NON-COVERED ITEM OR SERVICE: HCPCS | Mod: UD | Performed by: EMERGENCY MEDICINE

## 2024-01-11 PROCEDURE — 84702 CHORIONIC GONADOTROPIN TEST: CPT

## 2024-01-11 PROCEDURE — 36415 COLL VENOUS BLD VENIPUNCTURE: CPT

## 2024-01-11 PROCEDURE — 81025 URINE PREGNANCY TEST: CPT

## 2024-01-11 PROCEDURE — 83605 ASSAY OF LACTIC ACID: CPT

## 2024-01-11 PROCEDURE — 770020 HCHG ROOM/CARE - TELE (206)

## 2024-01-11 PROCEDURE — 700105 HCHG RX REV CODE 258: Mod: UD | Performed by: EMERGENCY MEDICINE

## 2024-01-11 PROCEDURE — A9270 NON-COVERED ITEM OR SERVICE: HCPCS | Performed by: STUDENT IN AN ORGANIZED HEALTH CARE EDUCATION/TRAINING PROGRAM

## 2024-01-11 RX ORDER — AZITHROMYCIN 500 MG/5ML
500 INJECTION, POWDER, LYOPHILIZED, FOR SOLUTION INTRAVENOUS EVERY 24 HOURS
Status: DISCONTINUED | OUTPATIENT
Start: 2024-01-12 | End: 2024-01-12

## 2024-01-11 RX ORDER — SERTRALINE HYDROCHLORIDE 100 MG/1
200 TABLET, FILM COATED ORAL DAILY
COMMUNITY
End: 2024-02-09 | Stop reason: SDUPTHER

## 2024-01-11 RX ORDER — IPRATROPIUM BROMIDE AND ALBUTEROL SULFATE 2.5; .5 MG/3ML; MG/3ML
3 SOLUTION RESPIRATORY (INHALATION)
Status: DISCONTINUED | OUTPATIENT
Start: 2024-01-11 | End: 2024-01-14 | Stop reason: HOSPADM

## 2024-01-11 RX ORDER — CEFTRIAXONE 2 G/1
2000 INJECTION, POWDER, FOR SOLUTION INTRAMUSCULAR; INTRAVENOUS ONCE
Status: COMPLETED | OUTPATIENT
Start: 2024-01-11 | End: 2024-01-11

## 2024-01-11 RX ORDER — AZITHROMYCIN 250 MG/1
500 TABLET, FILM COATED ORAL ONCE
Status: COMPLETED | OUTPATIENT
Start: 2024-01-11 | End: 2024-01-11

## 2024-01-11 RX ORDER — KETOROLAC TROMETHAMINE 30 MG/ML
15 INJECTION, SOLUTION INTRAMUSCULAR; INTRAVENOUS ONCE
Status: DISCONTINUED | OUTPATIENT
Start: 2024-01-11 | End: 2024-01-11

## 2024-01-11 RX ORDER — BISACODYL 10 MG
10 SUPPOSITORY, RECTAL RECTAL
Status: DISCONTINUED | OUTPATIENT
Start: 2024-01-11 | End: 2024-01-14 | Stop reason: HOSPADM

## 2024-01-11 RX ORDER — ENOXAPARIN SODIUM 100 MG/ML
40 INJECTION SUBCUTANEOUS DAILY
Status: DISCONTINUED | OUTPATIENT
Start: 2024-01-11 | End: 2024-01-14 | Stop reason: HOSPADM

## 2024-01-11 RX ORDER — SODIUM CHLORIDE, SODIUM LACTATE, POTASSIUM CHLORIDE, CALCIUM CHLORIDE 600; 310; 30; 20 MG/100ML; MG/100ML; MG/100ML; MG/100ML
INJECTION, SOLUTION INTRAVENOUS CONTINUOUS
Status: DISCONTINUED | OUTPATIENT
Start: 2024-01-11 | End: 2024-01-13

## 2024-01-11 RX ORDER — AMOXICILLIN 250 MG
2 CAPSULE ORAL 2 TIMES DAILY
Status: DISCONTINUED | OUTPATIENT
Start: 2024-01-11 | End: 2024-01-14 | Stop reason: HOSPADM

## 2024-01-11 RX ORDER — MULTIVIT WITH MINERALS/LUTEIN
1000 TABLET ORAL
COMMUNITY

## 2024-01-11 RX ORDER — SODIUM CHLORIDE 9 MG/ML
1000 INJECTION, SOLUTION INTRAVENOUS ONCE
Status: COMPLETED | OUTPATIENT
Start: 2024-01-11 | End: 2024-01-11

## 2024-01-11 RX ORDER — KETOROLAC TROMETHAMINE 30 MG/ML
15 INJECTION, SOLUTION INTRAMUSCULAR; INTRAVENOUS ONCE
Status: DISCONTINUED | OUTPATIENT
Start: 2024-01-11 | End: 2024-01-12

## 2024-01-11 RX ORDER — ACETAMINOPHEN 500 MG
1000 TABLET ORAL ONCE
Status: COMPLETED | OUTPATIENT
Start: 2024-01-11 | End: 2024-01-11

## 2024-01-11 RX ORDER — POLYETHYLENE GLYCOL 3350 17 G/17G
1 POWDER, FOR SOLUTION ORAL
Status: DISCONTINUED | OUTPATIENT
Start: 2024-01-11 | End: 2024-01-14 | Stop reason: HOSPADM

## 2024-01-11 RX ORDER — VITAMIN B COMPLEX
1000 TABLET ORAL DAILY
COMMUNITY

## 2024-01-11 RX ORDER — TIZANIDINE 4 MG/1
4 TABLET ORAL EVERY 6 HOURS PRN
Status: DISCONTINUED | OUTPATIENT
Start: 2024-01-11 | End: 2024-01-14 | Stop reason: HOSPADM

## 2024-01-11 RX ORDER — SERTRALINE HYDROCHLORIDE 100 MG/1
200 TABLET, FILM COATED ORAL EVERY EVENING
Status: DISCONTINUED | OUTPATIENT
Start: 2024-01-11 | End: 2024-01-14 | Stop reason: HOSPADM

## 2024-01-11 RX ORDER — PREGABALIN 150 MG/1
150 CAPSULE ORAL 3 TIMES DAILY
Status: DISCONTINUED | OUTPATIENT
Start: 2024-01-11 | End: 2024-01-14 | Stop reason: HOSPADM

## 2024-01-11 RX ADMIN — ACETAMINOPHEN 1000 MG: 500 TABLET ORAL at 20:06

## 2024-01-11 RX ADMIN — DOCUSATE SODIUM 50 MG AND SENNOSIDES 8.6 MG 2 TABLET: 8.6; 5 TABLET, FILM COATED ORAL at 23:06

## 2024-01-11 RX ADMIN — SODIUM CHLORIDE 1000 ML: 9 INJECTION, SOLUTION INTRAVENOUS at 19:56

## 2024-01-11 RX ADMIN — SODIUM CHLORIDE, POTASSIUM CHLORIDE, SODIUM LACTATE AND CALCIUM CHLORIDE: 600; 310; 30; 20 INJECTION, SOLUTION INTRAVENOUS at 23:06

## 2024-01-11 RX ADMIN — PREGABALIN 150 MG: 150 CAPSULE ORAL at 23:07

## 2024-01-11 RX ADMIN — ENOXAPARIN SODIUM 40 MG: 100 INJECTION SUBCUTANEOUS at 23:13

## 2024-01-11 RX ADMIN — AZITHROMYCIN DIHYDRATE 500 MG: 250 TABLET, FILM COATED ORAL at 20:06

## 2024-01-11 RX ADMIN — CEFTRIAXONE SODIUM 2000 MG: 2 INJECTION, POWDER, FOR SOLUTION INTRAMUSCULAR; INTRAVENOUS at 20:08

## 2024-01-11 RX ADMIN — SERTRALINE 200 MG: 100 TABLET, FILM COATED ORAL at 23:07

## 2024-01-11 ASSESSMENT — LIFESTYLE VARIABLES
ALCOHOL_USE: YES
DO YOU DRINK ALCOHOL: NO
EVER HAD A DRINK FIRST THING IN THE MORNING TO STEADY YOUR NERVES TO GET RID OF A HANGOVER: NO
DOES PATIENT WANT TO STOP DRINKING: NO
TOTAL SCORE: 0
AVERAGE NUMBER OF DAYS PER WEEK YOU HAVE A DRINK CONTAINING ALCOHOL: 0
EVER FELT BAD OR GUILTY ABOUT YOUR DRINKING: NO
ON A TYPICAL DAY WHEN YOU DRINK ALCOHOL HOW MANY DRINKS DO YOU HAVE: 0
HOW MANY TIMES IN THE PAST YEAR HAVE YOU HAD 5 OR MORE DRINKS IN A DAY: 0
HAVE YOU EVER FELT YOU SHOULD CUT DOWN ON YOUR DRINKING: NO
TOTAL SCORE: 0
HAVE PEOPLE ANNOYED YOU BY CRITICIZING YOUR DRINKING: NO
CONSUMPTION TOTAL: NEGATIVE
TOTAL SCORE: 0

## 2024-01-11 ASSESSMENT — COGNITIVE AND FUNCTIONAL STATUS - GENERAL
DAILY ACTIVITIY SCORE: 24
MOBILITY SCORE: 24
SUGGESTED CMS G CODE MODIFIER DAILY ACTIVITY: CH
SUGGESTED CMS G CODE MODIFIER MOBILITY: CH

## 2024-01-11 ASSESSMENT — PAIN DESCRIPTION - PAIN TYPE: TYPE: CHRONIC PAIN

## 2024-01-11 ASSESSMENT — ENCOUNTER SYMPTOMS
CHILLS: 1
SHORTNESS OF BREATH: 1
FEVER: 1
COUGH: 1

## 2024-01-11 ASSESSMENT — PATIENT HEALTH QUESTIONNAIRE - PHQ9
SUM OF ALL RESPONSES TO PHQ9 QUESTIONS 1 AND 2: 0
2. FEELING DOWN, DEPRESSED, IRRITABLE, OR HOPELESS: NOT AT ALL
1. LITTLE INTEREST OR PLEASURE IN DOING THINGS: NOT AT ALL

## 2024-01-11 ASSESSMENT — FIBROSIS 4 INDEX
FIB4 SCORE: 0.51
FIB4 SCORE: 0.27

## 2024-01-12 PROBLEM — A41.9 SEPSIS (HCC): Status: ACTIVE | Noted: 2024-01-11

## 2024-01-12 LAB
BASOPHILS # BLD AUTO: 0.6 % (ref 0–1.8)
BASOPHILS # BLD: 0.08 K/UL (ref 0–0.12)
EOSINOPHIL # BLD AUTO: 0.02 K/UL (ref 0–0.51)
EOSINOPHIL NFR BLD: 0.2 % (ref 0–6.9)
ERYTHROCYTE [DISTWIDTH] IN BLOOD BY AUTOMATED COUNT: 32.3 FL (ref 35.9–50)
HCT VFR BLD AUTO: 33.6 % (ref 37–47)
HGB BLD-MCNC: 10.7 G/DL (ref 12–16)
IMM GRANULOCYTES # BLD AUTO: 0.07 K/UL (ref 0–0.11)
IMM GRANULOCYTES NFR BLD AUTO: 0.5 % (ref 0–0.9)
LYMPHOCYTES # BLD AUTO: 1.72 K/UL (ref 1–4.8)
LYMPHOCYTES NFR BLD: 13.4 % (ref 22–41)
MCH RBC QN AUTO: 19.1 PG (ref 27–33)
MCHC RBC AUTO-ENTMCNC: 31.8 G/DL (ref 32.2–35.5)
MCV RBC AUTO: 60.1 FL (ref 81.4–97.8)
MONOCYTES # BLD AUTO: 0.7 K/UL (ref 0–0.85)
MONOCYTES NFR BLD AUTO: 5.5 % (ref 0–13.4)
NEUTROPHILS # BLD AUTO: 10.2 K/UL (ref 1.82–7.42)
NEUTROPHILS NFR BLD: 79.8 % (ref 44–72)
NRBC # BLD AUTO: 0 K/UL
NRBC BLD-RTO: 0 /100 WBC (ref 0–0.2)
PLATELET # BLD AUTO: 292 K/UL (ref 164–446)
PMV BLD AUTO: 10.2 FL (ref 9–12.9)
PROCALCITONIN SERPL-MCNC: 0.05 NG/ML
RBC # BLD AUTO: 5.59 M/UL (ref 4.2–5.4)
WBC # BLD AUTO: 12.8 K/UL (ref 4.8–10.8)

## 2024-01-12 PROCEDURE — A9270 NON-COVERED ITEM OR SERVICE: HCPCS | Performed by: STUDENT IN AN ORGANIZED HEALTH CARE EDUCATION/TRAINING PROGRAM

## 2024-01-12 PROCEDURE — 700102 HCHG RX REV CODE 250 W/ 637 OVERRIDE(OP): Performed by: HOSPITALIST

## 2024-01-12 PROCEDURE — 700105 HCHG RX REV CODE 258: Performed by: STUDENT IN AN ORGANIZED HEALTH CARE EDUCATION/TRAINING PROGRAM

## 2024-01-12 PROCEDURE — 700105 HCHG RX REV CODE 258: Performed by: HOSPITALIST

## 2024-01-12 PROCEDURE — A9270 NON-COVERED ITEM OR SERVICE: HCPCS | Performed by: HOSPITALIST

## 2024-01-12 PROCEDURE — 700102 HCHG RX REV CODE 250 W/ 637 OVERRIDE(OP)

## 2024-01-12 PROCEDURE — 700102 HCHG RX REV CODE 250 W/ 637 OVERRIDE(OP): Performed by: STUDENT IN AN ORGANIZED HEALTH CARE EDUCATION/TRAINING PROGRAM

## 2024-01-12 PROCEDURE — 84145 PROCALCITONIN (PCT): CPT

## 2024-01-12 PROCEDURE — 99233 SBSQ HOSP IP/OBS HIGH 50: CPT | Performed by: HOSPITALIST

## 2024-01-12 PROCEDURE — 36415 COLL VENOUS BLD VENIPUNCTURE: CPT

## 2024-01-12 PROCEDURE — 770020 HCHG ROOM/CARE - TELE (206)

## 2024-01-12 PROCEDURE — 700111 HCHG RX REV CODE 636 W/ 250 OVERRIDE (IP)

## 2024-01-12 PROCEDURE — A9270 NON-COVERED ITEM OR SERVICE: HCPCS

## 2024-01-12 PROCEDURE — 700111 HCHG RX REV CODE 636 W/ 250 OVERRIDE (IP): Mod: JZ | Performed by: STUDENT IN AN ORGANIZED HEALTH CARE EDUCATION/TRAINING PROGRAM

## 2024-01-12 PROCEDURE — 85025 COMPLETE CBC W/AUTO DIFF WBC: CPT

## 2024-01-12 RX ORDER — PROPRANOLOL HCL 60 MG
60 CAPSULE, EXTENDED RELEASE 24HR ORAL DAILY
Status: DISCONTINUED | OUTPATIENT
Start: 2024-01-12 | End: 2024-01-14 | Stop reason: HOSPADM

## 2024-01-12 RX ORDER — GUAIFENESIN/DEXTROMETHORPHAN 100-10MG/5
5 SYRUP ORAL EVERY 6 HOURS PRN
Status: DISCONTINUED | OUTPATIENT
Start: 2024-01-12 | End: 2024-01-14 | Stop reason: HOSPADM

## 2024-01-12 RX ORDER — KETOROLAC TROMETHAMINE 30 MG/ML
15 INJECTION, SOLUTION INTRAMUSCULAR; INTRAVENOUS ONCE
Status: COMPLETED | OUTPATIENT
Start: 2024-01-12 | End: 2024-01-12

## 2024-01-12 RX ORDER — KETOROLAC TROMETHAMINE 30 MG/ML
15 INJECTION, SOLUTION INTRAMUSCULAR; INTRAVENOUS EVERY 6 HOURS PRN
Status: DISCONTINUED | OUTPATIENT
Start: 2024-01-12 | End: 2024-01-12

## 2024-01-12 RX ORDER — AZITHROMYCIN 250 MG/1
500 TABLET, FILM COATED ORAL DAILY
Status: COMPLETED | OUTPATIENT
Start: 2024-01-12 | End: 2024-01-13

## 2024-01-12 RX ORDER — BENZONATATE 100 MG/1
100 CAPSULE ORAL 3 TIMES DAILY PRN
Status: DISCONTINUED | OUTPATIENT
Start: 2024-01-12 | End: 2024-01-14

## 2024-01-12 RX ORDER — ACETAMINOPHEN 325 MG/1
650 TABLET ORAL EVERY 4 HOURS PRN
Status: DISCONTINUED | OUTPATIENT
Start: 2024-01-12 | End: 2024-01-14 | Stop reason: HOSPADM

## 2024-01-12 RX ADMIN — GUAIFENESIN SYRUP AND DEXTROMETHORPHAN 5 ML: 100; 10 SYRUP ORAL at 17:39

## 2024-01-12 RX ADMIN — SODIUM CHLORIDE, POTASSIUM CHLORIDE, SODIUM LACTATE AND CALCIUM CHLORIDE: 600; 310; 30; 20 INJECTION, SOLUTION INTRAVENOUS at 08:47

## 2024-01-12 RX ADMIN — SERTRALINE 200 MG: 100 TABLET, FILM COATED ORAL at 17:39

## 2024-01-12 RX ADMIN — ACETAMINOPHEN 650 MG: 325 TABLET, FILM COATED ORAL at 23:48

## 2024-01-12 RX ADMIN — PREGABALIN 150 MG: 150 CAPSULE ORAL at 15:31

## 2024-01-12 RX ADMIN — PREGABALIN 150 MG: 150 CAPSULE ORAL at 20:38

## 2024-01-12 RX ADMIN — BENZONATATE 100 MG: 100 CAPSULE ORAL at 04:51

## 2024-01-12 RX ADMIN — GUAIFENESIN SYRUP AND DEXTROMETHORPHAN 5 ML: 100; 10 SYRUP ORAL at 06:16

## 2024-01-12 RX ADMIN — AMPICILLIN SODIUM, SULBACTAM SODIUM 3 G: 2; 1 INJECTION, POWDER, FOR SOLUTION INTRAMUSCULAR; INTRAVENOUS at 17:44

## 2024-01-12 RX ADMIN — DOCUSATE SODIUM 50 MG AND SENNOSIDES 8.6 MG 2 TABLET: 8.6; 5 TABLET, FILM COATED ORAL at 04:20

## 2024-01-12 RX ADMIN — AZITHROMYCIN DIHYDRATE 500 MG: 250 TABLET, FILM COATED ORAL at 17:38

## 2024-01-12 RX ADMIN — KETOROLAC TROMETHAMINE 15 MG: 30 INJECTION, SOLUTION INTRAMUSCULAR; INTRAVENOUS at 20:37

## 2024-01-12 RX ADMIN — BENZONATATE 100 MG: 100 CAPSULE ORAL at 17:39

## 2024-01-12 RX ADMIN — PREGABALIN 150 MG: 150 CAPSULE ORAL at 08:46

## 2024-01-12 RX ADMIN — ENOXAPARIN SODIUM 40 MG: 100 INJECTION SUBCUTANEOUS at 17:39

## 2024-01-12 RX ADMIN — PROPRANOLOL HYDROCHLORIDE 60 MG: 60 CAPSULE, EXTENDED RELEASE ORAL at 12:41

## 2024-01-12 RX ADMIN — ACETAMINOPHEN 650 MG: 325 TABLET, FILM COATED ORAL at 09:26

## 2024-01-12 RX ADMIN — AMPICILLIN SODIUM, SULBACTAM SODIUM 3 G: 2; 1 INJECTION, POWDER, FOR SOLUTION INTRAMUSCULAR; INTRAVENOUS at 23:50

## 2024-01-12 RX ADMIN — ACETAMINOPHEN 650 MG: 325 TABLET, FILM COATED ORAL at 16:44

## 2024-01-12 ASSESSMENT — ENCOUNTER SYMPTOMS
FEVER: 1
CHILLS: 0
HEADACHES: 0
BACK PAIN: 0
WHEEZING: 0
HEARTBURN: 0
MYALGIAS: 0
NAUSEA: 0
COUGH: 1
DOUBLE VISION: 0
BRUISES/BLEEDS EASILY: 0
PALPITATIONS: 0
VOMITING: 0
PND: 0
BLURRED VISION: 0
SHORTNESS OF BREATH: 1
CLAUDICATION: 0
DEPRESSION: 0
DIZZINESS: 0
HEMOPTYSIS: 0

## 2024-01-12 ASSESSMENT — PAIN DESCRIPTION - PAIN TYPE
TYPE: CHRONIC PAIN
TYPE: ACUTE PAIN

## 2024-01-12 ASSESSMENT — FIBROSIS 4 INDEX: FIB4 SCORE: 0.51

## 2024-01-12 NOTE — ED NOTES
Pt brought back to room Red 1 via wheelchair. C/C is Shortness of Breath, Cough, and Fever. Pt is in a gown, on the monitor and call light within reach. Chart up for ERP.  Father at bedside.

## 2024-01-12 NOTE — ASSESSMENT & PLAN NOTE
Continue with IV unasyn 3g every 6 hours  Follow up sputum culture   Oxygen per protocol   Continue iv abx  F/u blood and sputum cx  Azithro x 3   Continue having fever.

## 2024-01-12 NOTE — CARE PLAN
The patient is Watcher - Medium risk of patient condition declining or worsening    Shift Goals  Clinical Goals: monitor vitals and labs    Progress made toward(s) clinical / shift goals:    Problem: Knowledge Deficit - Standard  Goal: Patient and family/care givers will demonstrate understanding of plan of care, disease process/condition, diagnostic tests and medications  Outcome: Progressing     Problem: Hemodynamics  Goal: Patient's hemodynamics, fluid balance and neurologic status will be stable or improve  Outcome: Progressing     Problem: Fluid Volume  Goal: Fluid volume balance will be maintained  Outcome: Progressing     Problem: Urinary - Renal Perfusion  Goal: Ability to achieve and maintain adequate renal perfusion and functioning will improve  Outcome: Progressing     Problem: Respiratory  Goal: Patient will achieve/maintain optimum respiratory ventilation and gas exchange  Outcome: Progressing       Patient is not progressing towards the following goals:

## 2024-01-12 NOTE — ED TRIAGE NOTES
Chief Complaint   Patient presents with    Shortness of Breath     Since Monday.    Cough     Reports Dry cough and congestion since Monday. Tested multiple times for covid w/c came back negative.    Fever     Reports intermittent fever since Tuesday, highest temp=102.9. taking round the clock otc tylenol and ibuprofen for fever. Last dose of ibuprofen 200mg was at 2pm     Educated on triage process. Instructed to notify staff for any worsening symptoms. Denies any recent travel. Pt wearing mask in triage. Placed in family room.  Vitals:    01/11/24 1731   BP: 128/78   Pulse: (!) 130   Resp: 18   Temp: (!) 39.7 °C (103.4 °F)   SpO2: 94%

## 2024-01-12 NOTE — PROGRESS NOTES
4 Eyes Skin Assessment Completed by ANNE Acuña and ANNE Enriquez.    Head WDL  Ears Redness and Blanching  Nose WDL  Mouth WDL  Neck WDL  Breast/Chest Redness, Blanching, and Scar moisture  Shoulder Blades WDL  Spine WDL  (R) Arm/Elbow/Hand Redness and Blanching  (L) Arm/Elbow/Hand Redness and Blanching  Abdomen WDL  Groin WDL  Scrotum/Coccyx/Buttocks Redness and Blanching  (R) Leg WDL  (L) Leg WDL  (R) Heel/Foot/Toe Redness and Blanching  (L) Heel/Foot/Toe Redness and Blanching          Devices In Places Tele Box, Blood Pressure Cuff, Pulse Ox, and Nasal Cannula      Interventions In Place Gray Ear Foams, NC W/Ear Foams, Pillows, and Pressure Redistribution Mattress    Possible Skin Injury No    Pictures Uploaded Into Epic N/A  Wound Consult Placed N/A  RN Wound Prevention Protocol Ordered No

## 2024-01-12 NOTE — PROGRESS NOTES
[facility] Medicine Daily Progress Note    Date of Service  1/12/2024    Chief Complaint  Carmen Hebert is a 28 y.o. female admitted 1/11/2024 with pneumonia    Hospital Course  Carmen Hebert is a 28 y.o. female who presented 1/11/2024 with shortness of breath, subjective fevers and productive cough.  She reports exposure to sick contacts with COVID-19.  Denies any nausea, vomiting. Denies any urinary symptoms. Patient states she is on birth control pills. Patient reports taking Tylenol and ibuprofen.  In the ER, chest x-ray showed multifocal pneumonia.  Viral panel was negative.  Given patient medically clear for SIRS and hypoxia will admit patient for continued IV abx patient noted to have positive beta-hCG urine however blood test quantitative came back negative. Admitted to medicine service.    Interval Problem Update  1/12 patient is new to me today, patient is in bed, continues having fever, on 3L of o2, she is able to speak in full sentences, chest pain with deep inspiration. Will need IS, f/u blood and sputum cx. Continue iv abx.     I have discussed this patient's plan of care and discharge plan at IDT rounds today with Case Management, Nursing, Nursing leadership, and other members of the IDT team.    Consultants/Specialty  na    Code Status  Full Code    Disposition  The patient is not medically cleared for discharge to home or a post-acute facility.      I have placed the appropriate orders for post-discharge needs.    Review of Systems  Review of Systems   Constitutional:  Positive for fever and malaise/fatigue. Negative for chills.   Eyes:  Negative for blurred vision and double vision.   Respiratory:  Positive for cough and shortness of breath. Negative for hemoptysis and wheezing.    Cardiovascular:  Negative for chest pain, palpitations, claudication, leg swelling and PND.   Gastrointestinal:  Negative for heartburn, nausea and vomiting.   Genitourinary:  Negative for  hematuria and urgency.   Musculoskeletal:  Negative for back pain and myalgias.   Skin:  Negative for rash.   Neurological:  Negative for dizziness and headaches.   Endo/Heme/Allergies:  Does not bruise/bleed easily.   Psychiatric/Behavioral:  Negative for depression.         Physical Exam  Temp:  [36 °C (96.8 °F)-39.7 °C (103.4 °F)] 38.2 °C (100.8 °F)  Pulse:  [] 86  Resp:  [18-20] 18  BP: (102-128)/(57-78) 120/74  SpO2:  [90 %-100 %] 93 %    Physical Exam  Vitals and nursing note reviewed.   Constitutional:       General: She is not in acute distress.     Appearance: Normal appearance. She is ill-appearing.   HENT:      Mouth/Throat:      Pharynx: Oropharynx is clear. No oropharyngeal exudate or posterior oropharyngeal erythema.   Eyes:      General: No scleral icterus.        Right eye: No discharge.         Left eye: No discharge.      Conjunctiva/sclera: Conjunctivae normal.   Cardiovascular:      Rate and Rhythm: Normal rate and regular rhythm.      Pulses: Normal pulses.      Heart sounds: Normal heart sounds.   Pulmonary:      Effort: Pulmonary effort is normal. No respiratory distress.      Breath sounds: Normal breath sounds. No wheezing.   Abdominal:      General: Bowel sounds are normal. There is no distension.      Tenderness: There is no abdominal tenderness. There is no guarding.   Musculoskeletal:         General: Normal range of motion.      Cervical back: Normal range of motion and neck supple.   Skin:     General: Skin is warm.      Capillary Refill: Capillary refill takes less than 2 seconds.   Neurological:      General: No focal deficit present.      Mental Status: She is alert and oriented to person, place, and time.      Cranial Nerves: No cranial nerve deficit.      Motor: No weakness.   Psychiatric:         Mood and Affect: Mood normal.         Behavior: Behavior normal.         Fluids  No intake or output data in the 24 hours ending 01/12/24 1531    Laboratory  Recent Labs      01/11/24  1848 01/12/24  1023   WBC 17.1* 12.8*   RBC 6.23* 5.59*   HEMOGLOBIN 12.0 10.7*   HEMATOCRIT 37.6 33.6*   MCV 60.4* 60.1*   MCH 19.3* 19.1*   MCHC 31.9* 31.8*   RDW 32.1* 32.3*   PLATELETCT 377 292   MPV 10.5 10.2     Recent Labs     01/11/24  1848   SODIUM 137   POTASSIUM 4.1   CHLORIDE 104   CO2 19*   GLUCOSE 114*   BUN 6*   CREATININE 0.80   CALCIUM 8.7                   Imaging  DX-CHEST-PORTABLE (1 VIEW)   Final Result      Probable multifocal pneumonia.           Assessment/Plan  * Multifocal pneumonia- (present on admission)  Assessment & Plan  Continue with IV unasyn 3g every 6 hours  Follow up sputum culture   Oxygen per protocol   Continue iv abx  F/u blood and sputum cx  Azithro x 3     Hypoxia  Assessment & Plan  Oxygen per protocol   Due to PNA  Continue monitoring.     Sepsis (HCC)- (present on admission)  Assessment & Plan  SIRS criteria identified on my evaluation include:  Tachycardia, with heart rate greater than 90 BPM and Leukocytosis, with WBC greater than 12,000    Respiratory failure  Requiring 3L of o2.       Mixed anxiety depressive disorder- (present on admission)  Assessment & Plan  Resume sertraline          VTE prophylaxis: lovenox    I have performed a physical exam and reviewed and updated ROS and Plan today (1/12/2024). In review of yesterday's note (1/11/2024), there are no changes except as documented above.      Total time of 55 minutes spent prepping to see patient (e.g. reviewing  tests/imaging results, notes from consultants, bedside nurse, night shift ) obtaining and/or reviewing separately obtained history. Performing a medically appropriate examination and evaluation.  Counseling and educating the patient.  Ordering medications, tests, or procedures.  Referring and communicating with other health care professionals.  Documenting clinical information in EPIC.  Independently interpreting results and communicating results to patient.  Care coordination.

## 2024-01-12 NOTE — H&P
Hospital Medicine History & Physical Note    Date of Service  1/11/2024    Primary Care Physician  Kristine Nails P.A.-C.    Consultants  None     Code Status  Full Code    Chief Complaint  Chief Complaint   Patient presents with    Shortness of Breath     Since Monday.    Cough     Reports Dry cough and congestion since Monday. Tested multiple times for covid w/c came back negative.    Fever     Reports intermittent fever since Tuesday, highest temp=102.9. taking round the clock otc tylenol and ibuprofen for fever. Last dose of ibuprofen 200mg was at 2pm       History of Presenting Illness  Carmen Hebert is a 28 y.o. female who presented 1/11/2024 with shortness of breath, subjective fevers and productive cough.  She reports exposure to sick contacts with COVID-19.  Denies any nausea, vomiting. Denies any urinary symptoms. Patient states she is on birth control pills. Patient reports taking Tylenol and ibuprofen.  In the ER, chest x-ray showed multifocal pneumonia.  Viral panel was negative.  Given patient medically clear for SIRS and hypoxia will admit patient for continued IV abx patient noted to have positive beta-hCG urine however blood test quantitative came back negative. Admitted to medicine service.    I discussed the plan of care with patient and ERP .    Review of Systems  Review of Systems   Constitutional:  Positive for chills and fever.   Respiratory:  Positive for cough and shortness of breath.        Past Medical History   has a past medical history of Anxiety, Asthma, Depression, Fibromyalgia (2017), and Thalassemia minor.    Surgical History   has no past surgical history on file.     Family History  family history includes ADD / ADHD in her father; Anxiety disorder in her mother; Depression in her father and mother; Fibromyalgia in her mother; Lung Disease in her mother.   Family history reviewed with patient. There is no family history that is pertinent to the chief complaint.      Social History   reports that she has never smoked. She has never used smokeless tobacco. She reports current alcohol use. She reports that she does not currently use drugs after having used the following drugs: Marijuana.    Allergies  Allergies   Allergen Reactions    Corn-Related Products Hives, Itching, Nausea and Myalgia    Dairy Food Allergy Diarrhea, Nausea and Myalgia          Gluten Meal Hives, Shortness of Breath, Diarrhea, Rash, Runny Nose, Itching, Vomiting, Nausea, Cough and Myalgia          Sesame Seed (Diagnostic) Hives, Rash and Itching     rash    Soy Allergy Hives, Itching, Nausea and Myalgia       Medications  Prior to Admission Medications   Prescriptions Last Dose Informant Patient Reported? Taking?   ALPRAZolam (XANAX) 0.25 MG Tab ~ 1 MONTH at PRN Patient Yes No   Sig: Take 0.25 mg by mouth at bedtime as needed for Sleep.   Ascorbic Acid (VITAMIN C) 1000 MG Tab 1/10/2024 at PM Patient Yes Yes   Sig: Take 1,000 mg by mouth every day.   Calcium-Magnesium-Zinc (IRMA-MAG-ZINC PO) 1/10/2024 at PM Patient Yes Yes   Sig: Take 1 Tablet by mouth every day.   Norethindrone Acet-Ethinyl Est (BELL 1.5/30) 1.5-30 MG-MCG Tab 1/10/2024 at PM Patient No No   Sig: TAKE 1 TABLET BY MOUTH DAILY. TAKE CONTINUOUSLY NO, PLACEBO WEEK   albuterol 108 (90 Base) MCG/ACT Aero Soln inhalation aerosol > 2 WEEKS at PRN Patient No No   Sig: Inhale 2 Puffs every four hours as needed for Shortness of Breath.   fluticasone (FLONASE) 50 MCG/ACT nasal spray FEW DAYS AGO at PRN Patient No No   Sig: Administer 2 Sprays into affected nostril(S) every day.   pregabalin (LYRICA) 150 MG Cap 1/11/2024 at 1400 Patient No No   Sig: Take 1 Capsule by mouth in the morning, at noon, and at bedtime for 30 days.   propranolol LA (INDERAL LA) 60 MG CAPSULE SR 24 HR 1/10/2024 at PM Patient No No   Sig: Take 1 Capsule by mouth every day. Please call to schedule follow up appointment for further refills. Please call [phone]. Thank you.    sertraline (ZOLOFT) 100 MG Tab 1/10/2024 at PM Patient Yes Yes   Sig: Take 200 mg by mouth every day. 200 mg = 2 tablets   tizanidine (ZANAFLEX) 4 MG Tab 1/8/2024 at PRN Patient Yes No   Sig: Take 4 mg by mouth every 6 hours as needed.   vitamin D3 (CHOLECALCIFEROL) 1000 Unit (25 mcg) Tab 1/10/2024 at PM Patient Yes Yes   Sig: Take 1,000 Units by mouth every day.      Facility-Administered Medications: None       Physical Exam  Temp:  [36.6 °C (97.8 °F)-39.7 °C (103.4 °F)] 36.6 °C (97.8 °F)  Pulse:  [] 98  Resp:  [18-20] 20  BP: (106-128)/(58-78) 106/58  SpO2:  [90 %-95 %] 95 %  Blood Pressure: 106/58   Temperature: 36.6 °C (97.8 °F)   Pulse: 98   Respiration: 20   Pulse Oximetry: 95 %       Physical Exam  Vitals and nursing note reviewed.   Constitutional:       Appearance: She is obese.   HENT:      Head: Normocephalic and atraumatic.      Right Ear: Tympanic membrane normal.      Left Ear: Tympanic membrane normal.      Nose: Nose normal.      Mouth/Throat:      Mouth: Mucous membranes are moist.      Pharynx: Oropharynx is clear.   Eyes:      Extraocular Movements: Extraocular movements intact.   Cardiovascular:      Rate and Rhythm: Normal rate and regular rhythm.      Pulses: Normal pulses.      Heart sounds: Normal heart sounds.   Pulmonary:      Effort: Pulmonary effort is normal.      Breath sounds: Rhonchi present.   Abdominal:      General: Bowel sounds are normal. There is no distension.      Palpations: Abdomen is soft. There is no mass.   Skin:     General: Skin is warm.      Capillary Refill: Capillary refill takes less than 2 seconds.   Neurological:      General: No focal deficit present.      Mental Status: She is alert and oriented to person, place, and time. Mental status is at baseline.   Psychiatric:         Mood and Affect: Mood normal.         Behavior: Behavior normal.         Laboratory:  Recent Labs     01/11/24  1848   WBC 17.1*   RBC 6.23*   HEMOGLOBIN 12.0   HEMATOCRIT 37.6  "  MCV 60.4*   MCH 19.3*   MCHC 31.9*   RDW 32.1*   PLATELETCT 377   MPV 10.5     Recent Labs     01/11/24  1848   SODIUM 137   POTASSIUM 4.1   CHLORIDE 104   CO2 19*   GLUCOSE 114*   BUN 6*   CREATININE 0.80   CALCIUM 8.7     Recent Labs     01/11/24  1848   ALTSGPT 22   ASTSGOT 30   ALKPHOSPHAT 89   TBILIRUBIN 0.5   GLUCOSE 114*         No results for input(s): \"NTPROBNP\" in the last 72 hours.      No results for input(s): \"TROPONINT\" in the last 72 hours.    Imaging:  DX-CHEST-PORTABLE (1 VIEW)   Final Result      Probable multifocal pneumonia.          X-Ray:  I have personally reviewed the images and compared with prior images.    Assessment/Plan:  Justification for Admission Status  I anticipate this patient will require at least two midnights for appropriate medical management, necessitating inpatient admission because Hypoxia secondary to multifocal pneumonia.    Patient will need a Telemetry bed on MEDICAL service .  The need is secondary to see above.    * Multifocal pneumonia- (present on admission)  Assessment & Plan  Continue with IV unasyn 3g every 6 hours  Follow up sputum culture   Oxygen per protocol     Hypoxia  Assessment & Plan  Oxygen per protocol       SIRS (systemic inflammatory response syndrome) (HCC)  Assessment & Plan  SIRS criteria identified on my evaluation include:  Tachycardia, with heart rate greater than 90 BPM and Leukocytosis, with WBC greater than 12,000        Mixed anxiety depressive disorder- (present on admission)  Assessment & Plan  Resume sertraline         VTE prophylaxis: SCDs/TEDs and enoxaparin ppx  "

## 2024-01-12 NOTE — ED PROVIDER NOTES
ED Provider Note    Scribed for Hilario Bermudez by Kade Pereira. 1/11/2024  8:18 PM    Primary care provider: Kristine Nails P.A.-C.  Means of arrival: Wheelchair  History obtained from: Patient  History limited by: None    CHIEF COMPLAINT  Chief Complaint   Patient presents with    Shortness of Breath     Since Monday.    Cough     Reports Dry cough and congestion since Monday. Tested multiple times for covid w/c came back negative.    Fever     Reports intermittent fever since Tuesday, highest temp=102.9. taking round the clock otc tylenol and ibuprofen for fever. Last dose of ibuprofen 200mg was at 2pm     EXTERNAL RECORDS REVIEWED  Outpatient Notes patient seen for STD check in August of last year    HPI/ROS  LIMITATION TO HISTORY   Select: : None  OUTSIDE HISTORIAN(S):  Family Patient's father present at bedside to provide patient history.     HPI  Carmen Hebert is a 28 y.o. female who presents to the Emergency Department for evaluation of fever onset 3 days ago. The patient reports her fever reached a T-max of 102.9 °F today, prompting her to present to the ED. The patient reports associated productive cough, body aches, wheezing, shortness of breath, headache, and dizziness. The patient denies nausea, vomiting, chance of pregnancy, or dysuria. The patient reports her coworker tested positive for Covid last week, and she has been taking at home Covid test which all have been negative. The patient reports she has taken Tylenol and Ibuprofen for the fever, noting her last dose was at 2PM. The patient reports a medical history of fibromyalgia, Thalassemia minor, and inappropriate sinus tachycardia. The patient reports occasional alcohol use, and denies tobacco or drug use.     REVIEW OF SYSTEMS  As above, all other systems reviewed and are negative.   See HPI for further details.     PAST MEDICAL HISTORY   has a past medical history of Anxiety, Asthma, Depression, Fibromyalgia (2017), and  "Thalassemia minor.  SURGICAL HISTORY  patient denies any surgical history  SOCIAL HISTORY  Social History     Tobacco Use    Smoking status: Never    Smokeless tobacco: Never   Vaping Use    Vaping Use: Never used   Substance Use Topics    Alcohol use: Yes     Comment: approx 1 drink per week    Drug use: Not Currently     Types: Marijuana     Comment: rarely      Social History     Substance and Sexual Activity   Drug Use Not Currently    Types: Marijuana    Comment: rarely     FAMILY HISTORY  Family History   Problem Relation Age of Onset    Fibromyalgia Mother     Lung Disease Mother         asthma r/o    Depression Mother     Anxiety disorder Mother     ADD / ADHD Father     Depression Father      CURRENT MEDICATIONS  Home Medications    **Home medications have not yet been reviewed for this encounter**       ALLERGIES  Allergies   Allergen Reactions    Corn-Related Products Hives, Itching, Myalgia and Nausea    Dairy Food Allergy Diarrhea, Myalgia and Nausea    Gluten Meal Cough, Diarrhea, Hives, Itching, Myalgia, Nausea, Rash, Runny Nose, Shortness of Breath and Vomiting    Sesame Seed (Diagnostic) Hives, Rash and Itching     rash    Soy Allergy Hives, Itching, Myalgia and Nausea       PHYSICAL EXAM    VITAL SIGNS:   Vitals:    01/11/24 1731 01/11/24 1950 01/11/24 2014   BP: 128/78 115/61    Pulse: (!) 130 (!) 121 (!) 112   Resp: 18 18 18   Temp: (!) 39.7 °C (103.4 °F) 37.1 °C (98.8 °F)    TempSrc: Oral Temporal    SpO2: 94% 90% 93%   Weight: 116 kg (255 lb)     Height: 1.778 m (5' 10\")         Vitals: My interpretation: normotensive, tachycardic, febrile, not hypoxic    Reinterpretation of vitals: Improving    Cardiac Monitor Interpretation: The cardiac monitor revealed normal Sinus Rhythm tachycardia as interpreted by me. The cardiac monitor was ordered secondary to the patient's history of tachycardia and to monitor for dysrhythmia and/or tachycardia.    PE:   Gen: sitting comfortably, speaking clearly, " appears in no acute distress   ENT: Mucous membranes moist, posterior pharynx clear, uvula midline, nares patent bilaterally   Neck: Supple, FROM  Pulmonary: Lungs are clear to auscultation bilaterally. No tachypnea  CV:  RRR, no murmur appreciated, pulses 2+ in both upper and lower extremities  Abdomen: soft, NT/ND; no rebound/guarding  : no CVA or suprapubic tenderness   Neuro: A&Ox4 (person, place, time, situation), speech fluent, gait steady, no focal deficits appreciated  Skin: No rash or lesions.  No pallor or jaundice.  No cyanosis.  Warm and dry.     DIAGNOSTIC STUDIES / PROCEDURES    LABS  Results for orders placed or performed during the hospital encounter of 01/11/24   Lactic acid (lactate)   Result Value Ref Range    Lactic Acid 1.0 0.5 - 2.0 mmol/L   Lactic acid (lactate): Repeat if initial lactic acid result is greater than 2   Result Value Ref Range    Lactic Acid 1.0 0.5 - 2.0 mmol/L   CBC With Differential   Result Value Ref Range    WBC 17.1 (H) 4.8 - 10.8 K/uL    RBC 6.23 (H) 4.20 - 5.40 M/uL    Hemoglobin 12.0 12.0 - 16.0 g/dL    Hematocrit 37.6 37.0 - 47.0 %    MCV 60.4 (L) 81.4 - 97.8 fL    MCH 19.3 (L) 27.0 - 33.0 pg    MCHC 31.9 (L) 32.2 - 35.5 g/dL    RDW 32.1 (L) 35.9 - 50.0 fL    Platelet Count 377 164 - 446 K/uL    MPV 10.5 9.0 - 12.9 fL    Neutrophils-Polys 78.90 (H) 44.00 - 72.00 %    Lymphocytes 14.90 (L) 22.00 - 41.00 %    Monocytes 4.80 0.00 - 13.40 %    Eosinophils 0.10 0.00 - 6.90 %    Basophils 0.70 0.00 - 1.80 %    Immature Granulocytes 0.60 0.00 - 0.90 %    Nucleated RBC 0.00 0.00 - 0.20 /100 WBC    Neutrophils (Absolute) 13.52 (H) 1.82 - 7.42 K/uL    Lymphs (Absolute) 2.55 1.00 - 4.80 K/uL    Monos (Absolute) 0.82 0.00 - 0.85 K/uL    Eos (Absolute) 0.01 0.00 - 0.51 K/uL    Baso (Absolute) 0.12 0.00 - 0.12 K/uL    Immature Granulocytes (abs) 0.10 0.00 - 0.11 K/uL    NRBC (Absolute) 0.00 K/uL   Comp Metabolic Panel   Result Value Ref Range    Sodium 137 135 - 145 mmol/L     Potassium 4.1 3.6 - 5.5 mmol/L    Chloride 104 96 - 112 mmol/L    Co2 19 (L) 20 - 33 mmol/L    Anion Gap 14.0 7.0 - 16.0    Glucose 114 (H) 65 - 99 mg/dL    Bun 6 (L) 8 - 22 mg/dL    Creatinine 0.80 0.50 - 1.40 mg/dL    Calcium 8.7 8.5 - 10.5 mg/dL    Correct Calcium 8.8 8.5 - 10.5 mg/dL    AST(SGOT) 30 12 - 45 U/L    ALT(SGPT) 22 2 - 50 U/L    Alkaline Phosphatase 89 30 - 99 U/L    Total Bilirubin 0.5 0.1 - 1.5 mg/dL    Albumin 3.9 3.2 - 4.9 g/dL    Total Protein 8.0 6.0 - 8.2 g/dL    Globulin 4.1 (H) 1.9 - 3.5 g/dL    A-G Ratio 1.0 g/dL   Urinalysis    Specimen: Urine   Result Value Ref Range    Micro Urine Req Microscopic    ESTIMATED GFR   Result Value Ref Range    GFR (CKD-EPI) 101 >60 mL/min/1.73 m 2   EKG   Result Value Ref Range    Report       [facility]    Test Date:  2024  Pt Name:    ANNITA MCNAMARA            Department: ER  MRN:        [id number]                      Room:  Gender:     Female                       Technician: [id number]  :        1995                   Requested By:ER TRIAGE PROTOCOL  Order #:    [id number]                    Reading MD: Hilario Bermudez    Measurements  Intervals                                Axis  Rate:       115                          P:          53  ME:         154                          QRS:        32  QRSD:       103                          T:          24  QT:         321  QTc:        444    Interpretive Statements  Sinus tachycardia  Probable left atrial enlargement  RSR' in V1 or V2, right VCD or RVH  Compared to ECG 2023 14:22:30  Right ventricular hypertrophy now present  RSR' in V1 or V2 now present  Sinus rhythm no longer present  Electronically Signed On 2024 20:29:26 PST by Hilario Bermudez     POC CoV-2, FLU A/B, RSV by PCR   Result Value Ref Range    POC Influenza A RNA, PCR Negative Negative    POC Influenza B RNA, PCR Negative Negative    POC RSV, by PCR Negative Negative    POC  SARS-CoV-2, PCR NotDetected       All labs reviewed by me. Labs were compared to prior labs if they were available. Significant for lactic acid normal, and has a leukocytosis of 17, no anemia, electrolytes show bicarb of 19 demonstrating mild dehydration, normal glucose, normal renal function, normal liver enzymes, normal bilirubin, flu, COVID, RSV negative.    RADIOLOGY  I have independently interpreted the diagnostic imaging associated with this visit and am waiting the final reading from the radiologist.   My preliminary interpretation is a follows: Right middle lobe pneumonia  Radiologist interpretation is as follows:  DX-CHEST-PORTABLE (1 VIEW)   Final Result      Probable multifocal pneumonia.        COURSE & MEDICAL DECISION MAKING  Nursing notes, VS, PMSFHx, labs, imaging, EKG reviewed in chart.    Ddx: Flu, COVID, RSV, pneumonia    MDM: 8:18 PM Carmen Hebert is a 28 y.o. female who presented with 3 days of persistent flulike symptoms including fever, productive cough, body aches, nausea without vomiting, no diarrhea, and generalized feeling poor.  She is fairly healthy for age, history of fibromyalgia.  States no chance of pregnancy.  Arrives here noted to be febrile, tachycardic and tachypneic.  She started on large bolus of IV fluids, 2 L, given Tylenol and Toradol for antipyretic effect.  Will undergo sepsis workup here in the ED.  Chest x-ray my independent interpretation is concerning for right middle lobe pneumonia, radiology comments probable multifocal pneumonia.  Her EKG done because she was tachycardic shows no ischemic changes but demonstrates sinus tachycardia. All labs reviewed by me. Labs were compared to prior labs if they were available. Significant for lactic acid normal, and has a leukocytosis of 17, no anemia, electrolytes show bicarb of 19 demonstrating mild dehydration, normal glucose, normal renal function, normal liver enzymes, normal bilirubin, flu, COVID, RSV negative.   Her vital signs did improve but she is still hypoxic requiring supplemental oxygen of 2 L to stay over 88%.  At this time I think she would benefit from hospital admission.  She is amenable to this.  Started antibiotics for commune acquired pneumonia including ceftriaxone azithromycin.  Discussed with family at bedside and they verbalized understand plan for admission and are amenable.  Discussed with hospitalist and they are amenable.    ADDITIONAL PROBLEM LIST AND DISPOSITION    I have discussed management of the patient with the following physicians and TYLER's:  Hospitalist     Discussion of management with other QHP or appropriate source(s): None     Barriers to care at this time, including but not limited to:  None .     Decision tools and prescription drugs considered including, but not limited to: Antibiotics ceftriaxone and azithromycin .    FINAL IMPRESSION  1. Multifocal pneumonia Acute   2. Sepsis with acute organ dysfunction without septic shock, due to unspecified organism, unspecified organ dysfunction type (HCC) Acute   3. Dehydration Acute   4. Sinus tachycardia Acute   5. Hypoxia Acute       Kade HUERTA (Scribe), am scribing for, and in the presence of, Hilario Bermudez.    Electronically signed by: Kade Pereira (Tashia), 1/11/2024    IHilario personally performed the services described in this documentation, as scribed by Kade Pereira in my presence, and it is both accurate and complete.    The note accurately reflects work and decisions made by me.  Hilario Bermudez  1/11/2024  8:31 PM

## 2024-01-12 NOTE — ASSESSMENT & PLAN NOTE
SIRS criteria identified on my evaluation include:  Tachycardia, with heart rate greater than 90 BPM and Leukocytosis, with WBC greater than 12,000    Respiratory failure  Requiring 3L of o2.

## 2024-01-12 NOTE — HOSPITAL COURSE
Carmen Hebert is a 28 y.o. female who presented 1/11/2024 with shortness of breath, subjective fevers and productive cough.  She reports exposure to sick contacts with COVID-19.  Denies any nausea, vomiting. Denies any urinary symptoms. Patient states she is on birth control pills. Patient reports taking Tylenol and ibuprofen.  In the ER, chest x-ray showed multifocal pneumonia.  Viral panel was negative.  Given patient medically clear for SIRS and hypoxia will admit patient for continued IV abx patient noted to have positive beta-hCG urine however blood test quantitative came back negative. Admitted to medicine service.

## 2024-01-12 NOTE — ED NOTES
Med rec complete per patient  Allergies reviewed.   Patient denies any outpatient antibiotics in the last 30 days.   Anticoagulants taken in the last 14 days? No    Patients [facility]

## 2024-01-12 NOTE — ED NOTES
BlOOD CULTURES COMPLETED X 2. LACT X 2 DRAW COMPLETED. NS BOLUS X 2 LITER IN PROCESS. NOTED SPO2 88% W/ MASK ON. PLACED PT ON 2 L/MIN VIA NC. PT AAOX4.

## 2024-01-13 LAB
ANION GAP SERPL CALC-SCNC: 10 MMOL/L (ref 7–16)
BACTERIA UR CULT: NORMAL
BUN SERPL-MCNC: 5 MG/DL (ref 8–22)
CALCIUM SERPL-MCNC: 8.4 MG/DL (ref 8.5–10.5)
CHLORIDE SERPL-SCNC: 105 MMOL/L (ref 96–112)
CO2 SERPL-SCNC: 24 MMOL/L (ref 20–33)
CREAT SERPL-MCNC: 0.59 MG/DL (ref 0.5–1.4)
ERYTHROCYTE [DISTWIDTH] IN BLOOD BY AUTOMATED COUNT: 33.2 FL (ref 35.9–50)
GFR SERPLBLD CREATININE-BSD FMLA CKD-EPI: 124 ML/MIN/1.73 M 2
GLUCOSE SERPL-MCNC: 136 MG/DL (ref 65–99)
HCT VFR BLD AUTO: 31.3 % (ref 37–47)
HGB BLD-MCNC: 9.9 G/DL (ref 12–16)
MCH RBC QN AUTO: 19.2 PG (ref 27–33)
MCHC RBC AUTO-ENTMCNC: 31.6 G/DL (ref 32.2–35.5)
MCV RBC AUTO: 60.7 FL (ref 81.4–97.8)
PLATELET # BLD AUTO: 289 K/UL (ref 164–446)
PMV BLD AUTO: 10.5 FL (ref 9–12.9)
POTASSIUM SERPL-SCNC: 3.7 MMOL/L (ref 3.6–5.5)
RBC # BLD AUTO: 5.16 M/UL (ref 4.2–5.4)
SIGNIFICANT IND 70042: NORMAL
SITE SITE: NORMAL
SODIUM SERPL-SCNC: 139 MMOL/L (ref 135–145)
SOURCE SOURCE: NORMAL
WBC # BLD AUTO: 9.3 K/UL (ref 4.8–10.8)

## 2024-01-13 PROCEDURE — 700102 HCHG RX REV CODE 250 W/ 637 OVERRIDE(OP)

## 2024-01-13 PROCEDURE — 99233 SBSQ HOSP IP/OBS HIGH 50: CPT | Performed by: HOSPITALIST

## 2024-01-13 PROCEDURE — 700102 HCHG RX REV CODE 250 W/ 637 OVERRIDE(OP): Performed by: HOSPITALIST

## 2024-01-13 PROCEDURE — A9270 NON-COVERED ITEM OR SERVICE: HCPCS | Performed by: HOSPITALIST

## 2024-01-13 PROCEDURE — A9270 NON-COVERED ITEM OR SERVICE: HCPCS | Performed by: STUDENT IN AN ORGANIZED HEALTH CARE EDUCATION/TRAINING PROGRAM

## 2024-01-13 PROCEDURE — 36415 COLL VENOUS BLD VENIPUNCTURE: CPT

## 2024-01-13 PROCEDURE — 85027 COMPLETE CBC AUTOMATED: CPT

## 2024-01-13 PROCEDURE — 770020 HCHG ROOM/CARE - TELE (206)

## 2024-01-13 PROCEDURE — A9270 NON-COVERED ITEM OR SERVICE: HCPCS

## 2024-01-13 PROCEDURE — 700105 HCHG RX REV CODE 258: Performed by: STUDENT IN AN ORGANIZED HEALTH CARE EDUCATION/TRAINING PROGRAM

## 2024-01-13 PROCEDURE — 700102 HCHG RX REV CODE 250 W/ 637 OVERRIDE(OP): Performed by: STUDENT IN AN ORGANIZED HEALTH CARE EDUCATION/TRAINING PROGRAM

## 2024-01-13 PROCEDURE — 700111 HCHG RX REV CODE 636 W/ 250 OVERRIDE (IP): Mod: JZ | Performed by: STUDENT IN AN ORGANIZED HEALTH CARE EDUCATION/TRAINING PROGRAM

## 2024-01-13 PROCEDURE — 80048 BASIC METABOLIC PNL TOTAL CA: CPT

## 2024-01-13 RX ADMIN — AMPICILLIN SODIUM, SULBACTAM SODIUM 3 G: 2; 1 INJECTION, POWDER, FOR SOLUTION INTRAMUSCULAR; INTRAVENOUS at 11:15

## 2024-01-13 RX ADMIN — PROPRANOLOL HYDROCHLORIDE 60 MG: 60 CAPSULE, EXTENDED RELEASE ORAL at 12:18

## 2024-01-13 RX ADMIN — PREGABALIN 150 MG: 150 CAPSULE ORAL at 08:23

## 2024-01-13 RX ADMIN — AMPICILLIN SODIUM, SULBACTAM SODIUM 3 G: 2; 1 INJECTION, POWDER, FOR SOLUTION INTRAMUSCULAR; INTRAVENOUS at 05:07

## 2024-01-13 RX ADMIN — ACETAMINOPHEN 650 MG: 325 TABLET, FILM COATED ORAL at 21:26

## 2024-01-13 RX ADMIN — SERTRALINE 200 MG: 100 TABLET, FILM COATED ORAL at 17:19

## 2024-01-13 RX ADMIN — PREGABALIN 150 MG: 150 CAPSULE ORAL at 14:48

## 2024-01-13 RX ADMIN — GUAIFENESIN SYRUP AND DEXTROMETHORPHAN 5 ML: 100; 10 SYRUP ORAL at 05:03

## 2024-01-13 RX ADMIN — ACETAMINOPHEN 650 MG: 325 TABLET, FILM COATED ORAL at 05:03

## 2024-01-13 RX ADMIN — TIZANIDINE 4 MG: 4 TABLET ORAL at 15:24

## 2024-01-13 RX ADMIN — PREGABALIN 150 MG: 150 CAPSULE ORAL at 21:27

## 2024-01-13 RX ADMIN — DOCUSATE SODIUM 50 MG AND SENNOSIDES 8.6 MG 2 TABLET: 8.6; 5 TABLET, FILM COATED ORAL at 17:19

## 2024-01-13 RX ADMIN — TIZANIDINE 4 MG: 4 TABLET ORAL at 08:23

## 2024-01-13 RX ADMIN — ENOXAPARIN SODIUM 40 MG: 100 INJECTION SUBCUTANEOUS at 17:19

## 2024-01-13 RX ADMIN — BENZONATATE 100 MG: 100 CAPSULE ORAL at 21:27

## 2024-01-13 RX ADMIN — BENZONATATE 100 MG: 100 CAPSULE ORAL at 05:03

## 2024-01-13 RX ADMIN — AMPICILLIN SODIUM, SULBACTAM SODIUM 3 G: 2; 1 INJECTION, POWDER, FOR SOLUTION INTRAMUSCULAR; INTRAVENOUS at 17:24

## 2024-01-13 RX ADMIN — AZITHROMYCIN DIHYDRATE 500 MG: 250 TABLET, FILM COATED ORAL at 17:19

## 2024-01-13 RX ADMIN — AMPICILLIN SODIUM, SULBACTAM SODIUM 3 G: 2; 1 INJECTION, POWDER, FOR SOLUTION INTRAMUSCULAR; INTRAVENOUS at 23:47

## 2024-01-13 ASSESSMENT — ENCOUNTER SYMPTOMS
WHEEZING: 0
COUGH: 1
PND: 0
BLURRED VISION: 0
MYALGIAS: 0
BRUISES/BLEEDS EASILY: 0
CLAUDICATION: 0
PALPITATIONS: 0
DEPRESSION: 0
BACK PAIN: 0
DIZZINESS: 0
HEADACHES: 0
NAUSEA: 0
SHORTNESS OF BREATH: 1
HEMOPTYSIS: 0
FEVER: 1
HEARTBURN: 0
DOUBLE VISION: 0
VOMITING: 0
CHILLS: 0

## 2024-01-13 ASSESSMENT — PAIN DESCRIPTION - PAIN TYPE
TYPE: ACUTE PAIN
TYPE: ACUTE PAIN

## 2024-01-13 ASSESSMENT — FIBROSIS 4 INDEX: FIB4 SCORE: 0.66

## 2024-01-13 NOTE — PROGRESS NOTES
Report received from outgoing nurse, care transferred at 1900. Patient currently in  on the monitor and A&Ox 4. Patient complaining of severe HA, currently has a temp of 101.1F, proivder notified, tylenol not available until 2245. Toradol 15mg reordered by provider. Whiteboard updated with plan for the day and names of staff. No other questions or concerns at this time from the patient. Call light within reach, fall precautions in place.

## 2024-01-13 NOTE — CARE PLAN
The patient is Stable - Low risk of patient condition declining or worsening    Shift Goals  Clinical Goals: patient will tolerate O2 titration and be afebrile through end of shift  Patient Goals: shower & feel better  Family Goals: updated on poc    Progress made toward(s) clinical / shift goals:  Tolerated titration down to 0.5LNC. Tmax 100.8F - given tylenol x1. Callig appropriately.     Patient is not progressing towards the following goals:

## 2024-01-13 NOTE — PROGRESS NOTES
[facility] Medicine Daily Progress Note    Date of Service  1/13/2024    Chief Complaint  Carmen Hebert is a 28 y.o. female admitted 1/11/2024 with pneumonia    Hospital Course  Carmen Hebert is a 28 y.o. female who presented 1/11/2024 with shortness of breath, subjective fevers and productive cough.  She reports exposure to sick contacts with COVID-19.  Denies any nausea, vomiting. Denies any urinary symptoms. Patient states she is on birth control pills. Patient reports taking Tylenol and ibuprofen.  In the ER, chest x-ray showed multifocal pneumonia.  Viral panel was negative.  Given patient medically clear for SIRS and hypoxia will admit patient for continued IV abx patient noted to have positive beta-hCG urine however blood test quantitative came back negative. Admitted to medicine service.    Interval Problem Update  1/12 patient is new to me today, patient is in bed, continues having fever, on 3L of o2, she is able to speak in full sentences, chest pain with deep inspiration. Will need IS, f/u blood and sputum cx. Continue iv abx.   1/13 patient in bed, continue having fever last night and low grade today, blood cx neg, her o2 requirement improved, no chest pain or palpitation, needs to continue IS, continue iv abx for today. Discussed with nurse staff, charge nurse amrit.     I have discussed this patient's plan of care and discharge plan at IDT rounds today with Case Management, Nursing, Nursing leadership, and other members of the IDT team.    Consultants/Specialty  na    Code Status  Full Code    Disposition  The patient is not medically cleared for discharge to home or a post-acute facility.      I have placed the appropriate orders for post-discharge needs.    Review of Systems  Review of Systems   Constitutional:  Positive for fever and malaise/fatigue. Negative for chills.   Eyes:  Negative for blurred vision and double vision.   Respiratory:  Positive for cough and shortness of  breath. Negative for hemoptysis and wheezing.    Cardiovascular:  Negative for chest pain, palpitations, claudication, leg swelling and PND.   Gastrointestinal:  Negative for heartburn, nausea and vomiting.   Genitourinary:  Negative for hematuria and urgency.   Musculoskeletal:  Negative for back pain and myalgias.   Skin:  Negative for rash.   Neurological:  Negative for dizziness and headaches.   Endo/Heme/Allergies:  Does not bruise/bleed easily.   Psychiatric/Behavioral:  Negative for depression.         Physical Exam  Temp:  [36.3 °C (97.3 °F)-38.4 °C (101.1 °F)] 36.8 °C (98.2 °F)  Pulse:  [] 85  Resp:  [16-18] 18  BP: (104-129)/(61-80) 117/70  SpO2:  [90 %-95 %] 90 %    Physical Exam  Vitals and nursing note reviewed.   Constitutional:       General: She is not in acute distress.     Appearance: Normal appearance. She is ill-appearing.   HENT:      Mouth/Throat:      Pharynx: Oropharynx is clear. No oropharyngeal exudate or posterior oropharyngeal erythema.   Eyes:      General: No scleral icterus.        Right eye: No discharge.         Left eye: No discharge.      Conjunctiva/sclera: Conjunctivae normal.   Cardiovascular:      Rate and Rhythm: Normal rate and regular rhythm.      Pulses: Normal pulses.      Heart sounds: Normal heart sounds.   Pulmonary:      Effort: Pulmonary effort is normal. No respiratory distress.      Breath sounds: Normal breath sounds. No wheezing.   Abdominal:      General: Bowel sounds are normal. There is no distension.      Tenderness: There is no abdominal tenderness. There is no guarding.   Musculoskeletal:         General: Normal range of motion.      Cervical back: Normal range of motion and neck supple.   Skin:     General: Skin is warm.      Capillary Refill: Capillary refill takes less than 2 seconds.   Neurological:      General: No focal deficit present.      Mental Status: She is alert and oriented to person, place, and time.      Cranial Nerves: No cranial nerve  deficit.      Motor: No weakness.   Psychiatric:         Mood and Affect: Mood normal.         Behavior: Behavior normal.         Fluids    Intake/Output Summary (Last 24 hours) at 1/13/2024 1421  Last data filed at 1/12/2024 2350  Gross per 24 hour   Intake 2854.44 ml   Output --   Net 2854.44 ml       Laboratory  Recent Labs     01/11/24  1848 01/12/24  1023 01/13/24  0222   WBC 17.1* 12.8* 9.3   RBC 6.23* 5.59* 5.16   HEMOGLOBIN 12.0 10.7* 9.9*   HEMATOCRIT 37.6 33.6* 31.3*   MCV 60.4* 60.1* 60.7*   MCH 19.3* 19.1* 19.2*   MCHC 31.9* 31.8* 31.6*   RDW 32.1* 32.3* 33.2*   PLATELETCT 377 292 289   MPV 10.5 10.2 10.5       Recent Labs     01/11/24 1848 01/13/24 0222   SODIUM 137 139   POTASSIUM 4.1 3.7   CHLORIDE 104 105   CO2 19* 24   GLUCOSE 114* 136*   BUN 6* 5*   CREATININE 0.80 0.59   CALCIUM 8.7 8.4*                     Imaging  DX-CHEST-PORTABLE (1 VIEW)   Final Result      Probable multifocal pneumonia.           Assessment/Plan  * Multifocal pneumonia- (present on admission)  Assessment & Plan  Continue with IV unasyn 3g every 6 hours  Follow up sputum culture   Oxygen per protocol   Continue iv abx  F/u blood and sputum cx  Azithro x 3     Hypoxia  Assessment & Plan  Oxygen per protocol   Due to PNA  Continue monitoring.     Sepsis (HCC)- (present on admission)  Assessment & Plan  SIRS criteria identified on my evaluation include:  Tachycardia, with heart rate greater than 90 BPM and Leukocytosis, with WBC greater than 12,000    Respiratory failure  Requiring 3L of o2.       Mixed anxiety depressive disorder- (present on admission)  Assessment & Plan  Resume sertraline          VTE prophylaxis: lovenox    I have performed a physical exam and reviewed and updated ROS and Plan today (1/13/2024). In review of yesterday's note (1/12/2024), there are no changes except as documented above.      Total time of 51 minutes spent prepping to see patient (e.g. reviewing  tests/imaging results, notes from  consultants, bedside nurse, night shift ) obtaining and/or reviewing separately obtained history. Performing a medically appropriate examination and evaluation.  Counseling and educating the patient.  Ordering medications, tests, or procedures.  Referring and communicating with other health care professionals.  Documenting clinical information in EPIC.  Independently interpreting results and communicating results to patient.  Care coordination.

## 2024-01-13 NOTE — CARE PLAN
Problem: Knowledge Deficit - Standard  Goal: Patient and family/care givers will demonstrate understanding of plan of care, disease process/condition, diagnostic tests and medications  Outcome: Progressing     Problem: Hemodynamics  Goal: Patient's hemodynamics, fluid balance and neurologic status will be stable or improve  Outcome: Progressing     Problem: Fluid Volume  Goal: Fluid volume balance will be maintained  Outcome: Progressing   The patient is Stable - Low risk of patient condition declining or worsening    Shift Goals  Clinical Goals: pain control  Patient Goals: to feel better  Family Goals: na    Progress made toward(s) clinical / shift goals:  Plan of care discussed with patient.     Patient is not progressing towards the following goals:

## 2024-01-13 NOTE — CARE PLAN
The patient is Stable - Low risk of patient condition declining or worsening    Shift Goals  Clinical Goals: Pain control, Abx, fever control  Patient Goals: get better, rest  Family Goals: na    Progress made toward(s) clinical / shift goals:  Refer to flowsheets/education for additional details.  Problem: Respiratory  Goal: Patient will achieve/maintain optimum respiratory ventilation and gas exchange  Outcome: Progressing     Problem: Physical Regulation  Goal: Diagnostic test results will improve  Outcome: Progressing     Problem: Physical Regulation  Goal: Signs and symptoms of infection will decrease  Outcome: Progressing       Patient is not progressing towards the following goals:

## 2024-01-14 ENCOUNTER — PHARMACY VISIT (OUTPATIENT)
Dept: PHARMACY | Facility: MEDICAL CENTER | Age: 29
End: 2024-01-14
Payer: COMMERCIAL

## 2024-01-14 VITALS
TEMPERATURE: 98.2 F | WEIGHT: 273.37 LBS | RESPIRATION RATE: 16 BRPM | BODY MASS INDEX: 39.14 KG/M2 | OXYGEN SATURATION: 93 % | DIASTOLIC BLOOD PRESSURE: 80 MMHG | SYSTOLIC BLOOD PRESSURE: 125 MMHG | HEIGHT: 70 IN | HEART RATE: 75 BPM

## 2024-01-14 PROBLEM — A41.9 SEPSIS (HCC): Status: RESOLVED | Noted: 2024-01-11 | Resolved: 2024-01-14

## 2024-01-14 PROBLEM — J18.9 MULTIFOCAL PNEUMONIA: Status: RESOLVED | Noted: 2024-01-11 | Resolved: 2024-01-14

## 2024-01-14 PROBLEM — R09.02 HYPOXIA: Status: RESOLVED | Noted: 2024-01-11 | Resolved: 2024-01-14

## 2024-01-14 PROCEDURE — 99239 HOSP IP/OBS DSCHRG MGMT >30: CPT | Performed by: HOSPITALIST

## 2024-01-14 PROCEDURE — 700102 HCHG RX REV CODE 250 W/ 637 OVERRIDE(OP): Performed by: HOSPITALIST

## 2024-01-14 PROCEDURE — A9270 NON-COVERED ITEM OR SERVICE: HCPCS | Performed by: HOSPITALIST

## 2024-01-14 PROCEDURE — 700105 HCHG RX REV CODE 258: Performed by: STUDENT IN AN ORGANIZED HEALTH CARE EDUCATION/TRAINING PROGRAM

## 2024-01-14 PROCEDURE — A9270 NON-COVERED ITEM OR SERVICE: HCPCS | Performed by: STUDENT IN AN ORGANIZED HEALTH CARE EDUCATION/TRAINING PROGRAM

## 2024-01-14 PROCEDURE — A9270 NON-COVERED ITEM OR SERVICE: HCPCS

## 2024-01-14 PROCEDURE — RXMED WILLOW AMBULATORY MEDICATION CHARGE: Performed by: HOSPITALIST

## 2024-01-14 PROCEDURE — 700111 HCHG RX REV CODE 636 W/ 250 OVERRIDE (IP): Mod: JZ | Performed by: STUDENT IN AN ORGANIZED HEALTH CARE EDUCATION/TRAINING PROGRAM

## 2024-01-14 PROCEDURE — 700102 HCHG RX REV CODE 250 W/ 637 OVERRIDE(OP): Performed by: STUDENT IN AN ORGANIZED HEALTH CARE EDUCATION/TRAINING PROGRAM

## 2024-01-14 PROCEDURE — 700102 HCHG RX REV CODE 250 W/ 637 OVERRIDE(OP)

## 2024-01-14 RX ORDER — DEXTROMETHORPHAN POLISTIREX 30 MG/5ML
60 SUSPENSION ORAL 2 TIMES DAILY
Qty: 100 ML | Refills: 0 | Status: SHIPPED | OUTPATIENT
Start: 2024-01-14 | End: 2024-01-19

## 2024-01-14 RX ORDER — BENZONATATE 100 MG/1
100 CAPSULE ORAL 3 TIMES DAILY
Qty: 15 CAPSULE | Refills: 0 | Status: SHIPPED | OUTPATIENT
Start: 2024-01-14 | End: 2024-01-19

## 2024-01-14 RX ORDER — AMOXICILLIN AND CLAVULANATE POTASSIUM 875; 125 MG/1; MG/1
1 TABLET, FILM COATED ORAL 2 TIMES DAILY
Qty: 6 TABLET | Refills: 0 | Status: ACTIVE | OUTPATIENT
Start: 2024-01-14 | End: 2024-01-17

## 2024-01-14 RX ORDER — DEXTROMETHORPHAN POLISTIREX 30 MG/5ML
60 SUSPENSION ORAL 2 TIMES DAILY
Status: DISCONTINUED | OUTPATIENT
Start: 2024-01-14 | End: 2024-01-14 | Stop reason: HOSPADM

## 2024-01-14 RX ORDER — BENZONATATE 100 MG/1
100 CAPSULE ORAL 3 TIMES DAILY
Status: DISCONTINUED | OUTPATIENT
Start: 2024-01-14 | End: 2024-01-14 | Stop reason: HOSPADM

## 2024-01-14 RX ADMIN — PREGABALIN 150 MG: 150 CAPSULE ORAL at 08:29

## 2024-01-14 RX ADMIN — BENZONATATE 100 MG: 100 CAPSULE ORAL at 05:43

## 2024-01-14 RX ADMIN — BENZONATATE 100 MG: 100 CAPSULE ORAL at 17:53

## 2024-01-14 RX ADMIN — AMPICILLIN SODIUM, SULBACTAM SODIUM 3 G: 2; 1 INJECTION, POWDER, FOR SOLUTION INTRAMUSCULAR; INTRAVENOUS at 12:07

## 2024-01-14 RX ADMIN — DEXTROMETHORPHAN 60 MG: 30 SUSPENSION, EXTENDED RELEASE ORAL at 17:53

## 2024-01-14 RX ADMIN — SERTRALINE 200 MG: 100 TABLET, FILM COATED ORAL at 17:53

## 2024-01-14 RX ADMIN — ENOXAPARIN SODIUM 40 MG: 100 INJECTION SUBCUTANEOUS at 17:53

## 2024-01-14 RX ADMIN — DEXTROMETHORPHAN 60 MG: 30 SUSPENSION, EXTENDED RELEASE ORAL at 10:17

## 2024-01-14 RX ADMIN — PREGABALIN 150 MG: 150 CAPSULE ORAL at 15:51

## 2024-01-14 RX ADMIN — AMPICILLIN SODIUM, SULBACTAM SODIUM 3 G: 2; 1 INJECTION, POWDER, FOR SOLUTION INTRAMUSCULAR; INTRAVENOUS at 05:48

## 2024-01-14 RX ADMIN — BENZONATATE 100 MG: 100 CAPSULE ORAL at 12:05

## 2024-01-14 RX ADMIN — PROPRANOLOL HYDROCHLORIDE 60 MG: 60 CAPSULE, EXTENDED RELEASE ORAL at 05:44

## 2024-01-14 ASSESSMENT — ENCOUNTER SYMPTOMS
COUGH: 1
CLAUDICATION: 0
PND: 0
FEVER: 0
BACK PAIN: 0
HEADACHES: 0
SHORTNESS OF BREATH: 1
PALPITATIONS: 0
BLURRED VISION: 0
MYALGIAS: 0
DEPRESSION: 0
HEMOPTYSIS: 0
WHEEZING: 0
VOMITING: 0
NAUSEA: 0
BRUISES/BLEEDS EASILY: 0
HEARTBURN: 0
CHILLS: 0
DOUBLE VISION: 0
DIZZINESS: 0

## 2024-01-14 ASSESSMENT — PAIN DESCRIPTION - PAIN TYPE
TYPE: ACUTE PAIN
TYPE: ACUTE PAIN

## 2024-01-14 ASSESSMENT — FIBROSIS 4 INDEX: FIB4 SCORE: 0.66

## 2024-01-14 NOTE — PROGRESS NOTES
[facility] Medicine Daily Progress Note    Date of Service  1/14/2024    Chief Complaint  Carmen Hebert is a 28 y.o. female admitted 1/11/2024 with pneumonia    Hospital Course  Carmen Hebert is a 28 y.o. female who presented 1/11/2024 with shortness of breath, subjective fevers and productive cough.  She reports exposure to sick contacts with COVID-19.  Denies any nausea, vomiting. Denies any urinary symptoms. Patient states she is on birth control pills. Patient reports taking Tylenol and ibuprofen.  In the ER, chest x-ray showed multifocal pneumonia.  Viral panel was negative.  Given patient medically clear for SIRS and hypoxia will admit patient for continued IV abx patient noted to have positive beta-hCG urine however blood test quantitative came back negative. Admitted to medicine service.    Interval Problem Update  1/12 patient is new to me today, patient is in bed, continues having fever, on 3L of o2, she is able to speak in full sentences, chest pain with deep inspiration. Will need IS, f/u blood and sputum cx. Continue iv abx.   1/13 patient in bed, continue having fever last night and low grade today, blood cx neg, her o2 requirement improved, no chest pain or palpitation, needs to continue IS, continue iv abx for today. Discussed with nurse staff, charge nurse cm.   1/14 patient in bed feels well, no fever or chills in the past 24hrs, o2 requirement improved off o2 at rest, still requiring o2 with ambulation, if o2 for home approved will dc home today.    I have discussed this patient's plan of care and discharge plan at IDT rounds today with Case Management, Nursing, Nursing leadership, and other members of the IDT team.    Consultants/Specialty  na    Code Status  Full Code    Disposition  The patient is medically cleared for discharge to home or a post-acute facility.      I have placed the appropriate orders for post-discharge needs.    Review of Systems  Review of Systems    Constitutional:  Negative for chills, fever and malaise/fatigue.   Eyes:  Negative for blurred vision and double vision.   Respiratory:  Positive for cough and shortness of breath. Negative for hemoptysis and wheezing.    Cardiovascular:  Negative for chest pain, palpitations, claudication, leg swelling and PND.   Gastrointestinal:  Negative for heartburn, nausea and vomiting.   Genitourinary:  Negative for hematuria and urgency.   Musculoskeletal:  Negative for back pain and myalgias.   Skin:  Negative for rash.   Neurological:  Negative for dizziness and headaches.   Endo/Heme/Allergies:  Does not bruise/bleed easily.   Psychiatric/Behavioral:  Negative for depression.         Physical Exam  Temp:  [36.4 °C (97.5 °F)-36.8 °C (98.2 °F)] 36.5 °C (97.7 °F)  Pulse:  [] 68  Resp:  [16-18] 16  BP: ()/(63-87) 118/83  SpO2:  [88 %-98 %] 98 %    Physical Exam  Vitals and nursing note reviewed.   Constitutional:       General: She is not in acute distress.     Appearance: Normal appearance. She is ill-appearing.   HENT:      Mouth/Throat:      Pharynx: Oropharynx is clear. No oropharyngeal exudate or posterior oropharyngeal erythema.   Eyes:      General: No scleral icterus.        Right eye: No discharge.         Left eye: No discharge.      Conjunctiva/sclera: Conjunctivae normal.   Cardiovascular:      Rate and Rhythm: Normal rate and regular rhythm.      Pulses: Normal pulses.      Heart sounds: Normal heart sounds.   Pulmonary:      Effort: Pulmonary effort is normal. No respiratory distress.      Breath sounds: Normal breath sounds. No wheezing.   Abdominal:      General: Bowel sounds are normal. There is no distension.      Tenderness: There is no abdominal tenderness. There is no guarding.   Musculoskeletal:         General: Normal range of motion.      Cervical back: Normal range of motion and neck supple.   Skin:     General: Skin is warm.      Capillary Refill: Capillary refill takes less than 2  seconds.   Neurological:      General: No focal deficit present.      Mental Status: She is alert and oriented to person, place, and time.      Cranial Nerves: No cranial nerve deficit.      Motor: No weakness.   Psychiatric:         Mood and Affect: Mood normal.         Behavior: Behavior normal.         Fluids    Intake/Output Summary (Last 24 hours) at 1/14/2024 1528  Last data filed at 1/14/2024 1519  Gross per 24 hour   Intake 840 ml   Output --   Net 840 ml         Laboratory  Recent Labs     01/11/24 1848 01/12/24  1023 01/13/24  0222   WBC 17.1* 12.8* 9.3   RBC 6.23* 5.59* 5.16   HEMOGLOBIN 12.0 10.7* 9.9*   HEMATOCRIT 37.6 33.6* 31.3*   MCV 60.4* 60.1* 60.7*   MCH 19.3* 19.1* 19.2*   MCHC 31.9* 31.8* 31.6*   RDW 32.1* 32.3* 33.2*   PLATELETCT 377 292 289   MPV 10.5 10.2 10.5       Recent Labs     01/11/24 1848 01/13/24 0222   SODIUM 137 139   POTASSIUM 4.1 3.7   CHLORIDE 104 105   CO2 19* 24   GLUCOSE 114* 136*   BUN 6* 5*   CREATININE 0.80 0.59   CALCIUM 8.7 8.4*                     Imaging  DX-CHEST-PORTABLE (1 VIEW)   Final Result      Probable multifocal pneumonia.           Assessment/Plan  Mixed anxiety depressive disorder- (present on admission)  Assessment & Plan  Resume sertraline          VTE prophylaxis: lovenox    I have performed a physical exam and reviewed and updated ROS and Plan today (1/14/2024). In review of yesterday's note (1/13/2024), there are no changes except as documented above.    My total time spent caring for the patient on the day of the encounter was 37 minutes.   This does not include time spent on separately billable procedures/tests.

## 2024-01-14 NOTE — CARE PLAN
The patient is Stable - Low risk of patient condition declining or worsening    Shift Goals  Clinical Goals: pain control  Patient Goals: to feel better  Family Goals: na    Progress made toward(s) clinical / shift goals:    Problem: Respiratory  Goal: Patient will achieve/maintain optimum respiratory ventilation and gas exchange  Outcome: Progressing     Problem: Physical Regulation  Goal: Signs and symptoms of infection will decrease  Outcome: Progressing       Patient is not progressing towards the following goals:

## 2024-01-14 NOTE — PROGRESS NOTES
Report received from outgoing nurse, care transferred at 1900. Patient currently medical and A&Ox 4. Patient reports cough has gotten better, small white sputum production noted. Whiteboard updated with plan for the day and names of staff. No other questions or concerns at this time from the patient. Call light within reach, fall precautions in place.

## 2024-01-14 NOTE — FACE TO FACE
"Face to Face Note  -  Durable Medical Equipment    Dmitriy Singh M.D. - NPI: [id number]  I certify that this patient is under my care and that they had a durable medical equipment(DME)face to face encounter by myself that meets the physician DME face-to-face encounter requirements with this patient on:    Date of encounter:   Patient:                    MRN:                       YOB: 2026  Carmen Hebert  [id number]  1995     The encounter with the patient was in whole, or in part, for the following medical condition, which is the primary reason for durable medical equipment:  Other - pneumonia    I certify that, based on my findings, the following durable medical equipment is medically necessary:    Oxygen   HOME O2 Saturation Measurements:(Values must be present for Home Oxygen orders)  Room air sat at rest: 93  Room air sat with amb: 84  With liters of O2: 1, O2 sat at rest with O2: 96  With Liters of O2: 4, O2 sat with amb with O2 : 96  Is the patient mobile?: Yes  If patient feels more short of breath, they can go up to 6 liters per minute and contact healthcare provider.    Supporting Symptoms: The patient requires supplemental oxygen, as the following interventions have been tried with limited or no improvement: \"Incentive spirometry.    My Clinical findings support the need for the above equipment due to:  Hypoxia  "

## 2024-01-14 NOTE — CARE PLAN
Problem: Knowledge Deficit - Standard  Goal: Patient and family/care givers will demonstrate understanding of plan of care, disease process/condition, diagnostic tests and medications  Outcome: Progressing     Problem: Hemodynamics  Goal: Patient's hemodynamics, fluid balance and neurologic status will be stable or improve  Outcome: Progressing     Problem: Fluid Volume  Goal: Fluid volume balance will be maintained  Outcome: Progressing     Problem: Urinary - Renal Perfusion  Goal: Ability to achieve and maintain adequate renal perfusion and functioning will improve  Outcome: Progressing     Problem: Respiratory  Goal: Patient will achieve/maintain optimum respiratory ventilation and gas exchange  Outcome: Progressing     Problem: Physical Regulation  Goal: Diagnostic test results will improve  Outcome: Progressing  Goal: Signs and symptoms of infection will decrease  Outcome: Progressing     Problem: Respiratory  Goal: Patient will achieve/maintain optimum respiratory ventilation and gas exchange  Outcome: Progressing     Problem: Physical Regulation  Goal: Diagnostic test results will improve  Outcome: Progressing     Problem: Physical Regulation  Goal: Signs and symptoms of infection will decrease  Outcome: Progressing     Problem: Pain - Standard  Goal: Alleviation of pain or a reduction in pain to the patient’s comfort goal  Outcome: Progressing   The patient is Stable - Low risk of patient condition declining or worsening    Shift Goals  Clinical Goals: pain control  Patient Goals: to feel better  Family Goals: na    Progress made toward(s) clinical / shift goals:    This RN was able to titrate down on pt's oxygen. Pt reported no pain during this RN's shift. Pt is pending discharge if 02 is delivered.    Patient is not progressing towards the following goals:  Pt is still requiring oxygen.

## 2024-01-15 ENCOUNTER — DOCUMENTATION (OUTPATIENT)
Dept: HEALTH INFORMATION MANAGEMENT | Facility: OTHER | Age: 29
End: 2024-01-15
Payer: COMMERCIAL

## 2024-01-15 NOTE — DISCHARGE PLANNING
Received choice form @: 0521  Agency/Facility name: [facility]  Sent referral per choice form @: 4729  Spoke with Rony at [facility], he will be beside in about 2 hours.

## 2024-01-15 NOTE — DISCHARGE SUMMARY
Discharge Summary    CHIEF COMPLAINT ON ADMISSION  Chief Complaint   Patient presents with    Shortness of Breath     Since Monday.    Cough     Reports Dry cough and congestion since Monday. Tested multiple times for covid w/c came back negative.    Fever     Reports intermittent fever since Tuesday, highest temp=102.9. taking round the clock otc tylenol and ibuprofen for fever. Last dose of ibuprofen 200mg was at 2pm       Reason for Admission  SOB     Admission Date  1/11/2024    CODE STATUS  Full Code    HPI & HOSPITAL COURSE    Please see original H&P for specific information  Carmen Hebert is a 28 y.o. female who presented 1/11/2024 with shortness of breath, subjective fevers and productive cough.  She reports exposure to sick contacts with COVID-19.  Denies any nausea, vomiting. Denies any urinary symptoms. Patient states she is on birth control pills. Patient reports taking Tylenol and ibuprofen.  In the ER, chest x-ray showed multifocal pneumonia.  Viral panel was negative.  Given patient medically clear for SIRS and hypoxia will admit patient for continued IV abx patient noted to have positive beta-hCG urine however blood test quantitative came back negative. Admitted to medicine service.    Patient was started on IV antibiotics, blood cultures negative, patient has responded well to antibiotics, patient is still requiring oxygen with ambulation but she is feeling much better, she is alert oriented follows commands she is being independent with ambulation, she is tolerating diet, no more fever, patient will be DC home with home oxygen, she is again requiring only oxygen with ambulation at rest oxygen saturations are 93%, patient will be discharged in stable condition, patient has expressed understanding of her plan of care and agree with it she will continue on oral antibiotics cough medication she needs to continue working with incentive spirometry at home.    Therefore, she is discharged in  good and stable condition to home with close outpatient follow-up.    The patient met 2-midnight criteria for an inpatient stay at the time of discharge.    Discharge Date  1/14/24    FOLLOW UP ITEMS POST DISCHARGE  Primary care physician    DISCHARGE DIAGNOSES  Principal Problem (Resolved):    Multifocal pneumonia (POA: Yes)  Active Problems:    Mixed anxiety depressive disorder (POA: Yes)  Resolved Problems:    Sepsis (HCC) (POA: Yes)    Hypoxia (POA: Unknown)      FOLLOW UP  No future appointments.  Kristine Nails P.A.-C.  [address] [facility] [address]  [facility] [address]  [phone]    Follow up in 1 week(s)        MEDICATIONS ON DISCHARGE     Medication List        START taking these medications        Instructions   amoxicillin-clavulanate 875-125 MG Tabs  Commonly known as: Augmentin   Take 1 Tablet by mouth 2 times a day for 3 days.  Dose: 1 Tablet     benzonatate 100 MG Caps  Commonly known as: Tessalon   Take 1 Capsule by mouth 3 times a day for 5 days.  Dose: 100 mg     Dextromethorphan Polistirex ER 30 MG/5ML Suer  Commonly known as: Delsym   Take 10 mL by mouth 2 times a day for 5 days.  Dose: 60 mg            CONTINUE taking these medications        Instructions   albuterol 108 (90 Base) MCG/ACT Aers inhalation aerosol   Doctor's comments: Give albuterol that is patient or insurance preference please  Inhale 2 Puffs every four hours as needed for Shortness of Breath.  Dose: 2 Puff     ALPRAZolam 0.25 MG Tabs  Commonly known as: Xanax   Take 0.25 mg by mouth at bedtime as needed for Sleep.  Dose: 0.25 mg     IRMA-MAG-ZINC PO   Take 1 Tablet by mouth every day.  Dose: 1 Tablet     fluticasone 50 MCG/ACT nasal spray  Commonly known as: Flonase   Administer 2 Sprays into affected nostril(S) every day.  Dose: 2 Spray     Norethindrone Acet-Ethinyl Est 1.5-30 MG-MCG Tabs  Commonly known as: Kanika 1.5/30   TAKE 1 TABLET BY MOUTH DAILY. TAKE CONTINUOUSLY NO, PLACEBO WEEK     pregabalin 150 MG Caps  Start taking  on: February 1, 2024  Commonly known as: Cayla   Doctor's comments: 30 days an 2 refill  Take 1 Capsule by mouth in the morning, at noon, and at bedtime for 30 days.  Dose: 150 mg     propranolol LA 60 MG Cp24  Commonly known as: Inderal LA   Take 1 Capsule by mouth every day. Please call to schedule follow up appointment for further refills. Please call [phone]. Thank you.  Dose: 60 mg     sertraline 100 MG Tabs  Commonly known as: Zoloft   Take 200 mg by mouth every day. 200 mg = 2 tablets  Dose: 200 mg     tizanidine 4 MG Tabs  Commonly known as: Zanaflex   Take 4 mg by mouth every 6 hours as needed.  Dose: 4 mg     Vitamin C 1000 MG Tabs   Take 1,000 mg by mouth every day.  Dose: 1,000 mg     vitamin D3 1000 Unit (25 mcg) Tabs  Commonly known as: Cholecalciferol   Take 1,000 Units by mouth every day.  Dose: 1,000 Units              Allergies  Allergies   Allergen Reactions    Corn-Related Products Hives, Itching, Nausea and Myalgia    Dairy Food Allergy Diarrhea, Nausea and Myalgia          Gluten Meal Hives, Shortness of Breath, Diarrhea, Rash, Runny Nose, Itching, Vomiting, Nausea, Cough and Myalgia          Sesame Seed (Diagnostic) Hives, Rash and Itching     rash    Soy Allergy Hives, Itching, Nausea and Myalgia       DIET  Orders Placed This Encounter   Procedures    Diet Order Diet: Regular     Standing Status:   Standing     Number of Occurrences:   1     Order Specific Question:   Diet:     Answer:   Regular [1]       ACTIVITY  As tolerated.  Weight bearing as tolerated    CONSULTATIONS  None    PROCEDURES  None    LABORATORY  Lab Results   Component Value Date    SODIUM 139 01/13/2024    POTASSIUM 3.7 01/13/2024    CHLORIDE 105 01/13/2024    CO2 24 01/13/2024    GLUCOSE 136 (H) 01/13/2024    BUN 5 (L) 01/13/2024    CREATININE 0.59 01/13/2024        Lab Results   Component Value Date    WBC 9.3 01/13/2024    HEMOGLOBIN 9.9 (L) 01/13/2024    HEMATOCRIT 31.3 (L) 01/13/2024    PLATELETCT 289  01/13/2024        Total time of the discharge process exceeds 32 minutes.

## 2024-01-15 NOTE — PROGRESS NOTES
Pt is discharging, has been educated on discharge, [facility] [facility], and new medications. Pt is alert and oriented x4 and will be discharging to [facility] with Family. Discussed follow up appointments, and worsening signs and symptoms. Patient has no questions at this time. Pt has home O2 and all of their belongings and was wheeled downstairs by staff.

## 2024-01-15 NOTE — DISCHARGE PLANNING
SW met with pt to obtain O2 choice. Per pt she has no preference as long as it is contracted through insurance. On choice form, choices are listed as 1) [facility], 2) [employer], and 3) [employer].

## 2024-01-16 LAB
BACTERIA BLD CULT: NORMAL
BACTERIA BLD CULT: NORMAL
SIGNIFICANT IND 70042: NORMAL
SIGNIFICANT IND 70042: NORMAL
SITE SITE: NORMAL
SITE SITE: NORMAL
SOURCE SOURCE: NORMAL
SOURCE SOURCE: NORMAL

## 2024-01-16 NOTE — DOCUMENTATION QUERY
"                                                                         [facility]                                                                       Query Response Note      PATIENT:               ANNITA LOGAN  ACCT #:                  [id number]  MRN:                     [id number]  :                      1995  ADMIT DATE:       2024 6:40 PM  DISCH DATE:        2024 8:45 PM  RESPONDING  PROVIDER #:        [id number]           QUERY TEXT:    The diagnosis of sepsis has been documented in the ED Provider Notes and Progress Notes. Per H&P, \"SIRS criteria identified on my evaluation include:  Tachycardia, with heart rate greater than 90 BPM and Leukocytosis, with WBC greater than 12,000.\"    Please clarify the status of sepsis after study:      The patient's Clinical Indicators include:  ED Provider Notes: \"febrile, tachycardic and tachypneic ... Her vital signs did improve but she is still hypoxic requiring supplemental oxygen of 2 L to stay over 88% ... Sepsis with acute organ dysfunction without septic shock, due to unspecified organism, unspecified organ dysfunction type\"    H&P: \"Positive for cough and shortness of breath. ...  Multifocal pneumonia ... Hypoxia ... SIRS criteria identified on my evaluation include: Tachycardia, with heart rate greater than 90 BPM and Leukocytosis, with WBC greater than 12,000\"     PN: \"Sepsis ... Respiratory failure. Requiring 3L of o2.\"     Labs: WBC 17.1, , CR 0.80, Total Bilirubin 0.5, Lactic Acid 1.0 > 1.0   Vitals: T103.4, HR , RR 18-20, MAP 72-95, 92-95% on 2L NC    Risk Factors: Pneumonia, hypoxia, respiratory failure, 92% on 2L = P/F Ratio 221  Treatment: IV fluids/bolus, Unasyn/Zithromax IV, Tessalon, Robitussin, supplemental oxygen    Thank You,  Jacey Love RN  Clinical    Connect via [email or web address] or [email or web address]  Options provided:   -- Acute hypoxic respiratory failure due to sepsis " present on admission   -- Sepsis ruled in, (please specify sepsis-related organ dysfunction)   -- Sepsis ruled out, SIRS criteria only   -- Other explanation, (please specify other explanation)   -- Unable to determine      Query created by: Jacey Love on 1/12/2024 6:36 AM    RESPONSE TEXT:    Acute hypoxic respiratory failure due to sepsis present on admission          Electronically signed by:  ADRIANA RENEE MD 1/15/2024 6:27 PM

## 2024-01-16 NOTE — DISCHARGE PLANNING
@1000   DPA manually faxed DME order to [employer] after company called informing that they didn't receive it over the weekend.

## 2024-01-17 ENCOUNTER — TELEPHONE (OUTPATIENT)
Dept: CARDIOLOGY | Facility: MEDICAL CENTER | Age: 29
End: 2024-01-17
Payer: COMMERCIAL

## 2024-01-17 NOTE — TELEPHONE ENCOUNTER
Phone Number Called: [phone]    Call outcome: Spoke to patient regarding message below.    Message: Patient needing FMLA paperwork completed before returning to work on 1/22/24. Email provided for patient to send paperwork to at this time. All questions answered at this time. Advised to call back with any further questions or concerns.

## 2024-01-17 NOTE — TELEPHONE ENCOUNTER
Caller: Carmen Hebert     Topic/issue: Patient would like to have Nikia Anderson fill out an FMLA form for her work. Patient was recently discharged from the hospital 01/14/2024. Please see ED hospital admission on 01/11/2024. Patient was given a directive for returning to work on 01/22/2024. Patient needs this paperwork as soon as possible.    Callback Number: [phone]      Thank you,  Lynn BUSTAMANTE

## 2024-01-18 NOTE — TELEPHONE ENCOUNTER
[place] paperwork completed and uploaded into patient chart.     Phone Number Called: [phone]    Call outcome: Spoke to patient regarding message below.    Message: Called to inform patient that [place] paperwork has been competed at this time. Patient requesting paperwork be emailed back to [email or web address]. Patient scheduled for follow up on 2/23/24 with . All questions answered at this time. Advised to call back with any further questions or concerns.

## 2024-01-22 ENCOUNTER — TELEPHONE (OUTPATIENT)
Dept: CARDIOLOGY | Facility: MEDICAL CENTER | Age: 29
End: 2024-01-22
Payer: COMMERCIAL

## 2024-01-22 NOTE — TELEPHONE ENCOUNTER
To [place]: okay to provide letter stating okay for patient to bring oxygen to work in case it is needed? Patient does not have PCP at this time as former PCP left and cannot reestablish with new provider until 2/9/24

## 2024-01-22 NOTE — TELEPHONE ENCOUNTER
Caller: Carmen Hebert    Topic/issue: MEDICAL ADVICE    Neda would like a call to discuss a work release letter. She needs the letter to read that if necessary she can have her O2 tank handy since her oxygen levels are not at full capacity yet. Please advise.    Thank you,  Drew COX    Callback Number: [phone] (home)

## 2024-01-23 ENCOUNTER — PATIENT MESSAGE (OUTPATIENT)
Dept: CARDIOLOGY | Facility: MEDICAL CENTER | Age: 29
End: 2024-01-23
Payer: COMMERCIAL

## 2024-01-23 NOTE — TELEPHONE ENCOUNTER
CHAD Aldana.  You16 hours ago (2:12 PM)     We don't manage her oxygen for cardiac concerns so this needs to go through ordering provider for her oxygen. SC     Patient notified via [employer].

## 2024-01-24 ENCOUNTER — PATIENT MESSAGE (OUTPATIENT)
Dept: CARDIOLOGY | Facility: MEDICAL CENTER | Age: 29
End: 2024-01-24
Payer: COMMERCIAL

## 2024-02-09 ENCOUNTER — OFFICE VISIT (OUTPATIENT)
Dept: MEDICAL GROUP | Facility: MEDICAL CENTER | Age: 29
End: 2024-02-09
Payer: COMMERCIAL

## 2024-02-09 VITALS
WEIGHT: 264.66 LBS | SYSTOLIC BLOOD PRESSURE: 98 MMHG | BODY MASS INDEX: 37.89 KG/M2 | DIASTOLIC BLOOD PRESSURE: 68 MMHG | OXYGEN SATURATION: 95 % | HEIGHT: 70 IN | HEART RATE: 69 BPM | RESPIRATION RATE: 14 BRPM | TEMPERATURE: 98.4 F

## 2024-02-09 DIAGNOSIS — M79.7 FIBROMYALGIA: ICD-10-CM

## 2024-02-09 DIAGNOSIS — F33.9 RECURRENT MAJOR DEPRESSIVE DISORDER, REMISSION STATUS UNSPECIFIED (HCC): ICD-10-CM

## 2024-02-09 DIAGNOSIS — R10.2 PELVIC PAIN: ICD-10-CM

## 2024-02-09 DIAGNOSIS — Z00.00 PE (PHYSICAL EXAM), ANNUAL: ICD-10-CM

## 2024-02-09 DIAGNOSIS — J45.21 MILD INTERMITTENT ASTHMA WITH ACUTE EXACERBATION: ICD-10-CM

## 2024-02-09 DIAGNOSIS — D56.9 THALASSEMIA, UNSPECIFIED TYPE: ICD-10-CM

## 2024-02-09 DIAGNOSIS — Z30.41 ENCOUNTER FOR SURVEILLANCE OF CONTRACEPTIVE PILLS: ICD-10-CM

## 2024-02-09 PROBLEM — S01.332A COMPLICATION OF LEFT EAR PIERCING: Status: RESOLVED | Noted: 2021-08-30 | Resolved: 2024-02-09

## 2024-02-09 PROBLEM — N89.8 VAGINAL DISCHARGE: Status: RESOLVED | Noted: 2021-11-01 | Resolved: 2024-02-09

## 2024-02-09 PROBLEM — I47.11 INAPPROPRIATE SINUS TACHYCARDIA (HCC): Status: RESOLVED | Noted: 2021-12-08 | Resolved: 2024-02-09

## 2024-02-09 PROBLEM — N88.9 CERVICAL LESION: Status: RESOLVED | Noted: 2020-04-08 | Resolved: 2024-02-09

## 2024-02-09 PROBLEM — N60.89 SEBACEOUS CYST OF SKIN OF BREAST: Status: RESOLVED | Noted: 2021-12-07 | Resolved: 2024-02-09

## 2024-02-09 PROCEDURE — 3078F DIAST BP <80 MM HG: CPT | Performed by: NURSE PRACTITIONER

## 2024-02-09 PROCEDURE — 3074F SYST BP LT 130 MM HG: CPT | Performed by: NURSE PRACTITIONER

## 2024-02-09 PROCEDURE — 99214 OFFICE O/P EST MOD 30 MIN: CPT | Performed by: NURSE PRACTITIONER

## 2024-02-09 RX ORDER — NORETHINDRONE ACETATE AND ETHINYL ESTRADIOL .03; 1.5 MG/1; MG/1
TABLET ORAL
Qty: 84 TABLET | Refills: 3 | Status: SHIPPED | OUTPATIENT
Start: 2024-02-09

## 2024-02-09 RX ORDER — FLUTICASONE PROPIONATE AND SALMETEROL 100; 50 UG/1; UG/1
1 POWDER RESPIRATORY (INHALATION) EVERY 12 HOURS
Qty: 1 EACH | Refills: 1 | Status: SHIPPED | OUTPATIENT
Start: 2024-02-09

## 2024-02-09 RX ORDER — SERTRALINE HYDROCHLORIDE 100 MG/1
200 TABLET, FILM COATED ORAL DAILY
Qty: 90 TABLET | Refills: 1 | Status: SHIPPED | OUTPATIENT
Start: 2024-02-09

## 2024-02-09 RX ORDER — UBIDECARENONE 75 MG
100 CAPSULE ORAL DAILY
COMMUNITY

## 2024-02-09 ASSESSMENT — FIBROSIS 4 INDEX: FIB4 SCORE: 0.69

## 2024-02-09 NOTE — ASSESSMENT & PLAN NOTE
New to me. Dx with fibromyalgia in 2017, needing ref to a specialist. ON lyrica 150 mg BID, working well for her.

## 2024-02-09 NOTE — PROGRESS NOTES
Chief Complaint   Patient presents with    Establish Care     Fibromyalgia and needs new dr Ocasio was in the er recently          HISTORY OF PRESENT ILLNESS: Patient is a 29 y.o. female, established patient who presents today to discuss medical problems as listed below:    Health Maintenance:  COMPLETED       Allergies: Corn-related products, Dairy food allergy, Gluten meal, Sesame seed (diagnostic), and Soy allergy    Current Outpatient Medications   Medication Sig Dispense Refill    cyanocobalamin (VITAMIN B-12) 100 MCG Tab Take 100 mcg by mouth every day.      sertraline (ZOLOFT) 100 MG Tab Take 200 mg by mouth every day. 200 mg = 2 tablets      Ascorbic Acid (VITAMIN C) 1000 MG Tab Take 1,000 mg by mouth every day.      vitamin D3 (CHOLECALCIFEROL) 1000 Unit (25 mcg) Tab Take 1,000 Units by mouth every day.      Calcium-Magnesium-Zinc (IRMA-MAG-ZINC PO) Take 1 Tablet by mouth every day.      fluticasone (FLONASE) 50 MCG/ACT nasal spray Administer 2 Sprays into affected nostril(S) every day. 16 g 11    albuterol 108 (90 Base) MCG/ACT Aero Soln inhalation aerosol Inhale 2 Puffs every four hours as needed for Shortness of Breath. 1 Each 11    Norethindrone Acet-Ethinyl Est (BELL 1.5/30) 1.5-30 MG-MCG Tab TAKE 1 TABLET BY MOUTH DAILY. TAKE CONTINUOUSLY NO, PLACEBO WEEK 84 Tablet 2    pregabalin (LYRICA) 150 MG Cap Take 1 Capsule by mouth in the morning, at noon, and at bedtime for 30 days. 90 Capsule 0    propranolol LA (INDERAL LA) 60 MG CAPSULE SR 24 HR Take 1 Capsule by mouth every day. Please call to schedule follow up appointment for further refills. Please call [phone]. Thank you. 100 Capsule 1    tizanidine (ZANAFLEX) 4 MG Tab Take 4 mg by mouth every 6 hours as needed.      ALPRAZolam (XANAX) 0.25 MG Tab Take 0.25 mg by mouth at bedtime as needed for Sleep.       No current facility-administered medications for this visit.       Allergies, past medical history, past surgical history, family history,  "social history reviewed and updated.    Review of Systems:     - Constitutional: Negative for fever, chills, unexpected weight change, and fatigue/generalized weakness.     - HEENT: Negative for headaches, vision changes, hearing changes, ear pain, ear discharge, rhinorrhea, sinus congestion, sore throat, and neck pain.      - Respiratory: Negative for cough, sputum production, chest congestion, dyspnea, wheezing, and crackles.      - Cardiovascular: Negative for chest pain, palpitations, orthopnea, and bilateral lower extremity edema.     - Gastrointestinal: Negative for heartburn, nausea, vomiting, abdominal pain, hematochezia, melena, diarrhea, constipation, and greasy/foul-smelling stools.     - Genitourinary: Negative for dysuria, polyuria, hematuria, pyuria, urinary urgency, and urinary incontinence.    - Musculoskeletal: Negative for myalgias, back pain, and joint pain.     - Skin: Negative for rash, itching, cyanotic skin color change.     - Neurological: Negative for dizziness, tingling, tremors, focal sensory deficit, focal weakness and headaches.     - Endo/Heme/Allergies: Does not bruise/bleed easily.     - Psychiatric/Behavioral: Negative for depression, suicidal/homicidal ideation and memory loss.      All other systems reviewed and are negative    Exam:    BP 98/68   Pulse 69   Temp 36.9 °C (98.4 °F) (Temporal)   Resp 14   Ht 1.778 m (5' 10\")   Wt 120 kg (264 lb 10.6 oz)   LMP 01/17/2024 (Approximate)   SpO2 95%   BMI 37.98 kg/m²  Body mass index is 37.98 kg/m².    Physical Exam:  Constitutional: Well-developed and well-nourished. Not diaphoretic. No distress.   Skin: Skin is warm and dry. No rash noted.  Head: Atraumatic without lesions.  Eyes: Conjunctivae and extraocular motions are normal. Pupils are equal, round, and reactive to light. No scleral icterus.   Ears:  External ears unremarkable. Tympanic membranes clear and intact.  Nose: Nares patent. Septum midline. Turbinates without " erythema nor edema. No discharge.   Mouth/Throat: Dentition is normal. Tongue normal. Oropharynx is clear and moist. Posterior pharynx without erythema or exudates.  Neck: Supple, trachea midline. Normal range of motion. No thyromegaly present. No lymphadenopathy--cervical or supraclavicular.  Cardiovascular: Regular rate and rhythm, S1 and S2 without murmur, rubs, or gallops.    Chest: Effort normal. Clear to auscultation throughout. No adventitious sounds. No CVA tenderness.  Abdomen: Soft, non tender, and without distention. Active bowel sounds in all four quadrants. No rebound, guarding, masses or HSM.  : Negative for dysuria, polyuria, hematuria, pyuria, urinary urgency, and urinary incontinence.  Extremities: No cyanosis, clubbing, erythema, nor edema. Distal pulses intact and symmetric.   Musculoskeletal: All major joints AROM full in all directions without pain.  Neurological: Alert and oriented x 3. DTRs 2+/3 and symmetric. No cranial nerve deficit. 5/5 myotomes. Sensation intact. Negative Rhomberg.  Psychiatric:  Behavior, mood, and affect are appropriate.  MA/nursing note and vitals reviewed.    LABS: 1/2023  results reviewed and discussed with the patient, questions answered.    Assessment/Plan:  Fibromyalgia  New to me. Dx with fibromyalgia in 2017, needing ref to a specialist. ON lyrica 150 mg BID, working well for her.     Asthma  New to me. Chronic condition. Taking albuterol as needed. No refills needed today.     Major depression, recurrent (HCC)  New to me. Taking Zoloft 200 mg and xanax as needed. Followed by [facility]. Denies SI.     1. Fibromyalgia  Stable on current regimen.  Continue Lyrica.  Will be following up with physiatry.  - Referral to Pain Clinic    2. Mild intermittent asthma with acute exacerbation  Stable on current regimen.  Will start on steroid inhaler as maintenance during the seasonal allergies.  Utilize albuterol for as needed basis.  - PULMONARY FUNCTION TESTS -Test requested:  Complete Pulmonary Function Test; Future    3. Recurrent major depressive disorder, remission status unspecified (HCC)  stable on current regimen.  Continue.  Following with psychiatrist.    4. PE (physical exam), annual  - CBC WITHOUT DIFFERENTIAL; Future  - Comp Metabolic Panel; Future  - Lipid Profile; Future  - VITAMIN D,25 HYDROXY (DEFICIENCY); Future  - TSH WITH REFLEX TO FT4; Future  - HEMOGLOBIN A1C; Future    5. Thalassemia, unspecified type  - CBC WITHOUT DIFFERENTIAL; Future  - VITAMIN B12; Future  - IRON/TOTAL IRON BIND; Future  - FOLATE; Future      Discussed with patient possible alternative diagnoses, patient is to take all medications as prescribed.      If symptoms persist FU w/PCP, if symptoms worsen go to emergency room.      If experiencing any side effects from prescribed medications report to the office immediately or go to emergency room.     Reviewed indication, dosage, usage and potential adverse effects of prescribed medications.      Reviewed risks and benefits of treatment plan. Patient verbalizes understanding of all instruction and verbally agrees to plan.     Discussed plan with the patient, and patient agrees to the above.      I personally reviewed prior external notes and test results pertinent to today's visit.      No follow-ups on file. 3 mos

## 2024-02-09 NOTE — ASSESSMENT & PLAN NOTE
New to me. Taking Zoloft 200 mg and xanax as needed. Followed by [facility]. Denies SI.

## 2024-02-09 NOTE — ASSESSMENT & PLAN NOTE
New to me. Chronic condition. Taking albuterol as needed. No refills needed today.   Reports feeling a little SOB with exertion

## 2024-02-13 ENCOUNTER — HOSPITAL ENCOUNTER (OUTPATIENT)
Facility: MEDICAL CENTER | Age: 29
End: 2024-02-13
Attending: NURSE PRACTITIONER
Payer: COMMERCIAL

## 2024-02-13 ENCOUNTER — OFFICE VISIT (OUTPATIENT)
Dept: MEDICAL GROUP | Facility: MEDICAL CENTER | Age: 29
End: 2024-02-13
Payer: COMMERCIAL

## 2024-02-13 VITALS — BODY MASS INDEX: 37.8 KG/M2 | WEIGHT: 264 LBS | HEIGHT: 70 IN

## 2024-02-13 DIAGNOSIS — M79.7 FIBROMYALGIA: ICD-10-CM

## 2024-02-13 DIAGNOSIS — Z79.899 CONTROLLED SUBSTANCE AGREEMENT SIGNED: ICD-10-CM

## 2024-02-13 DIAGNOSIS — J45.21 MILD INTERMITTENT ASTHMA WITH ACUTE EXACERBATION: ICD-10-CM

## 2024-02-13 PROCEDURE — 99214 OFFICE O/P EST MOD 30 MIN: CPT | Performed by: NURSE PRACTITIONER

## 2024-02-13 PROCEDURE — G0481 DRUG TEST DEF 8-14 CLASSES: HCPCS

## 2024-02-13 PROCEDURE — 1126F AMNT PAIN NOTED NONE PRSNT: CPT | Performed by: NURSE PRACTITIONER

## 2024-02-13 RX ORDER — PREGABALIN 150 MG/1
150 CAPSULE ORAL 3 TIMES DAILY
Qty: 90 CAPSULE | Refills: 0 | Status: SHIPPED | OUTPATIENT
Start: 2024-02-13 | End: 2024-03-14

## 2024-02-13 ASSESSMENT — FIBROSIS 4 INDEX: FIB4 SCORE: 0.69

## 2024-02-13 ASSESSMENT — PAIN SCALES - GENERAL: PAINLEVEL: NO PAIN

## 2024-02-13 NOTE — ASSESSMENT & PLAN NOTE
Chronic condition.  Has been diagnosed in 2017 and has been following with physiatrist who has moved away from the area.  Patient has been receiving Lyrica 150 mg 3 times daily.  She needs a refill today.  She takes no other controlled substances, no street drugs.  PDMP verified, controlled substance agreement discussed with the patient and signed, will obtain urine drug screen today.

## 2024-02-13 NOTE — ASSESSMENT & PLAN NOTE
Chronic intermittent condition.  She is doing much better today oxygenation at 95%.  Is utilizing albuterol.  She has not picked up her Advair yet.  She denies chest tightness cough or wheezing today.

## 2024-02-17 LAB
1OH-MIDAZOLAM UR QL SCN: NOT DETECTED
6MAM UR QL: NOT DETECTED
7AMINOCLONAZEPAM UR QL: NOT DETECTED
A-OH ALPRAZ UR QL: NOT DETECTED
ALPRAZ UR QL: NOT DETECTED
AMPHET UR QL SCN: NOT DETECTED
ANNOTATION COMMENT IMP: NORMAL
BARBITURATES UR QL: NEGATIVE
BUPRENORPHINE UR QL: NOT DETECTED
BZE UR QL: NEGATIVE
CARBOXYTHC UR QL: NEGATIVE
CARISOPRODOL UR QL: NEGATIVE
CLONAZEPAM UR QL: NOT DETECTED
CODEINE UR QL: NOT DETECTED
CREAT UR-MCNC: 253.7 MG/DL (ref 20–400)
DIAZEPAM UR QL: NOT DETECTED
ETHYL GLUCURONIDE UR QL: NEGATIVE
FENTANYL UR QL: NOT DETECTED
GABAPENTIN UR QL CFM: NOT DETECTED
HYDROCODONE UR QL: NOT DETECTED
HYDROMORPHONE UR QL: NOT DETECTED
LORAZEPAM UR QL: NOT DETECTED
MDA UR QL: NOT DETECTED
MDEA UR QL: NOT DETECTED
MDMA UR QL: NOT DETECTED
ME-PHENIDATE UR QL: NOT DETECTED
METHADONE UR QL: NEGATIVE
METHAMPHET UR QL: NOT DETECTED
MIDAZOLAM UR QL SCN: NOT DETECTED
MORPHINE UR QL: NOT DETECTED
NALOXONE UR QL CFM: NOT DETECTED
NORBUPRENORPHINE UR QL CFM: NOT DETECTED
NORDIAZEPAM UR QL: NOT DETECTED
NORFENTANYL UR QL: NOT DETECTED
NORHYDROCODONE UR QL CFM: NOT DETECTED
NORMEPERIDINE UR QL CFM: NOT DETECTED
NOROXYCODONE UR QL CFM: NOT DETECTED
NOROXYMORPHONE UR QL SCN: NOT DETECTED
OXAZEPAM UR QL: NOT DETECTED
OXYCODONE UR QL: NOT DETECTED
OXYMORPHONE UR QL: NOT DETECTED
PATHOLOGY STUDY: NORMAL
PCP UR QL: NEGATIVE
PHENTERMINE UR QL: NOT DETECTED
PREGABALIN UR QL CFM: PRESENT
SERVICE CMNT-IMP: NORMAL
TAPENTADOL UR QL SCN: NOT DETECTED
TAPENTADOL UR QL SCN: NOT DETECTED
TEMAZEPAM UR QL: NOT DETECTED
TRAMADOL UR QL: NEGATIVE
ZOLPIDEM PHENYL-4-CARB UR QL SCN: NOT DETECTED
ZOLPIDEM UR QL: NOT DETECTED

## 2024-02-23 ENCOUNTER — OFFICE VISIT (OUTPATIENT)
Dept: PHYSICAL MEDICINE AND REHAB | Facility: MEDICAL CENTER | Age: 29
End: 2024-02-23
Payer: COMMERCIAL

## 2024-02-23 ENCOUNTER — APPOINTMENT (OUTPATIENT)
Dept: CARDIOLOGY | Facility: MEDICAL CENTER | Age: 29
End: 2024-02-23
Payer: COMMERCIAL

## 2024-02-23 VITALS
BODY MASS INDEX: 38.09 KG/M2 | OXYGEN SATURATION: 94 % | SYSTOLIC BLOOD PRESSURE: 112 MMHG | WEIGHT: 266.1 LBS | HEIGHT: 70 IN | HEART RATE: 77 BPM | DIASTOLIC BLOOD PRESSURE: 80 MMHG | TEMPERATURE: 97.1 F

## 2024-02-23 DIAGNOSIS — M54.6 THORACIC SPINE PAIN: ICD-10-CM

## 2024-02-23 DIAGNOSIS — M51.36 BULGE OF LUMBAR DISC WITHOUT MYELOPATHY: ICD-10-CM

## 2024-02-23 DIAGNOSIS — M47.816 LUMBAR SPONDYLOSIS: ICD-10-CM

## 2024-02-23 DIAGNOSIS — M79.7 FIBROMYALGIA: ICD-10-CM

## 2024-02-23 DIAGNOSIS — M41.24 OTHER IDIOPATHIC SCOLIOSIS, THORACIC REGION: ICD-10-CM

## 2024-02-23 DIAGNOSIS — M43.17 SPONDYLOLISTHESIS OF LUMBOSACRAL REGION: ICD-10-CM

## 2024-02-23 DIAGNOSIS — M62.838 MUSCLE SPASM: ICD-10-CM

## 2024-02-23 DIAGNOSIS — M54.16 LUMBAR RADICULOPATHY: ICD-10-CM

## 2024-02-23 DIAGNOSIS — M54.50 LOW BACK PAIN WITH RADIATION: ICD-10-CM

## 2024-02-23 PROCEDURE — 3079F DIAST BP 80-89 MM HG: CPT | Performed by: STUDENT IN AN ORGANIZED HEALTH CARE EDUCATION/TRAINING PROGRAM

## 2024-02-23 PROCEDURE — 1125F AMNT PAIN NOTED PAIN PRSNT: CPT | Performed by: STUDENT IN AN ORGANIZED HEALTH CARE EDUCATION/TRAINING PROGRAM

## 2024-02-23 PROCEDURE — 99214 OFFICE O/P EST MOD 30 MIN: CPT | Performed by: STUDENT IN AN ORGANIZED HEALTH CARE EDUCATION/TRAINING PROGRAM

## 2024-02-23 PROCEDURE — 3074F SYST BP LT 130 MM HG: CPT | Performed by: STUDENT IN AN ORGANIZED HEALTH CARE EDUCATION/TRAINING PROGRAM

## 2024-02-23 ASSESSMENT — PATIENT HEALTH QUESTIONNAIRE - PHQ9: CLINICAL INTERPRETATION OF PHQ2 SCORE: 2

## 2024-02-23 ASSESSMENT — PAIN SCALES - GENERAL: PAINLEVEL: 5=MODERATE PAIN

## 2024-02-23 ASSESSMENT — FIBROSIS 4 INDEX: FIB4 SCORE: 0.69

## 2024-02-23 NOTE — Clinical Note
Darek Bryan,  I hope you're doing well! I saw Carmen today and her fibromyalgia pain is stable on Lyrica 150 mg 3 times daily.  I discussed that at this time my practice is moving away from prescribing medications when it is a stable dose that the patient is doing well with, and we are referring the patient to their PCP for ongoing refills of this medication.  I referred her to physical therapy for symptoms of lumbar radiculitis and will see her back in 3 months to see how she is doing with that.  We may consider an epidural or trigger point injections depending on her degree of pain at that time.  Thanks, Keturah

## 2024-02-23 NOTE — PROGRESS NOTES
Follow-up patient note    Physiatry (physical medicine and  Rehabilitation), interventional spine and sports medicine    Date of Service:2023      Chief complaint:   Chief Complaint   Patient presents with    Follow-Up     Fibromyalgia       HISTORY    HPI: Carmen BACA 1995 is a female who presents today for follow-up evaluation of pain complaints.  She has a diagnosis of fibromyalgia.      This is a previous patient of Dr. Crane's. Dr. Crane has left our practice and the patient will be transitioning pain management care to me. At today's visit I reviewed Dr. Crane's most recent note dated 23 and PCP note 24.    She reports in the last year she had breast reduction surgery which helped improve the pain in her mid back. She continues to have some pain in the mid thoracic back when carrying her toddler nieces and nephews. She did PT.     She reports radiating pain and painful numbness down her left leg to her lateral knee. Used to radiate to her ankle. She reports the intensity of pain has worsened. She manages this with internal rotation of her left hip when sleeping. This is her largest area of concern. Pain rates 5/10 on NRS. Has not done PT for this. Denies leg weakness.     Also reports ongoing chronic generalized pain from fibromyalgia including wrists, elbows, hips, knees, ankles, along the length of her spine. Most recently have been having more brain fog. Taking lyrica 150mg TID with improvement and no unwanted side effects.  She reports she experienced SE swelling and taking 200 mg at a time.     She reports slightly worsened creaky pain in her legs and hip flexors after a hospital stay for sepsis. She reports she has been more sedentary in recent months. Currently doing stretching.     Reports overall pain is 5/10 on the NRS.    History:  -  she was diagnosed in 2017 with fibromyalgia, in [place].    - Medications the patient has tried: lyrica, cyclobenzeprine,  amitriptyline, tried savella  -Reports some relief with trigger point injections done [facility].  Notes she has not done spinal injections    Red flags ROS:  Fever, Chills, Sweats: Denies  Involuntary Weight Loss: Denies  Bladder Incontinence: Denies  Bowel Incontinence: Denies  Saddle Anesthesia: Denies    All other systems reviewed and negative.       PMHx:   Past Medical History:   Diagnosis Date    Anxiety     Asthma     Depression     Fibromyalgia 2017    Thalassemia minor        PSHx:   History reviewed. No pertinent surgical history.    Family history   Family History   Problem Relation Age of Onset    Fibromyalgia Mother     Lung Disease Mother         asthma r/o    Depression Mother     Anxiety disorder Mother     ADD / ADHD Father     Depression Father          Medications:   Current Outpatient Medications   Medication    pregabalin (LYRICA) 150 MG Cap    cyanocobalamin (VITAMIN B-12) 100 MCG Tab    fluticasone-salmeterol (ADVAIR) 100-50 MCG/ACT AEROSOL POWDER, BREATH ACTIVATED    Norethindrone Acet-Ethinyl Est (BELL 1.5/30) 1.5-30 MG-MCG Tab    sertraline (ZOLOFT) 100 MG Tab    Ascorbic Acid (VITAMIN C) 1000 MG Tab    vitamin D3 (CHOLECALCIFEROL) 1000 Unit (25 mcg) Tab    Calcium-Magnesium-Zinc (IRMA-MAG-ZINC PO)    fluticasone (FLONASE) 50 MCG/ACT nasal spray    albuterol 108 (90 Base) MCG/ACT Aero Soln inhalation aerosol    propranolol LA (INDERAL LA) 60 MG CAPSULE SR 24 HR    tizanidine (ZANAFLEX) 4 MG Tab    ALPRAZolam (XANAX) 0.25 MG Tab     No current facility-administered medications for this visit.       Allergies:   Allergies   Allergen Reactions    Corn-Related Products Hives, Itching, Nausea and Myalgia    Dairy Food Allergy Diarrhea, Nausea and Myalgia          Gluten Meal Hives, Shortness of Breath, Diarrhea, Rash, Runny Nose, Itching, Vomiting, Nausea, Cough and Myalgia          Sesame Seed (Diagnostic) Hives, Rash and Itching     rash    Soy Allergy Hives, Itching, Nausea and  "Myalgia       Social Hx:   Social History     Socioeconomic History    Marital status: Single     Spouse name: Not on file    Number of children: Not on file    Years of education: Not on file    Highest education level: Not on file   Occupational History    Not on file   Tobacco Use    Smoking status: Never    Smokeless tobacco: Never   Vaping Use    Vaping Use: Never used   Substance and Sexual Activity    Alcohol use: Yes     Comment: approx 1 drink per week    Drug use: Not Currently     Types: Marijuana     Comment: rarely    Sexual activity: Yes     Partners: Male     Birth control/protection: Pill, Condom   Other Topics Concern     Service No    Blood Transfusions No    Caffeine Concern No    Occupational Exposure No    Hobby Hazards No    Sleep Concern Yes    Stress Concern Yes    Weight Concern Yes    Special Diet Yes    Back Care No    Exercise No    Bike Helmet Yes    Seat Belt Yes    Self-Exams Yes   Social History Narrative    Not on file     Social Determinants of Health     Financial Resource Strain: Not on file   Food Insecurity: Not on file   Transportation Needs: Not on file   Physical Activity: Not on file   Stress: Not on file   Social Connections: Not on file   Intimate Partner Violence: Not on file   Housing Stability: Not on file         EXAMINATION     Physical Exam:   Vitals: /80 (BP Location: Right arm, Patient Position: Sitting, BP Cuff Size: Adult)   Pulse 77   Temp 36.2 °C (97.1 °F) (Temporal)   Ht 1.778 m (5' 10\")   Wt 121 kg (266 lb 1.5 oz)   SpO2 94%     Constitutional:   Body Habitus: Body mass index is 38.18 kg/m².  Cooperation: Fully cooperates with exam  Appearance: Well-groomed, well-nourished, not disheveled, in no acute distress    Eyes: No scleral icterus, no proptosis     ENT -no obvious auditory deficits, wearing a face mask    Skin -no rashes or lesions noted     Respiratory-  breathing comfortable on room air, no audible wheezing     Cardiovascular- no " "lower extremity edema is noted.     Psychiatric- alert and oriented ×3. Normal affect.     Gait - normal gait, no use of ambulatory device.      Musculoskeletal -     Thoracic/Lumbar Spine/Sacral Spine/Hips   Inspection: No evidence of atrophy in bilateral lower extremities throughout     Tenderness to palpation over thoracic paraspinals and lumbar paraspinals.  Decreased ROM of the lumbar spine.  Pain increases with flexion.    No focal motor or sensory deficits in the lower extremities    Reflexes are 2+ patella and achilles bilaterally      MEDICAL DECISION MAKING    Medical records review: see under HPI section.     DATA    Labs:   Personally reviewed at today's visit:     Lab Results   Component Value Date/Time    SODIUM 139 01/13/2024 02:22 AM    POTASSIUM 3.7 01/13/2024 02:22 AM    CHLORIDE 105 01/13/2024 02:22 AM    CO2 24 01/13/2024 02:22 AM    ANION 10.0 01/13/2024 02:22 AM    GLUCOSE 136 (H) 01/13/2024 02:22 AM    BUN 5 (L) 01/13/2024 02:22 AM    CREATININE 0.59 01/13/2024 02:22 AM    CALCIUM 8.4 (L) 01/13/2024 02:22 AM    ASTSGOT 30 01/11/2024 06:48 PM    ALTSGPT 22 01/11/2024 06:48 PM    TBILIRUBIN 0.5 01/11/2024 06:48 PM    ALBUMIN 3.9 01/11/2024 06:48 PM    TOTPROTEIN 8.0 01/11/2024 06:48 PM    GLOBULIN 4.1 (H) 01/11/2024 06:48 PM    AGRATIO 1.0 01/11/2024 06:48 PM       No results found for: \"PROTHROMBTM\", \"INR\"     Lab Results   Component Value Date/Time    WBC 9.3 01/13/2024 02:22 AM    RBC 5.16 01/13/2024 02:22 AM    HEMOGLOBIN 9.9 (L) 01/13/2024 02:22 AM    HEMATOCRIT 31.3 (L) 01/13/2024 02:22 AM    MCV 60.7 (L) 01/13/2024 02:22 AM    MCH 19.2 (L) 01/13/2024 02:22 AM    MCHC 31.6 (L) 01/13/2024 02:22 AM    MPV 10.5 01/13/2024 02:22 AM    NEUTSPOLYS 79.80 (H) 01/12/2024 10:23 AM    LYMPHOCYTES 13.40 (L) 01/12/2024 10:23 AM    MONOCYTES 5.50 01/12/2024 10:23 AM    EOSINOPHILS 0.20 01/12/2024 10:23 AM    BASOPHILS 0.60 01/12/2024 10:23 AM    HYPOCHROMIA 1+ 07/13/2021 08:29 AM    ANISOCYTOSIS 2+ (A) " 07/13/2021 08:29 AM        Lab Results   Component Value Date/Time    HBA1C 5.5 03/10/2020 05:06 PM        Imaging: I personally reviewed following images, these are my reads  MRI lumbar spine 03/21/2023  There is note of a disc herniation at T12-L1 that does not cause cord compression  At L1-2, no central or foraminal stenosis  At L2-3, no central or foraminal stenosis  At L3-4, no central or foraminal stenosis  At L4-5, disc bulge with mild facet arthropathy, no central or foraminal stenosis  At L5-S1, no central or foraminal stenosis      Xray thoracic spine 10/11/2022  There is mild levoconvex curvature    Xray lumbar spine July 8. 2022  There is note of levoscoliosis.  Spondylolisthesis at L5-S1 measures 6 mm in extension and 8 mm in flexion.  Suspicious for possible pars defects.    Xray cervical spine 08/12/2021  There is reversal of cervical lordosis  No significant degenerative changes    Xray thoracic 07/31/2020  There is mild curvative of the thoracic spine, convex left.  [place] at T3-4    MRI cervical spine on 05/22/2020  There is not of mild loss of cervical lordosis.  No significant degenerative changes, no cervical disc herniations, no central or foraminal stenosis in the cervical spine.     IMAGING radiology reads. I reviewed the following radiology reads   MRI lumbar spine 03/21/2023  T12-L1: Small left paracentral disc protrusion that minimally encroaches upon the spinal canal without cord compression.  L1-2: Normal.  L2-3: Normal.  L3-4: Normal.  L4-5: Broad-based disc bulge and bilateral mild facet degeneration without canal stenosis or nerve impingement.  L5-S1: Normal.     The prevertebral soft tissues are within normal limits.     IMPRESSION:     Small left paracentral protrusion at T12-L1 that minimally encroaches upon the spinal canal. There is no significant canal stenosis or foraminal narrowing in the lumbar spine.    Xray thoracic spine 10/11/2022  IMPRESSION:     1.  There is a mild 7  degree levoconvex curvature of the upper thoracic spine.    Xray lumbar spine 2022  IMPRESSION:  1.  Anterior subluxation at L5-S1 which measures 6 mm with extension and 8 mm with flexion. There are possible bilateral pars defects at that level.  2.  Convex left scoliotic curvature.      Xray cervical spine 2021  Impression: No acute fracture is identified    MRI cervical spine on 2020  Unremarkable pre and postcontrast MR examination of the cervical spine except for loss of cervical lordosis              Xray thoracic 2020        IMPRESSION:   1.  No acute findings.  2.  Mild spinal curvature, convex left, in the upper thoracic spine                                                                                                                                                         Diagnosis   Visit Diagnoses     ICD-10-CM   1. Fibromyalgia  M79.7   2. Depression, unspecified depression type  F32.A   3. Lumbar radiculopathy  M54.16   4. Low back pain with radiation  M54.50           ASSESSMENT:  Carmen Hebert  1995 is a female seen for above     Carmen was seen today for follow-up.    Diagnoses and all orders for this visit:    Fibromyalgia    Depression, unspecified depression type    Lumbar radiculopathy    Low back pain with radiation        Discussed that she has been stable on lyrica 150mg po tid. This medicine is working well for her.  At this time I will refer her back to her PCP for ongoing refills of this medication. CC Randi FOURNIER  Referral to physical therapy for low back radicular symptoms and thoracic pain.  She reports she has not done physical therapy.  Discussed possible element of chemical radiculitis, can consider epidural if she continues to have significant pain after PT  Could consider trial of trigger point injections.  She reports she received these in the past with another provider and had improvement  Reviewed MRI lumbar spine today  3/21/23 with patient    Follow-up: 3 months to assess progress with PT    Thank you very much for asking me to participate in Carmen Alvaradoadolfo Hebert's care.  Please contact me with any questions or concerns.      Please note that this dictation was created using voice recognition software. I have made every reasonable attempt to correct obvious errors but there may be errors of grammar and content that I may have overlooked prior to finalization of this note.    Keturah Madrigal MD  Interventional Pain and Spine  Physical Medicine and Rehabilitation  [facility]

## 2024-03-09 ENCOUNTER — HOSPITAL ENCOUNTER (OUTPATIENT)
Dept: PULMONOLOGY | Facility: MEDICAL CENTER | Age: 29
End: 2024-03-09
Attending: NURSE PRACTITIONER
Payer: COMMERCIAL

## 2024-03-09 DIAGNOSIS — J45.21 MILD INTERMITTENT ASTHMA WITH ACUTE EXACERBATION: ICD-10-CM

## 2024-03-09 PROCEDURE — 94726 PLETHYSMOGRAPHY LUNG VOLUMES: CPT

## 2024-03-09 PROCEDURE — 94060 EVALUATION OF WHEEZING: CPT | Mod: 26 | Performed by: INTERNAL MEDICINE

## 2024-03-09 PROCEDURE — 94060 EVALUATION OF WHEEZING: CPT

## 2024-03-09 PROCEDURE — 94729 DIFFUSING CAPACITY: CPT | Mod: 26 | Performed by: INTERNAL MEDICINE

## 2024-03-09 PROCEDURE — 94726 PLETHYSMOGRAPHY LUNG VOLUMES: CPT | Mod: 26 | Performed by: INTERNAL MEDICINE

## 2024-03-09 PROCEDURE — 94729 DIFFUSING CAPACITY: CPT

## 2024-03-09 RX ADMIN — Medication 2.5 MG: at 14:33

## 2024-03-09 ASSESSMENT — PULMONARY FUNCTION TESTS
FEV1_LLN: 3.01
FEV1_PERCENT_PREDICTED: 103
FVC_PREDICTED: 4.27
FEV1_PERCENT_PREDICTED: 103
FEV1: 3.72
FEV1_LLN: 3.01
FEV1/FVC: 88
FVC_PERCENT_PREDICTED: 101
FEV1/FVC_PERCENT_CHANGE: 0
FEV1/FVC_PERCENT_PREDICTED: 101
FEV1_PREDICTED: 3.6
FEV1/FVC: 86
FEV1: 3.72
FEV1/FVC_PERCENT_PREDICTED: 102
FVC_LLN: 3.57
FEV1/FVC_PERCENT_LLN: 71
FEV1/FVC_PERCENT_PREDICTED: 105
FEV1/FVC_PREDICTED: 85
FEV1/FVC: 88.36
FVC: 4.21
FEV1/FVC_PERCENT_PREDICTED: 104
FEV1/FVC: 86
FEV1/FVC_PERCENT_CHANGE: 2
FVC_LLN: 3.57
FEV1_PERCENT_CHANGE: -2
FEV1/FVC_PERCENT_PREDICTED: 84
FEV1_PERCENT_CHANGE: 0
FEV1/FVC_PERCENT_LLN: 71
FVC: 4.33
FVC_PERCENT_PREDICTED: 98

## 2024-03-11 NOTE — PROCEDURES
DATE OF SERVICE:  03/09/2024     PULMONARY FUNCTION TEST INTERPRETATION     INTERPRETING PHYSICIAN:  Kayleigh Diane M.D.     REASON FOR STUDY:  Mild intermittent asthma with acute exacerbation.     RESULTS:  SPIROMETRY:  FVC is 4.33 liters, which is 101% predicted without a significant   change postbronchodilator.     FEV1 is 3.72 liters, which is 103% predicted without a significant change   postbronchodilator.     FEV1/FVC is 88.     LUNG VOLUMES:  TLC is 5.68 liters, which is 95% predicted.     RV is 1.45 liters, which is 85% predicted.     DIFFUSION CAPACITY:  DLCO is 30.97, which is 121% predicted.     DL/VA is 5.78, which is 135% predicted.     INTERPRETATION:  1.  Spirometry is normal.  2.  There is no significant change postbronchodilator.  3.  The flow volume loop is consistent with the spirometry data.  4.  Lung volumes are normal.  5.  Diffusion capacity is normal.  6.  There are no prior studies for comparison.        ______________________________  Kayleigh Diane MD    NEELAM/SUB/TMA    DD:  03/10/2024 19:10  DT:  03/10/2024 19:18    Job#:  [id number]

## 2024-03-18 ENCOUNTER — OFFICE VISIT (OUTPATIENT)
Dept: URGENT CARE | Facility: CLINIC | Age: 29
End: 2024-03-18
Payer: COMMERCIAL

## 2024-03-18 VITALS
HEIGHT: 70 IN | BODY MASS INDEX: 37.37 KG/M2 | SYSTOLIC BLOOD PRESSURE: 114 MMHG | TEMPERATURE: 98 F | RESPIRATION RATE: 16 BRPM | DIASTOLIC BLOOD PRESSURE: 70 MMHG | HEART RATE: 92 BPM | OXYGEN SATURATION: 98 % | WEIGHT: 261 LBS

## 2024-03-18 DIAGNOSIS — J00 ACUTE NASOPHARYNGITIS: ICD-10-CM

## 2024-03-18 LAB — S PYO DNA SPEC NAA+PROBE: NOT DETECTED

## 2024-03-18 PROCEDURE — 3074F SYST BP LT 130 MM HG: CPT | Performed by: PHYSICIAN ASSISTANT

## 2024-03-18 PROCEDURE — 3078F DIAST BP <80 MM HG: CPT | Performed by: PHYSICIAN ASSISTANT

## 2024-03-18 PROCEDURE — 87651 STREP A DNA AMP PROBE: CPT | Performed by: PHYSICIAN ASSISTANT

## 2024-03-18 PROCEDURE — 99213 OFFICE O/P EST LOW 20 MIN: CPT | Performed by: PHYSICIAN ASSISTANT

## 2024-03-18 RX ORDER — PREDNISONE 10 MG/1
30 TABLET ORAL DAILY
Qty: 15 TABLET | Refills: 0 | Status: SHIPPED | OUTPATIENT
Start: 2024-03-18 | End: 2024-03-23

## 2024-03-18 RX ORDER — FLUTICASONE PROPIONATE 50 MCG
1 SPRAY, SUSPENSION (ML) NASAL DAILY
Qty: 16 G | Refills: 0 | Status: SHIPPED | OUTPATIENT
Start: 2024-03-18

## 2024-03-18 ASSESSMENT — FIBROSIS 4 INDEX: FIB4 SCORE: 0.69

## 2024-03-18 ASSESSMENT — ENCOUNTER SYMPTOMS
CHILLS: 0
EYE DISCHARGE: 0
SORE THROAT: 1
COUGH: 0
ABDOMINAL PAIN: 0
VOMITING: 0
DIARRHEA: 0
HEADACHES: 0
NAUSEA: 0
SHORTNESS OF BREATH: 0
DIZZINESS: 0
CONSTIPATION: 0
SINUS PAIN: 0
EYE PAIN: 0
FEVER: 1
DIAPHORESIS: 0
EYE REDNESS: 0
WHEEZING: 0

## 2024-03-19 NOTE — PROGRESS NOTES
"  Subjective:     Carmen Hebert  is a 29 y.o. female who presents for Nasal Congestion (Sinus pressure, pt states upper throat and gland pain, BL otalgia, x 3 days )       She presents today with nasal congestion, sinus pain and pressure, sore throat, bilateral ear pains ongoing last 3 days.  Denies recent close sick contacts.  Has been experiencing low-grade fever, Tmax of 98.7.  No chest pain or shortness breath, no nausea vomit, no abdominal pain, no diarrhea.  Has been using over-the-counter medications for symptom support. Please recall patient was admitted to the hospital on 1/11/2024 for multifocal pneumonia and sepsis.         Review of Systems   Constitutional:  Positive for fever. Negative for chills, diaphoresis and malaise/fatigue.   HENT:  Positive for congestion, ear pain and sore throat. Negative for ear discharge and sinus pain.    Eyes:  Negative for pain, discharge and redness.   Respiratory:  Negative for cough, shortness of breath and wheezing.    Cardiovascular:  Negative for chest pain.   Gastrointestinal:  Negative for abdominal pain, constipation, diarrhea, nausea and vomiting.   Neurological:  Negative for dizziness and headaches.      Allergies   Allergen Reactions    Corn-Related Products Hives, Itching, Nausea and Myalgia    Dairy Food Allergy Diarrhea, Nausea and Myalgia          Gluten Meal Hives, Shortness of Breath, Diarrhea, Rash, Runny Nose, Itching, Vomiting, Nausea, Cough and Myalgia          Sesame Seed (Diagnostic) Hives, Rash and Itching     rash    Soy Allergy Hives, Itching, Nausea and Myalgia     Past Medical History:   Diagnosis Date    Anxiety     Asthma     Depression     Fibromyalgia 2017    Thalassemia minor         Objective:   /70   Pulse 92   Temp 36.7 °C (98 °F)   Resp 16   Ht 1.778 m (5' 10\")   Wt 118 kg (261 lb)   SpO2 98%   BMI 37.45 kg/m²   Physical Exam  Vitals and nursing note reviewed.   Constitutional:       General: She is not in " acute distress.     Appearance: Normal appearance. She is not ill-appearing, toxic-appearing or diaphoretic.   HENT:      Head: Normocephalic.      Right Ear: Tympanic membrane, ear canal and external ear normal. There is no impacted cerumen.      Left Ear: Tympanic membrane, ear canal and external ear normal. There is no impacted cerumen.      Nose: Congestion present. No rhinorrhea.      Mouth/Throat:      Mouth: Mucous membranes are moist.      Pharynx: Posterior oropharyngeal erythema present. No oropharyngeal exudate.      Comments: No tonsillar swelling, bilaterally.  No soft tissue swelling of the sublingual mucosa, no swelling of the soft or hard palate, no unilarteral oral pharynx swelling, no uvular deviation.  Eyes:      General:         Right eye: No discharge.         Left eye: No discharge.      Conjunctiva/sclera: Conjunctivae normal.   Cardiovascular:      Rate and Rhythm: Normal rate and regular rhythm.   Pulmonary:      Effort: Pulmonary effort is normal. No respiratory distress.      Breath sounds: Normal breath sounds. No stridor. No wheezing or rhonchi.   Musculoskeletal:      Cervical back: Neck supple.   Lymphadenopathy:      Cervical: No cervical adenopathy.   Neurological:      General: No focal deficit present.      Mental Status: She is alert and oriented to person, place, and time.   Psychiatric:         Mood and Affect: Mood normal.         Behavior: Behavior normal.         Thought Content: Thought content normal.         Judgment: Judgment normal.             Diagnostic testing:    Daryn Strep -negative, notified via [employer] message    Assessment/Plan:     Encounter Diagnoses   Name Primary?    Acute nasopharyngitis       Plan for care for today's complaint includes starting the patient on On Flonase and prednisone for her viral URI symptoms.  Strep test was negative.  Patient was well-appearing and vital signs are stable.  Continue to monitor symptoms and return to urgent care or  follow-up with primary care provider if symptoms remain ongoing.  Follow-up in the emergency department if symptoms become severe, ER precautions discussed in office today..  Prescription for Flonase, prednisone provided.    See AVS Instructions below for written guidance provided to patient on after-visit management and care in addition to our verbal discussion during the visit.    Please note that this dictation was created using voice recognition software. I have attempted to correct all errors, but there may be sound-alike, spelling, grammar and possibly content errors that I did not discover before finalizing the note.    Shahid Mendez PA-C

## 2024-04-16 DIAGNOSIS — Z79.899 CONTROLLED SUBSTANCE AGREEMENT SIGNED: ICD-10-CM

## 2024-04-16 DIAGNOSIS — M79.7 FIBROMYALGIA: ICD-10-CM

## 2024-04-16 RX ORDER — PREGABALIN 150 MG/1
CAPSULE ORAL
COMMUNITY
Start: 2024-03-14 | End: 2024-04-18 | Stop reason: SDUPTHER

## 2024-04-16 RX ORDER — PREGABALIN 150 MG/1
150 CAPSULE ORAL DAILY
Qty: 30 CAPSULE | Refills: 0 | Status: SHIPPED | OUTPATIENT
Start: 2024-04-16 | End: 2024-05-16

## 2024-04-16 NOTE — TELEPHONE ENCOUNTER
Received request via: Patient    Was the patient seen in the last year in this department? Yes    Does the patient have an active prescription (recently filled or refills available) for medication(s) requested? No    Pharmacy Name: [facility]    Does the patient have [facility] and need 100 day supply (blood pressure, diabetes and cholesterol meds only)? Patient does not have SCP

## 2024-04-17 ENCOUNTER — PATIENT MESSAGE (OUTPATIENT)
Dept: MEDICAL GROUP | Facility: MEDICAL CENTER | Age: 29
End: 2024-04-17
Payer: COMMERCIAL

## 2024-04-18 DIAGNOSIS — M79.7 FIBROMYALGIA: ICD-10-CM

## 2024-04-18 RX ORDER — PREGABALIN 150 MG/1
CAPSULE ORAL
Qty: 30 CAPSULE | Refills: 0 | Status: SHIPPED | OUTPATIENT
Start: 2024-04-18 | End: 2024-04-25 | Stop reason: SDUPTHER

## 2024-04-18 NOTE — TELEPHONE ENCOUNTER
Re order for proper medication dosage/fill.    Per pt: I take 150mg 3x daily (was filled at 1x daily).

## 2024-04-25 DIAGNOSIS — M79.7 FIBROMYALGIA: ICD-10-CM

## 2024-04-25 RX ORDER — PREGABALIN 150 MG/1
CAPSULE ORAL
Qty: 90 CAPSULE | Refills: 0 | Status: SHIPPED | OUTPATIENT
Start: 2024-04-25 | End: 2024-04-30 | Stop reason: SDUPTHER

## 2024-04-30 DIAGNOSIS — Z79.899 CONTROLLED SUBSTANCE AGREEMENT SIGNED: ICD-10-CM

## 2024-04-30 DIAGNOSIS — M79.7 FIBROMYALGIA: ICD-10-CM

## 2024-04-30 RX ORDER — PREGABALIN 150 MG/1
150 CAPSULE ORAL
Qty: 90 CAPSULE | Refills: 0 | Status: SHIPPED | OUTPATIENT
Start: 2024-04-30 | End: 2024-07-29

## 2024-05-09 ENCOUNTER — OFFICE VISIT (OUTPATIENT)
Dept: MEDICAL GROUP | Facility: MEDICAL CENTER | Age: 29
End: 2024-05-09
Payer: COMMERCIAL

## 2024-05-09 ENCOUNTER — HOSPITAL ENCOUNTER (OUTPATIENT)
Dept: LAB | Facility: MEDICAL CENTER | Age: 29
End: 2024-05-09
Attending: NURSE PRACTITIONER
Payer: COMMERCIAL

## 2024-05-09 VITALS
HEIGHT: 70 IN | SYSTOLIC BLOOD PRESSURE: 104 MMHG | HEART RATE: 94 BPM | TEMPERATURE: 97.4 F | DIASTOLIC BLOOD PRESSURE: 64 MMHG | BODY MASS INDEX: 37.94 KG/M2 | WEIGHT: 265 LBS | RESPIRATION RATE: 18 BRPM | OXYGEN SATURATION: 94 %

## 2024-05-09 DIAGNOSIS — Z00.00 ANNUAL PHYSICAL EXAM: ICD-10-CM

## 2024-05-09 DIAGNOSIS — H81.13 BPPV (BENIGN PAROXYSMAL POSITIONAL VERTIGO), BILATERAL: ICD-10-CM

## 2024-05-09 DIAGNOSIS — E66.9 OBESITY (BMI 30-39.9): ICD-10-CM

## 2024-05-09 DIAGNOSIS — M79.7 FIBROMYALGIA: ICD-10-CM

## 2024-05-09 LAB
25(OH)D3 SERPL-MCNC: 49 NG/ML (ref 30–100)
ALBUMIN SERPL BCP-MCNC: 4.3 G/DL (ref 3.2–4.9)
ALBUMIN/GLOB SERPL: 1.3 G/DL
ALP SERPL-CCNC: 94 U/L (ref 30–99)
ALT SERPL-CCNC: 17 U/L (ref 2–50)
ANION GAP SERPL CALC-SCNC: 9 MMOL/L (ref 7–16)
AST SERPL-CCNC: 17 U/L (ref 12–45)
BILIRUB SERPL-MCNC: 0.2 MG/DL (ref 0.1–1.5)
BUN SERPL-MCNC: 9 MG/DL (ref 8–22)
CALCIUM ALBUM COR SERPL-MCNC: 9.2 MG/DL (ref 8.5–10.5)
CALCIUM SERPL-MCNC: 9.4 MG/DL (ref 8.5–10.5)
CHLORIDE SERPL-SCNC: 103 MMOL/L (ref 96–112)
CHOLEST SERPL-MCNC: 136 MG/DL (ref 100–199)
CO2 SERPL-SCNC: 25 MMOL/L (ref 20–33)
CREAT SERPL-MCNC: 0.64 MG/DL (ref 0.5–1.4)
ERYTHROCYTE [DISTWIDTH] IN BLOOD BY AUTOMATED COUNT: 36 FL (ref 35.9–50)
EST. AVERAGE GLUCOSE BLD GHB EST-MCNC: 108 MG/DL
FERRITIN SERPL-MCNC: 45.7 NG/ML (ref 10–291)
GFR SERPLBLD CREATININE-BSD FMLA CKD-EPI: 121 ML/MIN/1.73 M 2
GLOBULIN SER CALC-MCNC: 3.3 G/DL (ref 1.9–3.5)
GLUCOSE SERPL-MCNC: 95 MG/DL (ref 65–99)
HBA1C MFR BLD: 5.4 % (ref 4–5.6)
HCT VFR BLD AUTO: 38.2 % (ref 37–47)
HDLC SERPL-MCNC: 44 MG/DL
HGB BLD-MCNC: 11.7 G/DL (ref 12–16)
IRON SATN MFR SERPL: 16 % (ref 15–55)
IRON SERPL-MCNC: 54 UG/DL (ref 40–170)
LDLC SERPL CALC-MCNC: 82 MG/DL
MCH RBC QN AUTO: 18.8 PG (ref 27–33)
MCHC RBC AUTO-ENTMCNC: 30.6 G/DL (ref 32.2–35.5)
MCV RBC AUTO: 61.5 FL (ref 81.4–97.8)
PLATELET # BLD AUTO: 430 K/UL (ref 164–446)
PMV BLD AUTO: 11.5 FL (ref 9–12.9)
POTASSIUM SERPL-SCNC: 4.5 MMOL/L (ref 3.6–5.5)
PROT SERPL-MCNC: 7.6 G/DL (ref 6–8.2)
RBC # BLD AUTO: 6.21 M/UL (ref 4.2–5.4)
SODIUM SERPL-SCNC: 137 MMOL/L (ref 135–145)
T3FREE SERPL-MCNC: 3.04 PG/ML (ref 2–4.4)
T4 FREE SERPL-MCNC: 1.07 NG/DL (ref 0.93–1.7)
TIBC SERPL-MCNC: 339 UG/DL (ref 250–450)
TRIGL SERPL-MCNC: 49 MG/DL (ref 0–149)
TSH SERPL DL<=0.005 MIU/L-ACNC: 2.6 UIU/ML (ref 0.38–5.33)
UIBC SERPL-MCNC: 285 UG/DL (ref 110–370)
VIT B12 SERPL-MCNC: 609 PG/ML (ref 211–911)
WBC # BLD AUTO: 9.6 K/UL (ref 4.8–10.8)

## 2024-05-09 PROCEDURE — 3078F DIAST BP <80 MM HG: CPT | Performed by: NURSE PRACTITIONER

## 2024-05-09 PROCEDURE — 99214 OFFICE O/P EST MOD 30 MIN: CPT | Performed by: NURSE PRACTITIONER

## 2024-05-09 PROCEDURE — 3074F SYST BP LT 130 MM HG: CPT | Performed by: NURSE PRACTITIONER

## 2024-05-09 RX ORDER — MECLIZINE HCL 12.5 MG/1
12.5 TABLET ORAL 3 TIMES DAILY PRN
Qty: 90 TABLET | Refills: 0 | Status: SHIPPED | OUTPATIENT
Start: 2024-05-09

## 2024-05-09 RX ORDER — PREGABALIN 150 MG/1
150 CAPSULE ORAL
Qty: 90 CAPSULE | Refills: 1 | Status: SHIPPED | OUTPATIENT
Start: 2024-05-29 | End: 2024-06-28

## 2024-05-09 ASSESSMENT — ENCOUNTER SYMPTOMS
DIZZINESS: 1
PALPITATIONS: 0
FEVER: 0
CHILLS: 0

## 2024-05-09 ASSESSMENT — FIBROSIS 4 INDEX: FIB4 SCORE: 0.69

## 2024-05-09 NOTE — PROGRESS NOTES
Patient agreed to use of MARCIA: yes  Chief Complaint   Patient presents with    Asthma       HISTORY OF PRESENT ILLNESS: Patient is a pleasant 29 y.o. female, established patient who presents today to discuss medical problems as listed below:    Assessment/Plan:    Carmen was seen today for asthma.    Diagnoses and all orders for this visit:    Fibromyalgia  -     pregabalin (LYRICA) 150 MG Cap; Take 1 Capsule by mouth 3 times a day for 30 days.    BPPV (benign paroxysmal positional vertigo), bilateral  -     Referral to Physical Therapy  -     meclizine (ANTIVERT) 12.5 MG Tab; Take 1 Tablet by mouth 3 times a day as needed for Dizziness.    Obesity (BMI 30-39.9)    Annual physical exam  -     CBC WITHOUT DIFFERENTIAL; Future  -     Comp Metabolic Panel; Future  -     FREE THYROXINE; Future  -     Lipid Profile; Future  -     HEMOGLOBIN A1C; Future  -     T3 FREE; Future  -     TSH; Future  -     VITAMIN D,25 HYDROXY (DEFICIENCY); Future  -     IRON/TOTAL IRON BIND; Future  -     FERRITIN; Future  -     VITAMIN B12; Future              Assessment & Plan  1. Fibromyalgia.  The patient's condition is chronic and stable, which is effectively managed with Lyrica 150 mg thrice daily. The most recent note from Dr. Madai Baltazar, a physiatrist, indicates that the patient's condition is well-managed at the current dosage, which can be managed by the primary care provider. A controlled substance agreement and urine drug screen were obtained on 02/13/2025, and the patient has demonstrated compliance with the regimen. The PDMP has been verified. The last refill was administered on 04/30/2025, which will be continued for an additional 2 months, totaling 3 months. The controlled substance agreement was reviewed with the patient, and the medication will be refilled every 3 months.    2. Benign paroxysmal positional vertigo (BPPV).  The patient's condition is chronic, intermittent, and currently flaring up. The condition is currently  stable. The patient will commence a trial of meclizine. A PT evaluation and vestibular therapy are appreciated, and a follow-up will be scheduled during the next visit.    3. Obesity.  The patient's condition is a chronic and stable problem. The patient is scheduled to obtain comprehensive labs, a referral to the [facility] today, and a follow-up in 2 to 3 weeks.    History of Present Illness  The patient is a pleasant 29-year-old female who is here for weight follow-up and vertigo.    The patient is seeking a referral to a periodontist due to gingival issues.    The patient is under the care of Dr. Madai Baltazar, who is monitoring her condition. Her next appointment is scheduled for the following week. She is currently on Lyrica 150 mg, administered thrice daily, to manage her fibromyalgia. She expresses difficulty in refilling her medication due to a previous week-long hiatus from her previous prescription.    The patient's vertigo has been a chronic issue, albeit less frequent over the past few years. The vertigo is bilateral, a condition that began during her college years prior to her diagnosis of fibromyalgia. Although her condition has improved since starting medication for fibromyalgia, she continues to experience dizziness. She is scheduled to commence physical therapy at the end of this month to address her vertigo.    The patient's weight issues have been a persistent issue. She has experienced weight gain as a side effect of her medications, which has resulted in her struggling to lose weight. She is currently at her heaviest weight and has expressed interest in weight loss medication. She also reports numerous food allergies.    Health Maintenance:  COMPLETED     Allergies, past medical history, past surgical history, family history, social history reviewed and updated.    Review of Systems   Constitutional:  Negative for chills, fever and malaise/fatigue.   Cardiovascular:  Negative for chest pain,  "palpitations and leg swelling.   Neurological:  Positive for dizziness.        Exam:    /64 (BP Location: Left arm, Patient Position: Sitting, BP Cuff Size: Large adult)   Pulse 94   Temp 36.3 °C (97.4 °F) (Temporal)   Resp 18   Ht 1.778 m (5' 10\")   Wt 120 kg (265 lb)   SpO2 94%   BMI 38.02 kg/m²  Body mass index is 38.02 kg/m².    Physical Exam  Constitutional:       General: She is not in acute distress.     Appearance: She is not ill-appearing.   HENT:      Head: Normocephalic and atraumatic.      Nose: Nose normal.   Eyes:      General:         Right eye: No discharge.         Left eye: No discharge.   Cardiovascular:      Rate and Rhythm: Normal rate.      Pulses: Normal pulses.      Heart sounds: Normal heart sounds. No murmur heard.  Pulmonary:      Effort: Pulmonary effort is normal. No respiratory distress.      Breath sounds: Normal breath sounds. No stridor. No wheezing or rales.   Skin:     Findings: No rash.   Neurological:      General: No focal deficit present.      Mental Status: She is alert. Mental status is at baseline.   Psychiatric:         Mood and Affect: Mood normal.         Behavior: Behavior normal.          Results         Discussed with patient possible alternative diagnoses, patient is to take all medications as prescribed.      If symptoms persist FU w/PCP, if symptoms worsen go to emergency room.      If experiencing any side effects from prescribed medications report to the office immediately or go to emergency room.     Reviewed indication, dosage, usage and potential adverse effects of prescribed medications.      Reviewed risks and benefits of treatment plan. Patient verbalizes understanding of all instruction and verbally agrees to plan.     Discussed plan with the patient, and patient agrees to the above.      I personally reviewed prior external notes and test results pertinent to today's visit.      No follow-ups on file. 3 wks  "

## 2024-05-20 ENCOUNTER — TELEPHONE (OUTPATIENT)
Dept: PHYSICAL THERAPY | Facility: REHABILITATION | Age: 29
End: 2024-05-20
Payer: COMMERCIAL

## 2024-05-20 NOTE — OP THERAPY EVALUATION
Outpatient Physical Therapy  INITIAL EVALUATION    [facility] [address]  [address]  [place] [address]  Phone:  [phone]  Fax:  [phone]    Date of Evaluation: 05/21/2024    Patient: Carmen Hebert  YOB: 1995  MRN: [id number]     Referring Provider: Keturah Madrigal M.D.  [address]  [place] [address]   Referring Diagnosis Fibromyalgia [M79.7];Lumbar radiculopathy [M54.16];Low back pain with radiation [M54.50];Bulge of lumbar disc without myelopathy [M51.36];Spondylolisthesis of lumbosacral region [M43.17];Thoracic spine pain [M54.6];Lumbar spondylosis [M47.816];Muscle spasm [M62.838];Other idiopathic scoliosis, thoracic region [M41.24]     Time Calculation                 Chief Complaint: Back Problem    Visit Diagnoses     ICD-10-CM   1. Fibromyalgia  M79.7   2. Lumbar radiculopathy  M54.16   3. Low back pain with radiation  M54.50   4. Bulge of lumbar disc without myelopathy  M51.36   5. Spondylolisthesis of lumbosacral region  M43.17   6. Thoracic spine pain  M54.6   7. Lumbar spondylosis  M47.816   8. Muscle spasm  M62.838   9. Other idiopathic scoliosis, thoracic region  M41.24       Date of onset of impairment: 2/3/2024    Subjective:   History of Present Illness:     Mechanism of injury:  Pt. is a 29 Y.O. M/F who reports breast reduction in 2023 and felt great in her mid-lower back.  Then she had a hospitalization in early January lasting beginning of February (RTW then.  After that she has experienced increasing LBP /thoracic pain and LLE pain. She went to her PCP with the c/o and also has been to  chiropractor 4x in last month w/minimal results. Pt. Is an , computer-desk, cooking, laundry, heavy housework,: vacuum <=2 min.), bed sheets.     MD CRENSHAW on 02/23 reads 'She reports in the last year she had breast reduction surgery which helped improve the pain in her mid back. She continues to have some pain in  "the mid thoracic back when carrying her toddler nieces and nephews. She did PT.      She reports radiating pain and painful numbness down her left leg to her lateral knee. Used to radiate to her ankle. She reports the intensity of pain has worsened. She manages this with internal rotation of her left hip when sleeping. This is her largest area of concern. Pain rates 5/10 on NRS. Has not done PT for this. Denies leg weakness.    Also reports ongoing chronic generalized pain from fibromyalgia including wrists, elbows, hips, knees, ankles, along the length of her spine. Most recently have been having more brain fog. Taking lyrica 150mg TID with improvement and no unwanted side effects.  She reports she experienced SE swelling and taking 200 mg at a time.\"  PLOF: pain in multiple joints in her body  PMHx: significant for LBP, HNP, FM,scoliosis      Pain:     At best pain ratin    At worst pain ratin    Pain Comments::  PAIN  Location: 1. Mid thoracic , refers to: neck - posterior  2. L lat. Hip to anterolat. thigh  Descriptors: 1. Constant, dull, or IM spasm, burning  2. N.   Aggravated with bending, standing/walking settles with OOPminutes / hours / days.  Eases with massage, chiro  Progression: slight improved   Patient Goals:     Patient goals for therapy:  Decreased pain and increased motion      Past Medical History:   Diagnosis Date    Anxiety     Asthma     Depression     Fibromyalgia 2017    Thalassemia minor      No past surgical history on file.  Social History     Tobacco Use    Smoking status: Never    Smokeless tobacco: Never   Substance Use Topics    Alcohol use: Yes     Comment: approx 1 drink per week     Family and Occupational History     Socioeconomic History    Marital status: Single     Spouse name: Not on file    Number of children: Not on file    Years of education: Not on file    Highest education level: Not on file   Occupational History    Not on file       Objective     Observations "   Central spine     Positive for thoracic kyphosis.    Additional Observation Details  Fwd head posture, upon straightening sx reduce.  Scoliosis L upper T/S    Neurological Testing     Reflexes   Left   Patellar (L4): normal (2+)  Achilles (S1): normal (2+)    Right   Patellar (L4): normal (2+)  Achilles (S1): trace (1+)    Active Range of Motion     Lumbar   Flexion: decreased (60)  Extension: decreased (10)  Left lateral flexion: within functional limits  Right lateral flexion: within functional limits  Left rotation: within functional limits  Right rotation: decreased (75%)    Additional Active Range of Motion Details  T/S  Ext: Mid-lower painful, upper S+P+    Strength:      Abdominals   Left: 4  Right: 4  Lower abdominals: Able to maintain neutral statically    Lower extremities   Normal left lower extremity strength  Normal right lower extremity strength    Left Ankle/Foot   Plantar flexion: 4+        Therapeutic Treatments and Modalities:     1. Self Care ADL Training (CPT 14441), walking program daily, get steps in, tood for CV health    Time-based treatments/modalities:    Physical Therapy Timed Treatment Charges  Functional training, self care minutes (CPT 74119): 8 minutes      Assessment, Response and Plan:   Assessment details:  Pt. Is a 29 y.o. M/F who presents with above impairments and S: mod I: u/a to assess N: l/s instability, S1 NRI S: chronic. Pt. Is appropriate for skilled PT intervention. Pt. was educated in anatomical, pathoanatomical considerations of PT diagosis and treatment options to address it.  Pt. Also educated in self care management and compliance with PT visits/HEP to improve functional limitations and reach goals.     Barriers to therapy:  None  Prognosis: good    Goals:   Short Term Goals:   Increase AROM of L/S F to 50,  E to 25 degrees, respectively ( in accordance w/ AAOS)  Establish a HEP/self care techniques to be able to lower pain range on own  Short term goal time span:   2-4 weeks      Long Term Goals:    Lower score on the Oswestry by 8 points  Pt. Able to walk for > or = 30 minutes for improved cardio-respiratory health  Pt. I w/ a HEP for maintenance going forward after his PT visits.     Long term goal time span:  4-6 weeks    Plan:   Therapy options:  Physical therapy treatment to continue  Planned therapy interventions:  Self Care ADL Training (CPT 24266), Therapeutic Activities (CPT 81017), Neuromuscular Re-education (CPT 63190), Manual Therapy (CPT 83799), E Stim Unattended (CPT 00514) and Therapeutic Exercise (CPT 13059)  Frequency:  2x week  Duration in visits:  10  Discussed with:  Patient      Functional Assessment Used  Oswestry Low Back Pain Disability Total Score: 40     Referring provider co-signature:  I have reviewed this plan of care and my co-signature certifies the need for services.    Certification Period: 05/21/2024 to  07/16/24    Physician Signature: ________________________________ Date: ______________

## 2024-05-21 ENCOUNTER — PHYSICAL THERAPY (OUTPATIENT)
Dept: PHYSICAL THERAPY | Facility: REHABILITATION | Age: 29
End: 2024-05-21
Attending: STUDENT IN AN ORGANIZED HEALTH CARE EDUCATION/TRAINING PROGRAM
Payer: COMMERCIAL

## 2024-05-21 DIAGNOSIS — M79.7 FIBROMYALGIA: ICD-10-CM

## 2024-05-21 DIAGNOSIS — M41.24 OTHER IDIOPATHIC SCOLIOSIS, THORACIC REGION: ICD-10-CM

## 2024-05-21 DIAGNOSIS — M43.17 SPONDYLOLISTHESIS OF LUMBOSACRAL REGION: ICD-10-CM

## 2024-05-21 DIAGNOSIS — M54.16 LUMBAR RADICULOPATHY: ICD-10-CM

## 2024-05-21 DIAGNOSIS — M47.816 LUMBAR SPONDYLOSIS: ICD-10-CM

## 2024-05-21 DIAGNOSIS — M62.838 MUSCLE SPASM: ICD-10-CM

## 2024-05-21 DIAGNOSIS — M54.50 LOW BACK PAIN WITH RADIATION: ICD-10-CM

## 2024-05-21 DIAGNOSIS — M54.6 THORACIC SPINE PAIN: ICD-10-CM

## 2024-05-21 DIAGNOSIS — M51.36 BULGE OF LUMBAR DISC WITHOUT MYELOPATHY: ICD-10-CM

## 2024-05-21 ASSESSMENT — ENCOUNTER SYMPTOMS
PAIN SCALE AT LOWEST: 6
PAIN SCALE AT HIGHEST: 7

## 2024-05-24 ENCOUNTER — APPOINTMENT (OUTPATIENT)
Dept: PHYSICAL MEDICINE AND REHAB | Facility: MEDICAL CENTER | Age: 29
End: 2024-05-24
Payer: COMMERCIAL

## 2024-05-29 ENCOUNTER — PHYSICAL THERAPY (OUTPATIENT)
Dept: PHYSICAL THERAPY | Facility: REHABILITATION | Age: 29
End: 2024-05-29
Attending: STUDENT IN AN ORGANIZED HEALTH CARE EDUCATION/TRAINING PROGRAM
Payer: COMMERCIAL

## 2024-05-29 DIAGNOSIS — M54.6 THORACIC SPINE PAIN: ICD-10-CM

## 2024-05-29 NOTE — OP THERAPY DAILY TREATMENT
"  Outpatient Physical Therapy  DAILY TREATMENT     [facility] [address]  [address]  [place] [address]  Phone:  [phone]  Fax:  [phone]    Date: 05/29/2024    Patient: Carmen Hebert  YOB: 1995  MRN: [id number]     Time Calculation                   Chief Complaint: No chief complaint on file.    Visit #: 11    SUBJECTIVE:  N/t L leg unchanged mis shoulder blade pain worse with stabbing and n/t  R>L--saw chiropractor yes with temporary relief but worse today--patient reports  releif with roller but does not     OBJECTIVE:  Seated torax rotation  L:45  R: 35--erp          Therapeutic Treatments and Modalities:     Therapeutic Treatment and Modalities Summary: Mwms to t4-7 rib ipsilateral and contralateral  Prone maxi  Segmental STM, IASTM multifidi t4-8  Segmental blocking with with active t/s extension with prone angelst-4-8// seated rotation R: 40 L 45--no pain  Seated ELDOA// muscles are sore--hep  ELDOA with finger slide up wall --h/o hep  Seated self t/s mobs with arm on ball--mwmw to bilateral rib 3-8// no pain  Educated  \"PNE\" push to, not through pain\" important to move when hurting for no apparent reason\"   discussed \"hurt vs harm,\" \" pain does not mean damage.\" House alarm analogy\"  Tape t2-t-12 with Jeff tape for BFB    //\"tight  seated rot R: 45 no pain L: 50 deg no pain    Time-based treatments/modalities:         Pain rating (1-10) before treatment:  5  Pain rating (1-10) after treatment:  more tense than pain\"    ASSESSMENT:   Patient presents with motor control deficits of t/s with poor posture awareness and limited posterior chain endurance.  Noted increased bilateral thorax rotation after treatment    PLAN/RECOMMENDATIONS:    .iastm, mwms, SFMA thorax rotation progression, Elmer and wall ELDOA progression, tape for posture cue as indicated         "

## 2024-05-30 ENCOUNTER — TELEMEDICINE (OUTPATIENT)
Dept: MEDICAL GROUP | Facility: MEDICAL CENTER | Age: 29
End: 2024-05-30
Payer: COMMERCIAL

## 2024-05-30 DIAGNOSIS — E66.9 OBESITY (BMI 30-39.9): ICD-10-CM

## 2024-05-30 DIAGNOSIS — N89.8 VAGINAL DISCHARGE: ICD-10-CM

## 2024-05-30 DIAGNOSIS — D56.3 THALASSEMIA MINOR: ICD-10-CM

## 2024-05-30 NOTE — ASSESSMENT & PLAN NOTE
Latest Reference Range & Units 05/09/24 08:41   RBC 4.20 - 5.40 M/uL 6.21 (H)   Hemoglobin 12.0 - 16.0 g/dL 11.7 (L)   Hematocrit 37.0 - 47.0 % 38.2   MCV 81.4 - 97.8 fL 61.5 (L)   MCH 27.0 - 33.0 pg 18.8 (L)   MCHC 32.2 - 35.5 g/dL 30.6 (L)   (H): Data is abnormally high  (L): Data is abnormally low

## 2024-05-30 NOTE — PROGRESS NOTES
Follow-up patient note    Physiatry (physical medicine and  Rehabilitation), interventional spine and sports medicine    Date of Service:2023      Chief complaint:   No chief complaint on file.      HISTORY    HPI: Carmen Hebert  1995 is a female who presents today for follow-up evaluation of pain complaints.  She has a diagnosis of fibromyalgia.      Presents today to assess progress with PT      This is a previous patient of Dr. Crane's. Dr. Crane has left our practice and the patient will be transitioning pain management care to me. At today's visit I reviewed Dr. Crane's most recent note dated 23 and PCP note 24.    She reports in the last year she had breast reduction surgery which helped improve the pain in her mid back. She continues to have some pain in the mid thoracic back when carrying her toddler nieces and nephews. She did PT.     She reports radiating pain and painful numbness down her left leg to her lateral knee. Used to radiate to her ankle. She reports the intensity of pain has worsened. She manages this with internal rotation of her left hip when sleeping. This is her largest area of concern. Pain rates 5/10 on NRS. Has not done PT for this. Denies leg weakness.     Also reports ongoing chronic generalized pain from fibromyalgia including wrists, elbows, hips, knees, ankles, along the length of her spine. Most recently have been having more brain fog. Taking lyrica 150mg TID with improvement and no unwanted side effects.  She reports she experienced SE swelling and taking 200 mg at a time.     She reports slightly worsened creaky pain in her legs and hip flexors after a hospital stay for sepsis. She reports she has been more sedentary in recent months. Currently doing stretching.     Reports overall pain is 5/10 on the NRS.    History:  -  she was diagnosed in 2017 with fibromyalgia, in [facility].    - Medications the patient has tried: lyrica,  cyclobenzeprine, amitriptyline, tried savella  -Reports some relief with trigger point injections done [facility].  Notes she has not done spinal injections    Red flags ROS:  Fever, Chills, Sweats: Denies  Involuntary Weight Loss: Denies  Bladder Incontinence: Denies  Bowel Incontinence: Denies  Saddle Anesthesia: Denies    All other systems reviewed and negative.       PMHx:   Past Medical History:   Diagnosis Date    Anxiety     Asthma     Depression     Fibromyalgia 2017    Thalassemia minor        PSHx:   No past surgical history on file.    Family history   Family History   Problem Relation Age of Onset    Fibromyalgia Mother     Lung Disease Mother         asthma r/o    Depression Mother     Anxiety disorder Mother     ADD / ADHD Father     Depression Father          Medications:   Current Outpatient Medications   Medication    pregabalin (LYRICA) 150 MG Cap    meclizine (ANTIVERT) 12.5 MG Tab    fluticasone (FLONASE) 50 MCG/ACT nasal spray    cyanocobalamin (VITAMIN B-12) 100 MCG Tab    fluticasone-salmeterol (ADVAIR) 100-50 MCG/ACT AEROSOL POWDER, BREATH ACTIVATED    Norethindrone Acet-Ethinyl Est (BELL 1.5/30) 1.5-30 MG-MCG Tab    sertraline (ZOLOFT) 100 MG Tab    Ascorbic Acid (VITAMIN C) 1000 MG Tab    vitamin D3 (CHOLECALCIFEROL) 1000 Unit (25 mcg) Tab    Calcium-Magnesium-Zinc (IRMA-MAG-ZINC PO)    fluticasone (FLONASE) 50 MCG/ACT nasal spray    albuterol 108 (90 Base) MCG/ACT Aero Soln inhalation aerosol    propranolol LA (INDERAL LA) 60 MG CAPSULE SR 24 HR    tizanidine (ZANAFLEX) 4 MG Tab    ALPRAZolam (XANAX) 0.25 MG Tab     No current facility-administered medications for this visit.       Allergies:   Allergies   Allergen Reactions    Corn-Related Products Hives, Itching, Nausea and Myalgia    Dairy Food Allergy Diarrhea, Nausea and Myalgia          Gluten Meal Hives, Shortness of Breath, Diarrhea, Rash, Runny Nose, Itching, Vomiting, Nausea, Cough and Myalgia          Sesame Seed  (Diagnostic) Hives, Rash and Itching     rash    Soy Allergy Hives, Itching, Nausea and Myalgia       Social Hx:   Social History     Socioeconomic History    Marital status: Single     Spouse name: Not on file    Number of children: Not on file    Years of education: Not on file    Highest education level: Not on file   Occupational History    Not on file   Tobacco Use    Smoking status: Never    Smokeless tobacco: Never   Vaping Use    Vaping status: Never Used   Substance and Sexual Activity    Alcohol use: Yes     Comment: approx 1 drink per week    Drug use: Not Currently     Types: Marijuana     Comment: rarely    Sexual activity: Yes     Partners: Male     Birth control/protection: Pill, Condom   Other Topics Concern     Service No    Blood Transfusions No    Caffeine Concern No    Occupational Exposure No    Hobby Hazards No    Sleep Concern Yes    Stress Concern Yes    Weight Concern Yes    Special Diet Yes    Back Care No    Exercise No    Bike Helmet Yes    Seat Belt Yes    Self-Exams Yes   Social History Narrative    Not on file     Social Determinants of Health     Financial Resource Strain: Not on file   Food Insecurity: Not on file   Transportation Needs: Not on file   Physical Activity: Not on file   Stress: Not on file   Social Connections: Not on file   Intimate Partner Violence: Not on file   Housing Stability: Not on file         EXAMINATION     Physical Exam:   Vitals: There were no vitals taken for this visit.    Constitutional:   Body Habitus: There is no height or weight on file to calculate BMI.  Cooperation: Fully cooperates with exam  Appearance: Well-groomed, well-nourished, not disheveled, in no acute distress    Eyes: No scleral icterus, no proptosis     ENT -no obvious auditory deficits, wearing a face mask    Skin -no rashes or lesions noted     Respiratory-  breathing comfortable on room air, no audible wheezing     Cardiovascular- no lower extremity edema is noted.  "    Psychiatric- alert and oriented ×3. Normal affect.     Gait - normal gait, no use of ambulatory device.      Musculoskeletal -     Thoracic/Lumbar Spine/Sacral Spine/Hips   Inspection: No evidence of atrophy in bilateral lower extremities throughout     Tenderness to palpation over thoracic paraspinals and lumbar paraspinals.  Decreased ROM of the lumbar spine.  Pain increases with flexion.    No focal motor or sensory deficits in the lower extremities    Reflexes are 2+ patella and achilles bilaterally      MEDICAL DECISION MAKING    Medical records review: see under HPI section.     DATA    Labs:   Personally reviewed at today's visit:     Lab Results   Component Value Date/Time    SODIUM 137 05/09/2024 08:41 AM    POTASSIUM 4.5 05/09/2024 08:41 AM    CHLORIDE 103 05/09/2024 08:41 AM    CO2 25 05/09/2024 08:41 AM    ANION 9.0 05/09/2024 08:41 AM    GLUCOSE 95 05/09/2024 08:41 AM    BUN 9 05/09/2024 08:41 AM    CREATININE 0.64 05/09/2024 08:41 AM    CALCIUM 9.4 05/09/2024 08:41 AM    ASTSGOT 17 05/09/2024 08:41 AM    ALTSGPT 17 05/09/2024 08:41 AM    TBILIRUBIN 0.2 05/09/2024 08:41 AM    ALBUMIN 4.3 05/09/2024 08:41 AM    TOTPROTEIN 7.6 05/09/2024 08:41 AM    GLOBULIN 3.3 05/09/2024 08:41 AM    AGRATIO 1.3 05/09/2024 08:41 AM       No results found for: \"PROTHROMBTM\", \"INR\"     Lab Results   Component Value Date/Time    WBC 9.6 05/09/2024 08:41 AM    RBC 6.21 (H) 05/09/2024 08:41 AM    HEMOGLOBIN 11.7 (L) 05/09/2024 08:41 AM    HEMATOCRIT 38.2 05/09/2024 08:41 AM    MCV 61.5 (L) 05/09/2024 08:41 AM    MCH 18.8 (L) 05/09/2024 08:41 AM    MCHC 30.6 (L) 05/09/2024 08:41 AM    MPV 11.5 05/09/2024 08:41 AM    NEUTSPOLYS 79.80 (H) 01/12/2024 10:23 AM    LYMPHOCYTES 13.40 (L) 01/12/2024 10:23 AM    MONOCYTES 5.50 01/12/2024 10:23 AM    EOSINOPHILS 0.20 01/12/2024 10:23 AM    BASOPHILS 0.60 01/12/2024 10:23 AM    HYPOCHROMIA 1+ 07/13/2021 08:29 AM    ANISOCYTOSIS 2+ (A) 07/13/2021 08:29 AM        Lab Results "   Component Value Date/Time    HBA1C 5.4 05/09/2024 08:41 AM        Imaging: I personally reviewed following images, these are my reads  MRI lumbar spine 03/21/2023  There is note of a disc herniation at T12-L1 that does not cause cord compression  At L1-2, no central or foraminal stenosis  At L2-3, no central or foraminal stenosis  At L3-4, no central or foraminal stenosis  At L4-5, disc bulge with mild facet arthropathy, no central or foraminal stenosis  At L5-S1, no central or foraminal stenosis      Xray thoracic spine 10/11/2022  There is mild levoconvex curvature    Xray lumbar spine July 8. 2022  There is note of levoscoliosis.  Spondylolisthesis at L5-S1 measures 6 mm in extension and 8 mm in flexion.  Suspicious for possible pars defects.    Xray cervical spine 08/12/2021  There is reversal of cervical lordosis  No significant degenerative changes    Xray thoracic 07/31/2020  There is mild curvative of the thoracic spine, convex left.  Sargeant at T3-4    MRI cervical spine on 05/22/2020  There is not of mild loss of cervical lordosis.  No significant degenerative changes, no cervical disc herniations, no central or foraminal stenosis in the cervical spine.     IMAGING radiology reads. I reviewed the following radiology reads   MRI lumbar spine 03/21/2023  T12-L1: Small left paracentral disc protrusion that minimally encroaches upon the spinal canal without cord compression.  L1-2: Normal.  L2-3: Normal.  L3-4: Normal.  L4-5: Broad-based disc bulge and bilateral mild facet degeneration without canal stenosis or nerve impingement.  L5-S1: Normal.     The prevertebral soft tissues are within normal limits.     IMPRESSION:     Small left paracentral protrusion at T12-L1 that minimally encroaches upon the spinal canal. There is no significant canal stenosis or foraminal narrowing in the lumbar spine.    Xray thoracic spine 10/11/2022  IMPRESSION:     1.  There is a mild 7 degree levoconvex curvature of the upper  thoracic spine.    Xray lumbar spine 2022  IMPRESSION:  1.  Anterior subluxation at L5-S1 which measures 6 mm with extension and 8 mm with flexion. There are possible bilateral pars defects at that level.  2.  Convex left scoliotic curvature.      Xray cervical spine 2021  Impression: No acute fracture is identified    MRI cervical spine on 2020  Unremarkable pre and postcontrast MR examination of the cervical spine except for loss of cervical lordosis              Xray thoracic 2020        IMPRESSION:   1.  No acute findings.  2.  Mild spinal curvature, convex left, in the upper thoracic spine                                                                                                                                                         Diagnosis   {No diagnosis found. (Refresh or delete this SmartLink)}          ASSESSMENT:  Carmen Hebert  1995 is a female seen for above     There are no diagnoses linked to this encounter.      Discussed that she has been stable on lyrica 150mg po tid. This medicine is working well for her.  At this time I will refer her back to her PCP for ongoing refills of this medication. CC Randi Vazquez APRN  Referral to physical therapy for low back radicular symptoms and thoracic pain.  She reports she has not done physical therapy.  Discussed possible element of chemical radiculitis, can consider epidural if she continues to have significant pain after PT  Could consider trial of trigger point injections.  She reports she received these in the past with another provider and had improvement  Reviewed MRI lumbar spine today 3/21/23 with patient    Follow-up: 3 months to assess progress with PT    Thank you very much for asking me to participate in Carmen Hebert's care.  Please contact me with any questions or concerns.      Please note that this dictation was created using voice recognition software. I have made every  reasonable attempt to correct obvious errors but there may be errors of grammar and content that I may have overlooked prior to finalization of this note.    Keturah Madrigal MD  Interventional Pain and Spine  Physical Medicine and Rehabilitation  [facility]

## 2024-05-30 NOTE — PROGRESS NOTES
Virtual Visit: Established Patient   This visit was conducted via Zoom using secure and encrypted videoconferencing technology.   The patient was in their home in the [facility].    The patient's identity was confirmed and verbal consent was obtained for this virtual visit.   This evaluation was conducted via Zoom using secure and encrypted videoconferencing technology. The patient was in their home in the [facility].    The patient's identity was confirmed and verbal consent was obtained for this virtual visit.     Patient agreed to using MARCIA: yes    Diagnoses and all orders for this visit:    Thalassemia minor  -     Referral to Hematology Oncology    Obesity (BMI 30-39.9)    Vaginal discharge  -     VAGINAL PATHOGENS DNA PANEL; Future  -     Cancel: URINALYSIS; Future  -     URINE CULTURE(NEW); Future  -     URINALYSIS; Future              Assessment & Plan  1. Thal ? juju.  This is a chronic and stable condition. A referral to hematology has been made.    2. Obesity.  This condition is a chronic and stable condition. Her current weight is 265 pounds. Her current goal is to be below 200 pounds. She has been maintaining a healthy diet. Considering her history of anxiety, which is currently well-managed, a low dose of phentermine will be considered. Additionally, a GLP-1 agonist will be considered. She will obtain this information from her insurance for coverage and cost. This will be discussed during her next visit. A referral to the dietary clinic was also made.    3. Vaginal discharge.  DNA pathogens, urinalysis, and urine culture will be obtained, and treatment will be initiated upon availability of the results.    Subjective:   CC: No chief complaint on file.      History of Present Illness  The patient is a pleasant 29-year-old female who is here presenting via Zoom.    The patient expresses a desire to discuss her laboratory results and the potential benefits of weight loss medication. She has been  grappling with weight management, with her current weight being approximately 265 pounds. Her target weight is set to be below 200 pounds. She has not previously attempted any weight loss medications. She reports experiencing weight gain as a side effect of certain medications.    The patient suspects a yeast infection, characterized by discharge and cramping. She also reports a crusty and goopy discharge in her umbilical region, accompanied by mild vaginal itching. She sought treatment at an urgent care facility a few years ago, where she was prescribed an oral medication, which provided relief. She was also diagnosed with a urinary tract infection (UTI). She denies any dysuria.    The patient's anemia has shown slight improvement. She is not currently taking any iron supplements due to her thal ? juju diagnosis. She has previously consulted a hematologist.   Her mother has thal ? juju.       ROS     Current medicines (including changes today)  Current Outpatient Medications   Medication Sig Dispense Refill    pregabalin (LYRICA) 150 MG Cap Take 1 Capsule by mouth 3 times a day for 30 days. 90 Capsule 1    meclizine (ANTIVERT) 12.5 MG Tab Take 1 Tablet by mouth 3 times a day as needed for Dizziness. 90 Tablet 0    fluticasone (FLONASE) 50 MCG/ACT nasal spray Administer 1 Spray into affected nostril(S) every day. 16 g 0    cyanocobalamin (VITAMIN B-12) 100 MCG Tab Take 100 mcg by mouth every day.      fluticasone-salmeterol (ADVAIR) 100-50 MCG/ACT AEROSOL POWDER, BREATH ACTIVATED Inhale 1 Puff every 12 hours. 1 Each 1    Norethindrone Acet-Ethinyl Est (BELL 1.5/30) 1.5-30 MG-MCG Tab TAKE 1 TABLET BY MOUTH DAILY. TAKE CONTINUOUSLY NO, PLACEBO WEEK 84 Tablet 3    sertraline (ZOLOFT) 100 MG Tab Take 2 Tablets by mouth every day. 200 mg = 2 tablets 90 Tablet 1    Ascorbic Acid (VITAMIN C) 1000 MG Tab Take 1,000 mg by mouth every day.      vitamin D3 (CHOLECALCIFEROL) 1000 Unit (25 mcg) Tab Take 1,000 Units by mouth every  day.      Calcium-Magnesium-Zinc (IRMA-MAG-ZINC PO) Take 1 Tablet by mouth every day.      fluticasone (FLONASE) 50 MCG/ACT nasal spray Administer 2 Sprays into affected nostril(S) every day. 16 g 11    albuterol 108 (90 Base) MCG/ACT Aero Soln inhalation aerosol Inhale 2 Puffs every four hours as needed for Shortness of Breath. 1 Each 11    propranolol LA (INDERAL LA) 60 MG CAPSULE SR 24 HR Take 1 Capsule by mouth every day. Please call to schedule follow up appointment for further refills. Please call [phone]. Thank you. 100 Capsule 1    tizanidine (ZANAFLEX) 4 MG Tab Take 4 mg by mouth every 6 hours as needed.      ALPRAZolam (XANAX) 0.25 MG Tab Take 0.25 mg by mouth at bedtime as needed for Sleep.       No current facility-administered medications for this visit.        Objective:   There were no vitals taken for this visit.    Physical Exam:  Constitutional: Alert, no distress, well-groomed.  Skin: No rashes in visible areas.  Eye: Round. Conjunctiva clear, lids normal. No icterus.   ENMT: Lips pink without lesions, good dentition, moist mucous membranes. Phonation normal.  Neck: No masses, no thyromegaly. Moves freely without pain.  Respiratory: Unlabored respiratory effort, no cough or audible wheeze  Psych: Alert and oriented x3, normal affect and mood.     Results  Laboratory Studies  Anemia has improved. Iron levels are perfect. Cholesterol and thyroid levels are good.       Discussed with patient possible alternative diagnoses, patient is to take all medications as prescribed.      If symptoms persist FU w/PCP, if symptoms worsen go to emergency room.      If experiencing any side effects from prescribed medications report to the office immediately or go to emergency room.     Reviewed indication, dosage, usage and potential adverse effects of prescribed medications.      Reviewed risks and benefits of treatment plan. Patient verbalizes understanding of all instruction and verbally agrees to plan.      Discussed plan with the patient, and patient agrees to the above.      I personally reviewed prior external notes and test results pertinent to today's visit.     No follow-ups on file. Next wk

## 2024-05-31 ENCOUNTER — APPOINTMENT (OUTPATIENT)
Dept: PHYSICAL MEDICINE AND REHAB | Facility: MEDICAL CENTER | Age: 29
End: 2024-05-31
Payer: COMMERCIAL

## 2024-06-03 ENCOUNTER — HOSPITAL ENCOUNTER (OUTPATIENT)
Dept: LAB | Facility: MEDICAL CENTER | Age: 29
End: 2024-06-03
Attending: NURSE PRACTITIONER
Payer: COMMERCIAL

## 2024-06-03 DIAGNOSIS — D56.9 THALASSEMIA, UNSPECIFIED TYPE: ICD-10-CM

## 2024-06-03 DIAGNOSIS — N89.8 VAGINAL DISCHARGE: ICD-10-CM

## 2024-06-03 LAB
APPEARANCE UR: ABNORMAL
BACTERIA #/AREA URNS HPF: NEGATIVE /HPF
BILIRUB UR QL STRIP.AUTO: NEGATIVE
COLOR UR: YELLOW
EPI CELLS #/AREA URNS HPF: ABNORMAL /HPF
FOLATE SERPL-MCNC: 3 NG/ML
GLUCOSE UR STRIP.AUTO-MCNC: NEGATIVE MG/DL
HYALINE CASTS #/AREA URNS LPF: ABNORMAL /LPF
KETONES UR STRIP.AUTO-MCNC: NEGATIVE MG/DL
LEUKOCYTE ESTERASE UR QL STRIP.AUTO: ABNORMAL
MICRO URNS: ABNORMAL
NITRITE UR QL STRIP.AUTO: NEGATIVE
PH UR STRIP.AUTO: 6 [PH] (ref 5–8)
PROT UR QL STRIP: NEGATIVE MG/DL
RBC # URNS HPF: ABNORMAL /HPF
RBC UR QL AUTO: NEGATIVE
SP GR UR STRIP.AUTO: 1.02
UROBILINOGEN UR STRIP.AUTO-MCNC: 0.2 MG/DL
WBC #/AREA URNS HPF: ABNORMAL /HPF

## 2024-06-03 PROCEDURE — 87086 URINE CULTURE/COLONY COUNT: CPT

## 2024-06-03 PROCEDURE — 81001 URINALYSIS AUTO W/SCOPE: CPT

## 2024-06-03 PROCEDURE — 36415 COLL VENOUS BLD VENIPUNCTURE: CPT

## 2024-06-03 PROCEDURE — 82746 ASSAY OF FOLIC ACID SERUM: CPT

## 2024-06-05 ENCOUNTER — PHYSICAL THERAPY (OUTPATIENT)
Dept: PHYSICAL THERAPY | Facility: REHABILITATION | Age: 29
End: 2024-06-05
Attending: STUDENT IN AN ORGANIZED HEALTH CARE EDUCATION/TRAINING PROGRAM
Payer: COMMERCIAL

## 2024-06-05 DIAGNOSIS — M54.16 LUMBAR RADICULOPATHY: ICD-10-CM

## 2024-06-05 DIAGNOSIS — M54.50 LOW BACK PAIN WITH RADIATION: ICD-10-CM

## 2024-06-05 LAB
BACTERIA UR CULT: NORMAL
SIGNIFICANT IND 70042: NORMAL
SITE SITE: NORMAL
SOURCE SOURCE: NORMAL

## 2024-06-05 PROCEDURE — 97110 THERAPEUTIC EXERCISES: CPT

## 2024-06-05 PROCEDURE — 97012 MECHANICAL TRACTION THERAPY: CPT

## 2024-06-05 NOTE — OP THERAPY DAILY TREATMENT
"  Outpatient Physical Therapy  DAILY TREATMENT     [facility] [address].  [address]  [place] [address]  Phone:  [phone]  Fax:  [phone]    Date: 06/05/2024    Patient: Carmen Hebert  YOB: 1995  MRN: [id number]     Time Calculation    Start time: 0850  Stop time: 0945 Time Calculation (min): 55 minutes         Chief Complaint: No chief complaint on file.    Visit #: 12    SUBJECTIVE:  Good for a few days with tape really helping--pt. Worked in garden and flared up low back    OBJECTIVE:            Therapeutic Treatments and Modalities:     Therapeutic Treatment and Modalities Summary: Reil with and without L hip flexion//centralized but did not stay  SEIL with  L hip flexion  Ball bridge x 49\"// stopped due to increased hip pain 2nd attempt 1' slight hip// resolved oop    Ball roll with bfb with focus on hip dissociation--90/90 x 1' struggled but able to control w/o increased hip pain  ELDOA with finger slide up wall --h/o hep  Mechanical traction  80/40 x 60/20 x 15' w/ mhp   Tape t4-l5 with Jeff tape for BFB        Time-based treatments/modalities:    Physical Therapy Timed Treatment Charges  Therapeutic exercise minutes (CPT 58013): 38 minutes    Pain rating (1-10) before treatment:  8/10 L sided  troch pain  Pain rating (1-10) after treatment:  more tense than pain\"    ASSESSMENT:   Centralized with deirdre progression  but cont to struggle with hip dissociation and limited post chain endurance w/ cont. Axial load sensitivities     PLAN/RECOMMENDATIONS:    .iastm, mwms, SFMA thorax rotation progression, [place] and wall ELDOA progression, tape for posture cue as indicated         "

## 2024-06-07 ENCOUNTER — HOSPITAL ENCOUNTER (OUTPATIENT)
Facility: MEDICAL CENTER | Age: 29
End: 2024-06-07
Attending: NURSE PRACTITIONER
Payer: COMMERCIAL

## 2024-06-07 ENCOUNTER — OFFICE VISIT (OUTPATIENT)
Dept: MEDICAL GROUP | Facility: MEDICAL CENTER | Age: 29
End: 2024-06-07
Payer: COMMERCIAL

## 2024-06-07 VITALS
WEIGHT: 267 LBS | HEIGHT: 70 IN | RESPIRATION RATE: 18 BRPM | OXYGEN SATURATION: 96 % | DIASTOLIC BLOOD PRESSURE: 64 MMHG | SYSTOLIC BLOOD PRESSURE: 122 MMHG | BODY MASS INDEX: 38.22 KG/M2 | HEART RATE: 78 BPM | TEMPERATURE: 97.8 F

## 2024-06-07 DIAGNOSIS — N30.90 CYSTITIS: ICD-10-CM

## 2024-06-07 DIAGNOSIS — N89.8 VAGINAL ITCHING: ICD-10-CM

## 2024-06-07 DIAGNOSIS — E66.9 OBESITY (BMI 30-39.9): ICD-10-CM

## 2024-06-07 DIAGNOSIS — B37.9 CANDIDIASIS: ICD-10-CM

## 2024-06-07 DIAGNOSIS — Z79.899 CONTROLLED SUBSTANCE AGREEMENT SIGNED: ICD-10-CM

## 2024-06-07 DIAGNOSIS — N89.8 VAGINAL DISCHARGE: ICD-10-CM

## 2024-06-07 LAB
APPEARANCE UR: NORMAL
BILIRUB UR STRIP-MCNC: NORMAL MG/DL
COLOR UR AUTO: NORMAL
GLUCOSE UR STRIP.AUTO-MCNC: NORMAL MG/DL
KETONES UR STRIP.AUTO-MCNC: NORMAL MG/DL
LEUKOCYTE ESTERASE UR QL STRIP.AUTO: NORMAL
NITRITE UR QL STRIP.AUTO: NORMAL
PH UR STRIP.AUTO: 7 [PH] (ref 5–8)
PROT UR QL STRIP: NORMAL MG/DL
RBC UR QL AUTO: NORMAL
SP GR UR STRIP.AUTO: 1.02
UROBILINOGEN UR STRIP-MCNC: 0.2 MG/DL

## 2024-06-07 PROCEDURE — 87510 GARDNER VAG DNA DIR PROBE: CPT

## 2024-06-07 PROCEDURE — 3078F DIAST BP <80 MM HG: CPT | Performed by: NURSE PRACTITIONER

## 2024-06-07 PROCEDURE — 87660 TRICHOMONAS VAGIN DIR PROBE: CPT

## 2024-06-07 PROCEDURE — 87480 CANDIDA DNA DIR PROBE: CPT

## 2024-06-07 PROCEDURE — 99214 OFFICE O/P EST MOD 30 MIN: CPT | Performed by: NURSE PRACTITIONER

## 2024-06-07 PROCEDURE — 81002 URINALYSIS NONAUTO W/O SCOPE: CPT | Performed by: NURSE PRACTITIONER

## 2024-06-07 PROCEDURE — 80307 DRUG TEST PRSMV CHEM ANLYZR: CPT

## 2024-06-07 PROCEDURE — 87086 URINE CULTURE/COLONY COUNT: CPT

## 2024-06-07 PROCEDURE — 3074F SYST BP LT 130 MM HG: CPT | Performed by: NURSE PRACTITIONER

## 2024-06-07 PROCEDURE — G0480 DRUG TEST DEF 1-7 CLASSES: HCPCS

## 2024-06-07 RX ORDER — NITROFURANTOIN 25; 75 MG/1; MG/1
100 CAPSULE ORAL 2 TIMES DAILY
Qty: 10 CAPSULE | Refills: 0 | Status: SHIPPED | OUTPATIENT
Start: 2024-06-07 | End: 2024-06-12

## 2024-06-07 RX ORDER — PHENTERMINE HYDROCHLORIDE 15 MG/1
15 CAPSULE ORAL EVERY MORNING
Qty: 30 CAPSULE | Refills: 0 | Status: SHIPPED | OUTPATIENT
Start: 2024-06-07 | End: 2024-07-07

## 2024-06-07 RX ORDER — FLUCONAZOLE 150 MG/1
150 TABLET ORAL
Qty: 2 TABLET | Refills: 0 | Status: SHIPPED | OUTPATIENT
Start: 2024-06-07 | End: 2024-06-15

## 2024-06-07 ASSESSMENT — PATIENT HEALTH QUESTIONNAIRE - PHQ9
6. FEELING BAD ABOUT YOURSELF - OR THAT YOU ARE A FAILURE OR HAVE LET YOURSELF OR YOUR FAMILY DOWN: NOT AL ALL
9. THOUGHTS THAT YOU WOULD BE BETTER OFF DEAD, OR OF HURTING YOURSELF: NOT AT ALL
8. MOVING OR SPEAKING SO SLOWLY THAT OTHER PEOPLE COULD HAVE NOTICED. OR THE OPPOSITE, BEING SO FIGETY OR RESTLESS THAT YOU HAVE BEEN MOVING AROUND A LOT MORE THAN USUAL: NOT AT ALL
3. TROUBLE FALLING OR STAYING ASLEEP OR SLEEPING TOO MUCH: NOT AT ALL
SUM OF ALL RESPONSES TO PHQ QUESTIONS 1-9: 0
5. POOR APPETITE OR OVEREATING: NOT AT ALL
2. FEELING DOWN, DEPRESSED, IRRITABLE, OR HOPELESS: NOT AT ALL
7. TROUBLE CONCENTRATING ON THINGS, SUCH AS READING THE NEWSPAPER OR WATCHING TELEVISION: NOT AT ALL
4. FEELING TIRED OR HAVING LITTLE ENERGY: NOT AT ALL
1. LITTLE INTEREST OR PLEASURE IN DOING THINGS: NOT AT ALL
SUM OF ALL RESPONSES TO PHQ9 QUESTIONS 1 AND 2: 0

## 2024-06-07 ASSESSMENT — ENCOUNTER SYMPTOMS
CHILLS: 0
FEVER: 0
PALPITATIONS: 0

## 2024-06-07 ASSESSMENT — FIBROSIS 4 INDEX: FIB4 SCORE: 0.3

## 2024-06-08 NOTE — PROGRESS NOTES
Patient agreed to use of MARCIA: yes  No chief complaint on file.      HISTORY OF PRESENT ILLNESS: Patient is a pleasant 29 y.o. female, established patient who presents today to discuss medical problems as listed below:    Assessment/Plan:    There are no diagnoses linked to this encounter.          Assessment & Plan  1. Obesity.  This is a chronic and stable condition. The potential benefits of prescription medication for weight loss, including a trial of phentermine, were discussed. The controlled substance agreement was thoroughly reviewed and signed with the patient. A urine drug screen will be obtained.    2. Vaginal discharge.  3. Vulvovaginal itching.    4. Vaginal candidiasis.  Chronic stable problem DNA pathogens can not be obtained at the lab, which will be obtained today. The clinical diagnosis of candidiasis will be treated with Diflucan.    Follow-up  A follow-up appointment is scheduled for 4 weeks from now.    History of Present Illness  The patient presents for follow-up of weight loss and UTI.    The patient underwent a urine test and blood test during her last visit, however, a swab was not performed. She continues to experience intermittent vaginal discharge accompanied by pain. The discharge is characterized by an kassandra color. She has a history of recurrent yeast infections and urinary tract infections. She denies experiencing flank pain or hematuria, but reports intermittent nausea. She has experienced spotting, but does not have a menstrual cycle. She is currently on birth control.    The patient expresses interest in weight loss medication. She has been more active recently.      Allergies, past medical history, past surgical history, family history, social history reviewed and updated.    Review of Systems   Constitutional:  Negative for chills, fever and malaise/fatigue.   Cardiovascular:  Negative for chest pain, palpitations and leg swelling.        Exam:    There were no vitals taken for  this visit. There is no height or weight on file to calculate BMI.    Physical Exam  Constitutional:       General: She is not in acute distress.     Appearance: She is not ill-appearing.   HENT:      Head: Normocephalic and atraumatic.      Nose: Nose normal.   Eyes:      General:         Right eye: No discharge.         Left eye: No discharge.   Cardiovascular:      Rate and Rhythm: Normal rate.      Pulses: Normal pulses.      Heart sounds: Normal heart sounds. No murmur heard.  Pulmonary:      Effort: Pulmonary effort is normal. No respiratory distress.      Breath sounds: Normal breath sounds. No stridor. No wheezing or rales.   Skin:     Findings: No rash.   Neurological:      General: No focal deficit present.      Mental Status: She is alert. Mental status is at baseline.   Psychiatric:         Mood and Affect: Mood normal.         Behavior: Behavior normal.        Results  Laboratory Studies  Trace leukocytes in urine.       Discussed with patient possible alternative diagnoses, patient is to take all medications as prescribed.      If symptoms persist FU w/PCP, if symptoms worsen go to emergency room.      If experiencing any side effects from prescribed medications report to the office immediately or go to emergency room.     Reviewed indication, dosage, usage and potential adverse effects of prescribed medications.      Reviewed risks and benefits of treatment plan. Patient verbalizes understanding of all instruction and verbally agrees to plan.     Discussed plan with the patient, and patient agrees to the above.      I personally reviewed prior external notes and test results pertinent to today's visit.      No follow-ups on file. 4 wks or earlier if needed

## 2024-06-09 LAB
AMPHET CTO UR CFM-MCNC: NORMAL NG/ML
BARBITURATES CTO UR CFM-MCNC: NEGATIVE NG/ML
BENZODIAZ CTO UR CFM-MCNC: NEGATIVE NG/ML
CANNABINOIDS CTO UR CFM-MCNC: NEGATIVE NG/ML
COCAINE CTO UR CFM-MCNC: NEGATIVE NG/ML
CREAT UR-MCNC: 127.7 MG/DL (ref 20–400)
DRUG COMMENT 753798: NORMAL
METHADONE CTO UR CFM-MCNC: NEGATIVE NG/ML
OPIATES CTO UR CFM-MCNC: NORMAL NG/ML
PCP CTO UR CFM-MCNC: NEGATIVE NG/ML
PROPOXYPH CTO UR CFM-MCNC: NEGATIVE NG/ML

## 2024-06-10 ENCOUNTER — PATIENT MESSAGE (OUTPATIENT)
Dept: MEDICAL GROUP | Facility: MEDICAL CENTER | Age: 29
End: 2024-06-10
Payer: COMMERCIAL

## 2024-06-10 DIAGNOSIS — F33.9 RECURRENT MAJOR DEPRESSIVE DISORDER, REMISSION STATUS UNSPECIFIED (HCC): ICD-10-CM

## 2024-06-10 LAB
BACTERIA UR CULT: NORMAL
SIGNIFICANT IND 70042: NORMAL
SITE SITE: NORMAL
SOURCE SOURCE: NORMAL

## 2024-06-11 RX ORDER — SERTRALINE HYDROCHLORIDE 100 MG/1
200 TABLET, FILM COATED ORAL DAILY
Qty: 90 TABLET | Refills: 1 | Status: SHIPPED | OUTPATIENT
Start: 2024-06-11

## 2024-06-12 LAB
6MAM UR CFM-MCNC: <10 NG/ML
AMPHET UR CFM-MCNC: <50 NG/ML
CODEINE UR CFM-MCNC: 48 NG/ML
HYDROCODONE UR CFM-MCNC: <20 NG/ML
HYDROMORPHONE UR CFM-MCNC: <20 NG/ML
MDA UR CFM-MCNC: <200 NG/ML
MDEA UR CFM-MCNC: <200 NG/ML
MDMA UR CFM-MCNC: <200 NG/ML
METHAMPHET UR CFM-MCNC: <200 NG/ML
MORPHINE UR CFM-MCNC: <20 NG/ML
NORHYDROCODONE UR CFM-MCNC: <20 NG/ML
NOROXYCODONE UR CFM-MCNC: <20 NG/ML
OPIATES UR NOROXYM Q0836: <20 NG/ML
OXYCODONE UR CFM-MCNC: <20 NG/ML
OXYMORPHONE UR CFM-MCNC: <20 NG/ML
PHENTERMINE UR CFM-MCNC: <200 NG/ML

## 2024-06-13 ENCOUNTER — PHYSICAL THERAPY (OUTPATIENT)
Dept: PHYSICAL THERAPY | Facility: REHABILITATION | Age: 29
End: 2024-06-13
Attending: STUDENT IN AN ORGANIZED HEALTH CARE EDUCATION/TRAINING PROGRAM
Payer: COMMERCIAL

## 2024-06-13 DIAGNOSIS — M43.16 SPONDYLOLISTHESIS OF LUMBAR REGION: ICD-10-CM

## 2024-06-13 DIAGNOSIS — M54.16 LUMBAR RADICULOPATHY: ICD-10-CM

## 2024-06-13 PROCEDURE — 97014 ELECTRIC STIMULATION THERAPY: CPT

## 2024-06-13 PROCEDURE — 97110 THERAPEUTIC EXERCISES: CPT

## 2024-06-13 NOTE — OP THERAPY DAILY TREATMENT
"  Outpatient Physical Therapy  DAILY TREATMENT     [facility] Physical Therapy [address]  [address]  [place] [address]  Phone:  [phone]  Fax:  [phone]    Date: 06/13/2024    Patient: Carmen Hebert  YOB: 1995  MRN: [id number]     Time Calculation    Start time: 1022  Stop time: 1125 Time Calculation (min): 63 minutes         Chief Complaint: No chief complaint on file.    Visit #: 13    SUBJECTIVE:  Woke up morning after last visit with excruciating pain and was unable to resolve pain with extension and is finally resolving over the past 3 days    OBJECTIVE:            Therapeutic Treatments and Modalities:     Therapeutic Treatment and Modalities Summary: Reil with L hip flexion with p/a l5 gd 3-4  SEIL with  L hip flexion-REIL// centralized, better 1/10, central  Reviewed sit-supine transfer  Ball bridge x 50\", 30\"--focus on slight knee bend to avoid pull behind knee  Ball bridge with hamstring curl x 5 x 25\" \" tired, no pain\"  Ball bridge alternating leg lift x 5 x 15\"--hep  Superman 2 x 55\"  Ball roll with bfb with focus on hip dissociation--90/90 x 1' struggled but able to control w/o increased hip pain  ELDOA with finger slide up wall --h/o hep  Russain 10/10 l/s with ball roll and bfb for hip dissociation  cx 15'  Car ergo and transfer in/out of car        Time-based treatments/modalities:    Physical Therapy Timed Treatment Charges  Therapeutic exercise minutes (CPT 56447): 38 minutes    Pain rating (1-10) before treatment:  3/10 L sided  lbp/glut  Pain rating (1-10) after treatment: less 2/10     ASSESSMENT:   Centralized with deirdre progression  but cont to struggle with hip dissociation and limited post chain endurance w/ cont. Axial load sensitivities  and c/o pain with transitional movements    PLAN/RECOMMENDATIONS:    .iastm, mwms, SFMA thorax rotation progression, [place] and wall ELDOA progression, tape for posture cue as indicated         "

## 2024-06-14 ENCOUNTER — APPOINTMENT (OUTPATIENT)
Dept: PHYSICAL THERAPY | Facility: REHABILITATION | Age: 29
End: 2024-06-14
Attending: STUDENT IN AN ORGANIZED HEALTH CARE EDUCATION/TRAINING PROGRAM
Payer: COMMERCIAL

## 2024-06-18 ENCOUNTER — TELEPHONE (OUTPATIENT)
Dept: MEDICAL GROUP | Facility: MEDICAL CENTER | Age: 29
End: 2024-06-18
Payer: COMMERCIAL

## 2024-06-18 ENCOUNTER — APPOINTMENT (OUTPATIENT)
Dept: PHYSICAL THERAPY | Facility: REHABILITATION | Age: 29
End: 2024-06-18
Attending: STUDENT IN AN ORGANIZED HEALTH CARE EDUCATION/TRAINING PROGRAM
Payer: COMMERCIAL

## 2024-06-18 NOTE — TELEPHONE ENCOUNTER
ANNITA ALVAREZ (Key: [id number])  [place] Case ID #: [id number]    Status  Sent to Plan today    Drug  Phentermine HCl 15MG capsules

## 2024-06-21 ENCOUNTER — APPOINTMENT (OUTPATIENT)
Dept: PHYSICAL THERAPY | Facility: REHABILITATION | Age: 29
End: 2024-06-21
Attending: STUDENT IN AN ORGANIZED HEALTH CARE EDUCATION/TRAINING PROGRAM
Payer: COMMERCIAL

## 2024-06-25 ENCOUNTER — PHYSICAL THERAPY (OUTPATIENT)
Dept: PHYSICAL THERAPY | Facility: REHABILITATION | Age: 29
End: 2024-06-25
Attending: STUDENT IN AN ORGANIZED HEALTH CARE EDUCATION/TRAINING PROGRAM
Payer: COMMERCIAL

## 2024-06-25 DIAGNOSIS — M54.16 LUMBAR RADICULOPATHY: ICD-10-CM

## 2024-06-25 PROCEDURE — 97140 MANUAL THERAPY 1/> REGIONS: CPT

## 2024-06-25 PROCEDURE — 97014 ELECTRIC STIMULATION THERAPY: CPT

## 2024-06-25 PROCEDURE — 97110 THERAPEUTIC EXERCISES: CPT

## 2024-06-25 NOTE — OP THERAPY DAILY TREATMENT
Outpatient Physical Therapy  DAILY TREATMENT     [facility] Physical Therapy [address]  [address]  [place] [address]  Phone:  [phone]  Fax:  [phone]    Date: 06/25/2024    Patient: Carmen Hebert  YOB: 1995  MRN: [id number]     Time Calculation    Start time: 0932  Stop time: 1030 Time Calculation (min): 58 minutes         Chief Complaint: No chief complaint on file.    Visit #: 14    SUBJECTIVE:  Really busy [ast week and had flare up but working on posture awareness--denied   L gluteal paijn past week    OBJECTIVE:            Therapeutic Treatments and Modalities:     Therapeutic Treatment and Modalities Summary: Reil w/ o/p t12-l3  Tall wall ELDOA gh finger slide//centralized  Wall angels// centralized STM: L t5-8  Bilateral rib  mobs /s multifidi  Rib screws gd 3-4  Stm : bilateral t/s mulitfidi  Cupping t/s and l/s l/s  ELDOA with finger slide up wall --h/o hep  Russain 10/10 l/s with ball roll and bfb for hip dissociation  cx 15'          Time-based treatments/modalities:    Physical Therapy Timed Treatment Charges  Manual therapy minutes (CPT 45011): 25 minutes  Therapeutic exercise minutes (CPT 16901): 15 minutes    Pain rating (1-10) before treatment:  3/10-central  Pain rating (1-10) after treatment: no pain    ASSESSMENT:   Centralized with deirdre progression  but cont to struggle with hip dissociation . Presented with trg point throuhgout t/s with decreased pain and increaesd mobility after treatment    PLAN/RECOMMENDATIONS:    .iastm, mwms, SFMA thorax rotation progression, [place] and wall ELDOA progression, tape for posture cue as indicated

## 2024-06-28 ENCOUNTER — APPOINTMENT (OUTPATIENT)
Dept: PHYSICAL THERAPY | Facility: REHABILITATION | Age: 29
End: 2024-06-28
Attending: STUDENT IN AN ORGANIZED HEALTH CARE EDUCATION/TRAINING PROGRAM
Payer: COMMERCIAL

## 2024-07-03 ENCOUNTER — PHYSICAL THERAPY (OUTPATIENT)
Dept: PHYSICAL THERAPY | Facility: REHABILITATION | Age: 29
End: 2024-07-03
Attending: STUDENT IN AN ORGANIZED HEALTH CARE EDUCATION/TRAINING PROGRAM
Payer: COMMERCIAL

## 2024-07-03 DIAGNOSIS — M54.16 LUMBAR RADICULOPATHY: ICD-10-CM

## 2024-07-03 PROCEDURE — 97110 THERAPEUTIC EXERCISES: CPT

## 2024-07-03 PROCEDURE — 97014 ELECTRIC STIMULATION THERAPY: CPT

## 2024-07-03 PROCEDURE — 97140 MANUAL THERAPY 1/> REGIONS: CPT

## 2024-07-08 PROCEDURE — 99283 EMERGENCY DEPT VISIT LOW MDM: CPT | Mod: EDC

## 2024-07-08 ASSESSMENT — FIBROSIS 4 INDEX: FIB4 SCORE: 0.3

## 2024-07-09 ENCOUNTER — OFFICE VISIT (OUTPATIENT)
Dept: MEDICAL GROUP | Facility: MEDICAL CENTER | Age: 29
End: 2024-07-09
Payer: COMMERCIAL

## 2024-07-09 ENCOUNTER — HOSPITAL ENCOUNTER (EMERGENCY)
Facility: MEDICAL CENTER | Age: 29
End: 2024-07-09
Attending: STUDENT IN AN ORGANIZED HEALTH CARE EDUCATION/TRAINING PROGRAM
Payer: COMMERCIAL

## 2024-07-09 ENCOUNTER — PHARMACY VISIT (OUTPATIENT)
Dept: PHARMACY | Facility: MEDICAL CENTER | Age: 29
End: 2024-07-09
Payer: COMMERCIAL

## 2024-07-09 VITALS
OXYGEN SATURATION: 95 % | TEMPERATURE: 96.4 F | BODY MASS INDEX: 37.83 KG/M2 | RESPIRATION RATE: 18 BRPM | DIASTOLIC BLOOD PRESSURE: 60 MMHG | HEIGHT: 70 IN | SYSTOLIC BLOOD PRESSURE: 116 MMHG | WEIGHT: 264.22 LBS | HEART RATE: 87 BPM

## 2024-07-09 VITALS
BODY MASS INDEX: 38.35 KG/M2 | RESPIRATION RATE: 18 BRPM | SYSTOLIC BLOOD PRESSURE: 125 MMHG | DIASTOLIC BLOOD PRESSURE: 80 MMHG | OXYGEN SATURATION: 94 % | TEMPERATURE: 97.9 F | HEART RATE: 77 BPM | HEIGHT: 70 IN | WEIGHT: 267.86 LBS

## 2024-07-09 DIAGNOSIS — Z23 NEED FOR VACCINATION: ICD-10-CM
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
## 2024-07-09 DIAGNOSIS — M79.7 FIBROMYALGIA: ICD-10-CM

## 2024-07-09 DIAGNOSIS — R00.2 PALPITATIONS: ICD-10-CM

## 2024-07-09 DIAGNOSIS — E66.9 OBESITY (BMI 30-39.9): ICD-10-CM

## 2024-07-09 PROCEDURE — RXOTC WILLOW AMBULATORY OTC CHARGE: Performed by: PHARMACIST

## 2024-07-09 PROCEDURE — 90471 IMMUNIZATION ADMIN: CPT | Performed by: NURSE PRACTITIONER

## 2024-07-09 PROCEDURE — 700102 HCHG RX REV CODE 250 W/ 637 OVERRIDE(OP): Mod: UD | Performed by: STUDENT IN AN ORGANIZED HEALTH CARE EDUCATION/TRAINING PROGRAM

## 2024-07-09 PROCEDURE — 3078F DIAST BP <80 MM HG: CPT | Performed by: NURSE PRACTITIONER

## 2024-07-09 PROCEDURE — 3074F SYST BP LT 130 MM HG: CPT | Performed by: NURSE PRACTITIONER

## 2024-07-09 PROCEDURE — 90715 TDAP VACCINE 7 YRS/> IM: CPT | Performed by: NURSE PRACTITIONER

## 2024-07-09 PROCEDURE — 99214 OFFICE O/P EST MOD 30 MIN: CPT | Mod: 25 | Performed by: NURSE PRACTITIONER

## 2024-07-09 PROCEDURE — RXMED WILLOW AMBULATORY MEDICATION CHARGE: Performed by: STUDENT IN AN ORGANIZED HEALTH CARE EDUCATION/TRAINING PROGRAM

## 2024-07-09 PROCEDURE — A9270 NON-COVERED ITEM OR SERVICE: HCPCS | Mod: UD | Performed by: STUDENT IN AN ORGANIZED HEALTH CARE EDUCATION/TRAINING PROGRAM

## 2024-07-09 PROCEDURE — 99283 EMERGENCY DEPT VISIT LOW MDM: CPT | Mod: EDC

## 2024-07-09 RX ORDER — PHENTERMINE HYDROCHLORIDE 30 MG/1
30 CAPSULE ORAL EVERY MORNING
Qty: 30 CAPSULE | Refills: 0 | Status: SHIPPED | OUTPATIENT
Start: 2024-08-07 | End: 2024-09-06

## 2024-07-09 RX ORDER — PROPRANOLOL HCL 60 MG
60 CAPSULE, EXTENDED RELEASE 24HR ORAL DAILY
Qty: 90 CAPSULE | Refills: 0 | Status: SHIPPED | OUTPATIENT
Start: 2024-07-09

## 2024-07-09 RX ORDER — PHENTERMINE HYDROCHLORIDE 30 MG/1
30 CAPSULE ORAL EVERY MORNING
Qty: 30 CAPSULE | Refills: 0 | Status: SHIPPED | OUTPATIENT
Start: 2024-07-09 | End: 2024-08-08

## 2024-07-09 RX ORDER — AMOXICILLIN AND CLAVULANATE POTASSIUM 875; 125 MG/1; MG/1
1 TABLET, FILM COATED ORAL ONCE
Status: COMPLETED | OUTPATIENT
Start: 2024-07-09 | End: 2024-07-09

## 2024-07-09 RX ORDER — PREGABALIN 150 MG/1
150 CAPSULE ORAL 2 TIMES DAILY
Qty: 30 CAPSULE | Refills: 0 | Status: SHIPPED | OUTPATIENT
Start: 2024-09-06 | End: 2024-10-06

## 2024-07-09 RX ORDER — PREGABALIN 150 MG/1
150 CAPSULE ORAL 2 TIMES DAILY
Qty: 30 CAPSULE | Refills: 0 | Status: SHIPPED | OUTPATIENT
Start: 2024-07-09 | End: 2024-08-08

## 2024-07-09 RX ORDER — PREGABALIN 150 MG/1
150 CAPSULE ORAL 2 TIMES DAILY
Qty: 30 CAPSULE | Refills: 0 | Status: SHIPPED | OUTPATIENT
Start: 2024-08-07 | End: 2024-09-06

## 2024-07-09 RX ORDER — AMOXICILLIN AND CLAVULANATE POTASSIUM 875; 125 MG/1; MG/1
1 TABLET, FILM COATED ORAL 2 TIMES DAILY
Qty: 10 TABLET | Refills: 0 | Status: ACTIVE | OUTPATIENT
Start: 2024-07-09 | End: 2024-07-14

## 2024-07-09 RX ORDER — PREGABALIN 150 MG/1
150 CAPSULE ORAL 2 TIMES DAILY
COMMUNITY
Start: 2024-07-02 | End: 2024-07-09 | Stop reason: SDUPTHER

## 2024-07-09 RX ADMIN — AMOXICILLIN AND CLAVULANATE POTASSIUM 1 TABLET: 875; 125 TABLET, FILM COATED ORAL at 00:54

## 2024-07-09 ASSESSMENT — ENCOUNTER SYMPTOMS
PALPITATIONS: 0
FEVER: 0
CHILLS: 0

## 2024-07-09 ASSESSMENT — FIBROSIS 4 INDEX: FIB4 SCORE: 0.3

## 2024-07-17 ENCOUNTER — PHYSICAL THERAPY (OUTPATIENT)
Dept: PHYSICAL THERAPY | Facility: REHABILITATION | Age: 29
End: 2024-07-17
Attending: STUDENT IN AN ORGANIZED HEALTH CARE EDUCATION/TRAINING PROGRAM
Payer: COMMERCIAL

## 2024-07-17 DIAGNOSIS — M54.16 LUMBAR RADICULOPATHY: ICD-10-CM

## 2024-07-17 DIAGNOSIS — M54.6 THORACIC SPINE PAIN: ICD-10-CM

## 2024-07-17 PROCEDURE — 97110 THERAPEUTIC EXERCISES: CPT

## 2024-07-17 PROCEDURE — 97014 ELECTRIC STIMULATION THERAPY: CPT

## 2024-07-17 PROCEDURE — 97140 MANUAL THERAPY 1/> REGIONS: CPT

## 2024-07-23 ENCOUNTER — PHYSICAL THERAPY (OUTPATIENT)
Dept: PHYSICAL THERAPY | Facility: REHABILITATION | Age: 29
End: 2024-07-23
Attending: STUDENT IN AN ORGANIZED HEALTH CARE EDUCATION/TRAINING PROGRAM
Payer: COMMERCIAL

## 2024-07-23 DIAGNOSIS — M54.16 LUMBAR RADICULOPATHY: ICD-10-CM

## 2024-07-23 PROCEDURE — 97110 THERAPEUTIC EXERCISES: CPT

## 2024-07-26 ENCOUNTER — PHYSICAL THERAPY (OUTPATIENT)
Dept: PHYSICAL THERAPY | Facility: REHABILITATION | Age: 29
End: 2024-07-26
Attending: STUDENT IN AN ORGANIZED HEALTH CARE EDUCATION/TRAINING PROGRAM
Payer: COMMERCIAL

## 2024-07-26 DIAGNOSIS — M54.50 LOW BACK PAIN WITH RADIATION: ICD-10-CM

## 2024-07-26 DIAGNOSIS — M54.6 THORACIC SPINE PAIN: ICD-10-CM

## 2024-07-26 PROCEDURE — 97110 THERAPEUTIC EXERCISES: CPT

## 2024-07-26 PROCEDURE — 97012 MECHANICAL TRACTION THERAPY: CPT

## 2024-07-30 ENCOUNTER — PHYSICAL THERAPY (OUTPATIENT)
Dept: PHYSICAL THERAPY | Facility: REHABILITATION | Age: 29
End: 2024-07-30
Attending: STUDENT IN AN ORGANIZED HEALTH CARE EDUCATION/TRAINING PROGRAM
Payer: COMMERCIAL

## 2024-07-30 DIAGNOSIS — M47.816 LUMBAR ARTHROPATHY: ICD-10-CM

## 2024-07-30 DIAGNOSIS — M54.6 THORACIC SPINE PAIN: ICD-10-CM

## 2024-07-30 PROCEDURE — 97014 ELECTRIC STIMULATION THERAPY: CPT

## 2024-07-30 PROCEDURE — 97140 MANUAL THERAPY 1/> REGIONS: CPT

## 2024-07-30 PROCEDURE — 97110 THERAPEUTIC EXERCISES: CPT

## 2024-08-07 ENCOUNTER — OFFICE VISIT (OUTPATIENT)
Dept: MEDICAL GROUP | Facility: MEDICAL CENTER | Age: 29
End: 2024-08-07
Payer: COMMERCIAL

## 2024-08-07 ENCOUNTER — HOSPITAL ENCOUNTER (OUTPATIENT)
Facility: MEDICAL CENTER | Age: 29
End: 2024-08-07
Attending: NURSE PRACTITIONER
Payer: COMMERCIAL

## 2024-08-07 VITALS
DIASTOLIC BLOOD PRESSURE: 68 MMHG | RESPIRATION RATE: 18 BRPM | HEART RATE: 97 BPM | TEMPERATURE: 97.4 F | BODY MASS INDEX: 37.65 KG/M2 | OXYGEN SATURATION: 95 % | HEIGHT: 70 IN | WEIGHT: 263 LBS | SYSTOLIC BLOOD PRESSURE: 114 MMHG

## 2024-08-07 DIAGNOSIS — N94.9 VAGINAL DISCOMFORT: ICD-10-CM

## 2024-08-07 DIAGNOSIS — M79.7 FIBROMYALGIA: ICD-10-CM

## 2024-08-07 DIAGNOSIS — F41.9 ANXIETY: ICD-10-CM

## 2024-08-07 DIAGNOSIS — N89.8 VAGINAL ITCHING: ICD-10-CM

## 2024-08-07 DIAGNOSIS — N89.8 VAGINAL ODOR: ICD-10-CM

## 2024-08-07 DIAGNOSIS — E66.9 OBESITY (BMI 30-39.9): ICD-10-CM

## 2024-08-07 DIAGNOSIS — N89.8 VAGINAL DISCHARGE: ICD-10-CM

## 2024-08-07 PROCEDURE — 87660 TRICHOMONAS VAGIN DIR PROBE: CPT

## 2024-08-07 PROCEDURE — 3078F DIAST BP <80 MM HG: CPT | Performed by: NURSE PRACTITIONER

## 2024-08-07 PROCEDURE — 87510 GARDNER VAG DNA DIR PROBE: CPT

## 2024-08-07 PROCEDURE — 99214 OFFICE O/P EST MOD 30 MIN: CPT | Performed by: NURSE PRACTITIONER

## 2024-08-07 PROCEDURE — 3074F SYST BP LT 130 MM HG: CPT | Performed by: NURSE PRACTITIONER

## 2024-08-07 PROCEDURE — 87480 CANDIDA DNA DIR PROBE: CPT

## 2024-08-07 RX ORDER — PREGABALIN 150 MG/1
150 CAPSULE ORAL
Qty: 90 CAPSULE | Refills: 0 | Status: SHIPPED | OUTPATIENT
Start: 2024-11-01 | End: 2024-12-01

## 2024-08-07 RX ORDER — PHENTERMINE HYDROCHLORIDE 30 MG/1
30 CAPSULE ORAL EVERY MORNING
Qty: 30 CAPSULE | Refills: 0 | Status: SHIPPED | OUTPATIENT
Start: 2024-09-06 | End: 2024-10-06

## 2024-08-07 RX ORDER — PREGABALIN 150 MG/1
150 CAPSULE ORAL
Qty: 90 CAPSULE | Refills: 0 | Status: SHIPPED | OUTPATIENT
Start: 2024-09-06 | End: 2024-10-06

## 2024-08-07 RX ORDER — METRONIDAZOLE 500 MG/1
500 TABLET ORAL 2 TIMES DAILY
Qty: 14 TABLET | Refills: 0 | Status: SHIPPED | OUTPATIENT
Start: 2024-08-07 | End: 2024-08-14

## 2024-08-07 RX ORDER — PREGABALIN 150 MG/1
150 CAPSULE ORAL
Qty: 90 CAPSULE | Refills: 0 | Status: SHIPPED | OUTPATIENT
Start: 2024-10-04 | End: 2024-11-03

## 2024-08-07 RX ORDER — PHENTERMINE HYDROCHLORIDE 30 MG/1
30 CAPSULE ORAL EVERY MORNING
Qty: 30 CAPSULE | Refills: 0 | Status: SHIPPED | OUTPATIENT
Start: 2024-08-07 | End: 2024-09-06

## 2024-08-07 ASSESSMENT — FIBROSIS 4 INDEX: FIB4 SCORE: 0.3

## 2024-08-07 ASSESSMENT — ENCOUNTER SYMPTOMS
PALPITATIONS: 0
FEVER: 0
CHILLS: 0

## 2024-08-07 NOTE — PROGRESS NOTES
Patient agreed to use of MARCIA: yes  No chief complaint on file.      HISTORY OF PRESENT ILLNESS: Patient is a pleasant 29 y.o. female, established patient who presents today to discuss medical problems as listed below:    Assessment/Plan:    Diagnoses and all orders for this visit:    Vaginal discomfort  -     VAGINAL PATHOGENS DNA PANEL; Future  -     metroNIDAZOLE (FLAGYL) 500 MG Tab; Take 1 Tablet by mouth 2 times a day for 7 days.  -     Cancel: Chlamydia/GC, PCR (Urine); Future  -     Chlamydia/GC, PCR (Urine); Future  -     URINALYSIS,CULTURE IF INDICATED; Future    Fibromyalgia  -     pregabalin (LYRICA) 150 MG Cap; Take 1 Capsule by mouth 3 times a day for 30 days.  -     pregabalin (LYRICA) 150 MG Cap; Take 1 Capsule by mouth 3 times a day for 30 days.  -     pregabalin (LYRICA) 150 MG Cap; Take 1 Capsule by mouth 3 times a day for 30 days.    Obesity (BMI 30-39.9)  -     phentermine 30 MG capsule; Take 1 Capsule by mouth every morning for 30 days.  -     phentermine 30 MG capsule; Take 1 Capsule by mouth every morning for 30 days.    Anxiety  -     Cancel: Referral to Psychology  -     Referral to Psychology    Vaginal odor  -     metroNIDAZOLE (FLAGYL) 500 MG Tab; Take 1 Tablet by mouth 2 times a day for 7 days.  -     Cancel: Chlamydia/GC, PCR (Urine); Future  -     Chlamydia/GC, PCR (Urine); Future  -     URINALYSIS,CULTURE IF INDICATED; Future    Vaginal itching              Assessment & Plan  1. Vaginal discomfort, vaginal odor, vaginal itching.  Symptoms have been going on for about a week, no flank pain, no nausea, no pelvic pain, no fever. I will empirically treat with metronidazole for bacterial vaginosis. I will obtain a swab. Patient unable to provide adequate amount of urine and STI panel and urinalysis sent to the lab, patient is aware to obtain.    2. Fibromyalgia, chronic.  This is well controlled on Lyrica. Patient was previously seeing physiatry and the care was transferred to PCP, refills  were given. PDMP verified. Controlled substance agreement. Urine drug screen obtained 06/07/2024.    3. Obesity.  This is a chronic and stable problem. Currently on phentermine regimen, we will continue and refills were given.    4. Anxiety.  This is chronic, intermittent problem, currently situational. Referral to therapy was placed.    History of Present Illness  The patient presents for evaluation of multiple medical concerns.    She reports experiencing symptoms such as odor, itching, and discharge, which she suspects may be indicative of bacterial vaginosis. These symptoms seem to occur after sexual intercourse, and she urinates afterwards. She does not use condoms. Her diet includes plain yogurt with fruit, peanut butter, and granola, and she has recently started taking a probiotic. She is concerned about the possibility of sexually transmitted infections. Her discharge is grayish-green in color, and the itching has become more noticeable. She has provided a urine sample for testing. She recalls a severe episode of bacterial vaginosis that spread to her kidneys, causing a fever for 2 weeks.    She is experiencing sleep disturbances due to anxiety. She is currently taking magnesium and has previously undergone therapy.    She is currently on phentermine 30 mg, which she tolerates well. However, she is unsure if she has noticed any improvement. Her weight has fluctuated slightly.    Supplemental Information  She is on pregabalin.    Health Maintenance:  COMPLETED     Allergies, past medical history, past surgical history, family history, social history reviewed and updated.    Review of Systems   Constitutional:  Negative for chills, fever and malaise/fatigue.   Cardiovascular:  Negative for chest pain, palpitations and leg swelling.        Exam:    /68 (BP Location: Left arm, Patient Position: Sitting, BP Cuff Size: Large adult)   Pulse 97   Temp 36.3 °C (97.4 °F) (Temporal)   Resp 18   Ht 1.778 m (5'  "10\")   Wt 119 kg (263 lb)   SpO2 95%   BMI 37.74 kg/m²  Body mass index is 37.74 kg/m².    Physical Exam  Constitutional:       General: She is not in acute distress.     Appearance: She is not ill-appearing.   HENT:      Head: Normocephalic and atraumatic.      Nose: Nose normal.   Eyes:      General:         Right eye: No discharge.         Left eye: No discharge.   Cardiovascular:      Rate and Rhythm: Normal rate.      Pulses: Normal pulses.      Heart sounds: Normal heart sounds. No murmur heard.  Pulmonary:      Effort: Pulmonary effort is normal. No respiratory distress.      Breath sounds: Normal breath sounds. No stridor. No wheezing or rales.   Skin:     Findings: No rash.   Neurological:      General: No focal deficit present.      Mental Status: She is alert. Mental status is at baseline.   Psychiatric:         Mood and Affect: Mood normal.         Behavior: Behavior normal.          Results         Discussed with patient possible alternative diagnoses, patient is to take all medications as prescribed.      If symptoms persist FU w/PCP, if symptoms worsen go to emergency room.      If experiencing any side effects from prescribed medications report to the office immediately or go to emergency room.     Reviewed indication, dosage, usage and potential adverse effects of prescribed medications.      Reviewed risks and benefits of treatment plan. Patient verbalizes understanding of all instruction and verbally agrees to plan.     Discussed plan with the patient, and patient agrees to the above.      I personally reviewed prior external notes and test results pertinent to today's visit.      No follow-ups on file. 2 mos  "

## 2024-08-08 DIAGNOSIS — B37.31 VAGINAL CANDIDIASIS: ICD-10-CM

## 2024-08-08 LAB
CANDIDA DNA VAG QL PROBE+SIG AMP: POSITIVE
G VAGINALIS DNA VAG QL PROBE+SIG AMP: POSITIVE
T VAGINALIS DNA VAG QL PROBE+SIG AMP: NEGATIVE

## 2024-08-08 RX ORDER — FLUCONAZOLE 150 MG/1
150 TABLET ORAL
Qty: 2 TABLET | Refills: 0 | Status: SHIPPED | OUTPATIENT
Start: 2024-08-08 | End: 2024-08-16

## 2024-08-16 ENCOUNTER — PHYSICAL THERAPY (OUTPATIENT)
Dept: PHYSICAL THERAPY | Facility: REHABILITATION | Age: 29
End: 2024-08-16
Attending: PHYSICAL MEDICINE & REHABILITATION
Payer: COMMERCIAL

## 2024-08-16 DIAGNOSIS — M54.50 LOW BACK PAIN WITH RADIATION: ICD-10-CM

## 2024-08-16 DIAGNOSIS — M54.6 THORACIC SPINE PAIN: ICD-10-CM

## 2024-08-16 PROCEDURE — 97014 ELECTRIC STIMULATION THERAPY: CPT

## 2024-08-16 PROCEDURE — 97110 THERAPEUTIC EXERCISES: CPT

## 2024-08-16 NOTE — OP THERAPY DAILY TREATMENT
"  MRN:[id number]  CSN:[id number]   Outpatient Physical Therapy  DAILY TREATMENT     [facility] Physical Therapy [facility]  [address]  [address]  [place] [address]  Phone:  [phone]  Fax:  [phone]    Date: 08/16/2024    Patient: Carmen Hebert  YOB: 1995  MRN: [id number]     Time Calculation                  Chief Complaint: No chief complaint on file.    Visit #:19    SUBJECTIVE:  Cont. To report good and bad days --overall, patient reports that she has a little more mobility with \"..,not too much increase in pain\"  Patient reports that robel is able to walk 5' prior to increased pain, patient reports hat she is able to sit 60' w/o increased pain.    Patient reports progressive dizziness past month associated with sit-stand transitions a dn bending over  OBJECTIVE:      Orthostatic hypotension observed --supine rest 106/64--upon sitting with dizziness 90/55 with patient reporting sx resolution w/in 30\"          Therapeutic Treatments and Modalities:     Therapeutic Treatment and Modalities Summary: Reviewed HEP and importance of moving and completing exercise, even if pain is present--\"push to not through pain  Discussed c/o \"dizziness\" patient did not describe \"spinning\" more of an unstediness.  Not consistent with positions or movmenents      Yashira 5/5/ mhp t/s amd ;l/s x 15' mhp            Time-based treatments/modalities:         GOOD 12  Pain rating (1-10) before treatment: 5/10--mid/upper back  Pain rating (1-10) after treatment: no pain  Assessment:  Reached a plateau with treatment.  Patient is being discharged at this time  Patient also presented hypotensive with orthostatics observed from supine to sit  PLAN/RECOMMENDATIONS:   D/c from PT at this time due to patient independent with her HEP     Recommend patient follow-up with pcp and discusse low BP and possibly associated c/o dizziness.            "

## 2024-08-20 NOTE — OP THERAPY DISCHARGE SUMMARY
"  Outpatient Physical Therapy  DISCHARGE SUMMARY NOTE      [facility] [place] [address]  [address]  [place] [address]  Phone:  [phone]  Fax:  [phone]    Date of Visit: 08/16/2024    Patient: Carmen David  YOB: 1995  MRN: [id number]     Referring Provider: Keturah Madrigal M.D.  [address]  [place] [address]   Referring Diagnosis No admission diagnoses are documented for this encounter.         Functional Assessment Used        Your patient is being discharged from Physical Therapy with the following comments:   Goals partially met  Progress plateau    Cont. To report good and bad days --overall, patient reports that she has a little more mobility with \"..,not too much increase in pain\"  Patient reports that swhe is able to walk 5' prior to increased pain, patient reports hat she is able to sit 60' w/o increased pain.    Patient reports progressive dizziness past month associated with sit-stand transitions a dn bending over  OBJECTIVE:      Orthostatic hypotension observed --supine rest 106/64--upon sitting with c/o dizziness 90/55 with patient reporting sx resolution w/in 30\"--patient states that she is taking multiple medication including propanolol.             GOOD 12  Pain rating (1-10) before treatment: 5/10--mid/upper back  Pain rating (1-10) after treatment: no pain  Assessment:  Reached a plateau with treatment.  Patient is being discharged at this time  Patient also presented hypotensive with orthostatics observed from supine to sit  PLAN/RECOMMENDATIONS:   D/c from PT at this time due to patient independent with her HEP     Recommend patient follow-up with pcp and discusse low BP and possibly associated c/o dizziness.    Elie Horton, PT, DPT, OCS    Date: 8/20/2024         "

## 2024-08-21 ENCOUNTER — APPOINTMENT (OUTPATIENT)
Dept: PHYSICAL THERAPY | Facility: REHABILITATION | Age: 29
End: 2024-08-21
Attending: PHYSICAL MEDICINE & REHABILITATION
Payer: COMMERCIAL

## 2024-08-23 ENCOUNTER — PATIENT MESSAGE (OUTPATIENT)
Dept: CARDIOLOGY | Facility: MEDICAL CENTER | Age: 29
End: 2024-08-23
Payer: COMMERCIAL

## 2024-09-03 ENCOUNTER — APPOINTMENT (OUTPATIENT)
Dept: MEDICAL GROUP | Facility: MEDICAL CENTER | Age: 29
End: 2024-09-03
Payer: COMMERCIAL

## 2024-09-04 ENCOUNTER — APPOINTMENT (OUTPATIENT)
Dept: MEDICAL GROUP | Facility: MEDICAL CENTER | Age: 29
End: 2024-09-04
Payer: COMMERCIAL

## 2024-09-04 ENCOUNTER — HOSPITAL ENCOUNTER (OUTPATIENT)
Facility: MEDICAL CENTER | Age: 29
End: 2024-09-04
Attending: NURSE PRACTITIONER
Payer: COMMERCIAL

## 2024-09-04 VITALS
SYSTOLIC BLOOD PRESSURE: 118 MMHG | DIASTOLIC BLOOD PRESSURE: 70 MMHG | TEMPERATURE: 97.6 F | OXYGEN SATURATION: 95 % | HEART RATE: 67 BPM | HEIGHT: 70 IN | RESPIRATION RATE: 18 BRPM | BODY MASS INDEX: 37.8 KG/M2 | WEIGHT: 264 LBS

## 2024-09-04 DIAGNOSIS — R00.2 PALPITATIONS: ICD-10-CM

## 2024-09-04 DIAGNOSIS — F33.9 RECURRENT MAJOR DEPRESSIVE DISORDER, REMISSION STATUS UNSPECIFIED (HCC): ICD-10-CM

## 2024-09-04 DIAGNOSIS — E66.9 OBESITY (BMI 30-39.9): ICD-10-CM

## 2024-09-04 LAB
APPEARANCE UR: CLEAR
BACTERIA #/AREA URNS HPF: ABNORMAL /HPF
BILIRUB UR QL STRIP.AUTO: NEGATIVE
C TRACH DNA SPEC QL NAA+PROBE: NEGATIVE
COLOR UR: YELLOW
EPI CELLS #/AREA URNS HPF: ABNORMAL /HPF
GLUCOSE UR STRIP.AUTO-MCNC: NEGATIVE MG/DL
KETONES UR STRIP.AUTO-MCNC: NEGATIVE MG/DL
LEUKOCYTE ESTERASE UR QL STRIP.AUTO: ABNORMAL
MICRO URNS: ABNORMAL
MUCOUS THREADS #/AREA URNS HPF: ABNORMAL /HPF
N GONORRHOEA DNA SPEC QL NAA+PROBE: NEGATIVE
NITRITE UR QL STRIP.AUTO: NEGATIVE
PH UR STRIP.AUTO: 6 [PH] (ref 5–8)
PROT UR QL STRIP: NEGATIVE MG/DL
RBC # URNS HPF: ABNORMAL /HPF
RBC UR QL AUTO: NEGATIVE
SP GR UR STRIP.AUTO: 1.02
SPECIMEN SOURCE: NORMAL
WBC #/AREA URNS HPF: ABNORMAL /HPF

## 2024-09-04 PROCEDURE — 3078F DIAST BP <80 MM HG: CPT | Performed by: STUDENT IN AN ORGANIZED HEALTH CARE EDUCATION/TRAINING PROGRAM

## 2024-09-04 PROCEDURE — 99214 OFFICE O/P EST MOD 30 MIN: CPT | Performed by: STUDENT IN AN ORGANIZED HEALTH CARE EDUCATION/TRAINING PROGRAM

## 2024-09-04 PROCEDURE — 87491 CHLMYD TRACH DNA AMP PROBE: CPT

## 2024-09-04 PROCEDURE — 3074F SYST BP LT 130 MM HG: CPT | Performed by: STUDENT IN AN ORGANIZED HEALTH CARE EDUCATION/TRAINING PROGRAM

## 2024-09-04 PROCEDURE — 81001 URINALYSIS AUTO W/SCOPE: CPT

## 2024-09-04 PROCEDURE — 87591 N.GONORRHOEAE DNA AMP PROB: CPT

## 2024-09-04 RX ORDER — SERTRALINE HYDROCHLORIDE 100 MG/1
200 TABLET, FILM COATED ORAL DAILY
Qty: 90 TABLET | Refills: 1 | Status: SHIPPED | OUTPATIENT
Start: 2024-09-04

## 2024-09-04 RX ORDER — PROPRANOLOL HCL 60 MG
60 CAPSULE, EXTENDED RELEASE 24HR ORAL DAILY
Qty: 90 CAPSULE | Refills: 0 | Status: SHIPPED | OUTPATIENT
Start: 2024-09-04

## 2024-09-04 ASSESSMENT — FIBROSIS 4 INDEX: FIB4 SCORE: 0.3

## 2024-09-04 NOTE — PROGRESS NOTES
"Verbal consent was acquired by the patient to use [employer] ambient listening note generation during this visit     Subjective:     HPI:   History of Present Illness  The patient is a 29-year-old female who presents for medication refill.    She requires a refill of her sertraline prescription, which she takes at a dose of 200 mg daily for depression. Her symptoms are well-managed with this medication.    She also needs a refill of her propranolol prescription. This medication was initially prescribed by her cardiologist, but she does not regularly see them and the prescription was transferred to her primary care physician. Her symptoms are well-controlled with this medication.    She has been on phentermine for several months, prescribed for obesity. However, she reports no significant weight loss from this medication. She has attempted diet and lifestyle modifications, as well as exercise, but has found weight loss challenging throughout her life. This difficulty seems to increase with each additional medication she takes.    She also mentions that her sleep schedule has been disrupted since a late-night visit to the ER a few months ago. Even though she has not tried melatonin for sleep, she expresses interest in doing so.      FAMILY HISTORY  She denies any family history of thyroid cancer or MEN syndrome. Her grandmother has diabetes.        Objective:     Exam:  /70 (BP Location: Left arm, Patient Position: Sitting)   Pulse 67   Temp 36.4 °C (97.6 °F) (Temporal)   Resp 18   Ht 1.778 m (5' 10\")   Wt 120 kg (264 lb)   SpO2 95%   BMI 37.88 kg/m²  Body mass index is 37.88 kg/m².    Physical Exam  Constitutional:       Appearance: Normal appearance.   Cardiovascular:      Pulses: Normal pulses.      Heart sounds: Normal heart sounds. No murmur heard.  Pulmonary:      Effort: Pulmonary effort is normal. No respiratory distress.      Breath sounds: Normal breath sounds.   Neurological:      Mental " Status: She is alert and oriented to person, place, and time.           Lab studies  Lab Results   Component Value Date/Time    HBA1C 5.4 05/09/2024 08:41 AM      Lab Results   Component Value Date/Time    CHOLSTRLTOT 136 05/09/2024 0841    TRIGLYCERIDE 49 05/09/2024 0841    HDL 44 05/09/2024 0841    LDL 82 05/09/2024 0841         Assessment & Plan:     Problem List Items Addressed This Visit       Obesity (BMI 30-39.9)     She has been on phentermine for several months without significant weight loss. The potential use of Ozempic was discussed, including its side effects, benefits, risks, and cost. A prescription for Ozempic was provided, starting at a dose of 0.25 mg weekly for the first month, then increasing to 0.5 mg weekly for the second month, and  to 1 mg weekly thereafter. Recommended to follow up after 3 months         Relevant Medications    Semaglutide,0.25 or 0.5MG/DOS, 2 MG/3ML Solution Pen-injector    Semaglutide,0.25 or 0.5MG/DOS, 2 MG/3ML Solution Pen-injector (Start on 10/5/2024)    Semaglutide, 1 MG/DOSE, 4 MG/3ML Solution Pen-injector (Start on 11/5/2024)    Palpitations    Relevant Medications    propranolol LA (INDERAL LA) 60 MG CAPSULE SR 24 HR    Major depression, recurrent (HCC)    Relevant Medications    sertraline (ZOLOFT) 100 MG Tab       Assessment & Plan          Return in about 3 months (around 12/4/2024).    Please note that this dictation was created using voice recognition software. I have made every reasonable attempt to correct obvious errors, but I expect that there are errors of grammar and possibly content that I did not discover before finalizing the note.

## 2024-09-04 NOTE — ASSESSMENT & PLAN NOTE
She has been on phentermine for several months without significant weight loss. The potential use of Ozempic was discussed, including its side effects, benefits, risks, and cost. A prescription for Ozempic was provided, starting at a dose of 0.25 mg weekly for the first month, then increasing to 0.5 mg weekly for the second month, and  to 1 mg weekly thereafter. Recommended to follow up after 3 months

## 2024-09-05 ENCOUNTER — TELEPHONE (OUTPATIENT)
Dept: MEDICAL GROUP | Facility: MEDICAL CENTER | Age: 29
End: 2024-09-05
Payer: COMMERCIAL

## 2024-09-05 RX ORDER — SEMAGLUTIDE 0.25 MG/.5ML
0.25 INJECTION, SOLUTION SUBCUTANEOUS
Qty: 4 EACH | Refills: 0 | Status: SHIPPED | OUTPATIENT
Start: 2024-09-05 | End: 2024-10-05

## 2024-09-05 RX ORDER — SEMAGLUTIDE 0.5 MG/.5ML
0.5 INJECTION, SOLUTION SUBCUTANEOUS
Qty: 4 EACH | Refills: 0 | Status: SHIPPED | OUTPATIENT
Start: 2024-10-06 | End: 2024-11-05

## 2024-09-05 RX ORDER — SEMAGLUTIDE 1 MG/.5ML
1 INJECTION, SOLUTION SUBCUTANEOUS
Qty: 4 EACH | Refills: 0 | Status: SHIPPED | OUTPATIENT
Start: 2024-11-06 | End: 2024-12-06

## 2024-09-06 NOTE — TELEPHONE ENCOUNTER
Wegovy approved through insurance, however, pt copay is $1538 per pharmacy. RN contacted patient to inform, pt will not be able to afford this. Pt has virtual appt with pcp on 10/1 and she would like to further discuss other options at that time. Pt encouraged to contact office with any questions or concerns in the meantime.

## 2024-10-01 ENCOUNTER — TELEPHONE (OUTPATIENT)
Dept: MEDICAL GROUP | Facility: MEDICAL CENTER | Age: 29
End: 2024-10-01

## 2024-10-01 ENCOUNTER — APPOINTMENT (OUTPATIENT)
Dept: MEDICAL GROUP | Facility: MEDICAL CENTER | Age: 29
End: 2024-10-01
Payer: COMMERCIAL

## 2024-10-01 VITALS — BODY MASS INDEX: 34.67 KG/M2 | HEIGHT: 70 IN | WEIGHT: 242.2 LBS

## 2024-10-01 DIAGNOSIS — E66.9 OBESITY (BMI 30-39.9): ICD-10-CM

## 2024-10-01 DIAGNOSIS — M79.7 FIBROMYALGIA: ICD-10-CM

## 2024-10-01 PROCEDURE — 99214 OFFICE O/P EST MOD 30 MIN: CPT | Performed by: NURSE PRACTITIONER

## 2024-10-01 RX ORDER — PREGABALIN 150 MG/1
150 CAPSULE ORAL
Qty: 90 CAPSULE | Refills: 0 | Status: SHIPPED | OUTPATIENT
Start: 2024-12-02 | End: 2025-01-01

## 2024-10-01 RX ORDER — PHENTERMINE HYDROCHLORIDE 37.5 MG/1
37.5 CAPSULE ORAL EVERY MORNING
Qty: 30 CAPSULE | Refills: 0 | Status: SHIPPED | OUTPATIENT
Start: 2024-11-04 | End: 2024-12-04

## 2024-10-01 RX ORDER — PREGABALIN 150 MG/1
150 CAPSULE ORAL
Qty: 90 CAPSULE | Refills: 0 | Status: SHIPPED | OUTPATIENT
Start: 2024-11-02 | End: 2024-12-02

## 2024-10-01 RX ORDER — PHENTERMINE HYDROCHLORIDE 37.5 MG/1
37.5 CAPSULE ORAL EVERY MORNING
Qty: 30 CAPSULE | Refills: 0 | Status: SHIPPED | OUTPATIENT
Start: 2024-10-05 | End: 2024-11-04

## 2024-10-01 ASSESSMENT — ENCOUNTER SYMPTOMS
PALPITATIONS: 0
FEVER: 0
CHILLS: 0

## 2024-10-01 ASSESSMENT — FIBROSIS 4 INDEX: FIB4 SCORE: 0.3

## 2024-10-04 DIAGNOSIS — E66.9 OBESITY (BMI 30-39.9): ICD-10-CM

## 2024-10-04 RX ORDER — TIRZEPATIDE 2.5 MG/.5ML
2.5 INJECTION, SOLUTION SUBCUTANEOUS
Qty: 4 ML | Refills: 0 | Status: SHIPPED | OUTPATIENT
Start: 2024-10-04

## 2024-12-18 DIAGNOSIS — F33.9 RECURRENT MAJOR DEPRESSIVE DISORDER, REMISSION STATUS UNSPECIFIED (HCC): ICD-10-CM

## 2024-12-19 DIAGNOSIS — Z30.41 ENCOUNTER FOR SURVEILLANCE OF CONTRACEPTIVE PILLS: ICD-10-CM

## 2024-12-19 DIAGNOSIS — M79.7 FIBROMYALGIA: ICD-10-CM

## 2024-12-19 RX ORDER — SERTRALINE HYDROCHLORIDE 100 MG/1
200 TABLET, FILM COATED ORAL DAILY
Qty: 90 TABLET | Refills: 1 | Status: SHIPPED | OUTPATIENT
Start: 2024-12-19 | End: 2024-12-27 | Stop reason: SDUPTHER

## 2024-12-19 RX ORDER — PREGABALIN 150 MG/1
150 CAPSULE ORAL
Qty: 90 CAPSULE | Refills: 0 | Status: CANCELLED | OUTPATIENT
Start: 2024-12-19 | End: 2025-01-18

## 2024-12-20 ENCOUNTER — TELEPHONE (OUTPATIENT)
Dept: MEDICAL GROUP | Facility: MEDICAL CENTER | Age: 29
End: 2024-12-20
Payer: COMMERCIAL

## 2024-12-20 DIAGNOSIS — J30.2 SEASONAL ALLERGIES: ICD-10-CM

## 2024-12-20 RX ORDER — NORETHINDRONE ACETATE AND ETHINYL ESTRADIOL .03; 1.5 MG/1; MG/1
TABLET ORAL
Qty: 84 TABLET | Refills: 3 | Status: SHIPPED | OUTPATIENT
Start: 2024-12-20

## 2024-12-20 RX ORDER — FLUTICASONE PROPIONATE 50 MCG
2 SPRAY, SUSPENSION (ML) NASAL DAILY
Qty: 16 G | Refills: 11 | Status: SHIPPED | OUTPATIENT
Start: 2024-12-20

## 2024-12-20 NOTE — TELEPHONE ENCOUNTER
Received request via: Patient    Was the patient seen in the last year in this department? Yes    Does the patient have an active prescription (recently filled or refills available) for medication(s) requested? No    Pharmacy Name:   [id number] - [place] - [address]  [address] [place] [address]  [place] [address]  Phone: [phone] Fax: [phone]       Does the patient have [facility]CI Plus and need 100-day supply? (This applies to ALL medications) Patient does not have SCP

## 2024-12-20 NOTE — TELEPHONE ENCOUNTER
Patient called because they were having trouble filling their prescription of pregabalin (LYRICA) 150 MG Cap. I reached out to the patient's pharmacy to inquire about the prescription refill and was told they could fill prescription later on today. I reached out to patient to explain the prescription could filled. Patient also placed an appointment for 12/27/24 with Randi

## 2024-12-27 ENCOUNTER — OFFICE VISIT (OUTPATIENT)
Dept: MEDICAL GROUP | Facility: MEDICAL CENTER | Age: 29
End: 2024-12-27
Payer: COMMERCIAL

## 2024-12-27 VITALS
HEIGHT: 70 IN | SYSTOLIC BLOOD PRESSURE: 82 MMHG | BODY MASS INDEX: 39.18 KG/M2 | TEMPERATURE: 98 F | WEIGHT: 273.7 LBS | OXYGEN SATURATION: 95 % | HEART RATE: 74 BPM | DIASTOLIC BLOOD PRESSURE: 52 MMHG

## 2024-12-27 DIAGNOSIS — Z81.8 FAMILY HISTORY OF ATTENTION DEFICIT HYPERACTIVITY DISORDER (ADHD): ICD-10-CM

## 2024-12-27 DIAGNOSIS — Z23 NEED FOR VACCINATION: ICD-10-CM

## 2024-12-27 DIAGNOSIS — F33.9 RECURRENT MAJOR DEPRESSIVE DISORDER, REMISSION STATUS UNSPECIFIED (HCC): ICD-10-CM

## 2024-12-27 DIAGNOSIS — M79.7 FIBROMYALGIA: ICD-10-CM

## 2024-12-27 DIAGNOSIS — N92.6 MENSTRUAL IRREGULARITY: ICD-10-CM

## 2024-12-27 DIAGNOSIS — R00.2 PALPITATIONS: ICD-10-CM

## 2024-12-27 DIAGNOSIS — R41.840 CONCENTRATION DEFICIT: ICD-10-CM

## 2024-12-27 PROCEDURE — 99214 OFFICE O/P EST MOD 30 MIN: CPT | Mod: 25 | Performed by: NURSE PRACTITIONER

## 2024-12-27 PROCEDURE — 3074F SYST BP LT 130 MM HG: CPT | Performed by: NURSE PRACTITIONER

## 2024-12-27 PROCEDURE — 3078F DIAST BP <80 MM HG: CPT | Performed by: NURSE PRACTITIONER

## 2024-12-27 PROCEDURE — 90471 IMMUNIZATION ADMIN: CPT | Performed by: NURSE PRACTITIONER

## 2024-12-27 PROCEDURE — 90656 IIV3 VACC NO PRSV 0.5 ML IM: CPT | Performed by: NURSE PRACTITIONER

## 2024-12-27 RX ORDER — PREGABALIN 150 MG/1
150 CAPSULE ORAL
Qty: 90 CAPSULE | Refills: 2 | Status: SHIPPED | OUTPATIENT
Start: 2025-01-18 | End: 2025-02-17

## 2024-12-27 RX ORDER — SERTRALINE HYDROCHLORIDE 100 MG/1
200 TABLET, FILM COATED ORAL DAILY
Qty: 180 TABLET | Refills: 3 | Status: SHIPPED | OUTPATIENT
Start: 2024-12-27

## 2024-12-27 RX ORDER — PROPRANOLOL HYDROCHLORIDE 60 MG/1
60 CAPSULE, EXTENDED RELEASE ORAL DAILY
Qty: 90 CAPSULE | Refills: 3 | Status: SHIPPED | OUTPATIENT
Start: 2024-12-27

## 2024-12-27 ASSESSMENT — ENCOUNTER SYMPTOMS
PALPITATIONS: 0
CHILLS: 0
FEVER: 0

## 2024-12-27 ASSESSMENT — FIBROSIS 4 INDEX: FIB4 SCORE: 0.3

## 2024-12-27 NOTE — PROGRESS NOTES
Patient agreed to use of MARCIA: yes  Chief Complaint   Patient presents with    Follow-Up     Pregabalin, sertraline        HISTORY OF PRESENT ILLNESS: Patient is a pleasant 29 y.o. female, established patient who presents today to discuss medical problems as listed below:    Assessment/Plan:    Carmen was seen today for follow-up.    Diagnoses and all orders for this visit:    Need for vaccination  -     INFLUENZA VACCINE TRI INJ (PF)    Concentration deficit  -     Referral to Psychiatry    Family history of attention deficit hyperactivity disorder (ADHD)  -     Referral to Psychiatry    Fibromyalgia  -     pregabalin (LYRICA) 150 MG Cap; Take 1 Capsule by mouth 3 times a day for 30 days.    Recurrent major depressive disorder, remission status unspecified (HCC)  -     sertraline (ZOLOFT) 100 MG Tab; Take 2 Tablets by mouth every day. 200 mg = 2 tablets    Palpitations  -     propranolol LA (INDERAL LA) 60 MG CAPSULE SR 24 HR; Take 1 Capsule by mouth every day. Please call to schedule follow up appointment for further refills. Please call [phone]. Thank you.    Menstrual irregularity              Assessment & Plan  1. Attention deficit hyperactivity disorder (ADHD).  A referral to psychiatry has been initiated for further evaluation and potential diagnosis of ADHD. If diagnosed, the psychiatrist will manage the treatment and may try a few medications to determine the most effective one.     2. Fibromyalgia.  Chronic and stable condition.  Her fibromyalgia is well-managed at present. A prescription for pregabalin 150 mg, to be taken three times daily, has been provided. She will receive 90 pills with two refills, covering a three-month period. She is advised to check with the pharmacy to ensure they accept the two-refill prescription and to notify the office if there are any issues.    3. Depression and anxiety.  Chronic and stable conditions.  Her depression and anxiety are currently under control. A  prescription for sertraline 200 mg daily has been provided. She is also seeing a therapist regularly, which has been helpful.    4. Palpitations.  Chronic and stable condition.  Her palpitations are well-managed with propranolol. A prescription for propranolol has been provided.    5. Menstrual irregularities.  She has been experiencing irregular menstrual cycles while on birth control. This could be attributed to stress, weather changes, travel, or hormonal fluctuations. She is advised to monitor her menstrual cycle over the next 3 to 6 months. If the pattern normalizes, no further action is required. If irregularities persist, further evaluation may be necessary.    PROCEDURE  The patient underwent oral surgery for a gum graft prior to the onset of her current symptoms.    History of Present Illness  The patient presents for evaluation of fibromyalgia, depression and anxiety, palpitations, and menstrual irregularities.    She is seeking a refill of her medications. She has been experiencing symptoms suggestive of ADHD, including difficulties with focus and concentration, hyperfocus, and periods of inattention. These symptoms have been observed in several family members on her paternal side, including her father, brother, aunt, and grandmother, all of whom have been diagnosed with ADHD. She was not diagnosed with this condition during her childhood.    She is currently on a regimen of sertraline 200 mg daily, which she reports as effective in managing her depression and anxiety. Additionally, she is engaged in regular therapy sessions at [facility], which she finds beneficial.    She is also taking propranolol for palpitations, which she reports as effective.    She is on a continuous birth control regimen but has been experiencing menstrual bleeding for the past two weeks, with a brief intermission of a few days. She reports no missed doses of her medication. She underwent oral surgery for a gum graft  "prior to the onset of these symptoms.    Supplemental Information  She has thalassemia.    FAMILY HISTORY  Her father, brother, and aunt have been diagnosed with ADHD. Her grandmother also exhibited symptoms of ADHD but was not formally diagnosed.    MEDICATIONS  Current: Sertraline, propranolol    Health Maintenance:  COMPLETED     Allergies, past medical history, past surgical history, family history, social history reviewed and updated.    Review of Systems   Constitutional:  Negative for chills, fever and malaise/fatigue.   Cardiovascular:  Negative for chest pain, palpitations and leg swelling.        Exam:    BP (!) 82/52 (BP Location: Left arm, Patient Position: Sitting, BP Cuff Size: Large adult)   Pulse 74   Temp 36.7 °C (98 °F) (Temporal)   Ht 1.778 m (5' 10\")   Wt 124 kg (273 lb 11.2 oz)   SpO2 95%   BMI 39.27 kg/m²  Body mass index is 39.27 kg/m².    Physical Exam  Constitutional:       General: She is not in acute distress.     Appearance: She is not ill-appearing.   HENT:      Head: Normocephalic and atraumatic.      Nose: Nose normal.   Eyes:      General:         Right eye: No discharge.         Left eye: No discharge.   Cardiovascular:      Rate and Rhythm: Normal rate.      Pulses: Normal pulses.      Heart sounds: Normal heart sounds. No murmur heard.  Pulmonary:      Effort: Pulmonary effort is normal. No respiratory distress.      Breath sounds: Normal breath sounds. No stridor. No wheezing or rales.   Skin:     Findings: No rash.   Neurological:      General: No focal deficit present.      Mental Status: She is alert. Mental status is at baseline.   Psychiatric:         Mood and Affect: Mood normal.         Behavior: Behavior normal.          Results       Discussed with patient possible alternative diagnoses, patient is to take all medications as prescribed.      If symptoms persist FU w/PCP, if symptoms worsen go to emergency room.      If experiencing any side effects from prescribed " medications report to the office immediately or go to emergency room.     Reviewed indication, dosage, usage and potential adverse effects of prescribed medications.      Reviewed risks and benefits of treatment plan. Patient verbalizes understanding of all instruction and verbally agrees to plan.     Discussed plan with the patient, and patient agrees to the above.      I personally reviewed prior external notes and test results pertinent to today's visit.      No follow-ups on file. 3 mos

## 2025-01-27 ENCOUNTER — RESEARCH ENCOUNTER (OUTPATIENT)
Dept: RESEARCH | Facility: MEDICAL CENTER | Age: 30
End: 2025-01-27

## 2025-01-29 ENCOUNTER — OFFICE VISIT (OUTPATIENT)
Dept: MEDICAL GROUP | Facility: MEDICAL CENTER | Age: 30
End: 2025-01-29
Payer: COMMERCIAL

## 2025-01-29 VITALS
HEIGHT: 70 IN | SYSTOLIC BLOOD PRESSURE: 110 MMHG | HEART RATE: 95 BPM | BODY MASS INDEX: 39.77 KG/M2 | OXYGEN SATURATION: 97 % | WEIGHT: 277.78 LBS | DIASTOLIC BLOOD PRESSURE: 62 MMHG | TEMPERATURE: 97.3 F

## 2025-01-29 DIAGNOSIS — R50.9 FEVER, UNSPECIFIED FEVER CAUSE: ICD-10-CM

## 2025-01-29 DIAGNOSIS — R09.81 SINUS CONGESTION: ICD-10-CM

## 2025-01-29 DIAGNOSIS — R05.9 COUGH, UNSPECIFIED TYPE: ICD-10-CM

## 2025-01-29 DIAGNOSIS — J00 HEAD COLD: ICD-10-CM

## 2025-01-29 DIAGNOSIS — R09.89 CHEST CONGESTION: ICD-10-CM

## 2025-01-29 DIAGNOSIS — F33.9 RECURRENT MAJOR DEPRESSIVE DISORDER, REMISSION STATUS UNSPECIFIED (HCC): ICD-10-CM

## 2025-01-29 DIAGNOSIS — R09.89 PHLEGM IN THROAT: ICD-10-CM

## 2025-01-29 DIAGNOSIS — J02.9 SORE THROAT: ICD-10-CM

## 2025-01-29 DIAGNOSIS — R05.1 ACUTE COUGH: ICD-10-CM

## 2025-01-29 LAB
FLUAV RNA SPEC QL NAA+PROBE: NEGATIVE
FLUBV RNA SPEC QL NAA+PROBE: NEGATIVE
RSV RNA SPEC QL NAA+PROBE: NEGATIVE
S PYO DNA SPEC NAA+PROBE: NOT DETECTED
SARS-COV-2 RNA RESP QL NAA+PROBE: NEGATIVE

## 2025-01-29 RX ORDER — TRIAMCINOLONE ACETONIDE 55 UG/1
2 SPRAY, METERED NASAL DAILY
Qty: 16 ML | Refills: 1 | Status: SHIPPED | OUTPATIENT
Start: 2025-01-29

## 2025-01-29 RX ORDER — PSEUDOEPHEDRINE HCL 120 MG/1
120 TABLET, FILM COATED, EXTENDED RELEASE ORAL 2 TIMES DAILY PRN
Qty: 14 TABLET | Refills: 6 | Status: SHIPPED | OUTPATIENT
Start: 2025-01-29 | End: 2025-02-05

## 2025-01-29 RX ORDER — GUAIFENESIN 1200 MG/1
1200 TABLET, EXTENDED RELEASE ORAL
Qty: 14 TABLET | Refills: 0 | Status: SHIPPED | OUTPATIENT
Start: 2025-01-29 | End: 2025-02-05

## 2025-01-29 RX ORDER — AZITHROMYCIN 250 MG/1
TABLET, FILM COATED ORAL
Qty: 6 TABLET | Refills: 0 | Status: SHIPPED | OUTPATIENT
Start: 2025-01-29

## 2025-01-29 ASSESSMENT — PATIENT HEALTH QUESTIONNAIRE - PHQ9: CLINICAL INTERPRETATION OF PHQ2 SCORE: 0

## 2025-01-29 ASSESSMENT — FIBROSIS 4 INDEX: FIB4 SCORE: 0.3

## 2025-01-29 NOTE — PROGRESS NOTES
Patient agreed to use of MARCIA: yes  Chief Complaint   Patient presents with    Cold Exposure    Fever    Shortness of Breath    Ear Fullness       HISTORY OF PRESENT ILLNESS: Patient is a pleasant 29 y.o. female, established patient who presents today to discuss medical problems as listed below:    Assessment/Plan:    Carmen was seen today for cold exposure, fever, shortness of breath and ear fullness.    Diagnoses and all orders for this visit:    Acute cough  -     POCT Cepheid CoV-2, Flu A/B, RSV - PCR    Sore throat  -     POCT Cepheid Group A Strep - PCR    Sinus congestion  -     Guaifenesin 1200 MG TABLET SR 12 HR; Take 1 Tablet by mouth 2 times a day for 7 days.  -     triamcinolone (NASACORT) 55 MCG/ACT nasal inhaler; Administer 2 Sprays into affected nostril(S) every day.  -     pseudoephedrine SR (SUDAFED SR) 120 MG TABLET SR 12 HR; Take 1 Tablet by mouth 2 times a day as needed for Congestion for up to 7 days.    Head cold    Fever, unspecified fever cause    Phlegm in throat    Chest congestion  -     Guaifenesin 1200 MG TABLET SR 12 HR; Take 1 Tablet by mouth 2 times a day for 7 days.  -     azithromycin (ZITHROMAX) 250 MG Tab; Take 2 tabs on day 1, then take 1 tab on days 2-5    Cough, unspecified type    Recurrent major depressive disorder, remission status unspecified ([facility])              Assessment & Plan  1. Upper respiratory infection.  2.  Acute cough  3.  Sore throat  4.  Sinus congestion  5.  Head cold  6.  Low-grade fever  7.  Phlegm in throat  8.  Chest congestion  Symptoms started over a week ago and include head congestion, chest congestion, sore throat, and coughing up yellow-grey phlegm. She reported a low-grade fever and slight shortness of breath over the past few days. She does not qualify for antiviral treatment as it has been over 5 days since symptom onset. A screening test will be conducted today to identify the specific virus, with results expected within an hour. If the test is  positive, she will be notified; otherwise, no call will be made. The treatment plan includes Guaifenesin 1200 mg for 7 days, Nasacort, and Sudafed. She is advised to maintain high fluid intake, use a humidifier, and perform warm saltwater gargles for her sore throat. A work note will be provided. If there is no improvement in her symptoms after a day of medication, supportive care, fluid intake, and rest, she should commence azithromycin. However, if her condition improves, the use of azithromycin can be deferred.    9. Depression.  Chronic and stable condition.  Denies SI.  She reports that her depression has improved since seeing a therapist weekly. She is currently on sertraline 200 mg. She is advised to continue her current medication and therapy sessions. She is encouraged to focus on aspects of her job that are within her control and to discuss her concerns with her therapist.    History of Present Illness  The patient presents for evaluation of upper respiratory symptoms.    She reports experiencing head congestion that has progressively descended into her chest over the past week. Accompanying these symptoms is a mild sore throat. She has been absent from work due to her illness but expresses a desire to return as she perceives an improvement in her condition today compared to the previous day. She has been experiencing shortness of breath for the past few days and had a low-grade fever on Sunday, which has since subsided. She reports feeling slightly better overall. She has been producing yellowish-gray phlegm but does not report any wheezing or chest tightness. She experienced mild nausea, which she attributes to a gut cleanse she was undergoing at the time of symptom onset. She discontinued the cleanse due to her illness. She reports a sensation of fullness in her ears but does not experience any pain or balance issues. She does not have difficulty swallowing. Her cough is not severe and is primarily  "triggered when she needs to expectorate. She has been attempting to maintain hydration by consuming water. She does not require any medication refills at this time.    She reports feeling down but not depressed. She believes her depression has improved since starting therapy. 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 She is on Zoloft 200 mg and sees her therapist once a week, with her next appointment scheduled for tomorrow.    MEDICATIONS  Current: Zoloft    Health Maintenance:  COMPLETED     Allergies, past medical history, past surgical history, family history, social history reviewed and updated.    ROS     Exam:    /62   Pulse 95   Temp 36.3 °C (97.3 °F)   Ht 1.778 m (5' 10\")   Wt (!) 126 kg (277 lb 12.5 oz)   SpO2 97%   BMI 39.86 kg/m²  Body mass index is 39.86 kg/m².    Physical Exam     Results       Discussed with patient possible alternative diagnoses, patient is to take all medications as prescribed.      If symptoms persist FU w/PCP, if symptoms worsen go to emergency room.      If experiencing any side effects from prescribed medications report to the office immediately or go to emergency room.     Reviewed indication, dosage, usage and potential adverse effects of prescribed medications.      Reviewed risks and benefits of treatment plan. Patient verbalizes understanding of all instruction and verbally agrees to plan.     Discussed plan with the patient, and patient agrees to the above.      I personally reviewed prior external notes and test results pertinent to today's visit.      No follow-ups on file. return if symptoms are not improving or getting worse   "

## 2025-01-29 NOTE — LETTER
January 29, 2025       Patient: Carmen David   YOB: 1995   Date of Visit: 1/29/2025         To Whom It May Concern:    Carmen David seen and examined today. Please excuse from work for today. She can return to work when she feels better.     If you have any questions or concerns, please don't hesitate to call [phone]          Sincerely,          CHAD Alonzo.  Electronically Signed      trauma

## 2025-03-06 ENCOUNTER — OFFICE VISIT (OUTPATIENT)
Dept: PHYSICAL MEDICINE AND REHAB | Facility: MEDICAL CENTER | Age: 30
End: 2025-03-06
Payer: COMMERCIAL

## 2025-03-06 VITALS
HEART RATE: 77 BPM | BODY MASS INDEX: 41.63 KG/M2 | SYSTOLIC BLOOD PRESSURE: 109 MMHG | WEIGHT: 290.79 LBS | OXYGEN SATURATION: 100 % | TEMPERATURE: 97.8 F | HEIGHT: 70 IN | DIASTOLIC BLOOD PRESSURE: 75 MMHG

## 2025-03-06 DIAGNOSIS — M41.24 OTHER IDIOPATHIC SCOLIOSIS, THORACIC REGION: ICD-10-CM

## 2025-03-06 DIAGNOSIS — M43.17 SPONDYLOLISTHESIS OF LUMBOSACRAL REGION: ICD-10-CM

## 2025-03-06 DIAGNOSIS — M79.7 FIBROMYALGIA: ICD-10-CM

## 2025-03-06 DIAGNOSIS — R20.2 NUMBNESS AND TINGLING OF LEFT LEG: ICD-10-CM

## 2025-03-06 DIAGNOSIS — R20.0 NUMBNESS AND TINGLING OF LEFT LEG: ICD-10-CM

## 2025-03-06 DIAGNOSIS — M51.369 BULGE OF LUMBAR DISC WITHOUT MYELOPATHY: ICD-10-CM

## 2025-03-06 DIAGNOSIS — M47.816 LUMBAR SPONDYLOSIS: ICD-10-CM

## 2025-03-06 DIAGNOSIS — M54.6 THORACIC SPINE PAIN: ICD-10-CM

## 2025-03-06 DIAGNOSIS — M54.50 LOW BACK PAIN WITH RADIATION: ICD-10-CM

## 2025-03-06 DIAGNOSIS — M62.838 MUSCLE SPASM: ICD-10-CM

## 2025-03-06 DIAGNOSIS — F32.A DEPRESSION, UNSPECIFIED DEPRESSION TYPE: ICD-10-CM

## 2025-03-06 DIAGNOSIS — M54.16 LUMBAR RADICULOPATHY: Primary | ICD-10-CM

## 2025-03-06 PROCEDURE — 3078F DIAST BP <80 MM HG: CPT | Performed by: STUDENT IN AN ORGANIZED HEALTH CARE EDUCATION/TRAINING PROGRAM

## 2025-03-06 PROCEDURE — 3074F SYST BP LT 130 MM HG: CPT | Performed by: STUDENT IN AN ORGANIZED HEALTH CARE EDUCATION/TRAINING PROGRAM

## 2025-03-06 PROCEDURE — 1125F AMNT PAIN NOTED PAIN PRSNT: CPT | Performed by: STUDENT IN AN ORGANIZED HEALTH CARE EDUCATION/TRAINING PROGRAM

## 2025-03-06 PROCEDURE — 99214 OFFICE O/P EST MOD 30 MIN: CPT | Performed by: STUDENT IN AN ORGANIZED HEALTH CARE EDUCATION/TRAINING PROGRAM

## 2025-03-06 ASSESSMENT — PAIN SCALES - GENERAL: PAINLEVEL_OUTOF10: 8=MODERATE-SEVERE PAIN

## 2025-03-06 ASSESSMENT — PATIENT HEALTH QUESTIONNAIRE - PHQ9
SUM OF ALL RESPONSES TO PHQ QUESTIONS 1-9: 14
CLINICAL INTERPRETATION OF PHQ2 SCORE: 5
5. POOR APPETITE OR OVEREATING: 3 - NEARLY EVERY DAY

## 2025-03-06 ASSESSMENT — FIBROSIS 4 INDEX: FIB4 SCORE: 0.31

## 2025-03-06 NOTE — PROGRESS NOTES
Verbal consent was acquired by the patient to use [employer] ambient listening note generation during this visit Yes     Follow-up patient note    Physiatry (physical medicine and  Rehabilitation), interventional spine and sports medicine    Date of Service:2025      Chief complaint:   Chief Complaint   Patient presents with    Follow-Up     Fibromyalgia       HISTORY    HPI: Carmen BACA 1995 is a female who presents today for follow-up evaluation of pain complaints.  She has a diagnosis of fibromyalgia.      History of Present Illness  The patient presents for evaluation of fibromyalgia.    She reports an escalation in pain, characterized by palpable knots in her legs. She has been experiencing numbness in her left lateral leg for several years, which has recently been accompanied by pain. Attempts to alleviate the knots through massage have resulted in bruising due to her inability to gauge the pressure applied.  The pain and numbness in her left leg, which she describes as relatively new and severe, have been progressively worsening since the beginning of the year. She does not recall any specific triggers for these symptoms. She also reports tightness in her hips and a sensation of multiple types of pain. She has previously undergone water-based physical therapy in college, which she found beneficial. She has received trigger point injections from a rheumatologist for her fibromyalgia, which provided temporary relief that was not sustained.     She also reports fatigue, which she suspects may be indicative of a condition beyond fibromyalgia. She has not yet discussed these new symptoms with her primary care physician. She experiences joint pain, particularly in her elbows, and is under significant stress due to uncertainty about her employment status. 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 She has noticed a worsening of her brain fog and has been unable to lose weight despite  not having poor dietary habits. She recalls that these issues began when she started taking antidepressants in college. Previous thyroid tests have returned normal results.    She has not tried naltrexone before. She is not pregnant, diabetic, does not have a pacemaker or wiring, and is not claustrophobic. She continues to take Lyrica.      She is taking Lyrica and is also on Zoloft    Pain rates 8/10 on NRS.    History:  -  she was diagnosed in 2017 with fibromyalgia, in [facility].    - Medications the patient has tried: lyrica, cyclobenzeprine, amitriptyline, tried savella  -Reports some relief with trigger point injections done [facility].  Notes she has not done spinal injections    Red flags ROS:  Fever, Chills, Sweats: Denies  Involuntary Weight Loss: Denies  Bladder Incontinence: Denies  Bowel Incontinence: Denies  Saddle Anesthesia: Denies    All other systems reviewed and negative.       PMHx:   Past Medical History:   Diagnosis Date    Anxiety     Asthma     Depression     Fibromyalgia 2017    Thalassemia minor        PSHx:   History reviewed. No pertinent surgical history.    Family history   Family History   Problem Relation Age of Onset    Fibromyalgia Mother     Lung Disease Mother         asthma r/o    Depression Mother     Anxiety disorder Mother     ADD / ADHD Father     Depression Father          Medications:   Current Outpatient Medications   Medication    azithromycin (ZITHROMAX) 250 MG Tab    triamcinolone (NASACORT) 55 MCG/ACT nasal inhaler    sertraline (ZOLOFT) 100 MG Tab    propranolol LA (INDERAL LA) 60 MG CAPSULE SR 24 HR    Norethindrone Acet-Ethinyl Est (BELL 1.5/30) 1.5-30 MG-MCG Tab    fluticasone (FLONASE) 50 MCG/ACT nasal spray    meclizine (ANTIVERT) 12.5 MG Tab    fluticasone (FLONASE) 50 MCG/ACT nasal spray    cyanocobalamin (VITAMIN B-12) 100 MCG Tab    fluticasone-salmeterol (ADVAIR) 100-50 MCG/ACT AEROSOL POWDER, BREATH ACTIVATED    Ascorbic Acid (VITAMIN C) 1000 MG  Tab    vitamin D3 (CHOLECALCIFEROL) 1000 Unit (25 mcg) Tab    Calcium-Magnesium-Zinc (IRMA-MAG-ZINC PO)    albuterol 108 (90 Base) MCG/ACT Aero Soln inhalation aerosol    tizanidine (ZANAFLEX) 4 MG Tab    ALPRAZolam (XANAX) 0.25 MG Tab    Tirzepatide-Weight Management (ZEPBOUND) 2.5 MG/0.5ML Solution Auto-injector     No current facility-administered medications for this visit.       Allergies:   Allergies   Allergen Reactions    Corn-Related Products Hives, Itching, Nausea and Myalgia    Dairy Food Allergy Diarrhea, Nausea and Myalgia          Gluten Meal Hives, Shortness of Breath, Diarrhea, Rash, Runny Nose, Itching, Vomiting, Nausea, Cough and Myalgia          Sesame Seed (Diagnostic) Hives, Rash and Itching     rash    Soy Allergy Hives, Itching, Nausea and Myalgia       Social Hx:   Social History     Socioeconomic History    Marital status: Single     Spouse name: Not on file    Number of children: Not on file    Years of education: Not on file    Highest education level: Not on file   Occupational History    Not on file   Tobacco Use    Smoking status: Never    Smokeless tobacco: Never   Vaping Use    Vaping status: Never Used   Substance and Sexual Activity    Alcohol use: Yes     Comment: approx 1 drink per week    Drug use: Not Currently     Types: Marijuana     Comment: rarely    Sexual activity: Yes     Partners: Male     Birth control/protection: Pill, Condom   Other Topics Concern     Service No    Blood Transfusions No    Caffeine Concern No    Occupational Exposure No    Hobby Hazards No    Sleep Concern Yes    Stress Concern Yes    Weight Concern Yes    Special Diet Yes    Back Care No    Exercise No    Bike Helmet Yes    Seat Belt Yes    Self-Exams Yes   Social History Narrative    Not on file     Social Drivers of Health     Financial Resource Strain: Not on file   Food Insecurity: Not on file   Transportation Needs: Not on file   Physical Activity: Not on file   Stress: Not on file  "  Social Connections: Not on file   Intimate Partner Violence: Not on file   Housing Stability: Not on file         EXAMINATION     Physical Exam:   Vitals: /75 (BP Location: Right arm, Patient Position: Sitting, BP Cuff Size: Adult)   Pulse 77   Temp 36.6 °C (97.8 °F) (Temporal)   Ht 1.778 m (5' 10\")   Wt (!) 132 kg (290 lb 12.6 oz)   SpO2 100%     Constitutional:   Body Habitus: Body mass index is 41.72 kg/m².  Cooperation: Fully cooperates with exam  Appearance: Well-groomed, well-nourished, not disheveled, in no acute distress    Eyes: No scleral icterus, no proptosis     ENT -no obvious auditory deficits, wearing a face mask    Skin -no rashes or lesions noted     Respiratory-  breathing comfortable on room air, no audible wheezing     Cardiovascular- no lower extremity edema is noted.     Psychiatric- alert and oriented ×3. Normal affect.     Gait - normal gait, no use of ambulatory device.      Musculoskeletal -     Thoracic/Lumbar Spine/Sacral Spine/Hips   Inspection: No evidence of atrophy in bilateral lower extremities throughout       Facet loading maneuver negative bilaterally      Palpation:   No tenderness to palpation over the paraspinal muscles bilaterally.      Lumbar spine /hip provocative exam maneuvers  Straight leg raise negative bilaterally  FADIR test negative bilaterally     SI joint tests  KONRAD test negative bilaterally        Key points for the international standards for neurological classification of spinal cord injury (ISNCSCI) to light touch.   Dermatome R L   L2 2 2   L3 2 2   L4 2 2   L5 2 1   S1 2 2   S2 2 2         Motor Exam Lower Extremities  ? Myotome R L   Hip flexion L2 5 5   Knee extension L3 5 5   Ankle dorsiflexion L4 5 5   Toe extension L5 5 5   Ankle plantarflexion S1 5 5          MEDICAL DECISION MAKING    Medical records review: see under HPI section.     DATA    Labs:   Personally reviewed at today's visit:     Lab Results   Component Value Date/Time    " "SODIUM 137 05/09/2024 08:41 AM    POTASSIUM 4.5 05/09/2024 08:41 AM    CHLORIDE 103 05/09/2024 08:41 AM    CO2 25 05/09/2024 08:41 AM    ANION 9.0 05/09/2024 08:41 AM    GLUCOSE 95 05/09/2024 08:41 AM    BUN 9 05/09/2024 08:41 AM    CREATININE 0.64 05/09/2024 08:41 AM    CALCIUM 9.4 05/09/2024 08:41 AM    ASTSGOT 17 05/09/2024 08:41 AM    ALTSGPT 17 05/09/2024 08:41 AM    TBILIRUBIN 0.2 05/09/2024 08:41 AM    ALBUMIN 4.3 05/09/2024 08:41 AM    TOTPROTEIN 7.6 05/09/2024 08:41 AM    GLOBULIN 3.3 05/09/2024 08:41 AM    AGRATIO 1.3 05/09/2024 08:41 AM       No results found for: \"PROTHROMBTM\", \"INR\"     Lab Results   Component Value Date/Time    WBC 9.6 05/09/2024 08:41 AM    RBC 6.21 (H) 05/09/2024 08:41 AM    HEMOGLOBIN 11.7 (L) 05/09/2024 08:41 AM    HEMATOCRIT 38.2 05/09/2024 08:41 AM    MCV 61.5 (L) 05/09/2024 08:41 AM    MCH 18.8 (L) 05/09/2024 08:41 AM    MCHC 30.6 (L) 05/09/2024 08:41 AM    MPV 11.5 05/09/2024 08:41 AM    NEUTSPOLYS 79.80 (H) 01/12/2024 10:23 AM    LYMPHOCYTES 13.40 (L) 01/12/2024 10:23 AM    MONOCYTES 5.50 01/12/2024 10:23 AM    EOSINOPHILS 0.20 01/12/2024 10:23 AM    BASOPHILS 0.60 01/12/2024 10:23 AM    HYPOCHROMIA 1+ 07/13/2021 08:29 AM    ANISOCYTOSIS 2+ (A) 07/13/2021 08:29 AM        Lab Results   Component Value Date/Time    HBA1C 5.4 05/09/2024 08:41 AM        Imaging: I personally reviewed following images, these are my reads  MRI lumbar spine 03/21/2023  There is note of a disc herniation at T12-L1 that does not cause cord compression  At L1-2, no central or foraminal stenosis  At L2-3, no central or foraminal stenosis  At L3-4, no central or foraminal stenosis  At L4-5, disc bulge with mild facet arthropathy, no central or foraminal stenosis  At L5-S1, no central or foraminal stenosis      Xray thoracic spine 10/11/2022  There is mild levoconvex curvature    Xray lumbar spine July 8. 2022  There is note of levoscoliosis.  Spondylolisthesis at L5-S1 measures 6 mm in extension and 8 mm " in flexion.  Suspicious for possible pars defects.    Xray cervical spine 08/12/2021  There is reversal of cervical lordosis  No significant degenerative changes    Xray thoracic 07/31/2020  There is mild curvative of the thoracic spine, convex left.  Scott at T3-4    MRI cervical spine on 05/22/2020  There is not of mild loss of cervical lordosis.  No significant degenerative changes, no cervical disc herniations, no central or foraminal stenosis in the cervical spine.     IMAGING radiology reads. I reviewed the following radiology reads   MRI lumbar spine 03/21/2023  T12-L1: Small left paracentral disc protrusion that minimally encroaches upon the spinal canal without cord compression.  L1-2: Normal.  L2-3: Normal.  L3-4: Normal.  L4-5: Broad-based disc bulge and bilateral mild facet degeneration without canal stenosis or nerve impingement.  L5-S1: Normal.     The prevertebral soft tissues are within normal limits.     IMPRESSION:     Small left paracentral protrusion at T12-L1 that minimally encroaches upon the spinal canal. There is no significant canal stenosis or foraminal narrowing in the lumbar spine.    Xray thoracic spine 10/11/2022  IMPRESSION:     1.  There is a mild 7 degree levoconvex curvature of the upper thoracic spine.    Xray lumbar spine July 8, 2022  IMPRESSION:  1.  Anterior subluxation at L5-S1 which measures 6 mm with extension and 8 mm with flexion. There are possible bilateral pars defects at that level.  2.  Convex left scoliotic curvature.      Xray cervical spine 08/12/2021  Impression: No acute fracture is identified    MRI cervical spine on 05/22/2020  Unremarkable pre and postcontrast MR examination of the cervical spine except for loss of cervical lordosis              Xray thoracic 07/31/2020        IMPRESSION:   1.  No acute findings.  2.  Mild spinal curvature, convex left, in the upper thoracic spine                                                                                                                                                          Diagnosis   Visit Diagnoses     ICD-10-CM   1. Lumbar radiculopathy  M54.16   2. Fibromyalgia  M79.7   3. Low back pain with radiation  M54.50   4. Bulge of lumbar disc without myelopathy  M51.369   5. Spondylolisthesis of lumbosacral region  M43.17   6. Thoracic spine pain  M54.6   7. Lumbar spondylosis  M47.816   8. Muscle spasm  M62.838   9. Other idiopathic scoliosis, thoracic region  M41.24   10. Depression, unspecified depression type  F32.A   11. BMI 40.0-44.9, adult ([facility])  Z68.41   12. Numbness and tingling of left leg  R20.0    R20.2             ASSESSMENT:  Carmen Hebert  1995 is a female seen for above     Carmen was seen today for follow-up.    Diagnoses and all orders for this visit:    Lumbar radiculopathy  -     MR-LUMBAR SPINE-W/O; Future  -     Referral to Physical Therapy    Fibromyalgia  -     Referral to Physical Therapy    Low back pain with radiation  -     MR-LUMBAR SPINE-W/O; Future  -     Referral to Physical Therapy    Bulge of lumbar disc without myelopathy    Spondylolisthesis of lumbosacral region  -     Referral to Physical Therapy    Thoracic spine pain    Lumbar spondylosis    Muscle spasm  -     Referral to Physical Therapy    Other idiopathic scoliosis, thoracic region    Depression, unspecified depression type  -     Patient has been identified as having a positive depression screening. Appropriate orders and counseling have been given.    BMI 40.0-44.9, adult ([facility])  -     Patient identified as having weight management issue.  Appropriate orders and counseling given.    Numbness and tingling of left leg  -     Referral to Pain Clinic  -     MR-LUMBAR SPINE-W/O; Future        Physical Therapy: I ordered water-based physical therapy to focus on strengthening and stretching as well as a home exercise program.  She has done well with water-based physical therapy in the past.  She has done  land-based physical therapy more recently with mixed results.    Diagnostic workup: Personally reviewed at today's visit: MRI lumbar spine 3/21/2023.  Order EMG for further assessment of worsened numbness and tingling and pain radiating down left leg in L5 distribution.  Order updated MRI    Medications:   - continue lyrica 150mg po tid as prescribed by PCP  -Defer the addition of other antidepressant medication for now such as Cymbalta or TCA as the patient is taking Zoloft currently  -Discussed consideration of low-dose naltrexone 4.5 mg daily.  She would like to defer at this time.  Discussed that low-dose naltrexone (4.5 mg daily) is currently supported by research to possibly be effective for neuropathic pain but is not currently covered by insurance. Discussed potential side effects of nightmares     Interventions:   -Status post trigger point injections with a rheumatologist with short-lived relief  -I have discussed possible element of chemical radiculitis and consideration of an epidural if nerve impingement on MRI is seen    Follow-up: With me after EMG with Dr. Pimentel    Thank you very much for asking me to participate in Carmen Hebert's care.  Please contact me with any questions or concerns.      Please note that this dictation was created using voice recognition software. I have made every reasonable attempt to correct obvious errors but there may be errors of grammar and content that I may have overlooked prior to finalization of this note.    Keturah Madrigal MD  Interventional Pain and Spine  Physical Medicine and Rehabilitation  [facility]

## 2025-03-06 NOTE — Clinical Note
Sending this patient to you for an EMG. Worsened numbness and tingling and pain radiating down left leg in L5 distribution.  History of fibromyalgia.  No significant nerve impingement seen on previous MRI.  I ordered an updated MRI.  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

## 2025-03-11 NOTE — Clinical Note
REFERRAL APPROVAL NOTICE         Sent on March 11, 2025                   Carmen Vasques Frankie  [address]   [address]  [place] [address]                   Dear Ms. David,    After a careful review of the medical information and benefit coverage, [facility] has processed your referral. See below for additional details.    If applicable, you must be actively enrolled with your insurance for coverage of the authorized service. If you have any questions regarding your coverage, please contact your insurance directly.    REFERRAL INFORMATION   Referral #:  [id number]  Referred-To Provider    Referred-By Provider:  Physical Therapy    Keturah Madrigal M.D.   [facility]      [address]  [place] [id number] [phone] [address]  [place] [address]  [phone]    Referral Start Date:  03/06/2025  Referral End Date:   03/06/2026             SCHEDULING  If you do not already have an appointment, please call [phone] to make an appointment.     MORE INFORMATION  If you do not already have a [employer] account, sign up at: [email or web address]  You can access your medical information, make appointments, see lab results, billing information, and more.  If you have questions regarding this referral, please contact  the [facility] Referrals department at:             [phone]. Monday - Friday 8:00AM - 5:00PM.     Sincerely,    [facility]

## 2025-03-13 ENCOUNTER — OFFICE VISIT (OUTPATIENT)
Dept: PHYSICAL MEDICINE AND REHAB | Facility: MEDICAL CENTER | Age: 30
End: 2025-03-13
Payer: COMMERCIAL

## 2025-03-13 VITALS
DIASTOLIC BLOOD PRESSURE: 74 MMHG | OXYGEN SATURATION: 95 % | HEIGHT: 70 IN | SYSTOLIC BLOOD PRESSURE: 116 MMHG | HEART RATE: 88 BPM | TEMPERATURE: 97.1 F | BODY MASS INDEX: 41.52 KG/M2 | WEIGHT: 290 LBS

## 2025-03-13 DIAGNOSIS — M54.16 LUMBAR RADICULOPATHY: ICD-10-CM

## 2025-03-13 PROCEDURE — 3078F DIAST BP <80 MM HG: CPT | Performed by: GENERAL PRACTICE

## 2025-03-13 PROCEDURE — 3074F SYST BP LT 130 MM HG: CPT | Performed by: GENERAL PRACTICE

## 2025-03-13 PROCEDURE — 1125F AMNT PAIN NOTED PAIN PRSNT: CPT | Performed by: GENERAL PRACTICE

## 2025-03-13 PROCEDURE — 95886 MUSC TEST DONE W/N TEST COMP: CPT | Mod: LT | Performed by: GENERAL PRACTICE

## 2025-03-13 PROCEDURE — 95909 NRV CNDJ TST 5-6 STUDIES: CPT | Performed by: GENERAL PRACTICE

## 2025-03-13 ASSESSMENT — FIBROSIS 4 INDEX: FIB4 SCORE: 0.31

## 2025-03-13 ASSESSMENT — PAIN SCALES - GENERAL: PAINLEVEL_OUTOF10: 4=SLIGHT-MODERATE PAIN

## 2025-03-13 ASSESSMENT — PATIENT HEALTH QUESTIONNAIRE - PHQ9: CLINICAL INTERPRETATION OF PHQ2 SCORE: 0

## 2025-03-13 NOTE — PROCEDURES
"Electromyography Report          Name: Carmen David    MRN: [id number]   YOB: 1995 (30 y.o.)   Sex: female   Vitals:  Vitals:    03/13/25 0804   BP: 116/74   Weight: (!) 132 kg (290 lb)   Height: 1.778 m (5' 10\")     Body mass index is 41.61 kg/m².    Examining Physician: Angel Pimentel D.O.     Visit Diagnoses     ICD-10-CM   1. Lumbar radiculopathy  M54.16       EMG Examination Date: 3/13/2025     Impression:      This is a technically limited abnormal study.   There is no electrodiagnostic evidence of left lower extremity radiculopathy in segments tested.  Please note, however, that radiculopathy screening via needle EMG assesses motor nerve fibers only, and does not rule out radiculopathies that affects solely sensory nerve roots. Though EMG testing was normal, there may be a sampling error.  Assessment of the sensory nerves of the lower extremities were technically not feasible due to body composition  There is electrodiagnostic findings of a mild left peroneal neuropathy.  The exam today points AGAINST LEFT tibial mononeuropathy.  There is no electrodiagnostic evidence of polyneuropathy.      Recommendations:   - Findings discussed with patient and shared with the referring clinician  - advised to not cross her legs  - instructed patient to continue with MRI lumbar     History:    History of Present Illness  The patient presents for evaluation of bilateral lower extremity paresthesia.    She reports experiencing a combination of numbness and sharp pain localized to the mid-portion of both legs, which she describes as being symmetrically divided. She has noted ecchymosis on her legs, which she attributes to her inability to accurately gauge the intensity of her massages due to the numbness. She denies any associated weakness but acknowledges sensory alterations. Additionally, she experiences intermittent tingling sensations, with the most recent episode occurring last night, extending to " the patella. Although she did not experience any tingling in her toes during the last episode, she recalls previous instances where the sensation extended to the dorsum of her foot.    Relevant Medical Hx:  Thyroid disease          negative  Cancer                        negative                        Diabetes                     negative  Autoimmune disease  negative  Alcohol Use  negative  Hx of sepsis      Physical exam:    Physical Exam  - Motor testing in the lower extremities from L2-S1 is 5 out of 5  - Straight leg raise test is negative  - Sensory response to light touch is intact bilaterally  - Negative Tinel test at bilateral fibular heads      Nerve Conduction Studies and Electromyography  Examination Findings:      Nerve Conduction Studies Examination Findings:       LEFT radial sensory nerve action potential (SNAP) amplitude and peak latency are normal.   BILATERAL sural sensory nerve action potential (SNAP) amplitude and peak latency had no response due to body composition.   LEFT superficial fibular sensory nerve action potential (SNAP) amplitude and peak latency had no response due to body composition.   LEFT  fibular compound muscle action potential (CMAP) amplitude and distal latency are normal. Conduction velocity is delayed.   LEFT  tibial compound muscle action potential (CMAP) amplitude, distal latency, conduction velocity  are normal.     NCS+  Motor Nerve Results      Latency Amplitude F-Lat Segment Distance CV Comment   Site (ms) Norm (mV) Norm (ms)  (cm) (m/s) Norm    Left Fibular (EDB) Motor   Ankle 5.0  < 6.5 2.4  > 2.0         Bel fib head 11.5 - 2.2 -  Bel fib head-Ankle 21 32  > 44    Pop fossa 12.8 - 2.9 -  Pop fossa-Bel fib head 8 62  > 44    Left Tibial (AH) Motor   Ankle 4.4  < 5.8 8.8  > 4.0         Knee 11.5 - 8.7 -  Knee-Ankle 37 52  > 41      Sensory Sites      Neg Peak Lat Amplitude (O-P) Segment Distance Velocity Comment   Site (ms) Norm (µV) Norm  (cm) (ms)    Left Radial  Sensory   Forearm-Wrist 1.90  < 2.9 27  > 15 Forearm-Wrist 10     Left Superficial Fibular Sensory   14 cm-Ankle NR  < 4.4 NR  > 6 14 cm-Ankle 14     Left Sural Sensory   Calf-Lat mall NR  < 4.4 NR  > 6 Calf-Lat mall 0     Right Sural Sensory   Calf-Lat mall NR  < 4.4 NR  > 6 Calf-Lat mall 14       EMG+     Side Muscle Nerve Root Ins.Act Fibs Psw Amp Dur Poly Recrt Int.Pat Comment   Left Tib ant Deep fib,  Fibular L4-L5 Nml Nml Nml Nml >12ms 1+ Nml Nml    Left EHL Deep fib,  Fibular L5-S1 Nml Nml Nml Nml Nml 0 Nml Nml    Left Fib longus Fibular,  Superficial... L5-S1 Nml Nml Nml Nml Nml 0 Nml Nml    Left Vastus med Femoral L2-L4 Nml Nml Nml Nml Nml 0 Nml Nml    Left Gastroc Tibial S1-S2 Nml Nml Nml Nml Nml 0 Nml Nml    Left Biceps fem Sciatic L5-S1 Nml Nml Nml Nml Nml 0 Nml Nml            Waveforms:    Motor         Sensory                Nerve conduction studies (NCS) and electromyography (EMG) are utilized to evaluate direct or indirect damage to the peripheral nervous system. NCS are performed to measure the nerve(s) response(s) to electrostimulation across a given nerve segment. EMG evaluates the passive and active electrical activity of the muscle(s) in question.  Muscles are innervated by specific peripheral nerves and roots. Often times, several nerves the muscle to be examined in order to determine the presence or absence of the disease process. Furthermore, nerves and muscles may need to be tested in a [id number] comparison, as well as in additional extremities, as this may be crucial in characterizing the extent of the disease process, which may be diffuse or isolated and of varying degree of severity. The extent of the neurodiagnostic exam is justified as it may help arrive to a proper diagnosis, which ultimately may contribute to better management of the patient. Therefore, the nerves to muscles examined during the study were medically necessary.    Unless otherwise noted, temperature of the  extremity(s) study was monitored before and during the examination and remained between 32 and 36 degrees C for the upper extremities, and between 30 and 36 degrees C for the lower extremities.} The patient tolerated testing well, without any complications. The study was done with a concentric needle examination.   Reference values are from the [facility] normative data guidelines and [place] related to age guidelines.     This information is more accurate than what was recorded on the EMG computer.     cpt 66144. Nerve conduction studies,  5-6 studies  CPT 79942. needle electromyography, complete, each extremity.       Angel Pimentel D.O.

## 2025-03-13 NOTE — PROGRESS NOTES
Nursing Transfer Note      5/29/2023     Report given to Telma RN     Reason patient is being transferred: No longer needs to be treated in the ICU setting.    Transfer To:     Transfer via wheelchair    Transfer with cardiac monitoring    Transported by Babita Ng RN    Telemetry: Box Number [id number], Rate 105, Rhythm NSR, and Telemetry  N/A     Medicines sent: None    Any special needs or follow-up needed: PT/OT    Chart send with patient: Yes    Notified: sister Alonso, at the bedside    Patient reassessed at: 5/29/2023 1220    Upon arrival to floor: cardiac monitor applied, patient oriented to room, call bell in reach, and bed in lowest position   Today's encounter was a procedure only visit. Please see separate procedure note for details.

## 2025-03-13 NOTE — Clinical Note
This is a technically limited abnormal study.  1. There is no electrodiagnostic evidence of left lower extremity radiculopathy in segments tested.  Please note, however, that radiculopathy screening via needle EMG assesses motor nerve fibers only, and does not rule out radiculopathies that affects solely sensory nerve roots. Though EMG testing was normal, there may be a sampling error. 2. Assessment of the sensory nerves of the lower extremities were technically not feasible due to body composition 3. There is electrodiagnostic findings of a mild left peroneal neuropathy. 4. The exam today points AGAINST LEFT tibial mononeuropathy. 5. There is no electrodiagnostic evidence of polyneuropathy.

## 2025-03-27 ENCOUNTER — TELEMEDICINE (OUTPATIENT)
Dept: MEDICAL GROUP | Facility: MEDICAL CENTER | Age: 30
End: 2025-03-27
Payer: COMMERCIAL

## 2025-03-27 VITALS — WEIGHT: 291.01 LBS | HEIGHT: 70 IN | BODY MASS INDEX: 41.66 KG/M2

## 2025-03-27 DIAGNOSIS — F33.9 RECURRENT MAJOR DEPRESSIVE DISORDER, REMISSION STATUS UNSPECIFIED (HCC): ICD-10-CM

## 2025-03-27 DIAGNOSIS — Z83.3 FAMILY HISTORY OF DIABETES MELLITUS: ICD-10-CM

## 2025-03-27 DIAGNOSIS — R63.8 WEIGHT DISORDER: ICD-10-CM

## 2025-03-27 DIAGNOSIS — R53.83 OTHER FATIGUE: ICD-10-CM

## 2025-03-27 DIAGNOSIS — M79.7 FIBROMYALGIA: ICD-10-CM

## 2025-03-27 RX ORDER — PREGABALIN 150 MG/1
150 CAPSULE ORAL
Qty: 90 CAPSULE | Refills: 0 | Status: SHIPPED | OUTPATIENT
Start: 2025-06-21 | End: 2025-07-21

## 2025-03-27 RX ORDER — PREGABALIN 150 MG/1
150 CAPSULE ORAL
Qty: 90 CAPSULE | Refills: 0 | Status: SHIPPED | OUTPATIENT
Start: 2025-05-23 | End: 2025-06-22

## 2025-03-27 RX ORDER — PREGABALIN 150 MG/1
150 CAPSULE ORAL
Qty: 90 CAPSULE | Refills: 0 | Status: SHIPPED | OUTPATIENT
Start: 2025-04-24 | End: 2025-05-24

## 2025-03-27 RX ORDER — SERTRALINE HYDROCHLORIDE 100 MG/1
200 TABLET, FILM COATED ORAL DAILY
Qty: 180 TABLET | Refills: 3 | Status: SHIPPED | OUTPATIENT
Start: 2025-03-27

## 2025-03-27 ASSESSMENT — ENCOUNTER SYMPTOMS
FEVER: 0
PALPITATIONS: 0
CHILLS: 0

## 2025-03-27 ASSESSMENT — FIBROSIS 4 INDEX: FIB4 SCORE: 0.31

## 2025-03-27 NOTE — PROGRESS NOTES
Virtual Visit: Established Patient   This visit was conducted via Teams using secure and encrypted videoconferencing technology.   The patient was in their home in the [facility].    The patient's identity was confirmed and verbal consent was obtained for this virtual visit.   This evaluation was conducted via Teams using secure and encrypted videoconferencing technology. The patient was in their home in the [facility].    The patient's identity was confirmed and verbal consent was obtained for this virtual visit.     Patient agreed to using MARCIA: yes    Carmen was seen today for follow-up.    Diagnoses and all orders for this visit:    Recurrent major depressive disorder, remission status unspecified (HCC)  -     sertraline (ZOLOFT) 100 MG Tab; Take 2 Tablets by mouth every day. 200 mg = 2 tablets    Fibromyalgia  -     pregabalin (LYRICA) 150 MG Cap; Take 1 Capsule by mouth 3 times a day for 30 days.  -     pregabalin (LYRICA) 150 MG Cap; Take 1 Capsule by mouth 3 times a day for 30 days.  -     pregabalin (LYRICA) 150 MG Cap; Take 1 Capsule by mouth 3 times a day for 30 days.    Other fatigue  -     TSH; Future  -     T3 FREE; Future  -     FREE THYROXINE; Future  -     VITAMIN D,25 HYDROXY (DEFICIENCY); Future  -     VITAMIN B12; Future  -     IRON/TOTAL IRON BIND; Future  -     FERRITIN; Future  -     Referral to Pulmonary and Sleep Medicine    Weight disorder  -     CBC WITHOUT DIFFERENTIAL; Future  -     Comp Metabolic Panel; Future  -     HEMOGLOBIN A1C; Future  -     Lipid Profile; Future  -     Referral to Pulmonary and Sleep Medicine    Family history of diabetes mellitus  -     HEMOGLOBIN A1C; Future              Assessment & Plan  1. Depression.  Chronic and stable condition.  Denies SI.  Her depression is currently well-managed with sertraline 200 mg daily. A refill for sertraline 200 mg has been provided.    2. Fatigue.  3.  Weight disorder  Chronic and stable condition, currently exacerbated.   She reports persistent fatigue and weight gain. Laboratory tests have been ordered to evaluate thyroid function, diabetes, vitamin D, B12, iron levels, and ferritin.  Will send referral for sleep apnea evaluation.    4.  Fibromyalgia.  Chronic and stable condition.  She is currently taking pregabalin 150 mg three times a day for fibromyalgia. A refill for pregabalin 150 mg, 90 pills for 30 days, has been provided.  PDMP verified.  Controlled substance agreement obtained 6/7/2024    5.  Family history of diabetes  Will obtain A1c labs.    Subjective:   CC:   Chief Complaint   Patient presents with    Follow-Up     3 months fv        History of Present Illness  The patient presents for a medication refill and evaluation of depression, fatigue, weight gain, neuropathy, and sleep apnea.    She is seeking a refill of her pregabalin prescription, which she takes at a dosage of 150 mg three times daily. She has been experiencing an exacerbation of her depression symptoms, which she attributes to external factors. Despite these challenges, she has found some relief through therapy. Her sertraline prescription was recently refilled, but she is uncertain about the availability of additional refills. She is currently on a daily regimen of sertraline 200 mg.    She has been experiencing persistent fatigue and weight gain. She also reports neuropathic symptoms in her left outer thigh. Given her family history of diabetes, she is concerned about the potential development of this condition, even though she has not previously exhibited any signs.    She has not been evaluated for sleep apnea. She does not report any symptoms of gasping for air during sleep or snoring. She has recently started using the sleep tracking feature on her Apple watch, which did not record any awakenings last night.    FAMILY HISTORY  Her grandmother was recently diagnosed with diabetes. Her aunt has sleep apnea.    MEDICATIONS  Current: Pregabalin,  sertraline       Review of Systems   Constitutional:  Positive for malaise/fatigue. Negative for chills and fever.   Cardiovascular:  Negative for chest pain, palpitations and leg swelling.        Current medicines (including changes today)  Current Outpatient Medications   Medication Sig Dispense Refill    sertraline (ZOLOFT) 100 MG Tab Take 2 Tablets by mouth every day. 200 mg = 2 tablets 180 Tablet 3    [START ON 4/24/2025] pregabalin (LYRICA) 150 MG Cap Take 1 Capsule by mouth 3 times a day for 30 days. 90 Capsule 0    [START ON 5/23/2025] pregabalin (LYRICA) 150 MG Cap Take 1 Capsule by mouth 3 times a day for 30 days. 90 Capsule 0    [START ON 6/21/2025] pregabalin (LYRICA) 150 MG Cap Take 1 Capsule by mouth 3 times a day for 30 days. 90 Capsule 0    azithromycin (ZITHROMAX) 250 MG Tab Take 2 tabs on day 1, then take 1 tab on days 2-5 6 Tablet 0    triamcinolone (NASACORT) 55 MCG/ACT nasal inhaler Administer 2 Sprays into affected nostril(S) every day. 16 mL 1    propranolol LA (INDERAL LA) 60 MG CAPSULE SR 24 HR Take 1 Capsule by mouth every day. Please call to schedule follow up appointment for further refills. Please call [phone]. Thank you. 90 Capsule 3    Norethindrone Acet-Ethinyl Est (BELL 1.5/30) 1.5-30 MG-MCG Tab TAKE 1 TABLET BY MOUTH DAILY. TAKE CONTINUOUSLY NO, PLACEBO WEEK 84 Tablet 3    fluticasone (FLONASE) 50 MCG/ACT nasal spray Administer 2 Sprays into affected nostril(S) every day. 16 g 11    Tirzepatide-Weight Management (ZEPBOUND) 2.5 MG/0.5ML Solution Auto-injector Inject 2.5 mg under the skin every 7 days. 4 mL 0    meclizine (ANTIVERT) 12.5 MG Tab Take 1 Tablet by mouth 3 times a day as needed for Dizziness. 90 Tablet 0    fluticasone (FLONASE) 50 MCG/ACT nasal spray Administer 1 Spray into affected nostril(S) every day. 16 g 0    cyanocobalamin (VITAMIN B-12) 100 MCG Tab Take 100 mcg by mouth every day.      fluticasone-salmeterol (ADVAIR) 100-50 MCG/ACT AEROSOL POWDER, BREATH  "ACTIVATED Inhale 1 Puff every 12 hours. 1 Each 1    Ascorbic Acid (VITAMIN C) 1000 MG Tab Take 1,000 mg by mouth every day.      vitamin D3 (CHOLECALCIFEROL) 1000 Unit (25 mcg) Tab Take 1,000 Units by mouth every day.      Calcium-Magnesium-Zinc (IRMA-MAG-ZINC PO) Take 1 Tablet by mouth every day.      albuterol 108 (90 Base) MCG/ACT Aero Soln inhalation aerosol Inhale 2 Puffs every four hours as needed for Shortness of Breath. 1 Each 11    tizanidine (ZANAFLEX) 4 MG Tab Take 4 mg by mouth every 6 hours as needed.      ALPRAZolam (XANAX) 0.25 MG Tab Take 0.25 mg by mouth at bedtime as needed for Sleep.       No current facility-administered medications for this visit.        Objective:   Ht 1.778 m (5' 10\")   Wt (!) 132 kg (291 lb 0.1 oz)   BMI 41.76 kg/m²     Physical Exam:  Constitutional: Alert, no distress, well-groomed.  Skin: No rashes in visible areas.  Eye: Round. Conjunctiva clear, lids normal. No icterus.   ENMT: Lips pink without lesions, good dentition, moist mucous membranes. Phonation normal.  Neck: No masses, no thyromegaly. Moves freely without pain.  Respiratory: Unlabored respiratory effort, no cough or audible wheeze  Psych: Alert and oriented x3, normal affect and mood.     Results         Discussed with patient possible alternative diagnoses, patient is to take all medications as prescribed.      If symptoms persist FU w/PCP, if symptoms worsen go to emergency room.      If experiencing any side effects from prescribed medications report to the office immediately or go to emergency room.     Reviewed indication, dosage, usage and potential adverse effects of prescribed medications.      Reviewed risks and benefits of treatment plan. Patient verbalizes understanding of all instruction and verbally agrees to plan.     Discussed plan with the patient, and patient agrees to the above.      I personally reviewed prior external notes and test results pertinent to today's visit.     No follow-ups on " file. Will schedule for annual exam

## 2025-04-02 NOTE — Clinical Note
REFERRAL APPROVAL NOTICE         Sent on April 2, 2025                   Carmen David  [address]   [address]  [place] [address]                   Dear Ms. David,    After a careful review of the medical information and benefit coverage, [facility] has processed your referral. See below for additional details.    If applicable, you must be actively enrolled with your insurance for coverage of the authorized service. If you have any questions regarding your coverage, please contact your insurance directly.    REFERRAL INFORMATION   Referral #:  [id number]  Referred-To Department    Referred-By Provider:  Pulmonary and Sleep Medicine    HEMA Alonzo   Pulmonary/sleep [facility]      [address]  [address]  [place] [address] [phone] [address]  [place] [address] [phone]    Referral Start Date:  03/27/2025  Referral End Date:   03/27/2026           SCHEDULING  If you do not already have an appointment, please call [phone] to make an appointment.   MORE INFORMATION  As a reminder, [facility] ownership has changed, meaning this location is now owned and operated by [facility]. As such, we want to clarify that our patients should expect to receive two separate bills for the services received at [facility] - one representing the [facility] facility fees as the owner of the establishment, and the other to represent the physician's services and subsequent fees. You can speak with your insurance carrier for a pricing estimate by calling the customer service number on the back of your card and ask about charges for a hospital outpatient visit.  If you do not already have a [employer] account, sign up at: [email or web address]  You can access your medical information, make appointments, see lab  results, billing information, and more.  If you have questions regarding this referral, please contact  the [facility] department at:             [phone]. Monday - Friday 7:30AM - 5:00PM.      Sincerely,  [facility]

## 2025-04-08 ENCOUNTER — APPOINTMENT (OUTPATIENT)
Dept: RADIOLOGY | Facility: MEDICAL CENTER | Age: 30
End: 2025-04-08
Attending: STUDENT IN AN ORGANIZED HEALTH CARE EDUCATION/TRAINING PROGRAM
Payer: COMMERCIAL

## 2025-04-09 ENCOUNTER — APPOINTMENT (OUTPATIENT)
Dept: PHYSICAL MEDICINE AND REHAB | Facility: MEDICAL CENTER | Age: 30
End: 2025-04-09
Payer: COMMERCIAL

## 2025-04-11 ENCOUNTER — HOSPITAL ENCOUNTER (OUTPATIENT)
Facility: MEDICAL CENTER | Age: 30
End: 2025-04-11
Attending: NURSE PRACTITIONER
Payer: COMMERCIAL

## 2025-04-11 ENCOUNTER — APPOINTMENT (OUTPATIENT)
Dept: MEDICAL GROUP | Facility: MEDICAL CENTER | Age: 30
End: 2025-04-11
Payer: COMMERCIAL

## 2025-04-11 VITALS
TEMPERATURE: 97 F | DIASTOLIC BLOOD PRESSURE: 88 MMHG | SYSTOLIC BLOOD PRESSURE: 110 MMHG | HEART RATE: 83 BPM | OXYGEN SATURATION: 90 % | BODY MASS INDEX: 41.03 KG/M2 | WEIGHT: 286.6 LBS | HEIGHT: 70 IN

## 2025-04-11 DIAGNOSIS — Z11.51 SCREENING FOR HPV (HUMAN PAPILLOMAVIRUS): ICD-10-CM

## 2025-04-11 DIAGNOSIS — Z11.3 ROUTINE SCREENING FOR STI (SEXUALLY TRANSMITTED INFECTION): ICD-10-CM

## 2025-04-11 DIAGNOSIS — Z01.419 WELL FEMALE EXAM WITH ROUTINE GYNECOLOGICAL EXAM: ICD-10-CM

## 2025-04-11 DIAGNOSIS — Z97.5 IUD CONTRACEPTION: ICD-10-CM

## 2025-04-11 PROCEDURE — 3079F DIAST BP 80-89 MM HG: CPT | Performed by: NURSE PRACTITIONER

## 2025-04-11 PROCEDURE — 87491 CHLMYD TRACH DNA AMP PROBE: CPT

## 2025-04-11 PROCEDURE — 99395 PREV VISIT EST AGE 18-39: CPT | Performed by: NURSE PRACTITIONER

## 2025-04-11 PROCEDURE — 87591 N.GONORRHOEAE DNA AMP PROB: CPT

## 2025-04-11 PROCEDURE — 87624 HPV HI-RISK TYP POOLED RSLT: CPT

## 2025-04-11 PROCEDURE — 3074F SYST BP LT 130 MM HG: CPT | Performed by: NURSE PRACTITIONER

## 2025-04-11 PROCEDURE — 88142 CYTOPATH C/V THIN LAYER: CPT

## 2025-04-11 ASSESSMENT — FIBROSIS 4 INDEX: FIB4 SCORE: 0.31

## 2025-04-11 NOTE — PROGRESS NOTES
Established Patient    CC: Carmen David is a 30 y.o.,female who presents today for Pap and pelvic exam. No complaints today.    HPI:       History of Present Illness      Her LMP was dont have periods.    She is having irregular menses.   Her menstrual history is unremarkable.   Her form of contraception is  oral contraceptives  Hx of abnormal pelvic exams: No .   Last Pap last yr.  Hx of abnormal Pap yes  OB History    Para Term  AB Living   0 0 0 0 0 0   SAB IAB Ectopic Molar Multiple Live Births   0 0 0 0 0 0      Social History     Substance and Sexual Activity   Sexual Activity Yes    Partners: Male    Birth control/protection: Pill, Condom     Sexual history: single partner   She  reports that she has never smoked. She has never used smokeless tobacco.  Patient denies any current symptoms: No dyspareunia, no itching, no discharge, and no lesions.  Patient denies fam Hx of GYN or breast cancers.   She is , P:0.   Previous pregnancies, without significant complications.   Patient denies any STD risks or concerns.   Patient denies any history of sexual abuse.   Patient denies breast masses or lesions .  Last mammo:  (due at 38 )    No problem-specific Assessment & Plan notes found for this encounter.          Allergies: Corn-related products, Dairy food allergy, Gluten meal, Sesame seed (diagnostic), and Soy allergy    Current Outpatient Medications   Medication Sig Dispense Refill    sertraline (ZOLOFT) 100 MG Tab Take 2 Tablets by mouth every day. 200 mg = 2 tablets 180 Tablet 3    [START ON 2025] pregabalin (LYRICA) 150 MG Cap Take 1 Capsule by mouth 3 times a day for 30 days. 90 Capsule 0    [START ON 2025] pregabalin (LYRICA) 150 MG Cap Take 1 Capsule by mouth 3 times a day for 30 days. 90 Capsule 0    [START ON 2025] pregabalin (LYRICA) 150 MG Cap Take 1 Capsule by mouth 3 times a day for 30 days. 90 Capsule 0    azithromycin (ZITHROMAX) 250 MG Tab Take 2 tabs  on day 1, then take 1 tab on days 2-5 6 Tablet 0    triamcinolone (NASACORT) 55 MCG/ACT nasal inhaler Administer 2 Sprays into affected nostril(S) every day. 16 mL 1    propranolol LA (INDERAL LA) 60 MG CAPSULE SR 24 HR Take 1 Capsule by mouth every day. Please call to schedule follow up appointment for further refills. Please call [phone]. Thank you. 90 Capsule 3    Norethindrone Acet-Ethinyl Est (BELL 1.5/30) 1.5-30 MG-MCG Tab TAKE 1 TABLET BY MOUTH DAILY. TAKE CONTINUOUSLY NO, PLACEBO WEEK 84 Tablet 3    fluticasone (FLONASE) 50 MCG/ACT nasal spray Administer 2 Sprays into affected nostril(S) every day. 16 g 11    Tirzepatide-Weight Management (ZEPBOUND) 2.5 MG/0.5ML Solution Auto-injector Inject 2.5 mg under the skin every 7 days. 4 mL 0    meclizine (ANTIVERT) 12.5 MG Tab Take 1 Tablet by mouth 3 times a day as needed for Dizziness. 90 Tablet 0    fluticasone (FLONASE) 50 MCG/ACT nasal spray Administer 1 Spray into affected nostril(S) every day. 16 g 0    cyanocobalamin (VITAMIN B-12) 100 MCG Tab Take 100 mcg by mouth every day.      fluticasone-salmeterol (ADVAIR) 100-50 MCG/ACT AEROSOL POWDER, BREATH ACTIVATED Inhale 1 Puff every 12 hours. 1 Each 1    Ascorbic Acid (VITAMIN C) 1000 MG Tab Take 1,000 mg by mouth every day.      vitamin D3 (CHOLECALCIFEROL) 1000 Unit (25 mcg) Tab Take 1,000 Units by mouth every day.      Calcium-Magnesium-Zinc (IRMA-MAG-ZINC PO) Take 1 Tablet by mouth every day.      albuterol 108 (90 Base) MCG/ACT Aero Soln inhalation aerosol Inhale 2 Puffs every four hours as needed for Shortness of Breath. 1 Each 11    tizanidine (ZANAFLEX) 4 MG Tab Take 4 mg by mouth every 6 hours as needed.      ALPRAZolam (XANAX) 0.25 MG Tab Take 0.25 mg by mouth at bedtime as needed for Sleep.       No current facility-administered medications for this visit.     Patient Active Problem List    Diagnosis Date Noted    Family history of diabetes mellitus 03/27/2025    Weight disorder 03/27/2025     Other fatigue 03/27/2025    Fever 01/29/2025    Head cold 01/29/2025    Sinus congestion 01/29/2025    Sore throat 01/29/2025    Acute cough 01/29/2025    Cough 01/29/2025    Chest congestion 01/29/2025    Phlegm in throat 01/29/2025    Concentration deficit 12/27/2024    Family history of attention deficit hyperactivity disorder (ADHD) 12/27/2024    Menstrual irregularity 12/27/2024    Vaginal discomfort 08/07/2024    Vaginal odor 08/07/2024    Cat scratch 07/09/2024    Vaginal itching 06/07/2024    Candidiasis 06/07/2024    Thalassemia minor 05/30/2024    Vaginal discharge 05/30/2024    BPPV (benign paroxysmal positional vertigo), bilateral 05/09/2024    Anxiety 12/06/2021    Major depression, recurrent (HCC) 04/02/2021    Thalassemia 04/08/2020    Asthma 04/08/2020    Celiac disease 04/08/2020    Seasonal allergies 04/08/2020    Mixed anxiety depressive disorder 04/08/2020    Fibromyalgia 03/10/2020    Obesity (BMI 30-39.9) 03/10/2020    Palpitations 03/10/2020     Current Outpatient Medications on File Prior to Visit   Medication Sig Dispense Refill    sertraline (ZOLOFT) 100 MG Tab Take 2 Tablets by mouth every day. 200 mg = 2 tablets 180 Tablet 3    [START ON 4/24/2025] pregabalin (LYRICA) 150 MG Cap Take 1 Capsule by mouth 3 times a day for 30 days. 90 Capsule 0    [START ON 5/23/2025] pregabalin (LYRICA) 150 MG Cap Take 1 Capsule by mouth 3 times a day for 30 days. 90 Capsule 0    [START ON 6/21/2025] pregabalin (LYRICA) 150 MG Cap Take 1 Capsule by mouth 3 times a day for 30 days. 90 Capsule 0    azithromycin (ZITHROMAX) 250 MG Tab Take 2 tabs on day 1, then take 1 tab on days 2-5 6 Tablet 0    triamcinolone (NASACORT) 55 MCG/ACT nasal inhaler Administer 2 Sprays into affected nostril(S) every day. 16 mL 1    propranolol LA (INDERAL LA) 60 MG CAPSULE SR 24 HR Take 1 Capsule by mouth every day. Please call to schedule follow up appointment for further refills. Please call [phone]. Thank you. 90 Capsule  3    Norethindrone Acet-Ethinyl Est (BELL 1.5/30) 1.5-30 MG-MCG Tab TAKE 1 TABLET BY MOUTH DAILY. TAKE CONTINUOUSLY NO, PLACEBO WEEK 84 Tablet 3    fluticasone (FLONASE) 50 MCG/ACT nasal spray Administer 2 Sprays into affected nostril(S) every day. 16 g 11    Tirzepatide-Weight Management (ZEPBOUND) 2.5 MG/0.5ML Solution Auto-injector Inject 2.5 mg under the skin every 7 days. 4 mL 0    meclizine (ANTIVERT) 12.5 MG Tab Take 1 Tablet by mouth 3 times a day as needed for Dizziness. 90 Tablet 0    fluticasone (FLONASE) 50 MCG/ACT nasal spray Administer 1 Spray into affected nostril(S) every day. 16 g 0    cyanocobalamin (VITAMIN B-12) 100 MCG Tab Take 100 mcg by mouth every day.      fluticasone-salmeterol (ADVAIR) 100-50 MCG/ACT AEROSOL POWDER, BREATH ACTIVATED Inhale 1 Puff every 12 hours. 1 Each 1    Ascorbic Acid (VITAMIN C) 1000 MG Tab Take 1,000 mg by mouth every day.      vitamin D3 (CHOLECALCIFEROL) 1000 Unit (25 mcg) Tab Take 1,000 Units by mouth every day.      Calcium-Magnesium-Zinc (IRMA-MAG-ZINC PO) Take 1 Tablet by mouth every day.      albuterol 108 (90 Base) MCG/ACT Aero Soln inhalation aerosol Inhale 2 Puffs every four hours as needed for Shortness of Breath. 1 Each 11    tizanidine (ZANAFLEX) 4 MG Tab Take 4 mg by mouth every 6 hours as needed.      ALPRAZolam (XANAX) 0.25 MG Tab Take 0.25 mg by mouth at bedtime as needed for Sleep.       No current facility-administered medications on file prior to visit.     Family History   Problem Relation Age of Onset    Fibromyalgia Mother     Lung Disease Mother         asthma r/o    Depression Mother     Anxiety disorder Mother     ADD / ADHD Father     Depression Father      Social History     Tobacco Use    Smoking status: Never    Smokeless tobacco: Never   Substance Use Topics    Alcohol use: Yes     Comment: approx 1 drink per week       Allergies, past medical history, past surgical history, medications, family history, social history reviewed and  "updated.    Exercise: minimal exercise  Her preventative health screens are up to date.    ROS:  Constitutional: Denies fevers or chills  Eyes: Denies changes in vision  Ears/Nose/Throat/Mouth: Denies nasal congestion or sore throat   Cardiovascular: Denies chest pain or palpitations   Respiratory: Denies shortness of breath, Denies cough  Gastrointestinal/Hepatic: Denies abdominal pain, nausea, vomiting   Genitourinary: Denies dysuria or frequency  Musculoskeletal/Rheum: Denies joint pain and swelling   Neurological: Denies headache or visual changes  Psychiatric: Denies mood disorder   Endocrine: Denies hx of diabetes or thyroid dysfunction  Heme/Oncology/Lymph Nodes: Denies weight changes or enlarged LNs.    GYN ROS:    Cramping is none.   She does not take OTC analgesics for cramping  No significant bloating/fluid retention, no pelvic pain, or dyspareunia. No abnormal bleeding or vaginal discharge.   No breast pain or tenderness, no new or enlarging lumps on elf-exam, nipple discharge, changes in size or contour, or abnormal cyclic discomfort.  No urinary tract symptoms, no incontinence, no polydipsia, polyuria,  No abdominal pain, change in bowel habits, black or bloody stools.    No menstrual migraines   No depression, labile mood, anxiety, libido changes, insomnia.  No temperature intolerance.    /88   Pulse 83   Temp 36.1 °C (97 °F) (Temporal)   Ht 1.778 m (5' 10\")   Wt (!) 130 kg (286 lb 9.6 oz)   SpO2 90%   BMI 41.12 kg/m²   Body mass index is 41.12 kg/m².  Vitals Noted and Reviewed    Physical Exam    Physical Exam  Constitutional:       General: She is not in acute distress.     Appearance: She is not ill-appearing.   HENT:      Head: Normocephalic and atraumatic.      Nose: Nose normal.   Eyes:      General:         Right eye: No discharge.         Left eye: No discharge.   Cardiovascular:      Rate and Rhythm: Normal rate.      Pulses: Normal pulses.      Heart sounds: Normal heart sounds. " No murmur heard.  Pulmonary:      Effort: Pulmonary effort is normal. No respiratory distress.      Breath sounds: Normal breath sounds. No stridor. No wheezing or rales.   Skin:     Findings: No rash.   Neurological:      General: No focal deficit present.      Mental Status: She is alert. Mental status is at baseline.   Psychiatric:         Mood and Affect: Mood normal.         Behavior: Behavior normal.          Pelvic exam:   External genitalia are normal in appearance, no lesions.   Vulva: grossly unremarkable, no lesions or masses noted  Vagina: no abnormal discharge, normal color  Speculum exam:  Vaginal Mucosa: normal vaginal mucosa  Cervix: parous, normal cervix and mucosa, no lesions, non-friable. No cervical motion tenderness.  Pap performed and sample collected and sent to lab.  Uterus: Normal shape, position and consistency, normal in size, no masses.  Bimanual exam: No uteromegaly, negative chandelier sign, adnexa freely movable and without enlargements bilaterally.  Rectal: not performed  Breast exam: Bilateral breasts are normal in appearance. Nipple and areola are normal in appearance. No nipple retraction. No abnormal skin texture. No dominant masses. No axillary lymphadenopathy, no skin changes.   Breasts: breasts symmetric, no dominant or suspicious mass, no skin or nipple changes, and no axillary adenopathy    A chaperone was offered to the patient during today's exam. Chaperone name: Suyapa was present.    Assessment and Plan  NormalGYN Exam  pap smear  return annually or prn  Pap was processed and sent to the lab.    There are no diagnoses linked to this encounter.      Assessment & Plan           Plan:   pap smear  return annually or prn  Discussed  breast self exam, STD prevention, feminine hygiene, diet and exercise     Discussed  breast self exam, diet and exercise, different methods of contraception   Follow-up in one year for annual physical.    This note was created using voice  recognition software. There may be unintended errors in spelling, grammar or content.

## 2025-04-12 LAB
C TRACH DNA GENITAL QL NAA+PROBE: NEGATIVE
N GONORRHOEA DNA GENITAL QL NAA+PROBE: NEGATIVE
SPECIMEN SOURCE: NORMAL

## 2025-04-17 NOTE — Clinical Note
REFERRAL APPROVAL NOTICE         Sent on April 17, 2025                   Carmen Vasques Frankie  [address] [facility] [facility]   [address]  [place] [address]                   Dear Ms. David,    After a careful review of the medical information and benefit coverage, [facility] has processed your referral. See below for additional details.    If applicable, you must be actively enrolled with your insurance for coverage of the authorized service. If you have any questions regarding your coverage, please contact your insurance directly.    REFERRAL INFORMATION   Referral #:  [id number]  Referred-To Department    Referred-By Provider:  OB/Gyn    [facility]      [address]  [address]  [place] [address] [phone] [address], [address]  [place] [address] [phone]    Referral Start Date:  04/11/2025  Referral End Date:   04/11/2026             SCHEDULING  If you do not already have an appointment, please call [phone] to make an appointment.     MORE INFORMATION  If you do not already have a [employer] account, sign up at: [email or web address]  You can access your medical information, make appointments, see lab results, billing information, and more.  If you have questions regarding this referral, please contact  the [facility] Referrals department at:             [phone]. Monday - Friday 8:00AM - 5:00PM.     Sincerely,    [facility]

## 2025-04-21 LAB
HPV I/H RISK 1 DNA SPEC QL NAA+PROBE: NOT DETECTED
SPECIMEN SOURCE: NORMAL
THINPREP PAP, CYTOLOGY NL11781: NORMAL

## 2025-04-23 ENCOUNTER — RESULTS FOLLOW-UP (OUTPATIENT)
Dept: MEDICAL GROUP | Facility: MEDICAL CENTER | Age: 30
End: 2025-04-23

## 2025-04-30 ENCOUNTER — APPOINTMENT (OUTPATIENT)
Dept: RADIOLOGY | Facility: MEDICAL CENTER | Age: 30
End: 2025-04-30
Attending: STUDENT IN AN ORGANIZED HEALTH CARE EDUCATION/TRAINING PROGRAM
Payer: COMMERCIAL

## 2025-05-05 ENCOUNTER — TELEPHONE (OUTPATIENT)
Dept: HEALTH INFORMATION MANAGEMENT | Facility: OTHER | Age: 30
End: 2025-05-05
Payer: COMMERCIAL

## 2025-05-14 ENCOUNTER — OFFICE VISIT (OUTPATIENT)
Dept: PHYSICAL MEDICINE AND REHAB | Facility: MEDICAL CENTER | Age: 30
End: 2025-05-14
Payer: COMMERCIAL

## 2025-05-14 VITALS
TEMPERATURE: 97.4 F | DIASTOLIC BLOOD PRESSURE: 66 MMHG | WEIGHT: 293 LBS | OXYGEN SATURATION: 97 % | HEIGHT: 70 IN | SYSTOLIC BLOOD PRESSURE: 105 MMHG | BODY MASS INDEX: 41.95 KG/M2 | HEART RATE: 71 BPM

## 2025-05-14 DIAGNOSIS — M51.369 BULGE OF LUMBAR DISC WITHOUT MYELOPATHY: ICD-10-CM

## 2025-05-14 DIAGNOSIS — M54.50 LOW BACK PAIN WITH RADIATION: ICD-10-CM

## 2025-05-14 DIAGNOSIS — M54.16 LUMBAR RADICULOPATHY: ICD-10-CM

## 2025-05-14 DIAGNOSIS — Z13.31 POSITIVE DEPRESSION SCREENING: ICD-10-CM

## 2025-05-14 DIAGNOSIS — M43.17 SPONDYLOLISTHESIS OF LUMBOSACRAL REGION: ICD-10-CM

## 2025-05-14 DIAGNOSIS — M79.7 FIBROMYALGIA: ICD-10-CM

## 2025-05-14 DIAGNOSIS — M41.24 OTHER IDIOPATHIC SCOLIOSIS, THORACIC REGION: ICD-10-CM

## 2025-05-14 DIAGNOSIS — M62.838 MUSCLE SPASM: ICD-10-CM

## 2025-05-14 DIAGNOSIS — T14.8XXA MUSCLE STRAIN: Primary | ICD-10-CM

## 2025-05-14 DIAGNOSIS — M47.816 LUMBAR SPONDYLOSIS: ICD-10-CM

## 2025-05-14 DIAGNOSIS — M54.6 THORACIC SPINE PAIN: ICD-10-CM

## 2025-05-14 PROCEDURE — 3078F DIAST BP <80 MM HG: CPT | Performed by: STUDENT IN AN ORGANIZED HEALTH CARE EDUCATION/TRAINING PROGRAM

## 2025-05-14 PROCEDURE — 3074F SYST BP LT 130 MM HG: CPT | Performed by: STUDENT IN AN ORGANIZED HEALTH CARE EDUCATION/TRAINING PROGRAM

## 2025-05-14 PROCEDURE — 99214 OFFICE O/P EST MOD 30 MIN: CPT | Performed by: STUDENT IN AN ORGANIZED HEALTH CARE EDUCATION/TRAINING PROGRAM

## 2025-05-14 PROCEDURE — 1125F AMNT PAIN NOTED PAIN PRSNT: CPT | Performed by: STUDENT IN AN ORGANIZED HEALTH CARE EDUCATION/TRAINING PROGRAM

## 2025-05-14 RX ORDER — METHYLPREDNISOLONE 4 MG/1
TABLET ORAL
Qty: 21 TABLET | Refills: 0 | Status: SHIPPED | OUTPATIENT
Start: 2025-05-14

## 2025-05-14 ASSESSMENT — PATIENT HEALTH QUESTIONNAIRE - PHQ9
SUM OF ALL RESPONSES TO PHQ QUESTIONS 1-9: 10
5. POOR APPETITE OR OVEREATING: 2 - MORE THAN HALF THE DAYS
CLINICAL INTERPRETATION OF PHQ2 SCORE: 3

## 2025-05-14 ASSESSMENT — FIBROSIS 4 INDEX: FIB4 SCORE: 0.31

## 2025-05-14 ASSESSMENT — PAIN SCALES - GENERAL: PAINLEVEL_OUTOF10: 8=MODERATE-SEVERE PAIN

## 2025-05-14 NOTE — PROGRESS NOTES
Verbal consent was acquired by the patient to use [employer] ambient listening note generation during this visit Yes     Follow-up patient note    Physiatry (physical medicine and  Rehabilitation), interventional spine and sports medicine    Date of Service:2025      Chief complaint:   Chief Complaint   Patient presents with    Follow-Up     1m fv-back pain       HISTORY    HPI: Carmen Hebert  1995 is a female who presents today for follow-up evaluation of pain complaints.  She has a diagnosis of fibromyalgia.      History of Present Illness    The patient presents with sudden onset of severe left-sided back pain.    Left Back Pain  - Described as stabbing, occurring upon awakening on Saturday  - Pain is intense, making it difficult to stand or drive, and feels like a massive bruise  - Pain intensifies with certain movements, such as bending  - Accompanied by intermittent spasms  - No rashes or skin changes noted  - Using a back brace for support  - Alternating ice and heat applications, with some relief from heat  - Stretching has not improved range of motion  - Tizanidine and hydrocodone have not provided significant relief  - Pending authorization for water-based physical therapy  - Continues Lyrica with decreased numbness since symptom onset  - No chiropractic or massage therapy sought    -Her perception of left leg lightning pain has improved while dealing with an acute exacerbation of left sided low back pain    Supplemental information: None    MEDICATIONS  - Current medications:    - Tizanidine    - Hydrocodone    - Lyrica      She is taking Lyrica and is also on Zoloft    Pain rates 8/10 on NRS.    History:  -  she was diagnosed in 2017 with fibromyalgia, in [facility].    - Medications the patient has tried: lyrica, cyclobenzeprine, amitriptyline, tried savella  -Reports some relief with trigger point injections done [facility].  Notes she has not done spinal  injections    Red flags ROS:  Fever, Chills, Sweats: Denies  Involuntary Weight Loss: Denies  Bladder Incontinence: Denies  Bowel Incontinence: Denies  Saddle Anesthesia: Denies    All other systems reviewed and negative.       PMHx:   Past Medical History:   Diagnosis Date    Anxiety     Asthma     Depression     Fibromyalgia 2017    Thalassemia minor        PSHx:   History reviewed. No pertinent surgical history.    Family history   Family History   Problem Relation Age of Onset    Fibromyalgia Mother     Lung Disease Mother         asthma r/o    Depression Mother     Anxiety disorder Mother     ADD / ADHD Father     Depression Father          Medications:   Current Outpatient Medications   Medication    methylPREDNISolone (MEDROL DOSEPAK) 4 MG Tablet Therapy Pack    sertraline (ZOLOFT) 100 MG Tab    pregabalin (LYRICA) 150 MG Cap    azithromycin (ZITHROMAX) 250 MG Tab    triamcinolone (NASACORT) 55 MCG/ACT nasal inhaler    propranolol LA (INDERAL LA) 60 MG CAPSULE SR 24 HR    Norethindrone Acet-Ethinyl Est (BELL 1.5/30) 1.5-30 MG-MCG Tab    fluticasone (FLONASE) 50 MCG/ACT nasal spray    meclizine (ANTIVERT) 12.5 MG Tab    fluticasone (FLONASE) 50 MCG/ACT nasal spray    cyanocobalamin (VITAMIN B-12) 100 MCG Tab    fluticasone-salmeterol (ADVAIR) 100-50 MCG/ACT AEROSOL POWDER, BREATH ACTIVATED    Ascorbic Acid (VITAMIN C) 1000 MG Tab    vitamin D3 (CHOLECALCIFEROL) 1000 Unit (25 mcg) Tab    Calcium-Magnesium-Zinc (IRMA-MAG-ZINC PO)    albuterol 108 (90 Base) MCG/ACT Aero Soln inhalation aerosol    tizanidine (ZANAFLEX) 4 MG Tab    ALPRAZolam (XANAX) 0.25 MG Tab    [START ON 5/23/2025] pregabalin (LYRICA) 150 MG Cap    [START ON 6/21/2025] pregabalin (LYRICA) 150 MG Cap    Tirzepatide-Weight Management (ZEPBOUND) 2.5 MG/0.5ML Solution Auto-injector     No current facility-administered medications for this visit.       Allergies:   Allergies   Allergen Reactions    Corn-Related Products Hives, Itching, Nausea and  "Myalgia    Dairy Food Allergy Diarrhea, Nausea and Myalgia          Gluten Meal Hives, Shortness of Breath, Diarrhea, Rash, Runny Nose, Itching, Vomiting, Nausea, Cough and Myalgia          Sesame Seed (Diagnostic) Hives, Rash and Itching     rash    Soy Allergy Hives, Itching, Nausea and Myalgia       Social Hx:   Social History     Socioeconomic History    Marital status: Single     Spouse name: Not on file    Number of children: Not on file    Years of education: Not on file    Highest education level: Not on file   Occupational History    Not on file   Tobacco Use    Smoking status: Never    Smokeless tobacco: Never   Vaping Use    Vaping status: Never Used   Substance and Sexual Activity    Alcohol use: Yes     Comment: approx 1 drink per week    Drug use: Not Currently     Types: Marijuana     Comment: rarely    Sexual activity: Yes     Partners: Male     Birth control/protection: Pill, Condom   Other Topics Concern     Service No    Blood Transfusions No    Caffeine Concern No    Occupational Exposure No    Hobby Hazards No    Sleep Concern Yes    Stress Concern Yes    Weight Concern Yes    Special Diet Yes    Back Care No    Exercise No    Bike Helmet Yes    Seat Belt Yes    Self-Exams Yes   Social History Narrative    Not on file     Social Drivers of Health     Financial Resource Strain: Not on file   Food Insecurity: Not on file   Transportation Needs: Not on file   Physical Activity: Not on file   Stress: Not on file   Social Connections: Not on file   Intimate Partner Violence: Not on file   Housing Stability: Not on file         EXAMINATION     Physical Exam:   Vitals: /66   Pulse 71   Temp 36.3 °C (97.4 °F) (Temporal)   Ht 1.778 m (5' 10\")   Wt (!) 134 kg (294 lb 8.6 oz)   SpO2 97%     Constitutional:   Body Habitus: Body mass index is 42.26 kg/m².  Cooperation: Fully cooperates with exam  Appearance: Well-groomed, well-nourished, not disheveled, in no acute distress    Eyes: No " "scleral icterus, no proptosis     ENT -no obvious auditory deficits, wearing a face mask    Skin -no rashes or lesions noted including overlying area of pain    Respiratory-  breathing comfortable on room air, no audible wheezing     Cardiovascular- no lower extremity edema is noted.     Psychiatric- alert and oriented ×3. Normal affect.     Gait - normal gait, no use of ambulatory device.      Musculoskeletal -     Thoracic/Lumbar Spine/Sacral Spine/Hips   Inspection: No evidence of atrophy in bilateral lower extremities throughout       Facet loading maneuver negative bilaterally     No hyperalgesia to light touch at left thoracic or low back     Palpation:   No tenderness to palpation over the paraspinal muscles bilaterally.       Negative Tinel's at left fibular head    Key points for the international standards for neurological classification of spinal cord injury (ISNCSCI) to light touch.   Dermatome R L   L2 2 2   L3 2 2   L4 2 2   L5 2 1   S1 2 2   S2 2 2   3     Motor Exam Lower Extremities  ? Myotome R L   Hip flexion L2 5 5   Knee extension L3 5 5   Ankle dorsiflexion L4 5 5   Toe extension L5 5 5   Ankle plantarflexion S1 5 5          MEDICAL DECISION MAKING    Medical records review: see under HPI section.     DATA    Labs:   Personally reviewed at today's visit:       Lab Results   Component Value Date/Time    SODIUM 137 05/09/2024 08:41 AM    POTASSIUM 4.5 05/09/2024 08:41 AM    CHLORIDE 103 05/09/2024 08:41 AM    CO2 25 05/09/2024 08:41 AM    ANION 9.0 05/09/2024 08:41 AM    GLUCOSE 95 05/09/2024 08:41 AM    BUN 9 05/09/2024 08:41 AM    CREATININE 0.64 05/09/2024 08:41 AM    CALCIUM 9.4 05/09/2024 08:41 AM    ASTSGOT 17 05/09/2024 08:41 AM    ALTSGPT 17 05/09/2024 08:41 AM    TBILIRUBIN 0.2 05/09/2024 08:41 AM    ALBUMIN 4.3 05/09/2024 08:41 AM    TOTPROTEIN 7.6 05/09/2024 08:41 AM    GLOBULIN 3.3 05/09/2024 08:41 AM    AGRATIO 1.3 05/09/2024 08:41 AM       No results found for: \"PROTHROMBTM\", \"INR\" "     Lab Results   Component Value Date/Time    WBC 9.6 05/09/2024 08:41 AM    RBC 6.21 (H) 05/09/2024 08:41 AM    HEMOGLOBIN 11.7 (L) 05/09/2024 08:41 AM    HEMATOCRIT 38.2 05/09/2024 08:41 AM    MCV 61.5 (L) 05/09/2024 08:41 AM    MCH 18.8 (L) 05/09/2024 08:41 AM    MCHC 30.6 (L) 05/09/2024 08:41 AM    MPV 11.5 05/09/2024 08:41 AM    NEUTSPOLYS 79.80 (H) 01/12/2024 10:23 AM    LYMPHOCYTES 13.40 (L) 01/12/2024 10:23 AM    MONOCYTES 5.50 01/12/2024 10:23 AM    EOSINOPHILS 0.20 01/12/2024 10:23 AM    BASOPHILS 0.60 01/12/2024 10:23 AM    HYPOCHROMIA 1+ 07/13/2021 08:29 AM    ANISOCYTOSIS 2+ (A) 07/13/2021 08:29 AM        Lab Results   Component Value Date/Time    HBA1C 5.4 05/09/2024 08:41 AM        EMG Examination Date by Dr. Pimentel: 3/13/2025      Impression:       This is a technically limited abnormal study.   There is no electrodiagnostic evidence of left lower extremity radiculopathy in segments tested.  Please note, however, that radiculopathy screening via needle EMG assesses motor nerve fibers only, and does not rule out radiculopathies that affects solely sensory nerve roots. Though EMG testing was normal, there may be a sampling error.  Assessment of the sensory nerves of the lower extremities were technically not feasible due to body composition  There is electrodiagnostic findings of a mild left peroneal neuropathy.  The exam today points AGAINST LEFT tibial mononeuropathy.  There is no electrodiagnostic evidence of polyneuropathy.       Recommendations:   - Findings discussed with patient and shared with the referring clinician  - advised to not cross her legs  - instructed patient to continue with MRI lumbar     Imaging: I personally reviewed following images, these are my reads  MRI lumbar spine 03/21/2023  There is note of a disc herniation at T12-L1 that does not cause cord compression  At L1-2, no central or foraminal stenosis  At L2-3, no central or foraminal stenosis  At L3-4, no central or  foraminal stenosis  At L4-5, disc bulge with mild facet arthropathy, no central or foraminal stenosis  At L5-S1, no central or foraminal stenosis      Xray thoracic spine 10/11/2022  There is mild levoconvex curvature    Xray lumbar spine July 8. 2022  There is note of levoscoliosis.  Spondylolisthesis at L5-S1 measures 6 mm in extension and 8 mm in flexion.  Suspicious for possible pars defects.    Xray cervical spine 08/12/2021  There is reversal of cervical lordosis  No significant degenerative changes    Xray thoracic 07/31/2020  There is mild curvative of the thoracic spine, convex left.  Bouse at T3-4    MRI cervical spine on 05/22/2020  There is not of mild loss of cervical lordosis.  No significant degenerative changes, no cervical disc herniations, no central or foraminal stenosis in the cervical spine.     IMAGING radiology reads. I reviewed the following radiology reads   MRI lumbar spine 03/21/2023  T12-L1: Small left paracentral disc protrusion that minimally encroaches upon the spinal canal without cord compression.  L1-2: Normal.  L2-3: Normal.  L3-4: Normal.  L4-5: Broad-based disc bulge and bilateral mild facet degeneration without canal stenosis or nerve impingement.  L5-S1: Normal.     The prevertebral soft tissues are within normal limits.     IMPRESSION:     Small left paracentral protrusion at T12-L1 that minimally encroaches upon the spinal canal. There is no significant canal stenosis or foraminal narrowing in the lumbar spine.    Xray thoracic spine 10/11/2022  IMPRESSION:     1.  There is a mild 7 degree levoconvex curvature of the upper thoracic spine.    Xray lumbar spine July 8, 2022  IMPRESSION:  1.  Anterior subluxation at L5-S1 which measures 6 mm with extension and 8 mm with flexion. There are possible bilateral pars defects at that level.  2.  Convex left scoliotic curvature.      Xray cervical spine 08/12/2021  Impression: No acute fracture is identified    MRI cervical spine on  2020  Unremarkable pre and postcontrast MR examination of the cervical spine except for loss of cervical lordosis              Xray thoracic 2020        IMPRESSION:   1.  No acute findings.  2.  Mild spinal curvature, convex left, in the upper thoracic spine                                                                                                                                                         Diagnosis   Visit Diagnoses     ICD-10-CM   1. Positive depression screening  Z13.31   2. Muscle strain  T14.8XXA   3. Lumbar radiculopathy  M54.16   4. Fibromyalgia  M79.7   5. Low back pain with radiation  M54.50   6. Bulge of lumbar disc without myelopathy  M51.369   7. Other idiopathic scoliosis, thoracic region  M41.24   8. Muscle spasm  M62.838   9. Lumbar spondylosis  M47.816   10. Thoracic spine pain  M54.6   11. Spondylolisthesis of lumbosacral region  M43.17               ASSESSMENT:  Carmen Hebert  1995 is a female seen for above     Carmen was seen today for follow-up.    Diagnoses and all orders for this visit:    Positive depression screening  -     Patient has been identified as having a positive depression screening. Appropriate orders and counseling have been given.    Muscle strain    Lumbar radiculopathy    Fibromyalgia    Low back pain with radiation    Bulge of lumbar disc without myelopathy    Other idiopathic scoliosis, thoracic region    Muscle spasm    Lumbar spondylosis    Thoracic spine pain    Spondylolisthesis of lumbosacral region    Other orders  -     methylPREDNISolone (MEDROL DOSEPAK) 4 MG Tablet Therapy Pack; As directed on the packaging label.          Physical Therapy: I  previously ordered water-based physical therapy to focus on strengthening and stretching as well as a home exercise program.  She has done well with water-based physical therapy in the past.  The patient completed formal PT in 2024 for this pain and has been consistent with  her home exercise program since.      Diagnostic workup: Personally reviewed at today's visit: Raw data from EMG Examination Date by Dr. Pimentel: 3/13/2025.  Previously ordered updated MRI.  Pending insurance    Medications:   -Prescribed Medrol Dosepak for acute exacerbation of low back pain that appears to be consistent with muscle strain.   - continue lyrica 150mg po tid as prescribed by PCP  -Defer the addition of other antidepressant medication for now such as Cymbalta or TCA as the patient is taking Zoloft currently  -Discussed consideration of low-dose naltrexone 4.5 mg daily.  She would like to defer at this time.  Discussed that low-dose naltrexone (4.5 mg daily) is currently supported by research to possibly be effective for neuropathic pain but is not currently covered by insurance. Discussed potential side effects of nightmares     Interventions:   - discussed trigger point injections, she would like to defer at this time  -Status post trigger point injections with a rheumatologist with short-lived relief  -I have discussed possible element of chemical radiculitis and consideration of an epidural if nerve impingement on MRI is seen    Follow-up: 3 months or sooner as needed.  Discussed that if corresponding left lumbar nerve impingement is seen on her lumbar MRI, can consider epidural    Thank you very much for asking me to participate in Carmen Hebert's care.  Please contact me with any questions or concerns.      Please note that this dictation was created using voice recognition software. I have made every reasonable attempt to correct obvious errors but there may be errors of grammar and content that I may have overlooked prior to finalization of this note.    Keturah Madrigal MD  Interventional Pain and Spine  Physical Medicine and Rehabilitation  [facility]    Addendum 4/25/2025  The patient has failed conservative management including over 6 weeks of land-based physical therapy  in the last 6 months including physician directed at home PT exercises and has been consistent with her home exercise program in the meantime.

## 2025-05-23 ENCOUNTER — APPOINTMENT (OUTPATIENT)
Dept: RADIOLOGY | Facility: MEDICAL CENTER | Age: 30
End: 2025-05-23
Attending: STUDENT IN AN ORGANIZED HEALTH CARE EDUCATION/TRAINING PROGRAM
Payer: COMMERCIAL

## 2025-06-11 ENCOUNTER — HOSPITAL ENCOUNTER (OUTPATIENT)
Dept: LAB | Facility: MEDICAL CENTER | Age: 30
End: 2025-06-11
Attending: NURSE PRACTITIONER
Payer: COMMERCIAL

## 2025-06-11 DIAGNOSIS — R63.8 WEIGHT DISORDER: ICD-10-CM

## 2025-06-11 DIAGNOSIS — N89.8 VAGINAL ODOR: ICD-10-CM

## 2025-06-11 DIAGNOSIS — N94.9 VAGINAL DISCOMFORT: ICD-10-CM

## 2025-06-11 DIAGNOSIS — R53.83 OTHER FATIGUE: ICD-10-CM

## 2025-06-11 DIAGNOSIS — Z83.3 FAMILY HISTORY OF DIABETES MELLITUS: ICD-10-CM

## 2025-06-11 LAB
25(OH)D3 SERPL-MCNC: 38 NG/ML (ref 30–100)
ALBUMIN SERPL BCP-MCNC: 4 G/DL (ref 3.2–4.9)
ALBUMIN/GLOB SERPL: 1.1 G/DL
ALP SERPL-CCNC: 88 U/L (ref 30–99)
ALT SERPL-CCNC: 14 U/L (ref 2–50)
ANION GAP SERPL CALC-SCNC: 8 MMOL/L (ref 7–16)
APPEARANCE UR: CLEAR
AST SERPL-CCNC: 22 U/L (ref 12–45)
BACTERIA #/AREA URNS HPF: ABNORMAL /HPF
BILIRUB SERPL-MCNC: 0.3 MG/DL (ref 0.1–1.5)
BILIRUB UR QL STRIP.AUTO: NEGATIVE
BUN SERPL-MCNC: 7 MG/DL (ref 8–22)
CALCIUM ALBUM COR SERPL-MCNC: 9.1 MG/DL (ref 8.5–10.5)
CALCIUM SERPL-MCNC: 9.1 MG/DL (ref 8.5–10.5)
CASTS URNS QL MICRO: ABNORMAL /LPF (ref 0–2)
CHLORIDE SERPL-SCNC: 104 MMOL/L (ref 96–112)
CHOLEST SERPL-MCNC: 148 MG/DL (ref 100–199)
CO2 SERPL-SCNC: 25 MMOL/L (ref 20–33)
COLOR UR: YELLOW
CREAT SERPL-MCNC: 0.75 MG/DL (ref 0.5–1.4)
EPITHELIAL CELLS 1715: ABNORMAL /HPF (ref 0–5)
ERYTHROCYTE [DISTWIDTH] IN BLOOD BY AUTOMATED COUNT: 34 FL (ref 35.9–50)
EST. AVERAGE GLUCOSE BLD GHB EST-MCNC: 120 MG/DL
FERRITIN SERPL-MCNC: 84.6 NG/ML (ref 10–291)
GFR SERPLBLD CREATININE-BSD FMLA CKD-EPI: 108 ML/MIN/1.73 M 2
GLOBULIN SER CALC-MCNC: 3.5 G/DL (ref 1.9–3.5)
GLUCOSE SERPL-MCNC: 100 MG/DL (ref 65–99)
GLUCOSE UR STRIP.AUTO-MCNC: NEGATIVE MG/DL
HBA1C MFR BLD: 5.8 % (ref 4–5.6)
HCT VFR BLD AUTO: 36.7 % (ref 37–47)
HDLC SERPL-MCNC: 40 MG/DL
HGB BLD-MCNC: 11.3 G/DL (ref 12–16)
IRON SATN MFR SERPL: 11 % (ref 15–55)
IRON SERPL-MCNC: 35 UG/DL (ref 40–170)
KETONES UR STRIP.AUTO-MCNC: NEGATIVE MG/DL
LDLC SERPL CALC-MCNC: 91 MG/DL
LEUKOCYTE ESTERASE UR QL STRIP.AUTO: ABNORMAL
MCH RBC QN AUTO: 19 PG (ref 27–33)
MCHC RBC AUTO-ENTMCNC: 30.8 G/DL (ref 32.2–35.5)
MCV RBC AUTO: 61.7 FL (ref 81.4–97.8)
MICRO URNS: ABNORMAL
NITRITE UR QL STRIP.AUTO: NEGATIVE
PH UR STRIP.AUTO: 7.5 [PH] (ref 5–8)
PLATELET # BLD AUTO: 449 K/UL (ref 164–446)
PMV BLD AUTO: 10.4 FL (ref 9–12.9)
POTASSIUM SERPL-SCNC: 4.4 MMOL/L (ref 3.6–5.5)
PROT SERPL-MCNC: 7.5 G/DL (ref 6–8.2)
PROT UR QL STRIP: NEGATIVE MG/DL
RBC # BLD AUTO: 5.95 M/UL (ref 4.2–5.4)
RBC # URNS HPF: ABNORMAL /HPF (ref 0–2)
RBC UR QL AUTO: NEGATIVE
SODIUM SERPL-SCNC: 137 MMOL/L (ref 135–145)
SP GR UR STRIP.AUTO: 1.01
T3FREE SERPL-MCNC: 3.25 PG/ML (ref 2–4.4)
T4 FREE SERPL-MCNC: 1.01 NG/DL (ref 0.93–1.7)
TIBC SERPL-MCNC: 321 UG/DL (ref 250–450)
TRIGL SERPL-MCNC: 86 MG/DL (ref 0–149)
TSH SERPL-ACNC: 2.61 UIU/ML (ref 0.38–5.33)
UIBC SERPL-MCNC: 286 UG/DL (ref 110–370)
UROBILINOGEN UR STRIP.AUTO-MCNC: 0.2 EU/DL
VIT B12 SERPL-MCNC: 279 PG/ML (ref 211–911)
WBC # BLD AUTO: 11 K/UL (ref 4.8–10.8)
WBC #/AREA URNS HPF: ABNORMAL /HPF

## 2025-06-11 PROCEDURE — 82607 VITAMIN B-12: CPT

## 2025-06-11 PROCEDURE — 80053 COMPREHEN METABOLIC PANEL: CPT

## 2025-06-11 PROCEDURE — 81001 URINALYSIS AUTO W/SCOPE: CPT

## 2025-06-11 PROCEDURE — 83036 HEMOGLOBIN GLYCOSYLATED A1C: CPT

## 2025-06-11 PROCEDURE — 82306 VITAMIN D 25 HYDROXY: CPT

## 2025-06-11 PROCEDURE — 83550 IRON BINDING TEST: CPT

## 2025-06-11 PROCEDURE — 87491 CHLMYD TRACH DNA AMP PROBE: CPT

## 2025-06-11 PROCEDURE — 85027 COMPLETE CBC AUTOMATED: CPT

## 2025-06-11 PROCEDURE — 36415 COLL VENOUS BLD VENIPUNCTURE: CPT

## 2025-06-11 PROCEDURE — 80061 LIPID PANEL: CPT

## 2025-06-11 PROCEDURE — 82728 ASSAY OF FERRITIN: CPT

## 2025-06-11 PROCEDURE — 84481 FREE ASSAY (FT-3): CPT

## 2025-06-11 PROCEDURE — 84443 ASSAY THYROID STIM HORMONE: CPT

## 2025-06-11 PROCEDURE — 83540 ASSAY OF IRON: CPT

## 2025-06-11 PROCEDURE — 87591 N.GONORRHOEAE DNA AMP PROB: CPT

## 2025-06-11 PROCEDURE — 84439 ASSAY OF FREE THYROXINE: CPT

## 2025-07-09 ENCOUNTER — HOSPITAL ENCOUNTER (OUTPATIENT)
Facility: MEDICAL CENTER | Age: 30
End: 2025-07-09
Attending: NURSE PRACTITIONER
Payer: COMMERCIAL

## 2025-07-09 ENCOUNTER — OFFICE VISIT (OUTPATIENT)
Dept: MEDICAL GROUP | Facility: MEDICAL CENTER | Age: 30
End: 2025-07-09
Payer: COMMERCIAL

## 2025-07-09 VITALS
OXYGEN SATURATION: 93 % | SYSTOLIC BLOOD PRESSURE: 100 MMHG | HEART RATE: 82 BPM | DIASTOLIC BLOOD PRESSURE: 62 MMHG | TEMPERATURE: 97.6 F | HEIGHT: 70 IN | WEIGHT: 289.46 LBS | BODY MASS INDEX: 41.44 KG/M2

## 2025-07-09 DIAGNOSIS — Z00.00 PE (PHYSICAL EXAM), ANNUAL: ICD-10-CM

## 2025-07-09 DIAGNOSIS — F33.9 RECURRENT MAJOR DEPRESSIVE DISORDER, REMISSION STATUS UNSPECIFIED (HCC): ICD-10-CM

## 2025-07-09 DIAGNOSIS — R73.03 PREDIABETES: ICD-10-CM

## 2025-07-09 DIAGNOSIS — J30.2 SEASONAL ALLERGIES: ICD-10-CM

## 2025-07-09 DIAGNOSIS — E66.813 CLASS 3 SEVERE OBESITY IN ADULT, UNSPECIFIED BMI, UNSPECIFIED OBESITY TYPE, UNSPECIFIED WHETHER SERIOUS COMORBIDITY PRESENT: ICD-10-CM

## 2025-07-09 DIAGNOSIS — M79.7 FIBROMYALGIA: ICD-10-CM

## 2025-07-09 DIAGNOSIS — N89.8 VAGINAL DISCHARGE: ICD-10-CM

## 2025-07-09 DIAGNOSIS — M79.604 CHRONIC PAIN OF BOTH LOWER EXTREMITIES: ICD-10-CM

## 2025-07-09 DIAGNOSIS — N89.8 VAGINAL ITCHING: ICD-10-CM

## 2025-07-09 DIAGNOSIS — D56.3 THALASSEMIA MINOR: ICD-10-CM

## 2025-07-09 DIAGNOSIS — M54.9 CHRONIC BACK PAIN, UNSPECIFIED BACK LOCATION, UNSPECIFIED BACK PAIN LATERALITY: ICD-10-CM

## 2025-07-09 DIAGNOSIS — M79.605 CHRONIC PAIN OF BOTH LOWER EXTREMITIES: ICD-10-CM

## 2025-07-09 DIAGNOSIS — G89.29 CHRONIC PAIN OF BOTH LOWER EXTREMITIES: ICD-10-CM

## 2025-07-09 DIAGNOSIS — N89.8 VAGINAL DISCHARGE: Primary | ICD-10-CM

## 2025-07-09 DIAGNOSIS — Z23 NEED FOR VACCINATION: ICD-10-CM

## 2025-07-09 DIAGNOSIS — G89.29 CHRONIC BACK PAIN, UNSPECIFIED BACK LOCATION, UNSPECIFIED BACK PAIN LATERALITY: ICD-10-CM

## 2025-07-09 LAB
APPEARANCE UR: NORMAL
BILIRUB UR STRIP-MCNC: NEGATIVE MG/DL
COLOR UR AUTO: NORMAL
GLUCOSE UR STRIP.AUTO-MCNC: NEGATIVE MG/DL
KETONES UR STRIP.AUTO-MCNC: NEGATIVE MG/DL
LEUKOCYTE ESTERASE UR QL STRIP.AUTO: NEGATIVE
NITRITE UR QL STRIP.AUTO: NEGATIVE
PH UR STRIP.AUTO: 6 [PH] (ref 5–8)
PROT UR QL STRIP: NEGATIVE MG/DL
RBC UR QL AUTO: NORMAL
SP GR UR STRIP.AUTO: 1.02
UROBILINOGEN UR STRIP-MCNC: NORMAL MG/DL

## 2025-07-09 PROCEDURE — 90471 IMMUNIZATION ADMIN: CPT | Performed by: NURSE PRACTITIONER

## 2025-07-09 PROCEDURE — 99395 PREV VISIT EST AGE 18-39: CPT | Mod: 25 | Performed by: NURSE PRACTITIONER

## 2025-07-09 PROCEDURE — 87510 GARDNER VAG DNA DIR PROBE: CPT

## 2025-07-09 PROCEDURE — 90677 PCV20 VACCINE IM: CPT | Performed by: NURSE PRACTITIONER

## 2025-07-09 PROCEDURE — 99214 OFFICE O/P EST MOD 30 MIN: CPT | Mod: 25 | Performed by: NURSE PRACTITIONER

## 2025-07-09 PROCEDURE — 81002 URINALYSIS NONAUTO W/O SCOPE: CPT | Performed by: NURSE PRACTITIONER

## 2025-07-09 PROCEDURE — 3074F SYST BP LT 130 MM HG: CPT | Performed by: NURSE PRACTITIONER

## 2025-07-09 PROCEDURE — 87660 TRICHOMONAS VAGIN DIR PROBE: CPT

## 2025-07-09 PROCEDURE — 87480 CANDIDA DNA DIR PROBE: CPT

## 2025-07-09 PROCEDURE — 3078F DIAST BP <80 MM HG: CPT | Performed by: NURSE PRACTITIONER

## 2025-07-09 RX ORDER — FLUCONAZOLE 150 MG/1
150 TABLET ORAL DAILY
Qty: 2 TABLET | Refills: 0 | Status: SHIPPED
Start: 2025-07-09 | End: 2025-07-10

## 2025-07-09 RX ORDER — PREGABALIN 150 MG/1
150 CAPSULE ORAL
Qty: 90 CAPSULE | Refills: 0 | Status: SHIPPED | OUTPATIENT
Start: 2025-09-06 | End: 2025-10-06

## 2025-07-09 RX ORDER — AZELASTINE 1 MG/ML
1 SPRAY, METERED NASAL 2 TIMES DAILY
Qty: 30 ML | Refills: 1 | Status: SHIPPED | OUTPATIENT
Start: 2025-07-09

## 2025-07-09 RX ORDER — PREGABALIN 150 MG/1
150 CAPSULE ORAL
Qty: 90 CAPSULE | Refills: 0 | Status: SHIPPED | OUTPATIENT
Start: 2025-08-09 | End: 2025-09-08

## 2025-07-09 RX ORDER — PREGABALIN 150 MG/1
150 CAPSULE ORAL
Qty: 90 CAPSULE | Refills: 0 | Status: SHIPPED | OUTPATIENT
Start: 2025-07-11 | End: 2025-08-10

## 2025-07-09 ASSESSMENT — ENCOUNTER SYMPTOMS
COUGH: 0
WEAKNESS: 0
FLANK PAIN: 0
SPUTUM PRODUCTION: 0
NAUSEA: 0
PALPITATIONS: 0
EYE REDNESS: 0
SENSORY CHANGE: 0
CONSTIPATION: 0
CHILLS: 0
MYALGIAS: 0
TREMORS: 0
VOMITING: 0
DEPRESSION: 0
SORE THROAT: 0
BLOOD IN STOOL: 0
LOSS OF CONSCIOUSNESS: 0
POLYDIPSIA: 0
HEADACHES: 0
SHORTNESS OF BREATH: 0
EYE DISCHARGE: 0
SPEECH CHANGE: 0
ABDOMINAL PAIN: 0
DIZZINESS: 0
EYE PAIN: 0
WHEEZING: 0
SINUS PAIN: 0
NERVOUS/ANXIOUS: 0
FEVER: 0
DIARRHEA: 0
FOCAL WEAKNESS: 0
INSOMNIA: 0
BRUISES/BLEEDS EASILY: 0
BACK PAIN: 0
WEIGHT LOSS: 0

## 2025-07-09 ASSESSMENT — FIBROSIS 4 INDEX: FIB4 SCORE: 0.42

## 2025-07-09 NOTE — Clinical Note
REFERRAL APPROVAL NOTICE         Sent on July 9, 2025                   Carmen Hebert  [address]   [address]  [place] [address]                   Dear Ms. Ynes Hebert,    After a careful review of the medical information and benefit coverage, [facility] has processed your referral. See below for additional details.    If applicable, you must be actively enrolled with your insurance for coverage of the authorized service. If you have any questions regarding your coverage, please contact your insurance directly.    REFERRAL INFORMATION   Referral #:  [id number]  Referred-To Provider    Referred-By Provider:  Physical Therapy    HEMA Alonzo   [facility]      [address]  [address]  [place] [id number] [phone] [address]  [place] [address]  [phone]    Referral Start Date:  07/09/2025  Referral End Date:   07/09/2026             SCHEDULING  If you do not already have an appointment, please call [phone] to make an appointment.     MORE INFORMATION  If you do not already have a [employer] account, sign up at: [email or web address]  You can access your medical information, make appointments, see lab results, billing information, and more.  If you have questions regarding this referral, please contact  the [facility] Referrals department at:             [phone]. Monday - Friday 8:00AM - 5:00PM.     Sincerely,    [facility]

## 2025-07-09 NOTE — PROGRESS NOTES
Patient agreed to using MARICA: yes    Carmen was seen today for lab results and medication follow-up.    Diagnoses and all orders for this visit:    Vaginal discharge  -     VAGINAL PATHOGENS DNA PANEL; Future  -     fluconazole (DIFLUCAN) 150 MG tablet; Take 1 Tablet by mouth every day for 2 doses.  -     POCT Urinalysis    Vaginal itching  -     VAGINAL PATHOGENS DNA PANEL; Future  -     fluconazole (DIFLUCAN) 150 MG tablet; Take 1 Tablet by mouth every day for 2 doses.  -     POCT Urinalysis    Thalassemia minor    Prediabetes    Fibromyalgia  -     pregabalin (LYRICA) 150 MG Cap; Take 1 Capsule by mouth 3 times a day for 30 days.  -     pregabalin (LYRICA) 150 MG Cap; Take 1 Capsule by mouth 3 times a day for 30 days.  -     pregabalin (LYRICA) 150 MG Cap; Take 1 Capsule by mouth 3 times a day for 30 days.  -     Referral to Physical Therapy    Need for vaccination  -     Pneumococcal Conjugate Vaccine 20-Valent (6 wks+)    Chronic back pain, unspecified back location, unspecified back pain laterality  -     Referral to Physical Therapy    Chronic pain of both lower extremities  -     Referral to Physical Therapy    Class 3 severe obesity in adult, unspecified BMI, unspecified obesity type, unspecified whether serious comorbidity present  -     Naltrexone-buPROPion HCl ER 8-90 MG TABLET SR 12 HR; Take 8-90 mg by mouth 2 times a day for 30 days.    Recurrent major depressive disorder, remission status unspecified ([facility])    Seasonal allergies  -     azelastine (ASTELIN) 0.1 % nasal spray; Administer 1 Spray into affected nostril(S) 2 times a day.              Assessment & Plan  1.  Vaginal discharge  2.  Vaginal itching  - Symptoms suggest a possible Candida infection  - Vaginal swab to be taken today for further analysis  - Fluconazole prescribed, one dose initially with a refill available if necessary  - Patient reported discharge with a stale odor and mild itching  - Advised to hold off on treatment until swab  results are available    3.  Thalassemia minor  Chronic and stable condition  - Hemoglobin level currently 11.3, an improvement from previous readings  - Advised to continue consuming dark leafy greens and red meat to maintain iron levels  - No iron supplements recommended by hematologist due to thalassemia minor  - Monitoring dietary intake to ensure adequate iron absorption    4. Prediabetes:  Chronic and stable condition  - A1c level is 5.8, indicating prediabetes  - Counseled to monitor diet, particularly intake of starches and carbohydrates, and balance with physical activity  - Advised to check for additives in nondairy creamers and avoid those with carbs or sugars  - Encouraged to maintain a consistent diet and exercise routine    5. Chronic back pain  - Referral for aquatic physical therapy at [facility] provided  - Diagnosed with chronic back pain and fibromyalgia  - Monitoring pain levels and response to physical therapy    6. Chronic left leg pain:  - Referral for aquatic physical therapy at [facility] provided  - Diagnosed with chronic left leg pain  - Awaiting MRI approval for further evaluation    7. Obesity:  - Advised to undergo a sleep test to rule out sleep apnea  - Prescription for naltrexone-bupropion, to be taken twice daily for 30 days, provided. No refills until tolerance is assessed  - Discussed potential insurance coverage and cost of GLP-1 medications  - Monitoring weight and dietary habits    8. Seasonal allergies:  - Azelastine recommended as an alternative treatment option if current over-the-counter allergy medication is ineffective  - Symptoms include congestion  - Monitoring response to new medication if initiated    9. Health maintenance:  10.  Annual physical exam  - Urine sample to be collected today for analysis  - Pneumonia vaccine to be administered today  - Advised to verify meningococcal vaccine status  and receive vaccine if not already done    11.  Fibromyalgia  - Currently on pregabalin and Zoloft, both effective  - Refill for pregabalin 150 mg three times a day provided  - Monitoring medication effectiveness and patient adherence    History of Present Illness  The patient presents for evaluation of a yeast infection, prediabetes, anemia, chronic back pain, chronic left leg pain, obesity, seasonal allergies, and irritable bowel syndrome.    She reports experiencing a yeast infection characterized by discharge and a slight odor. Occasional itching is noted, although it is not a common symptom for her. During her last episode, she required two courses of treatment due to the severity of the infection.    She has been consuming spinach regularly and has not been taking any iron supplements. Her hematologist advised against iron supplementation due to her thalassemia condition. Instead, she was recommended to increase her intake of dark leafy greens and red meat.    She maintains a gluten-free diet and avoids dairy products, corn, and soy. She consumes butter and goat and sheep milk. She does not consume much meat as she does not cook often, preferring chicken over red meat. Despite eating salads regularly, she struggles with weight management. She has a referral for a sleep test and suspects she may have sleep apnea, as it runs in her family. She has tried phentermine in the past without success and was prescribed Ozempic by Dr. Rodrigez in 2024 but could not afford it.    She experiences constant pain, which limits her physical activity. She reports worsening numbness and pain in her leg. Physical therapy has not yet been started due to other commitments and an  referral. She was referred to [facility] for aquatic physical therapy for her leg and spine. An MRI was ordered but not performed due to insurance issues.    She suffers from seasonal allergies, which cause  congestion, and takes over-the-counter allergy medication for relief.    She experiences alternating bouts of constipation and diarrhea. Her diet varies, and she takes magnesium citrate.    She reports no difficulty swallowing and has been experiencing leg swelling.    She is currently on pregabalin and Zoloft, both of which are effective.    FAMILY HISTORY  The patient mentions that many people in her family have sleep apnea.       Ob-Gyn/ History:    Last Pap Smear:  2025. no history of abnormal pap smears.    Health Maintenance  Below Anticipatory guidance discussed with patient  Cholesterol Screening: labs  Diabetes Screening: labs  Aspirin Use: no    Diet: anti-inflammatory    Exercise: limited   Substance Abuse: no   Safe in relationship.   Seat belts, bike helmet, gun safety discussed.  Sun protection used.    Cancer screening    Cervical Cancer Screenin2025       Infectious disease screening/Immunizations  --STI Screening: no concerns    --Practices safe sex.  --HIV Screening:    --Hepatitis C Screening:    --Immunizations: PNA     Review of Systems   Constitutional:  Negative for chills, fever, malaise/fatigue and weight loss.   HENT:  Negative for congestion, ear discharge, hearing loss, sinus pain and sore throat.    Eyes:  Negative for pain, discharge and redness.   Respiratory:  Negative for cough, sputum production, shortness of breath and wheezing.    Cardiovascular:  Negative for chest pain, palpitations and leg swelling.   Gastrointestinal:  Negative for abdominal pain, blood in stool, constipation, diarrhea, nausea and vomiting.   Genitourinary:  Negative for dysuria, flank pain, frequency, hematuria and urgency.   Musculoskeletal:  Negative for back pain, joint pain and myalgias.   Skin:  Negative for itching and rash.   Neurological:  Negative for dizziness, tremors, sensory change, speech change, focal weakness, loss of consciousness, weakness and headaches.   Endo/Heme/Allergies:   Negative for environmental allergies and polydipsia. Does not bruise/bleed easily.   Psychiatric/Behavioral:  Negative for depression. The patient is not nervous/anxious and does not have insomnia.         He  has a past medical history of Anxiety, Asthma, Depression, Fibromyalgia (2017), and Thalassemia minor.  He  has no past surgical history on file.  Family History   Problem Relation Age of Onset    Fibromyalgia Mother     Lung Disease Mother         asthma r/o    Depression Mother     Anxiety disorder Mother     ADD / ADHD Father     Depression Father      Social History[1]  Patient Active Problem List    Diagnosis Date Noted    Prediabetes 07/09/2025    Chronic pain of both lower extremities 07/09/2025    Chronic back pain 07/09/2025    Family history of diabetes mellitus 03/27/2025    Weight disorder 03/27/2025    Other fatigue 03/27/2025    Fever 01/29/2025    Head cold 01/29/2025    Sinus congestion 01/29/2025    Sore throat 01/29/2025    Acute cough 01/29/2025    Cough 01/29/2025    Chest congestion 01/29/2025    Phlegm in throat 01/29/2025    Concentration deficit 12/27/2024    Family history of attention deficit hyperactivity disorder (ADHD) 12/27/2024    Menstrual irregularity 12/27/2024    Vaginal discomfort 08/07/2024    Vaginal odor 08/07/2024    Cat scratch 07/09/2024    Vaginal itching 06/07/2024    Candidiasis 06/07/2024    Thalassemia minor 05/30/2024    Vaginal discharge 05/30/2024    BPPV (benign paroxysmal positional vertigo), bilateral 05/09/2024    Anxiety 12/06/2021    Major depression, recurrent (HCC) 04/02/2021    Thalassemia 04/08/2020    Asthma 04/08/2020    Celiac disease 04/08/2020    Seasonal allergies 04/08/2020    Mixed anxiety depressive disorder 04/08/2020    Fibromyalgia 03/10/2020    Obesity (BMI 30-39.9) 03/10/2020    Palpitations 03/10/2020     Current Medications[2] (including changes today)  Allergies: Corn-related products, Dairy food allergy, Gluten meal, Sesame seed  "(diagnostic), and Soy allergy    /62   Pulse 82   Temp 36.4 °C (97.6 °F) (Temporal)   Ht 1.778 m (5' 10\")   Wt (!) 131 kg (289 lb 7.4 oz)   SpO2 93%      Physical Exam  Constitutional:       General: She is not in acute distress.     Appearance: Normal appearance. She is normal weight. She is not ill-appearing.   HENT:      Head: Normocephalic and atraumatic.      Right Ear: Tympanic membrane, ear canal and external ear normal. There is no impacted cerumen.      Left Ear: Tympanic membrane, ear canal and external ear normal. There is no impacted cerumen.      Nose: Nose normal. No congestion or rhinorrhea.      Mouth/Throat:      Mouth: Mucous membranes are moist.      Pharynx: No oropharyngeal exudate or posterior oropharyngeal erythema.   Eyes:      General:         Right eye: No discharge.         Left eye: No discharge.      Pupils: Pupils are equal, round, and reactive to light.   Cardiovascular:      Rate and Rhythm: Normal rate.      Pulses: Normal pulses.      Heart sounds: Normal heart sounds. No murmur heard.     No friction rub. No gallop.   Pulmonary:      Effort: Pulmonary effort is normal. No respiratory distress.      Breath sounds: Normal breath sounds. No wheezing, rhonchi or rales.   Abdominal:      General: Bowel sounds are normal. There is no distension.      Palpations: Abdomen is soft. There is no mass.      Tenderness: There is no abdominal tenderness. There is no right CVA tenderness, left CVA tenderness, guarding or rebound.      Hernia: No hernia is present.   Musculoskeletal:         General: No swelling, tenderness or deformity. Normal range of motion.      Cervical back: Normal range of motion and neck supple.      Right lower leg: No edema.      Left lower leg: No edema.   Lymphadenopathy:      Cervical: No cervical adenopathy.   Skin:     General: Skin is warm.      Findings: No rash.   Neurological:      General: No focal deficit present.      Mental Status: She is alert. " Mental status is at baseline.      Cranial Nerves: No cranial nerve deficit.      Sensory: No sensory deficit.      Motor: No weakness.      Coordination: Coordination normal.      Gait: Gait normal.   Psychiatric:         Mood and Affect: Mood normal.         Behavior: Behavior normal.          Results  Labs   - Hemoglobin: 11.3 g/dL   - Glucose: 100 mg/dL   - A1c: 5.8%   - Vitamin D: Normal   - B12: 279 pg/mL   - Thyroid levels: Normal       No follow-ups on file. 3 mos          [1]   Social History  Tobacco Use    Smoking status: Never    Smokeless tobacco: Never   Vaping Use    Vaping status: Never Used   Substance Use Topics    Alcohol use: Yes     Comment: approx 1 drink per week    Drug use: Not Currently     Types: Marijuana     Comment: rarely   [2]   Current Outpatient Medications   Medication Sig Dispense Refill    fluconazole (DIFLUCAN) 150 MG tablet Take 1 Tablet by mouth every day for 2 doses. 2 Tablet 0    [START ON 7/11/2025] pregabalin (LYRICA) 150 MG Cap Take 1 Capsule by mouth 3 times a day for 30 days. 90 Capsule 0    [START ON 8/9/2025] pregabalin (LYRICA) 150 MG Cap Take 1 Capsule by mouth 3 times a day for 30 days. 90 Capsule 0    [START ON 9/6/2025] pregabalin (LYRICA) 150 MG Cap Take 1 Capsule by mouth 3 times a day for 30 days. 90 Capsule 0    Naltrexone-buPROPion HCl ER 8-90 MG TABLET SR 12 HR Take 8-90 mg by mouth 2 times a day for 30 days. 60 Tablet 0    azelastine (ASTELIN) 0.1 % nasal spray Administer 1 Spray into affected nostril(S) 2 times a day. 30 mL 1    methylPREDNISolone (MEDROL DOSEPAK) 4 MG Tablet Therapy Pack As directed on the packaging label. 21 Tablet 0    sertraline (ZOLOFT) 100 MG Tab Take 2 Tablets by mouth every day. 200 mg = 2 tablets 180 Tablet 3    azithromycin (ZITHROMAX) 250 MG Tab Take 2 tabs on day 1, then take 1 tab on days 2-5 6 Tablet 0    triamcinolone (NASACORT) 55 MCG/ACT nasal inhaler Administer 2 Sprays into affected nostril(S) every day. 16 mL 1     propranolol LA (INDERAL LA) 60 MG CAPSULE SR 24 HR Take 1 Capsule by mouth every day. Please call to schedule follow up appointment for further refills. Please call [phone]. Thank you. 90 Capsule 3    Norethindrone Acet-Ethinyl Est (BELL 1.5/30) 1.5-30 MG-MCG Tab TAKE 1 TABLET BY MOUTH DAILY. TAKE CONTINUOUSLY NO, PLACEBO WEEK 84 Tablet 3    fluticasone (FLONASE) 50 MCG/ACT nasal spray Administer 2 Sprays into affected nostril(S) every day. 16 g 11    Tirzepatide-Weight Management (ZEPBOUND) 2.5 MG/0.5ML Solution Auto-injector Inject 2.5 mg under the skin every 7 days. 4 mL 0    meclizine (ANTIVERT) 12.5 MG Tab Take 1 Tablet by mouth 3 times a day as needed for Dizziness. 90 Tablet 0    fluticasone (FLONASE) 50 MCG/ACT nasal spray Administer 1 Spray into affected nostril(S) every day. 16 g 0    cyanocobalamin (VITAMIN B-12) 100 MCG Tab Take 100 mcg by mouth every day.      fluticasone-salmeterol (ADVAIR) 100-50 MCG/ACT AEROSOL POWDER, BREATH ACTIVATED Inhale 1 Puff every 12 hours. 1 Each 1    Ascorbic Acid (VITAMIN C) 1000 MG Tab Take 1,000 mg by mouth every day.      vitamin D3 (CHOLECALCIFEROL) 1000 Unit (25 mcg) Tab Take 1,000 Units by mouth every day.      Calcium-Magnesium-Zinc (IRMA-MAG-ZINC PO) Take 1 Tablet by mouth every day.      albuterol 108 (90 Base) MCG/ACT Aero Soln inhalation aerosol Inhale 2 Puffs every four hours as needed for Shortness of Breath. 1 Each 11    tizanidine (ZANAFLEX) 4 MG Tab Take 4 mg by mouth every 6 hours as needed.      ALPRAZolam (XANAX) 0.25 MG Tab Take 0.25 mg by mouth at bedtime as needed for Sleep.       No current facility-administered medications for this visit.

## 2025-07-10 DIAGNOSIS — N76.0 BACTERIAL VAGINITIS: Primary | ICD-10-CM

## 2025-07-10 DIAGNOSIS — B96.89 BACTERIAL VAGINITIS: Primary | ICD-10-CM

## 2025-07-10 RX ORDER — CLINDAMYCIN PHOSPHATE 20 MG/G
1 CREAM VAGINAL NIGHTLY
Qty: 40 G | Refills: 0 | Status: SHIPPED | OUTPATIENT
Start: 2025-07-10 | End: 2025-07-17

## 2025-07-15 DIAGNOSIS — R63.8 WEIGHT DISORDER: Primary | ICD-10-CM

## 2025-07-15 DIAGNOSIS — F50.819 BINGE EATING DISORDER, UNSPECIFIED SEVERITY: ICD-10-CM

## 2025-07-15 RX ORDER — NALTREXONE HYDROCHLORIDE 50 MG/1
50 TABLET, FILM COATED ORAL DAILY
Qty: 30 TABLET | Refills: 1 | Status: SHIPPED | OUTPATIENT
Start: 2025-07-15

## 2025-07-16 ENCOUNTER — APPOINTMENT (OUTPATIENT)
Dept: RADIOLOGY | Facility: MEDICAL CENTER | Age: 30
End: 2025-07-16
Attending: STUDENT IN AN ORGANIZED HEALTH CARE EDUCATION/TRAINING PROGRAM
Payer: COMMERCIAL

## 2025-07-16 ENCOUNTER — HOSPITAL ENCOUNTER (EMERGENCY)
Facility: MEDICAL CENTER | Age: 30
End: 2025-07-16
Attending: STUDENT IN AN ORGANIZED HEALTH CARE EDUCATION/TRAINING PROGRAM
Payer: COMMERCIAL

## 2025-07-16 VITALS
HEART RATE: 75 BPM | RESPIRATION RATE: 16 BRPM | SYSTOLIC BLOOD PRESSURE: 103 MMHG | WEIGHT: 288.8 LBS | HEIGHT: 70 IN | DIASTOLIC BLOOD PRESSURE: 59 MMHG | OXYGEN SATURATION: 95 % | TEMPERATURE: 97.2 F | BODY MASS INDEX: 41.35 KG/M2

## 2025-07-16 DIAGNOSIS — S92.212A CLOSED DISPLACED FRACTURE OF CUBOID OF LEFT FOOT, INITIAL ENCOUNTER: Primary | ICD-10-CM

## 2025-07-16 PROCEDURE — 73620 X-RAY EXAM OF FOOT: CPT | Mod: LT

## 2025-07-16 PROCEDURE — 302874 HCHG BANDAGE ACE 2 OR 3""

## 2025-07-16 PROCEDURE — 99283 EMERGENCY DEPT VISIT LOW MDM: CPT

## 2025-07-16 PROCEDURE — 29515 APPLICATION SHORT LEG SPLINT: CPT

## 2025-07-16 ASSESSMENT — PAIN DESCRIPTION - DESCRIPTORS: DESCRIPTORS: THROBBING;TENDER;STABBING

## 2025-07-16 ASSESSMENT — FIBROSIS 4 INDEX: FIB4 SCORE: 0.42

## 2025-07-16 NOTE — ED TRIAGE NOTES
"Chief Complaint   Patient presents with    Foot Pain     Pt here for L foot pain/injury after tripping and falling, believes L foot is broken      /72   Pulse 96   Temp 36.1 °C (97 °F) (Temporal)   Resp 14   Ht 1.778 m (5' 10\")   Wt (!) 131 kg (288 lb 12.8 oz)   SpO2 89%   BMI 41.44 kg/m²     Pt brought in by family by WC for above cc  Pt states she was heading to bed, walking and tripped over her sandal, L foot went sideways and she fell  Pain/swelling to lateral and top part of L foot, pain does extend up to ankle  Denies knee pain, limited ROM and cannot bear weight  Denies numbness/tingling     Pt took a couple advil at home after    Pt to lobby by WC, educated on rooming process   "

## 2025-07-16 NOTE — ED PROVIDER NOTES
ER Provider Note    Scribed for Dr. Tra Rinaldi MD. by Shameka Diaz. 7/16/2025  2:06 AM    Primary Care Provider: HEMA Alonzo    CHIEF COMPLAINT  Chief Complaint   Patient presents with    Foot Pain     Pt here for L foot pain/injury after tripping and falling, believes L foot is broken        EXTERNAL RECORDS REVIEWED  Outpatient Notes The patient was last seen by her primary on 7/9/25 for evaluation of vaginal discharge. The patient was treated with diflucan. The patient has a history of prediabetes and fibromyalgia.     HPI/ROS  LIMITATION TO HISTORY   Select: : None    OUTSIDE HISTORIAN(S):  Parent present at bedside.     Carmen Hebert is a 30 y.o. female who presents to the ED for left foot pain onset prior to arrival. The patient states she tripped on a Birkenstock while at home. She states her pain is mostly localized to her foot, but has been experiencing shooting pain into her ankle. The patient notes she took Advil with some alleviation.     PAST MEDICAL HISTORY  Past Medical History[1]    SURGICAL HISTORY  Past Surgical History[2]    FAMILY HISTORY  Family History   Problem Relation Age of Onset    Fibromyalgia Mother     Lung Disease Mother         asthma r/o    Depression Mother     Anxiety disorder Mother     ADD / ADHD Father     Depression Father        SOCIAL HISTORY   reports that she has never smoked. She has never used smokeless tobacco. She reports current alcohol use. She reports that she does not currently use drugs after having used the following drugs: Marijuana.    CURRENT MEDICATIONS  Current Outpatient Medications   Medication Instructions    albuterol 108 (90 Base) MCG/ACT Aero Soln inhalation aerosol 2 Puffs, Inhalation, EVERY 4 HOURS PRN    ALPRAZolam (XANAX) 0.25 mg, NIGHTLY PRN    azelastine (ASTELIN) 137 mcg, Nasal, 2 TIMES DAILY    azithromycin (ZITHROMAX) 250 MG Tab Take 2 tabs on day 1, then take 1 tab on days 2-5    Calcium-Magnesium-Zinc  "(IRMA-MAG-ZINC PO) 1 Tablet, Every Day (QD)    clindamycin (CLEOCIN) 2 % vaginal cream 1 Applicator, Vaginal, NIGHTLY    cyanocobalamin (VITAMIN B-12) 100 mcg, DAILY    fluticasone (FLONASE) 50 mcg, Nasal, DAILY    fluticasone (FLONASE) 100 mcg, Nasal, DAILY    fluticasone-salmeterol (ADVAIR) 100-50 MCG/ACT AEROSOL POWDER, BREATH ACTIVATED 1 Puff, Inhalation, EVERY 12 HOURS    meclizine (ANTIVERT) 12.5 mg, Oral, 3 TIMES DAILY PRN    methylPREDNISolone (MEDROL DOSEPAK) 4 MG Tablet Therapy Pack As directed on the packaging label.    naltrexone (DEPADE) 50 mg, Oral, DAILY    Naltrexone-buPROPion HCl ER 8-90 MG TABLET SR 12 HR 8-90 mg, Oral, 2 TIMES DAILY    Norethindrone Acet-Ethinyl Est (BELL 1.5/30) 1.5-30 MG-MCG Tab TAKE 1 TABLET BY MOUTH DAILY. TAKE CONTINUOUSLY NO, PLACEBO WEEK    pregabalin (LYRICA) 150 mg, Oral, 3 Times Daily (TID)    [START ON 8/9/2025] pregabalin (LYRICA) 150 mg, Oral, 3 Times Daily (TID)    [START ON 9/6/2025] pregabalin (LYRICA) 150 mg, Oral, 3 Times Daily (TID)    propranolol LA (INDERAL LA) 60 mg, Oral, DAILY, Please call to schedule follow up appointment for further refills. Please call [phone]. Thank you.    sertraline (ZOLOFT) 200 mg, Oral, DAILY, 200 mg = 2 tablets    tizanidine (ZANAFLEX) 4 mg, EVERY 6 HOURS PRN    triamcinolone (NASACORT) 55 MCG/ACT nasal inhaler 2 Sprays, Nasal, DAILY    Vitamin C 1,000 mg, Every Day (QD)    vitamin D3 (CHOLECALCIFEROL) 1,000 Units, DAILY    Zepbound 2.5 mg, Subcutaneous, EVERY 7 DAYS      ALLERGIES  Corn-related products, Dairy food allergy, Gluten meal, Sesame seed (diagnostic), and Soy allergy    PHYSICAL EXAM  /72   Pulse 96   Temp 36.1 °C (97 °F) (Temporal)   Resp 14   Ht 1.778 m (5' 10\")   Wt (!) 131 kg (288 lb 12.8 oz)   SpO2 89%   BMI 41.44 kg/m²   Physical Exam  Vitals and nursing note reviewed.   Constitutional:       Appearance: She is well-developed.   HENT:      Head: Normocephalic.   Eyes:      Extraocular Movements: " Extraocular movements intact.      Pupils: Pupils are equal, round, and reactive to light.   Cardiovascular:      Rate and Rhythm: Normal rate and regular rhythm.   Pulmonary:      Effort: Pulmonary effort is normal.      Breath sounds: Normal breath sounds.   Abdominal:      Palpations: Abdomen is soft.      Tenderness: There is no abdominal tenderness.   Musculoskeletal:      Cervical back: Normal range of motion.      Comments: Soft tissue swelling and developing ecchymosis over the base of the 5th metatarsal with severe tenderness. No tenderness to medial or lateral malleolus.    Neurological:      Mental Status: She is alert and oriented to person, place, and time.       DIAGNOSTIC STUDIES & PROCEDURES    Radiology:   The attending Emergency Physician has independently interpreted the diagnostic imaging associated with this visit and is awaiting the final reading from the radiologist, which will be displayed below.    Preliminary interpretation is a follows: Avulsion fracture of the cuboid bone.   Radiologist interpretation:    DX-FOOT-2- LEFT   Final Result      No acute osseous abnormality.         Procedure Note: Orthopedic Splint Application  Location: Emergency Department  Procedure Performed: Type of splint: Posterior short leg  Indication: Fracture of cuboid  Pre-Procedure  Informed Consent:    Risks, benefits, and alternatives to splint application were discussed with the patient (and/or guardian).    Verbal consent was obtained.    Pre-Procedure Assessment:    Neurovascular Exam: Intact sensation, motor function, and pulses distal to the injury.    Skin Assessment: No open wounds at application site. Skin intact.    Pain Control: [e.g., Acetaminophen, ibuprofen, local anesthesia, procedural sedation -- specify drug/dose if applicable]    Procedure Details      A well-padded splint was applied using orthoglass.    Posterior short leg splint was applied    Secured with elastic bandage (e.g., ACE wrap)  ensuring adequate compression without compromise to circulation.    Splint was molded appropriately for fracture stabilization.    Materials Used:    orthoglass    Padding (e.g., Webril)    Elastic bandage (e.g., ACE)    Post-Procedure  Post-Splint Neurovascular Check:    Sensation, motor function, capillary refill, and distal pulses intact.    Tolerated Well: Yes   Complications: None     Aftercare Instructions:    Elevation, ice, avoid getting splint wet.    Neurovascular precautions reviewed (e.g., return for numbness, increased pain, cool fingers/toes).    Follow-up with orthopedics in 5-7 days.    I provided definitive fracture management    Disposition: Discharge    Signature:  Tra Rinaldi M.D.      COURSE & MEDICAL DECISION MAKING    INITIAL ASSESSMENT AND PLAN  Care Narrative:   ED OBS: No; Patient does not meet criteria for ED Observation.       2:06 AM - Patient seen and evaluated at bedside.  30-year-old female presenting for acute left foot pain after rolling her ankle on a Birkenstock shoe.  There is soft tissue swelling and tenderness about the base of the fifth of meta tarsal.  X-rays obtained and do show a small avulsion fracture at the cuboid.  Patient placed in posterior short leg splint and referred to orthopedic surgery for further management will be nonweightbearing at this time.    3:07 AM AM - The patient will be placed in a split with the foot dorsiflexed and provided crutches. I discussed the patient's diagnostic study results which show avulsion fracture of the cuboid bone. I discussed plan for discharge and follow up as outlined below. The patient is stable for discharge at this time and will return for any new or worsening symptoms. Patient verbalizes understanding and support with my plan for discharge.                 DISPOSITION AND DISCUSSIONS  I have discussed management of the patient with the following physicians and TYLER's: None    Discussion of management with other [facility] [place] or  appropriate source(s): None     Escalation of care considered, and ultimately not performed: .    Barriers to care at this time, including but not limited to: None known.     Decision tools and prescription drugs considered including, but not limited to: NSAIDs Tylenol for pain control.    The patient will return for new or worsening symptoms and is stable at the time of discharge.    The patient is referred to a primary physician for blood pressure management, diabetic screening, and for all other preventative health concerns.    DISPOSITION:  Patient will be discharged home in stable condition.    FOLLOW UP:  Waylon Padilla M.D.  [address] [facility] [id number] [phone]    Schedule an appointment as soon as possible for a visit       [facility], Emergency Dept  [address] [id number] [phone]        FINAL IMPRESSION   1. Closed displaced fracture of cuboid of left foot, initial encounter      IShameka (Scribe), am scribing for, and in the presence of, Tra Rinaldi M.D..    Electronically signed by: Shameka Diaz (Scribe), 7/16/2025    Tra HUERTA M.D. personally performed the services described in this documentation, as scribed by Shameka Diaz in my presence, and it is both accurate and complete.    The note accurately reflects work and decisions made by me.  Tra Rinaldi M.D.  7/16/2025  7:17 AM         [1]   Past Medical History:  Diagnosis Date    Anxiety     Asthma     Depression     Fibromyalgia 2017    Thalassemia minor    [2] History reviewed. No pertinent surgical history.

## 2025-07-16 NOTE — ED NOTES
Splint applied per ERP orders, CMS intact before and after splint application. PT provided with crutches. PT verbalized and demonstrated proper crutch use.

## 2025-07-16 NOTE — ED NOTES
PT taken back to room via WC. PT able to stand and transfer to [facility] independently. PT placed on monitor, call light within reach and chart up for ERP.

## 2025-07-17 ENCOUNTER — HOSPITAL ENCOUNTER (EMERGENCY)
Facility: MEDICAL CENTER | Age: 30
End: 2025-07-17
Attending: STUDENT IN AN ORGANIZED HEALTH CARE EDUCATION/TRAINING PROGRAM
Payer: COMMERCIAL

## 2025-07-17 VITALS
SYSTOLIC BLOOD PRESSURE: 104 MMHG | TEMPERATURE: 97.8 F | WEIGHT: 288 LBS | HEIGHT: 70 IN | RESPIRATION RATE: 18 BRPM | DIASTOLIC BLOOD PRESSURE: 55 MMHG | HEART RATE: 75 BPM | BODY MASS INDEX: 41.23 KG/M2 | OXYGEN SATURATION: 93 %

## 2025-07-17 DIAGNOSIS — M79.672 LEFT FOOT PAIN: Primary | ICD-10-CM

## 2025-07-17 PROCEDURE — 302874 HCHG BANDAGE ACE 2 OR 3"": Mod: EDC

## 2025-07-17 PROCEDURE — 29515 APPLICATION SHORT LEG SPLINT: CPT | Mod: EDC

## 2025-07-17 PROCEDURE — 99283 EMERGENCY DEPT VISIT LOW MDM: CPT | Mod: EDC

## 2025-07-17 ASSESSMENT — FIBROSIS 4 INDEX: FIB4 SCORE: 0.42

## 2025-07-18 NOTE — ED NOTES
"[facility].    Discharge instructions, which include signs and symptoms to monitor patient for, as well as detailed information regarding left foot pain provided.  All questions and concerns addressed at this time. Encouraged patient to schedule a follow- up appointment to be made with patient's PCP. Parent verbalizes understanding.    Patient leaves ER in no apparent distress. Provided education regarding returning to the ER for any new concerns or changes in patient's condition.      /55   Pulse 75   Temp 36.6 °C (97.8 °F) (Temporal)   Resp 18   Ht 1.778 m (5' 10\")   Wt (!) 131 kg (288 lb)   LMP 06/26/2025 (Approximate)   SpO2 93%   BMI 41.32 kg/m²     "

## 2025-07-18 NOTE — ED TRIAGE NOTES
Chief Complaint   Patient presents with    Foot Pain     Pt states she was seen here yesterday for a foot fracture and was splinted. Returns for uncontrolled pain. CMS in her left foot intact. Doppler in triage detects pulses dorsalis pedis. Foot warm and normal appearing.      Pt to triage in a wheel chair with above complaint. Pt A+O x4. Pt educated on triage process and verbalized understanding.

## 2025-07-18 NOTE — ED NOTES
Pt brought to PEDS 43. Reviewed triage note and agree. Pt reports fracturing foot yesterday. Reports uncontrolled pain since d/c yesterday. Reports new pain to bottom of heel. Skin PWD. Splint in place from previous visit. Respirations even and unlabored. Pt awake and alert in room. Pt provided gown. Pt resting on gurney in NAD. Call light within reach. Chart up for ERP.

## 2025-07-18 NOTE — ED NOTES
Pt name called out for triage multiple times in lobby, bathroom, and outside. No response, will attempt again.

## 2025-07-18 NOTE — ED PROVIDER NOTES
ER Provider Note    Scribed for Dr. Tra Rinaldi MD. by Ghada Carey. 7/17/2025  10:06 PM    Primary Care Provider: HEMA Alonzo    CHIEF COMPLAINT  Chief Complaint   Patient presents with    Foot Pain     Pt states she was seen here yesterday for a foot fracture and was splinted. Returns for uncontrolled pain. CMS in her left foot intact. Doppler in triage detects pulses dorsalis pedis. Foot warm and normal appearing.        EXTERNAL RECORDS REVIEWED  Hospital records reviewed and demonstrate patient was seen in this ED yesterday for a closed displaced fracture of cuboid of left foot and had a splint placed.     HPI/ROS  LIMITATION TO HISTORY   Select: : None    OUTSIDE HISTORIAN(S):  None    Carmen Hebert is a 30 y.o. female who presents to the ED for worsening foot pain onset yesterday. Patient reports she was seen here yesterday for a left foot fracture and had a splint placed. She states she is now experiencing worsening foot pain to her heel from the splint. Patient denies any further injuries. She states she has a follow up appointment with ortho next week.    PAST MEDICAL HISTORY  Past Medical History[1]    SURGICAL HISTORY  Past Surgical History[2]    FAMILY HISTORY  Family History   Problem Relation Age of Onset    Fibromyalgia Mother     Lung Disease Mother         asthma r/o    Depression Mother     Anxiety disorder Mother     ADD / ADHD Father     Depression Father        SOCIAL HISTORY   reports that she has never smoked. She has never used smokeless tobacco. She reports current alcohol use. She reports that she does not currently use drugs after having used the following drugs: Marijuana.    CURRENT MEDICATIONS  Discharge Medication List as of 7/17/2025 10:50 PM        CONTINUE these medications which have NOT CHANGED    Details   naltrexone (DEPADE) 50 MG Tab Take 1 Tablet by mouth every day., Disp-30 Tablet, R-1, Normal      clindamycin (CLEOCIN) 2 % vaginal cream  Insert 1 Applicator into the vagina every evening for 7 days., Disp-40 g, R-0, Normal      !! pregabalin (LYRICA) 150 MG Cap Take 1 Capsule by mouth 3 times a day for 30 days., Disp-90 Capsule, R-0, Normal      !! pregabalin (LYRICA) 150 MG Cap Take 1 Capsule by mouth 3 times a day for 30 days., Disp-90 Capsule, R-0, Normal      !! pregabalin (LYRICA) 150 MG Cap Take 1 Capsule by mouth 3 times a day for 30 days., Disp-90 Capsule, R-0, Normal      Naltrexone-buPROPion HCl ER 8-90 MG TABLET SR 12 HR Take 8-90 mg by mouth 2 times a day for 30 days., Disp-60 Tablet, R-0, Normal      azelastine (ASTELIN) 0.1 % nasal spray Administer 1 Spray into affected nostril(S) 2 times a day., Disp-30 mL, R-1, Normal      methylPREDNISolone (MEDROL DOSEPAK) 4 MG Tablet Therapy Pack As directed on the packaging label. Disp-21 Tablet, R-0, Normal      sertraline (ZOLOFT) 100 MG Tab Take 2 Tablets by mouth every day. 200 mg = 2 tablets, Disp-180 Tablet, R-3, Normal      azithromycin (ZITHROMAX) 250 MG Tab Take 2 tabs on day 1, then take 1 tab on days 2-5, Disp-6 Tablet, R-0, Normal      triamcinolone (NASACORT) 55 MCG/ACT nasal inhaler Administer 2 Sprays into affected nostril(S) every day., Disp-16 mL, R-1, Normal      propranolol LA (INDERAL LA) 60 MG CAPSULE SR 24 HR Take 1 Capsule by mouth every day. Please call to schedule follow up appointment for further refills. Please call [phone]. Thank you., Disp-90 Capsule, R-3, Normal      Norethindrone Acet-Ethinyl Est (BELL 1.5/30) 1.5-30 MG-MCG Tab TAKE 1 TABLET BY MOUTH DAILY. TAKE CONTINUOUSLY NO, PLACEBO WEEK, Disp-84 Tablet, R-3, Normal      !! fluticasone (FLONASE) 50 MCG/ACT nasal spray Administer 2 Sprays into affected nostril(S) every day., Disp-16 g, R-11, Normal      Tirzepatide-Weight Management (ZEPBOUND) 2.5 MG/0.5ML Solution Auto-injector Inject 2.5 mg under the skin every 7 days., Disp-4 mL, R-0, Normal      meclizine (ANTIVERT) 12.5 MG Tab Take 1 Tablet by mouth 3  "times a day as needed for Dizziness., Disp-90 Tablet, R-0, Normal      !! fluticasone (FLONASE) 50 MCG/ACT nasal spray Administer 1 Spray into affected nostril(S) every day., Disp-16 g, R-0, Normal      cyanocobalamin (VITAMIN B-12) 100 MCG Tab Take 100 mcg by mouth every day., Historical Med      fluticasone-salmeterol (ADVAIR) 100-50 MCG/ACT AEROSOL POWDER, BREATH ACTIVATED Inhale 1 Puff every 12 hours., Disp-1 Each, R-1, Normal      Ascorbic Acid (VITAMIN C) 1000 MG Tab Take 1,000 mg by mouth every day., Historical Med      vitamin D3 (CHOLECALCIFEROL) 1000 Unit (25 mcg) Tab Take 1,000 Units by mouth every day., Historical Med      Calcium-Magnesium-Zinc (IRMA-MAG-ZINC PO) Take 1 Tablet by mouth every day., Historical Med      albuterol 108 (90 Base) MCG/ACT Aero Soln inhalation aerosol Inhale 2 Puffs every four hours as needed for Shortness of Breath.Give albuterol that is patient or insurance preference pleaseDisp-1 Each, R-11, Normal      tizanidine (ZANAFLEX) 4 MG Tab Take 4 mg by mouth every 6 hours as needed., Historical Med      ALPRAZolam (XANAX) 0.25 MG Tab Take 0.25 mg by mouth at bedtime as needed for Sleep., Historical Med       !! - Potential duplicate medications found. Please discuss with provider.          ALLERGIES  Corn-related products, Dairy food allergy, Gluten meal, Sesame seed (diagnostic), and Soy allergy    PHYSICAL EXAM  /84   Pulse 90   Temp 35.9 °C (96.6 °F) (Temporal)   Resp 16   Ht 1.778 m (5' 10\")   Wt (!) 131 kg (288 lb)   LMP 06/26/2025 (Approximate)   SpO2 94%   BMI 41.32 kg/m²   Physical Exam  Vitals and nursing note reviewed.   Constitutional:       Appearance: She is well-developed.   HENT:      Head: Normocephalic.   Eyes:      Extraocular Movements: Extraocular movements intact.      Pupils: Pupils are equal, round, and reactive to light.   Cardiovascular:      Rate and Rhythm: Normal rate and regular rhythm.   Pulmonary:      Effort: Pulmonary effort is normal. "      Breath sounds: Normal breath sounds.   Abdominal:      Palpations: Abdomen is soft.      Tenderness: There is no abdominal tenderness.   Musculoskeletal:      Cervical back: Normal range of motion.      Left lower leg: Swelling (mild soft tissue) present.      Left ankle: Ecchymosis (to posterior ankle) present.      Left foot: Tenderness (to palpation at the base of the fifth metatarsal) present.      Comments: Mild ecchymosis to posterior    Neurological:      Mental Status: She is alert and oriented to person, place, and time.       DIAGNOSTIC STUDIES & PROCEDURES    SPLINT PLACEMENT:  The patient was placed in a posterior short leg splint and the splint was applied by ED tech. Following splint application I rechecked the position and circulation and neuro function. The splint was in good position with good neurovascular function. The left foot was well immobilized.    COURSE & MEDICAL DECISION MAKING    INITIAL ASSESSMENT AND PLAN  Care Narrative:       10:06 PM - Patient seen and evaluated at bedside.  30-year-old female presenting for severe pain of the left foot.  This was significantly improved after removal of the splint and it appears that the splint was placing pressure on some swollen area of the heel.  There is no skin breakdown and the patient has not bared weight or had any other injury to the area to warrant further imaging.  The splint was replaced being more cognizant of swelling and the patient reported significant improvement.  She will follow-up with orthopedics next week.    10:46 PM - I reevaluated patient at bedside. Splint placement procedure performed by ED tech at this time. The patient informs me they feel improved following re-wrapping of splint I discussed plan for discharge and follow up as outlined below. The patient is stable for discharge at this time and will return for any new or worsening symptoms. Patient verbalizes understanding and support with my plan for discharge.      ADDITIONAL PROBLEM LIST AND DISPOSITION                 DISPOSITION AND DISCUSSIONS  I have discussed management of the patient with the following physicians and TYLER's: None    Discussion of management with other [facility] or appropriate source(s): None     Escalation of care considered, and ultimately not performed: diagnostic imaging.    Barriers to care at this time, including but not limited to: None.     Decision tools and prescription drugs considered including, but not limited to: .      The patient will return for new or worsening symptoms and is stable at the time of discharge.    The patient is referred to a primary physician for blood pressure management, diabetic screening, and for all other preventative health concerns.    DISPOSITION:  Patient will be discharged home in stable condition.    FOLLOW UP:  MARCO AlonzoR.N.  [address]  [address]  [facility] [id number] [phone]          [facility], Emergency Dept  [address] [facility] [id number] [phone]        FINAL IMPRESSION   1. Left foot pain         Ghada HUERTA (Tashia), am scribing for, and in the presence of, Tra Rinaldi M.D..    Electronically signed by: Ghada Carey (Scribe), 7/17/2025    ITra M.D. personally performed the services described in this documentation, as scribed by Ghada Carey in my presence, and it is both accurate and complete.    The note accurately reflects work and decisions made by me.  Tra Rinaldi M.D.  7/18/2025  2:58 AM         [1]   Past Medical History:  Diagnosis Date    Anxiety     Asthma     Depression     Fibromyalgia 2017    Thalassemia minor    [2] History reviewed. No pertinent surgical history.

## 2025-08-19 ENCOUNTER — OFFICE VISIT (OUTPATIENT)
Dept: PHYSICAL MEDICINE AND REHAB | Facility: MEDICAL CENTER | Age: 30
End: 2025-08-19
Payer: COMMERCIAL

## 2025-08-19 VITALS
DIASTOLIC BLOOD PRESSURE: 68 MMHG | BODY MASS INDEX: 41.35 KG/M2 | OXYGEN SATURATION: 96 % | WEIGHT: 288.8 LBS | TEMPERATURE: 97.1 F | HEIGHT: 70 IN | SYSTOLIC BLOOD PRESSURE: 110 MMHG | HEART RATE: 75 BPM

## 2025-08-19 DIAGNOSIS — T14.8XXA MUSCLE STRAIN: ICD-10-CM

## 2025-08-19 DIAGNOSIS — M54.16 LUMBAR RADICULOPATHY: ICD-10-CM

## 2025-08-19 DIAGNOSIS — M43.17 SPONDYLOLISTHESIS OF LUMBOSACRAL REGION: ICD-10-CM

## 2025-08-19 DIAGNOSIS — M54.6 THORACIC SPINE PAIN: ICD-10-CM

## 2025-08-19 DIAGNOSIS — Z13.31 POSITIVE DEPRESSION SCREENING: ICD-10-CM

## 2025-08-19 DIAGNOSIS — M54.50 LOW BACK PAIN WITH RADIATION: Primary | ICD-10-CM

## 2025-08-19 DIAGNOSIS — M51.369 BULGE OF LUMBAR DISC WITHOUT MYELOPATHY: ICD-10-CM

## 2025-08-19 DIAGNOSIS — M41.24 OTHER IDIOPATHIC SCOLIOSIS, THORACIC REGION: ICD-10-CM

## 2025-08-19 DIAGNOSIS — M47.816 LUMBAR SPONDYLOSIS: ICD-10-CM

## 2025-08-19 DIAGNOSIS — Z91.81 RISK FOR FALLS: ICD-10-CM

## 2025-08-19 DIAGNOSIS — M62.838 MUSCLE SPASM: ICD-10-CM

## 2025-08-19 DIAGNOSIS — M79.7 FIBROMYALGIA: ICD-10-CM

## 2025-08-19 PROCEDURE — 99214 OFFICE O/P EST MOD 30 MIN: CPT | Performed by: STUDENT IN AN ORGANIZED HEALTH CARE EDUCATION/TRAINING PROGRAM

## 2025-08-19 PROCEDURE — 1125F AMNT PAIN NOTED PAIN PRSNT: CPT | Performed by: STUDENT IN AN ORGANIZED HEALTH CARE EDUCATION/TRAINING PROGRAM

## 2025-08-19 PROCEDURE — 3074F SYST BP LT 130 MM HG: CPT | Performed by: STUDENT IN AN ORGANIZED HEALTH CARE EDUCATION/TRAINING PROGRAM

## 2025-08-19 PROCEDURE — 3078F DIAST BP <80 MM HG: CPT | Performed by: STUDENT IN AN ORGANIZED HEALTH CARE EDUCATION/TRAINING PROGRAM

## 2025-08-19 ASSESSMENT — FIBROSIS 4 INDEX: FIB4 SCORE: 0.42

## 2025-08-19 ASSESSMENT — PATIENT HEALTH QUESTIONNAIRE - PHQ9
SUM OF ALL RESPONSES TO PHQ QUESTIONS 1-9: 12
CLINICAL INTERPRETATION OF PHQ2 SCORE: 4
5. POOR APPETITE OR OVEREATING: 1 - SEVERAL DAYS

## 2025-08-19 ASSESSMENT — PAIN SCALES - GENERAL: PAINLEVEL_OUTOF10: 7=MODERATE-SEVERE PAIN
